# Patient Record
Sex: FEMALE | Race: WHITE | Employment: OTHER | URBAN - METROPOLITAN AREA
[De-identification: names, ages, dates, MRNs, and addresses within clinical notes are randomized per-mention and may not be internally consistent; named-entity substitution may affect disease eponyms.]

---

## 2020-11-26 LAB — SARS-COV-2: ABNORMAL

## 2020-11-29 ENCOUNTER — HOSPITAL ENCOUNTER (INPATIENT)
Age: 75
LOS: 17 days | Discharge: NURSING, REHAB OR LONG-TERM CARE | DRG: 207 | End: 2020-12-16
Attending: INTERNAL MEDICINE | Admitting: INTERNAL MEDICINE
Payer: MEDICARE

## 2020-11-29 PROBLEM — E66.01 OBESITY, CLASS III, BMI 40-49.9 (MORBID OBESITY) (HCC): Chronic | Status: ACTIVE | Noted: 2020-11-29

## 2020-11-29 PROBLEM — J96.00 ACUTE RESPIRATORY FAILURE DUE TO COVID-19 (HCC): Status: ACTIVE | Noted: 2020-11-29

## 2020-11-29 PROBLEM — U07.1 ACUTE RESPIRATORY FAILURE DUE TO COVID-19 (HCC): Status: ACTIVE | Noted: 2020-11-29

## 2020-11-29 PROBLEM — E66.01 MORBID OBESITY WITH BMI OF 40.0-44.9, ADULT (HCC): Status: ACTIVE | Noted: 2020-11-29

## 2020-11-29 PROBLEM — E11.9 TYPE 2 DIABETES MELLITUS, WITH LONG-TERM CURRENT USE OF INSULIN (HCC): Status: ACTIVE | Noted: 2020-11-29

## 2020-11-29 PROBLEM — Z79.4 TYPE 2 DIABETES MELLITUS, WITH LONG-TERM CURRENT USE OF INSULIN (HCC): Status: ACTIVE | Noted: 2020-11-29

## 2020-11-29 PROBLEM — I10 ESSENTIAL HYPERTENSION: Status: ACTIVE | Noted: 2020-11-29

## 2020-11-29 LAB
A/G RATIO: 0.9 (ref 1.1–2.2)
ABO/RH: NORMAL
ALBUMIN SERPL-MCNC: 2.9 G/DL (ref 3.4–5)
ALP BLD-CCNC: 64 U/L (ref 40–129)
ALT SERPL-CCNC: 23 U/L (ref 10–40)
ANION GAP SERPL CALCULATED.3IONS-SCNC: 9 MMOL/L (ref 3–16)
ANTIBODY SCREEN: NORMAL
AST SERPL-CCNC: 28 U/L (ref 15–37)
BASE EXCESS ARTERIAL: 3.9 MMOL/L (ref -3–3)
BASE EXCESS ARTERIAL: 6.6 MMOL/L (ref -3–3)
BASOPHILS ABSOLUTE: 0 K/UL (ref 0–0.2)
BASOPHILS RELATIVE PERCENT: 0.2 %
BILIRUB SERPL-MCNC: 0.5 MG/DL (ref 0–1)
BUN BLDV-MCNC: 22 MG/DL (ref 7–20)
CALCIUM SERPL-MCNC: 8.2 MG/DL (ref 8.3–10.6)
CARBOXYHEMOGLOBIN ARTERIAL: 0.4 % (ref 0–1.5)
CARBOXYHEMOGLOBIN ARTERIAL: 0.5 % (ref 0–1.5)
CHLORIDE BLD-SCNC: 94 MMOL/L (ref 99–110)
CO2: 34 MMOL/L (ref 21–32)
CREAT SERPL-MCNC: 1.2 MG/DL (ref 0.6–1.2)
D DIMER: 747 NG/ML DDU (ref 0–229)
EOSINOPHILS ABSOLUTE: 0 K/UL (ref 0–0.6)
EOSINOPHILS RELATIVE PERCENT: 0 %
FIBRINOGEN: 583 MG/DL (ref 200–397)
GFR AFRICAN AMERICAN: 53
GFR NON-AFRICAN AMERICAN: 44
GLOBULIN: 3.2 G/DL
GLUCOSE BLD-MCNC: 300 MG/DL (ref 70–99)
GLUCOSE BLD-MCNC: 347 MG/DL (ref 70–99)
HCO3 ARTERIAL: 27.5 MMOL/L (ref 21–29)
HCO3 ARTERIAL: 31 MMOL/L (ref 21–29)
HCT VFR BLD CALC: 43 % (ref 36–48)
HEMOGLOBIN, ART, EXTENDED: 14.8 G/DL (ref 12–16)
HEMOGLOBIN, ART, EXTENDED: 15 G/DL (ref 12–16)
HEMOGLOBIN: 14.3 G/DL (ref 12–16)
INR BLD: 1.11 (ref 0.86–1.14)
LACTATE DEHYDROGENASE: 412 U/L (ref 100–190)
LYMPHOCYTES ABSOLUTE: 0.5 K/UL (ref 1–5.1)
LYMPHOCYTES RELATIVE PERCENT: 4.7 %
MAGNESIUM: 1.7 MG/DL (ref 1.8–2.4)
MCH RBC QN AUTO: 27.1 PG (ref 26–34)
MCHC RBC AUTO-ENTMCNC: 33.3 G/DL (ref 31–36)
MCV RBC AUTO: 81.4 FL (ref 80–100)
METHEMOGLOBIN ARTERIAL: 0.5 %
METHEMOGLOBIN ARTERIAL: 0.6 %
MONOCYTES ABSOLUTE: 0.3 K/UL (ref 0–1.3)
MONOCYTES RELATIVE PERCENT: 2.7 %
NEUTROPHILS ABSOLUTE: 10.1 K/UL (ref 1.7–7.7)
NEUTROPHILS RELATIVE PERCENT: 92.4 %
O2 CONTENT ARTERIAL: 16 ML/DL
O2 CONTENT ARTERIAL: 18 ML/DL
O2 SAT, ARTERIAL: 76.5 %
O2 SAT, ARTERIAL: 89.5 %
O2 THERAPY: ABNORMAL
O2 THERAPY: ABNORMAL
PCO2 ARTERIAL: 38.2 MMHG (ref 35–45)
PCO2 ARTERIAL: 43.5 MMHG (ref 35–45)
PDW BLD-RTO: 14.4 % (ref 12.4–15.4)
PERFORMED ON: ABNORMAL
PH ARTERIAL: 7.47 (ref 7.35–7.45)
PH ARTERIAL: 7.47 (ref 7.35–7.45)
PHOSPHORUS: 3.6 MG/DL (ref 2.5–4.9)
PLATELET # BLD: 235 K/UL (ref 135–450)
PMV BLD AUTO: 9.7 FL (ref 5–10.5)
PO2 ARTERIAL: 38.2 MMHG (ref 75–108)
PO2 ARTERIAL: 53.4 MMHG (ref 75–108)
POTASSIUM SERPL-SCNC: 3.1 MMOL/L (ref 3.5–5.1)
PROCALCITONIN: 0.11 NG/ML (ref 0–0.15)
PROTHROMBIN TIME: 12.9 SEC (ref 10–13.2)
RBC # BLD: 5.28 M/UL (ref 4–5.2)
SODIUM BLD-SCNC: 137 MMOL/L (ref 136–145)
TCO2 ARTERIAL: 28.7 MMOL/L
TCO2 ARTERIAL: 32.4 MMOL/L
TOTAL PROTEIN: 6.1 G/DL (ref 6.4–8.2)
WBC # BLD: 11 K/UL (ref 4–11)

## 2020-11-29 PROCEDURE — 84100 ASSAY OF PHOSPHORUS: CPT

## 2020-11-29 PROCEDURE — 6360000002 HC RX W HCPCS: Performed by: INTERNAL MEDICINE

## 2020-11-29 PROCEDURE — 99223 1ST HOSP IP/OBS HIGH 75: CPT | Performed by: INTERNAL MEDICINE

## 2020-11-29 PROCEDURE — 86900 BLOOD TYPING SEROLOGIC ABO: CPT

## 2020-11-29 PROCEDURE — 82728 ASSAY OF FERRITIN: CPT

## 2020-11-29 PROCEDURE — 85384 FIBRINOGEN ACTIVITY: CPT

## 2020-11-29 PROCEDURE — XW13325 TRANSFUSION OF CONVALESCENT PLASMA (NONAUTOLOGOUS) INTO PERIPHERAL VEIN, PERCUTANEOUS APPROACH, NEW TECHNOLOGY GROUP 5: ICD-10-PCS | Performed by: INTERNAL MEDICINE

## 2020-11-29 PROCEDURE — 85379 FIBRIN DEGRADATION QUANT: CPT

## 2020-11-29 PROCEDURE — 94761 N-INVAS EAR/PLS OXIMETRY MLT: CPT

## 2020-11-29 PROCEDURE — 86850 RBC ANTIBODY SCREEN: CPT

## 2020-11-29 PROCEDURE — 86901 BLOOD TYPING SEROLOGIC RH(D): CPT

## 2020-11-29 PROCEDURE — 82803 BLOOD GASES ANY COMBINATION: CPT

## 2020-11-29 PROCEDURE — 6370000000 HC RX 637 (ALT 250 FOR IP): Performed by: INTERNAL MEDICINE

## 2020-11-29 PROCEDURE — 36600 WITHDRAWAL OF ARTERIAL BLOOD: CPT

## 2020-11-29 PROCEDURE — 2000000000 HC ICU R&B

## 2020-11-29 PROCEDURE — 80053 COMPREHEN METABOLIC PANEL: CPT

## 2020-11-29 PROCEDURE — 83615 LACTATE (LD) (LDH) ENZYME: CPT

## 2020-11-29 PROCEDURE — 85610 PROTHROMBIN TIME: CPT

## 2020-11-29 PROCEDURE — 2700000000 HC OXYGEN THERAPY PER DAY

## 2020-11-29 PROCEDURE — 86140 C-REACTIVE PROTEIN: CPT

## 2020-11-29 PROCEDURE — 84145 PROCALCITONIN (PCT): CPT

## 2020-11-29 PROCEDURE — XW033E5 INTRODUCTION OF REMDESIVIR ANTI-INFECTIVE INTO PERIPHERAL VEIN, PERCUTANEOUS APPROACH, NEW TECHNOLOGY GROUP 5: ICD-10-PCS | Performed by: INTERNAL MEDICINE

## 2020-11-29 PROCEDURE — 85025 COMPLETE CBC W/AUTO DIFF WBC: CPT

## 2020-11-29 PROCEDURE — 36415 COLL VENOUS BLD VENIPUNCTURE: CPT

## 2020-11-29 PROCEDURE — 83735 ASSAY OF MAGNESIUM: CPT

## 2020-11-29 RX ORDER — INSULIN GLARGINE 100 [IU]/ML
45 INJECTION, SOLUTION SUBCUTANEOUS NIGHTLY
Status: DISCONTINUED | OUTPATIENT
Start: 2020-11-30 | End: 2020-11-29

## 2020-11-29 RX ORDER — MAGNESIUM SULFATE 1 G/100ML
1 INJECTION INTRAVENOUS PRN
Status: DISCONTINUED | OUTPATIENT
Start: 2020-11-29 | End: 2020-12-16 | Stop reason: HOSPADM

## 2020-11-29 RX ORDER — INSULIN GLARGINE 100 [IU]/ML
45 INJECTION, SOLUTION SUBCUTANEOUS NIGHTLY
Status: DISCONTINUED | OUTPATIENT
Start: 2020-11-29 | End: 2020-12-01

## 2020-11-29 RX ORDER — DEXTROSE MONOHYDRATE 50 MG/ML
100 INJECTION, SOLUTION INTRAVENOUS PRN
Status: DISCONTINUED | OUTPATIENT
Start: 2020-11-29 | End: 2020-12-16 | Stop reason: HOSPADM

## 2020-11-29 RX ORDER — ALBUTEROL SULFATE 90 UG/1
2 AEROSOL, METERED RESPIRATORY (INHALATION) EVERY 4 HOURS PRN
Status: DISCONTINUED | OUTPATIENT
Start: 2020-11-29 | End: 2020-12-16 | Stop reason: HOSPADM

## 2020-11-29 RX ORDER — PANTOPRAZOLE SODIUM 40 MG/1
40 TABLET, DELAYED RELEASE ORAL
Status: DISCONTINUED | OUTPATIENT
Start: 2020-11-30 | End: 2020-12-02

## 2020-11-29 RX ORDER — ACETAMINOPHEN 325 MG/1
650 TABLET ORAL EVERY 4 HOURS PRN
Status: DISCONTINUED | OUTPATIENT
Start: 2020-11-29 | End: 2020-12-16 | Stop reason: HOSPADM

## 2020-11-29 RX ORDER — BENZONATATE 100 MG/1
200 CAPSULE ORAL 3 TIMES DAILY PRN
Status: DISCONTINUED | OUTPATIENT
Start: 2020-11-29 | End: 2020-12-02

## 2020-11-29 RX ORDER — POTASSIUM CHLORIDE 7.45 MG/ML
10 INJECTION INTRAVENOUS PRN
Status: DISCONTINUED | OUTPATIENT
Start: 2020-11-29 | End: 2020-12-03

## 2020-11-29 RX ORDER — ALBUTEROL SULFATE 90 UG/1
2 AEROSOL, METERED RESPIRATORY (INHALATION) 4 TIMES DAILY
Status: DISCONTINUED | OUTPATIENT
Start: 2020-11-29 | End: 2020-11-29

## 2020-11-29 RX ORDER — DEXTROSE MONOHYDRATE 25 G/50ML
12.5 INJECTION, SOLUTION INTRAVENOUS PRN
Status: DISCONTINUED | OUTPATIENT
Start: 2020-11-29 | End: 2020-12-16 | Stop reason: HOSPADM

## 2020-11-29 RX ORDER — NICOTINE POLACRILEX 4 MG
15 LOZENGE BUCCAL PRN
Status: DISCONTINUED | OUTPATIENT
Start: 2020-11-29 | End: 2020-12-16 | Stop reason: HOSPADM

## 2020-11-29 RX ORDER — ONDANSETRON 2 MG/ML
4 INJECTION INTRAMUSCULAR; INTRAVENOUS EVERY 6 HOURS PRN
Status: DISCONTINUED | OUTPATIENT
Start: 2020-11-29 | End: 2020-12-16 | Stop reason: HOSPADM

## 2020-11-29 RX ORDER — METHYLPREDNISOLONE SODIUM SUCCINATE 40 MG/ML
40 INJECTION, POWDER, LYOPHILIZED, FOR SOLUTION INTRAMUSCULAR; INTRAVENOUS EVERY 12 HOURS
Status: DISCONTINUED | OUTPATIENT
Start: 2020-11-29 | End: 2020-12-03

## 2020-11-29 RX ORDER — SODIUM CHLORIDE 0.9 % (FLUSH) 0.9 %
10 SYRINGE (ML) INJECTION PRN
Status: DISCONTINUED | OUTPATIENT
Start: 2020-11-29 | End: 2020-12-16 | Stop reason: HOSPADM

## 2020-11-29 RX ORDER — 0.9 % SODIUM CHLORIDE 0.9 %
30 INTRAVENOUS SOLUTION INTRAVENOUS PRN
Status: DISCONTINUED | OUTPATIENT
Start: 2020-11-29 | End: 2020-12-16 | Stop reason: HOSPADM

## 2020-11-29 RX ORDER — 0.9 % SODIUM CHLORIDE 0.9 %
20 INTRAVENOUS SOLUTION INTRAVENOUS ONCE
Status: COMPLETED | OUTPATIENT
Start: 2020-11-29 | End: 2020-11-30

## 2020-11-29 RX ORDER — ACETAMINOPHEN 650 MG/1
650 SUPPOSITORY RECTAL EVERY 4 HOURS PRN
Status: DISCONTINUED | OUTPATIENT
Start: 2020-11-29 | End: 2020-12-16 | Stop reason: HOSPADM

## 2020-11-29 RX ADMIN — INSULIN LISPRO 6 UNITS: 100 INJECTION, SOLUTION INTRAVENOUS; SUBCUTANEOUS at 22:08

## 2020-11-29 RX ADMIN — INSULIN LISPRO 9 UNITS: 100 INJECTION, SOLUTION INTRAVENOUS; SUBCUTANEOUS at 23:55

## 2020-11-29 RX ADMIN — METHYLPREDNISOLONE SODIUM SUCCINATE 40 MG: 40 INJECTION, POWDER, FOR SOLUTION INTRAMUSCULAR; INTRAVENOUS at 22:09

## 2020-11-29 RX ADMIN — ENOXAPARIN SODIUM 40 MG: 40 INJECTION SUBCUTANEOUS at 22:08

## 2020-11-29 NOTE — PROGRESS NOTES
Patient arrived by transport from [facility]. On 10 L HFNC and alert and oriented. RN notified Dr. Matt Thompson that patient arrived.

## 2020-11-30 ENCOUNTER — APPOINTMENT (OUTPATIENT)
Dept: VASCULAR LAB | Age: 75
DRG: 207 | End: 2020-11-30
Attending: INTERNAL MEDICINE
Payer: MEDICARE

## 2020-11-30 ENCOUNTER — APPOINTMENT (OUTPATIENT)
Dept: GENERAL RADIOLOGY | Age: 75
DRG: 207 | End: 2020-11-30
Attending: INTERNAL MEDICINE
Payer: MEDICARE

## 2020-11-30 PROBLEM — J44.9 COPD (CHRONIC OBSTRUCTIVE PULMONARY DISEASE) (HCC): Chronic | Status: ACTIVE | Noted: 2020-11-30

## 2020-11-30 LAB
A/G RATIO: 0.8 (ref 1.1–2.2)
ALBUMIN SERPL-MCNC: 2.7 G/DL (ref 3.4–5)
ALP BLD-CCNC: 64 U/L (ref 40–129)
ALT SERPL-CCNC: 20 U/L (ref 10–40)
ANION GAP SERPL CALCULATED.3IONS-SCNC: 11 MMOL/L (ref 3–16)
AST SERPL-CCNC: 24 U/L (ref 15–37)
BASOPHILS ABSOLUTE: 0 K/UL (ref 0–0.2)
BASOPHILS RELATIVE PERCENT: 0.1 %
BILIRUB SERPL-MCNC: 0.5 MG/DL (ref 0–1)
BLOOD BANK DISPENSE STATUS: NORMAL
BLOOD BANK PRODUCT CODE: NORMAL
BPU ID: NORMAL
BUN BLDV-MCNC: 20 MG/DL (ref 7–20)
C-REACTIVE PROTEIN: 94.9 MG/L (ref 0–5.1)
CALCIUM SERPL-MCNC: 8.5 MG/DL (ref 8.3–10.6)
CHLORIDE BLD-SCNC: 96 MMOL/L (ref 99–110)
CO2: 33 MMOL/L (ref 21–32)
CREAT SERPL-MCNC: 1.1 MG/DL (ref 0.6–1.2)
D DIMER: 774 NG/ML DDU (ref 0–229)
DESCRIPTION BLOOD BANK: NORMAL
EOSINOPHILS ABSOLUTE: 0 K/UL (ref 0–0.6)
EOSINOPHILS RELATIVE PERCENT: 0 %
ESTIMATED AVERAGE GLUCOSE: 200.1 MG/DL
FERRITIN: 802.4 NG/ML (ref 15–150)
FIBRINOGEN: 599 MG/DL (ref 200–397)
GFR AFRICAN AMERICAN: 59
GFR NON-AFRICAN AMERICAN: 49
GLOBULIN: 3.4 G/DL
GLUCOSE BLD-MCNC: 136 MG/DL (ref 70–99)
GLUCOSE BLD-MCNC: 189 MG/DL (ref 70–99)
GLUCOSE BLD-MCNC: 189 MG/DL (ref 70–99)
GLUCOSE BLD-MCNC: 205 MG/DL (ref 70–99)
GLUCOSE BLD-MCNC: 205 MG/DL (ref 70–99)
GLUCOSE BLD-MCNC: 238 MG/DL (ref 70–99)
GLUCOSE BLD-MCNC: 262 MG/DL (ref 70–99)
HBA1C MFR BLD: 8.6 %
HCT VFR BLD CALC: 41.4 % (ref 36–48)
HEMOGLOBIN: 13.8 G/DL (ref 12–16)
INR BLD: 1.09 (ref 0.86–1.14)
LYMPHOCYTES ABSOLUTE: 0.6 K/UL (ref 1–5.1)
LYMPHOCYTES RELATIVE PERCENT: 6.9 %
MAGNESIUM: 1.8 MG/DL (ref 1.8–2.4)
MCH RBC QN AUTO: 27.6 PG (ref 26–34)
MCHC RBC AUTO-ENTMCNC: 33.3 G/DL (ref 31–36)
MCV RBC AUTO: 82.9 FL (ref 80–100)
MONOCYTES ABSOLUTE: 0.4 K/UL (ref 0–1.3)
MONOCYTES RELATIVE PERCENT: 4 %
NEUTROPHILS ABSOLUTE: 8 K/UL (ref 1.7–7.7)
NEUTROPHILS RELATIVE PERCENT: 89 %
PDW BLD-RTO: 14.5 % (ref 12.4–15.4)
PERFORMED ON: ABNORMAL
PHOSPHORUS: 3.1 MG/DL (ref 2.5–4.9)
PLATELET # BLD: 213 K/UL (ref 135–450)
PMV BLD AUTO: 10 FL (ref 5–10.5)
POTASSIUM SERPL-SCNC: 2.9 MMOL/L (ref 3.5–5.1)
PROTHROMBIN TIME: 12.6 SEC (ref 10–13.2)
RBC # BLD: 4.99 M/UL (ref 4–5.2)
SODIUM BLD-SCNC: 140 MMOL/L (ref 136–145)
TOTAL PROTEIN: 6.1 G/DL (ref 6.4–8.2)
WBC # BLD: 9 K/UL (ref 4–11)

## 2020-11-30 PROCEDURE — 6360000002 HC RX W HCPCS: Performed by: INTERNAL MEDICINE

## 2020-11-30 PROCEDURE — 2500000003 HC RX 250 WO HCPCS: Performed by: INTERNAL MEDICINE

## 2020-11-30 PROCEDURE — 85384 FIBRINOGEN ACTIVITY: CPT

## 2020-11-30 PROCEDURE — 6370000000 HC RX 637 (ALT 250 FOR IP): Performed by: INTERNAL MEDICINE

## 2020-11-30 PROCEDURE — 6360000002 HC RX W HCPCS: Performed by: NURSE PRACTITIONER

## 2020-11-30 PROCEDURE — 83735 ASSAY OF MAGNESIUM: CPT

## 2020-11-30 PROCEDURE — 71045 X-RAY EXAM CHEST 1 VIEW: CPT

## 2020-11-30 PROCEDURE — 99291 CRITICAL CARE FIRST HOUR: CPT | Performed by: INTERNAL MEDICINE

## 2020-11-30 PROCEDURE — 83036 HEMOGLOBIN GLYCOSYLATED A1C: CPT

## 2020-11-30 PROCEDURE — 85610 PROTHROMBIN TIME: CPT

## 2020-11-30 PROCEDURE — 94660 CPAP INITIATION&MGMT: CPT

## 2020-11-30 PROCEDURE — 85025 COMPLETE CBC W/AUTO DIFF WBC: CPT

## 2020-11-30 PROCEDURE — 2000000000 HC ICU R&B

## 2020-11-30 PROCEDURE — 2700000000 HC OXYGEN THERAPY PER DAY

## 2020-11-30 PROCEDURE — 84100 ASSAY OF PHOSPHORUS: CPT

## 2020-11-30 PROCEDURE — 94761 N-INVAS EAR/PLS OXIMETRY MLT: CPT

## 2020-11-30 PROCEDURE — 36415 COLL VENOUS BLD VENIPUNCTURE: CPT

## 2020-11-30 PROCEDURE — 80053 COMPREHEN METABOLIC PANEL: CPT

## 2020-11-30 PROCEDURE — 2580000003 HC RX 258: Performed by: INTERNAL MEDICINE

## 2020-11-30 PROCEDURE — 93971 EXTREMITY STUDY: CPT

## 2020-11-30 PROCEDURE — 85379 FIBRIN DEGRADATION QUANT: CPT

## 2020-11-30 RX ORDER — MAGNESIUM SULFATE IN WATER 40 MG/ML
2 INJECTION, SOLUTION INTRAVENOUS ONCE
Status: COMPLETED | OUTPATIENT
Start: 2020-11-30 | End: 2020-11-30

## 2020-11-30 RX ADMIN — ENOXAPARIN SODIUM 40 MG: 40 INJECTION SUBCUTANEOUS at 09:47

## 2020-11-30 RX ADMIN — INSULIN GLARGINE 45 UNITS: 100 INJECTION, SOLUTION SUBCUTANEOUS at 00:13

## 2020-11-30 RX ADMIN — SODIUM CHLORIDE 20 ML: 9 INJECTION, SOLUTION INTRAVENOUS at 01:50

## 2020-11-30 RX ADMIN — MAGNESIUM SULFATE HEPTAHYDRATE 2 G: 40 INJECTION, SOLUTION INTRAVENOUS at 09:47

## 2020-11-30 RX ADMIN — POTASSIUM CHLORIDE 10 MEQ: 10 INJECTION, SOLUTION INTRAVENOUS at 15:34

## 2020-11-30 RX ADMIN — POTASSIUM CHLORIDE 10 MEQ: 10 INJECTION, SOLUTION INTRAVENOUS at 12:25

## 2020-11-30 RX ADMIN — REMDESIVIR 100 MG: 100 INJECTION, POWDER, LYOPHILIZED, FOR SOLUTION INTRAVENOUS at 22:39

## 2020-11-30 RX ADMIN — INSULIN LISPRO 6 UNITS: 100 INJECTION, SOLUTION INTRAVENOUS; SUBCUTANEOUS at 12:34

## 2020-11-30 RX ADMIN — INSULIN LISPRO 9 UNITS: 100 INJECTION, SOLUTION INTRAVENOUS; SUBCUTANEOUS at 18:13

## 2020-11-30 RX ADMIN — POTASSIUM CHLORIDE 10 MEQ: 10 INJECTION, SOLUTION INTRAVENOUS at 14:33

## 2020-11-30 RX ADMIN — POTASSIUM CHLORIDE 10 MEQ: 10 INJECTION, SOLUTION INTRAVENOUS at 17:18

## 2020-11-30 RX ADMIN — ENOXAPARIN SODIUM 40 MG: 40 INJECTION SUBCUTANEOUS at 22:30

## 2020-11-30 RX ADMIN — POTASSIUM CHLORIDE 10 MEQ: 10 INJECTION, SOLUTION INTRAVENOUS at 13:30

## 2020-11-30 RX ADMIN — POTASSIUM CHLORIDE 10 MEQ: 10 INJECTION, SOLUTION INTRAVENOUS at 08:34

## 2020-11-30 RX ADMIN — METHYLPREDNISOLONE SODIUM SUCCINATE 40 MG: 40 INJECTION, POWDER, FOR SOLUTION INTRAMUSCULAR; INTRAVENOUS at 22:30

## 2020-11-30 RX ADMIN — PANTOPRAZOLE SODIUM 40 MG: 40 TABLET, DELAYED RELEASE ORAL at 08:33

## 2020-11-30 RX ADMIN — REMDESIVIR 200 MG: 100 INJECTION, POWDER, LYOPHILIZED, FOR SOLUTION INTRAVENOUS at 00:30

## 2020-11-30 RX ADMIN — INSULIN LISPRO 3 UNITS: 100 INJECTION, SOLUTION INTRAVENOUS; SUBCUTANEOUS at 18:13

## 2020-11-30 RX ADMIN — INSULIN GLARGINE 45 UNITS: 100 INJECTION, SOLUTION SUBCUTANEOUS at 22:40

## 2020-11-30 RX ADMIN — INSULIN LISPRO 6 UNITS: 100 INJECTION, SOLUTION INTRAVENOUS; SUBCUTANEOUS at 04:21

## 2020-11-30 ASSESSMENT — PAIN SCALES - GENERAL
PAINLEVEL_OUTOF10: 0

## 2020-11-30 NOTE — PROGRESS NOTES
11/30/20 0805   Oxygen Therapy/Pulse Ox   O2 Therapy Oxygen humidified   $Oxygen $Daily Charge   O2 Device Heated high flow cannula   O2 Flow Rate (L/min) 35 L/min   FiO2  80 %   Resp 21   SpO2 96 %   $Pulse Oximeter $Spot check (multiple/continuous)

## 2020-11-30 NOTE — PROGRESS NOTES
11/30/20 0339   Oxygen Therapy/Pulse Ox   O2 Therapy Oxygen humidified   O2 Device Heated high flow cannula   O2 Flow Rate (L/min) 28 L/min   FiO2  100 %   SpO2 93 %   Pulse Oximeter Device Mode Continuous   Pulse Oximeter Device Location Finger   Blood Gas  Performed?  No

## 2020-11-30 NOTE — CONSULTS
[email or web address]-[email or web address]_18%20Telemed%20Guidance%20Updated%20March%2018. pdf?[email or web address]                      [email or web address]_18%20Telemed%20Guidance%20Updated%20March%2018. pdf?[email or web address]  [email or web address]. pdf         Results:  CBC:   Recent Labs     11/29/20 1946   WBC 11.0   HGB 14.3   HCT 43.0   MCV 81.4        BMP:   Recent Labs     11/29/20 1946      K 3.1*   CL 94*   CO2 34*   PHOS 3.6   BUN 22*   CREATININE 1.2     LIVER PROFILE:   Recent Labs     11/29/20 1946   AST 28   ALT 23   BILITOT 0.5   ALKPHOS 64     PT/INR:   Recent Labs     11/29/20 1946   PROTIME 12.9   INR 1.11           Echocardiogram: None in Epic   PFT: None in Epic         Assessment:  Active Problems:    Acute respiratory failure due to COVID-19 [facility])    Essential hypertension    Obesity, Class III, BMI 40-49.9 (morbid obesity) ([facility]    Type 2 diabetes mellitus, with long-term current use of insulin (HCC)    DM (diabetes mellitus), secondary uncontrolled ([facility]    Morbid obesity with BMI of 40.0-44.9, adult ([facility]  Resolved Problems:    * No resolved hospital problems.  *          Plan:   Oxygen supplementation to keep saturation between 90 to 94%  Patient was failing the high flow oxygen and for which reason Vapotherm oxygen was ordered  Titrate oxygen as per the above parameters  Monitor for any worsening respiratory compromise or hypoxemia which is progressively worse  If patient has worsening respite status or hypoxemia which is not controlled by Vapotherm then patient may require intubation and mechanical ventilator support  Patient has been started on Lovenox by the admitting provider-consider changing to subcutaneous heparin secondary morbid obesity if deemed appropriate  Patient was started on IV Solu-Medrol  Patient's blood sugar are elevated and will worsen with the Solu-Medrol  Patient has been started on Lantus insulin and titration of the dose of Lantus as per blood glucose monitoring  BGM with SSI  Will start patient on remdesivir  Patient may require convalescent plasma  Monitor input output and BMP  Correct electrolytes on whenever necessary basis  Bronchodilators can be given on as needed basis  PUD prophylaxis as per IM    Patient's clinical status is precarious and has potential for further decompensation and needs to monitor closely in the ICU    Patient has a high potential of getting intubated and being put on mechanical ventilatory support          Electronically signed by:  Shruti Garcia MD    11/29/2020    10:57 PM.

## 2020-11-30 NOTE — H&P
Hospital Medicine History & Physical      Patient:  Quang Ash  :   [birth year]  MRN:   [id number]  Date of Service: 20    CHIEF COMPLAINT:  dyspnea    HISTORY OF PRESENT ILLNESS:    Quang Ash is a 70 y.o. female. She was accepted in transfer from [facility]. She presentd there w/ respiratory failure requiring 10L/min O2 supplementation. She was diagnosed with COVID19 on . She was tested 3 days prior to that at [facility] but she never got the result. The documentation accompanying her includes only the  result. She estimates she has been symptomatic for about two weeks overall. Initial symptoms were profound fatigue, headache, loss of appetite and sense of smell & taste, and finally cough. Dyspnea has evolved over time. She relates her dyspnea has rapidly accelerated over the past 2 - 3 days. Cough has been usually non-productive, but sometimes she expectorates white sputum. Denies pleuritic pain and hemoptysis. She reports a h/o COPD CHF. She does not require supplemental O2 at baseline. Review of Systems:  All pertinent positives and negatives are as noted in the HPI section. All other systems were reviewed and are negative. Past Medical History:   Diagnosis Date    Hyperlipidemia     Hypertension     Neuropathy     Type II or unspecified type diabetes mellitus without mention of complication, not stated as uncontrolled ([facility]        Past Surgical History:   Procedure Laterality Date    APPENDECTOMY      CHOLECYSTECTOMY      HUMERUS FRACTURE SURGERY      TUBAL LIGATION      TUMOR REMOVAL           Prior to Admission medications    Medication Sig Start Date End Date Taking?  Authorizing Provider   metoprolol (LOPRESSOR) 100 MG tablet  10/6/14  Yes Historical Provider, MD   furosemide (LASIX) 40 MG tablet  14   Historical Provider, MD   gabapentin (NEURONTIN) 600 MG tablet  14   Historical Provider, MD   LEVEMIR FLEXPEN 100 UNIT/ML injection pen  10/9/14   Historical Provider, MD   Insulin Disposable Pump (V-GO 40) KIT  11/6/14   Historical Provider, MD   VICTOZA 18 MG/3ML SOPN SC injection  10/3/14   Historical Provider, MD   HUMALOG 100 UNIT/ML injection vial  11/5/14   Historical Provider, MD   acetaminophen-codeine (TYLENOL #3) 300-30 MG per tablet  10/11/14   Cheryl Provider, MD   HYDROcodone-acetaminophen (NORCO) 5-325 MG per tablet Take 1 tablet by mouth every 6 hours as needed for Pain. 12/26/14   Luis Chicas MD   acetaminophen-codeine (TYLENOL/CODEINE #3) 300-30 MG per tablet Take 1 tablet by mouth every 4 hours as needed for Pain. 11/7/14   Malina Morgan MD   methocarbamol (ROBAXIN) 500 MG tablet Take 1 tablet by mouth 4 times daily. 10/30/14   Malina Morgan MD   acetaminophen-codeine (TYLENOL/CODEINE #3) 300-30 MG per tablet Take 1 tablet by mouth every 4 hours as needed for Pain. 10/16/14   Malina Morgan MD       Allergies:   Ibuprofen and Lyrica [pregabalin]    Social:   reports that she has never smoked. She does not have any smokeless tobacco history on file. has no history on file for alcohol. Social History     Substance and Sexual Activity   Drug Use Not on file       Family History  Family History reviewed. No pertinent family history. PHYSICAL EXAM:  I performed this physical examination. Vitals:  Patient Vitals for the past 24 hrs:   BP Temp Temp src Pulse Resp SpO2 Weight   11/29/20 1850 (!) 141/59 96 °F (35.6 °C) Oral 73 28 91 % 241 lb 2.9 oz (109.4 kg)     No intake or output data in the 24 hours ending 11/29/20 1921    Vent Settings:  10 L/min HFNC    GEN:  Appearance:  Obese female who appears acutely ill but NAD . Level of Consciousness:  alert . Orientation:  ull    HEENT: Sclera anicteric.  no conjunctival chemosis. moist mucus membranes. no specific or diagnostic oral lesions. NECK:  no signs of meningismus. Jugular veins not distended.   Carotid pulses  2+.  no cervical lymphadenopathy. no thyromegaly. CV:  regular rhythm. normal S1 & S2.    no murmur. no rub.  no gallop. PULM:  Chest excursion is symmetric. Breath sounds are diminished throughout. Adventitious sounds:  Crackles throughout all fields. AB:  Abdominal shape is obese. Bowel sounds are active. Generally soft to palpation. no tenderness is present. no involuntary guarding. no rebound guarding. EXTR:  Skin is warm. Capillary refill brisk. no specific or pathognomic rash. no clubbing. 1+ pitting edema about the ankles w/ some associated distal skin thickening. Patch of skin hyperemia of left medial leg. Not tender but sensation is poor. No assymetry othrwise and no venous cords. No active wound or ulcer. LABS:  [facility] (dated 11/26 - 29/20)    CBC: WBC=16.2  82% segs, 6% lymph (UYM=560)   Hgb = 14.7   Plt = 310  Creat = 1.4 -> 1.7 on serial testing since 11/26    ABG #1:  7.48 / 39 / 48 / 28 (+4.3)  On 5L/min O2  ABG #2:  7.43 / 32 / 85 / 21 (-2.3)   on 10L/min via HFNC    Procalcitonin was < 0.05 on 11/26    IMAGING:  [facility]  -  Evolving changes since 11/26. On 11/26 there was a faint RLL opacity. Since that time bilateral peripheral opacities have evolved. Active Hospital Problems    Diagnosis Date Noted    COPD (chronic obstructive pulmonary disease) ([address] [J44.9] 11/30/2020    Acute respiratory failure due to COVID-19 ([address] [U07.1, J96.00] 11/29/2020    Essential hypertension [I10] 11/29/2020    Obesity, Class III, BMI 40-49.9 (morbid obesity) ([address] [E66.01] 11/29/2020    Type 2 diabetes mellitus, with long-term current use of insulin ([address] [E11.9, Z79.4] 11/29/2020       ASSESSMENT & PLAN  Respiratory failure, COPD, COVID19  -  Titrate respiratory support according to patient's needs and advanced care plan. Currently patient is requiring 10L/min O2 support. -  Patient seems to be at significant risk for progression of respiratory failure.   She was advised of the potential need for intubation and mechanical ventilation. She was agreeable if necessary.  -  Repeat all pertinent inflammatory labs. Obtain an ABG. The first sample obtained was venous. The second sample was arterial with a pO2 = 53. Vapotherm made available if needed. -  Start BMI-adjusted low-intensity anticoagulation w/ lovenox 40mg bid. There is suspicion for LLE DVT though so will request duplex venous U/S as well. -  Start solumedrol 40mg IV bid, remdesivir, and 1U convalescent plasma. DM2  -  Hold all oral antidiabetic agents. Start s.c. Insulin regimen based on home regimen. DVT prophylaxis: SCDs, lovenox 40 bid  Stress ulcers:  protonix  Code Status:  Ambivalent. Full code.   Disposition:  Inpatient    Ashly Mai MD

## 2020-11-30 NOTE — PROGRESS NOTES
11/30/20 1138   Oxygen Therapy/Pulse Ox   O2 Therapy Oxygen humidified   O2 Device Heated high flow cannula   O2 Flow Rate (L/min) 35 L/min   FiO2  80 %   Resp 24   SpO2 92 %   Humidification Temp 37

## 2020-11-30 NOTE — PROGRESS NOTES
11/30/20 0013   Oxygen Therapy/Pulse Ox   O2 Therapy Oxygen humidified   O2 Device Heated high flow cannula   O2 Flow Rate (L/min) 30 L/min   FiO2  100 %   Resp 9   SpO2 96 %   Pulse Oximeter Device Mode Continuous   Pulse Oximeter Device Location Finger   Blood Gas  Performed?  No

## 2020-11-30 NOTE — FLOWSHEET NOTE
11/30/20 1834   Encounter Summary   Services provided to: Patient not available   Referral/Consult From: [address] Yes  (Left AD materials with nurse to provide to patient)   Unable to visit patient in person (COVID+). Left materials with nurse to be give to patient.

## 2020-11-30 NOTE — PROGRESS NOTES
Pulmonary & Critical Care Inpatient Progress Note   Kj Winston MD     REASON FOR TODAY'S VISIT:  Critical care management    SUBJECTIVE:   Remains on vapotherm support, 35 LPM and 80% FIO2    Scheduled Meds:   methylPREDNISolone  40 mg Intravenous Q12H    insulin lispro  0.08 Units/kg Subcutaneous TID WC    insulin lispro  0-18 Units Subcutaneous TID WC    insulin lispro  0-9 Units Subcutaneous Nightly    enoxaparin  40 mg Subcutaneous BID    insulin glargine  45 Units Subcutaneous Nightly    remdesivir IVPB  100 mg Intravenous Q24H    pantoprazole  40 mg Oral QAM AC       Continuous Infusions:   dextrose         PRN Meds:  sodium chloride flush, potassium chloride, magnesium sulfate, acetaminophen **OR** acetaminophen, ondansetron, albuterol sulfate HFA, benzonatate, glucose, dextrose, glucagon (rDNA), dextrose, sodium chloride, albuterol sulfate HFA **AND** ipratropium **AND** MDI Treatment    ALLERGIES:  Patient is allergic to contrast [iodides]; ibuprofen; and lipitor [atorvastatin calcium]. Objective:   PHYSICAL EXAM:  BP (!) 188/91   Pulse 77   Temp 97.7 °F (36.5 °C) (Bladder)   Resp 25   Ht 5' 4\" (1.626 m)   Wt 242 lb 1 oz (109.8 kg)   SpO2 91%   BMI 41.55 kg/m²    Physical Exam  Constitutional:       General: She is not in acute distress. Appearance: She is well-developed. She is not diaphoretic. HENT:      Head: Normocephalic and atraumatic. Mouth/Throat:      Pharynx: No oropharyngeal exudate. Eyes:      Pupils: Pupils are equal, round, and reactive to light. Neck:      Musculoskeletal: Neck supple. Vascular: No JVD. Cardiovascular:      Heart sounds: Normal heart sounds. No murmur. No friction rub. No gallop. Pulmonary:      Effort: Pulmonary effort is normal.      Breath sounds: No wheezing or rales. Abdominal:      General: Bowel sounds are normal. There is no distension. Palpations: Abdomen is soft. Tenderness: There is no abdominal tenderness.

## 2020-11-30 NOTE — PROGRESS NOTES
all pertinent inflammatory labs. Obtain an ABG. The first sample obtained was venous. The second sample was arterial with a pO2 = 53. Vapotherm made available if needed. -  Started BMI-adjusted low-intensity anticoagulation w/ lovenox 40mg bid. There is suspicion for LLE DVT though so will request duplex venous U/S as well. -  Started solumedrol 40mg IV bid, remdesivir, and 1U convalescent plasma.     DM2  -  Hold all oral antidiabetic agents. Start s.c. Insulin regimen based on home regimen.     DVT Prophylaxis: lovenox bid  Diet: Diet NPO Effective Now Exceptions are: Sips with Meds  Code Status: Full Code    PT/OT Eval Status: not possible    Dispo - pending workup, icu care    Lisy Gray MD

## 2020-11-30 NOTE — PROGRESS NOTES
RESPIRATORY THERAPY ASSESSMENT    [facility]  Record Number:  [id number]  Age: 70 y.o. Gender: female  : 1945  Today's Date:  2020  Room:  0229/0229-01    Assessment     Is the patient being admitted for a COPD or Asthma exacerbation? No   (If yes the patient will be seen every 4 hours for the first 24 hours and then reassessed)    Patient Admission Diagnosis      Allergies  Allergies   Allergen Reactions    Ibuprofen     Lyrica [Pregabalin]      Side effects       Minimum Predicted Vital Capacity:     n/a          Actual Vital Capacity:      n/a              Pulmonary History:COPD and CHF/Pulmonary Edema  Home Oxygen Therapy:  room air  Home Respiratory Therapy:Albuterol inh PRN   Current Respiratory Therapy:  Atrovent and Albuterol inhaler           Respiratory Severity Index(RSI)   Patients with orders for inhalation medications, oxygen, or any therapeutic treatment modality will be placed on Respiratory Protocol. They will be assessed with the first treatment and at least every 72 hours thereafter. The following severity scale will be used to determine frequency of treatment intervention.     Smoking History: Pulmonary Disease or Smoking History, Greater than 15 pack year = 2    Social History  Social History     Tobacco Use    Smoking status: Never Smoker   Substance Use Topics    Alcohol use: Not on file    Drug use: Not on file       Recent Surgical History: None = 0  Past Surgical History  Past Surgical History:   Procedure Laterality Date    APPENDECTOMY      CHOLECYSTECTOMY      HUMERUS FRACTURE SURGERY      TUBAL LIGATION      TUMOR REMOVAL         Level of Consciousness: Alert, Oriented, and Cooperative = 0    Level of Activity: Bedridden, unresponsive or quadriplegic = 4    Respiratory Pattern: Increased; RR 21-30 = 1    Breath Sounds: Diminshed bilaterally and/or crackles = 2    Sputum   ,  ,    Cough: Strong, productive = 1    Vital Signs   BP (!) 141/59   Pulse 73   Temp 96 °F (35.6 °C) (Oral)   Resp 28   Wt 241 lb 2.9 oz (109.4 kg)   SpO2 91%   BMI 41.40 kg/m²   SPO2 (COPD values may differ): Less than 86% on room air or greater than 92% on FiO2 greater than 50% = 4    Peak Flow (asthma only): not applicable = 0    RSI: 67-05 = Q4WA (every four hours while awake) and Q4hrs PRN        Plan       Goals: medication delivery, mobilize retained secretions, volume expansion and improve oxygenation    Patient/caregiver was educated on the proper method of use for Respiratory Care Devices:  Yes      Level of patient/caregiver understanding able to:   ? Verbalize understanding   ? Demonstrate understanding       ? Teach back        ? Needs reinforcement       ? No available caregiver               ? Other:     Response to education:  Good     Is patient being placed on Home Treatment Regimen? No     Does the patient have everything they need prior to discharge? NA     Comments: pt seen on admission, eval at bedside     Plan of Care: albuterol and Atrovent inhalers       Electronically signed by Johnny Craig RCP on 11/29/2020 at 9:01 PM    Respiratory Protocol Guidelines     1. Assessment and treatment by Respiratory Therapy will be initiated for medication and therapeutic interventions upon initiation of aerosolized medication. 2. Physician will be contacted for respiratory rate (RR) greater than 35 breaths per minute. Therapy will be held for heart rate (HR) greater than 140 beats per minute, pending direction from physician. 3. Bronchodilators will be administered via Metered Dose Inhaler (MDI) with spacer when the following criteria are met:  a. Alert and cooperative     b. HR < 140 bpm  c. RR < 30 bpm                d. Can demonstrate a 2-3 second inspiratory hold  4. Bronchodilators will be administered via Hand Held Nebulizer [facility]) to patients when ANY of the following criteria are met  a. Incognizant or uncooperative          b.  Patients treated with HHN at Home        c. Unable to demonstrate proper use of MDI with spacer     d. RR > 30 bpm   5. Bronchodilators will be delivered via Metered Dose Inhaler (MDI), HHN, Aerogen to intubated patients on mechanical ventilation. 6. Inhalation medication orders will be delivered and/or substituted as outlined below. Aerosolized Medications Ordering and Administration Guidelines:    1. All Medications will be ordered by a physician, and their frequency and/or modality will be adjusted as defined by the patients Respiratory Severity Index (RSI) score. 2. If the patient does not have documented COPD, consider discontinuing anticholinergics when RSI is less than 9.  3. If the bronchospasm worsens (increased RSI), then the bronchodilator frequency can be increased to a maximum of every 4 hours. If greater than every 4 hours is required, the physician will be contacted. 4. If the bronchospasm improves, the frequency of the bronchodilator can be decreased, based on the patient's RSI, but not less than home treatment regimen frequency. 5. Bronchodilator(s) will be discontinued if patient has a RSI less than 9 and has received no scheduled or as needed treatment for 72  Hrs. Patients Ordered on a Mucolytic Agent:    1. Must always be administered with a bronchodilator. 2. Discontinue if patient experiences worsened bronchospasm, or secretions have lessened to the point that the patient is able to clear them with a cough. Anti-inflammatory and Combination Medications:    1. If the patient lacks prior history of lung disease, is not using inhaled anti-inflammatory medication at home, and lacks wheezing by examination or by history for at least 24 hours, contact physician for possible discontinuation.

## 2020-11-30 NOTE — PROGRESS NOTES
Granddaughter updated on Pt's status and plan of care. OK'd by patient to discuss with granddaughter.

## 2020-11-30 NOTE — PROGRESS NOTES
Comprehensive Nutrition Assessment    Type and Reason for Visit:  Initial, Positive Nutrition Screen(decreased appetite, weight loss)    Nutrition Recommendations/Plan:   1. NPO at this time  2. If/when enteral nutrition initiated recommend  Vital High Protein at trophic rate, increase when medically appropriate 10 ml every 4 hours to goal rate of 60 ml/hr providing 1208 calories, 105 grams of protein, 966 ml free water + Water flush 30 ml every 4 hours    Nutrition Assessment:  Patient admitted with acute respiratory failure due to COVID 19. Patient was transferred to ICU yesterday due to worsening respiratory status, currently on vapotherm with high risk for intubation per pulmonary. Nutritional status compromised with reported decreased appetite and weight loss prior to admission. At risk for further decline with current condition interfering with nutritional delivery. Will closely monitor medical condition, nutrition progression and plan of care. Malnutrition Assessment:  Malnutrition Status:   At risk for malnutrition (Comment)(limited access to nutrition + increased nutrient needs for compromised lung function)      Estimated Daily Nutrient Needs:  Energy (kcal):  6018-8682; Weight Used for Energy Requirements:  Current(109.8 kg)     Protein (g):  ; Weight Used for Protein Requirements:  Ideal(1.5-2.0)        Fluid (ml/day):  per MD with current condition; Method Used for Fluid Requirements:         Nutrition Related Findings:  no Edema; no BM noted yet; K 2.9 this am requiring replacement; blood sugars improving from greater than 300 mg/dl to less than 200 mg/dl in the past 24 hours-may vary pending steroid need      Wounds:  (redness on L ankle)       Current Nutrition Therapies:    Diet NPO Effective Now Exceptions are: Sips with Meds  Current Tube Feeding (TF) Orders:  · Goal TF & Flush Orders Provides: Start Vital 1.2 AF at trophic rate, increase when medically appropriate 10 ml every 4 hours to goal rate of 60 ml/hr providing 1208 calories, 105 grams of protein, 966 ml free water + Water flush 30 ml every 4 hours    Anthropometric Measures:  · Height: 5' 4\" (162.6 cm)  · Current Body Weight: 242 lb 1 oz (109.8 kg)   · Admission Body Weight: 241 lb 2 oz (109.4 kg)    · Ideal Body Weight: 120 lbs; % Ideal Body Weight 201.7 %   · BMI: 41.5  · BMI Categories: Obese Class 3 (BMI 40.0 or greater)       Nutrition Diagnosis:   · Inadequate energy intake related to lack or limited access to food, impaired respiratory function as evidenced by NPO or clear liquid status due to medical condition      Nutrition Interventions:   Food and/or Nutrient Delivery:  Continue NPO  Nutrition Education/Counseling:  No recommendation at this time   Coordination of Nutrition Care:  Continue to monitor while inpatient    Goals:  Patient will tolerate nutrition initiation without worsening respiratory function or blood sugars greater than 180 mg/dl. Nutrition Monitoring and Evaluation:   Food/Nutrient Intake Outcomes:  Enteral Nutrition Intake/Tolerance  Physical Signs/Symptoms Outcomes:  Biochemical Data, Nutrition Focused Physical Findings     Discharge Planning:     Too soon to determine     Electronically signed by Ligia Plascencia, [address],  on 11/30/20 at 10:34 AM EST    Contact: Office: [phone]; [address]: [phone]

## 2020-12-01 LAB
A/G RATIO: 0.8 (ref 1.1–2.2)
ALBUMIN SERPL-MCNC: 2.6 G/DL (ref 3.4–5)
ALP BLD-CCNC: 67 U/L (ref 40–129)
ALT SERPL-CCNC: 18 U/L (ref 10–40)
ANION GAP SERPL CALCULATED.3IONS-SCNC: 8 MMOL/L (ref 3–16)
AST SERPL-CCNC: 29 U/L (ref 15–37)
BASOPHILS ABSOLUTE: 0 K/UL (ref 0–0.2)
BASOPHILS RELATIVE PERCENT: 0.4 %
BILIRUB SERPL-MCNC: 0.5 MG/DL (ref 0–1)
BILIRUBIN URINE: NEGATIVE
BLOOD, URINE: ABNORMAL
BUN BLDV-MCNC: 19 MG/DL (ref 7–20)
CALCIUM SERPL-MCNC: 8.2 MG/DL (ref 8.3–10.6)
CHLORIDE BLD-SCNC: 102 MMOL/L (ref 99–110)
CLARITY: ABNORMAL
CO2: 33 MMOL/L (ref 21–32)
COLOR: ABNORMAL
COMMENT UA: ABNORMAL
CREAT SERPL-MCNC: 0.9 MG/DL (ref 0.6–1.2)
D DIMER: 918 NG/ML DDU (ref 0–229)
EOSINOPHILS ABSOLUTE: 0 K/UL (ref 0–0.6)
EOSINOPHILS RELATIVE PERCENT: 0 %
EPITHELIAL CELLS, UA: ABNORMAL /HPF (ref 0–5)
FIBRINOGEN: 599 MG/DL (ref 200–397)
GFR AFRICAN AMERICAN: >60
GFR NON-AFRICAN AMERICAN: >60
GLOBULIN: 3.2 G/DL
GLUCOSE BLD-MCNC: 115 MG/DL (ref 70–99)
GLUCOSE BLD-MCNC: 160 MG/DL (ref 70–99)
GLUCOSE BLD-MCNC: 191 MG/DL (ref 70–99)
GLUCOSE BLD-MCNC: 198 MG/DL (ref 70–99)
GLUCOSE BLD-MCNC: 201 MG/DL (ref 70–99)
GLUCOSE URINE: 100 MG/DL
HCT VFR BLD CALC: 44.3 % (ref 36–48)
HEMOGLOBIN: 14.6 G/DL (ref 12–16)
INR BLD: 1.11 (ref 0.86–1.14)
KETONES, URINE: NEGATIVE MG/DL
LEUKOCYTE ESTERASE, URINE: ABNORMAL
LYMPHOCYTES ABSOLUTE: 0.6 K/UL (ref 1–5.1)
LYMPHOCYTES RELATIVE PERCENT: 4.8 %
MAGNESIUM: 2.4 MG/DL (ref 1.8–2.4)
MCH RBC QN AUTO: 26.9 PG (ref 26–34)
MCHC RBC AUTO-ENTMCNC: 32.9 G/DL (ref 31–36)
MCV RBC AUTO: 81.7 FL (ref 80–100)
MICROSCOPIC EXAMINATION: YES
MONOCYTES ABSOLUTE: 0.4 K/UL (ref 0–1.3)
MONOCYTES RELATIVE PERCENT: 3 %
NEUTROPHILS ABSOLUTE: 11.2 K/UL (ref 1.7–7.7)
NEUTROPHILS RELATIVE PERCENT: 91.8 %
NITRITE, URINE: NEGATIVE
PDW BLD-RTO: 14.3 % (ref 12.4–15.4)
PERFORMED ON: ABNORMAL
PH UA: 7.5 (ref 5–8)
PHOSPHORUS: 2.6 MG/DL (ref 2.5–4.9)
PLATELET # BLD: 284 K/UL (ref 135–450)
PMV BLD AUTO: 9.7 FL (ref 5–10.5)
POTASSIUM SERPL-SCNC: 3.5 MMOL/L (ref 3.5–5.1)
PROTEIN UA: 100 MG/DL
PROTHROMBIN TIME: 12.9 SEC (ref 10–13.2)
RBC # BLD: 5.42 M/UL (ref 4–5.2)
RBC UA: >100 /HPF (ref 0–4)
RENAL EPITHELIAL, UA: ABNORMAL /HPF (ref 0–1)
SODIUM BLD-SCNC: 143 MMOL/L (ref 136–145)
SPECIFIC GRAVITY UA: 1.02 (ref 1–1.03)
TOTAL PROTEIN: 5.8 G/DL (ref 6.4–8.2)
URINE TYPE: ABNORMAL
UROBILINOGEN, URINE: 0.2 E.U./DL
WBC # BLD: 12.2 K/UL (ref 4–11)
WBC UA: ABNORMAL /HPF (ref 0–5)

## 2020-12-01 PROCEDURE — 2000000000 HC ICU R&B

## 2020-12-01 PROCEDURE — 85025 COMPLETE CBC W/AUTO DIFF WBC: CPT

## 2020-12-01 PROCEDURE — 83735 ASSAY OF MAGNESIUM: CPT

## 2020-12-01 PROCEDURE — 84100 ASSAY OF PHOSPHORUS: CPT

## 2020-12-01 PROCEDURE — 2580000003 HC RX 258: Performed by: INTERNAL MEDICINE

## 2020-12-01 PROCEDURE — 6370000000 HC RX 637 (ALT 250 FOR IP): Performed by: NURSE PRACTITIONER

## 2020-12-01 PROCEDURE — 6370000000 HC RX 637 (ALT 250 FOR IP): Performed by: INTERNAL MEDICINE

## 2020-12-01 PROCEDURE — 85379 FIBRIN DEGRADATION QUANT: CPT

## 2020-12-01 PROCEDURE — 81001 URINALYSIS AUTO W/SCOPE: CPT

## 2020-12-01 PROCEDURE — 99291 CRITICAL CARE FIRST HOUR: CPT | Performed by: INTERNAL MEDICINE

## 2020-12-01 PROCEDURE — 6370000000 HC RX 637 (ALT 250 FOR IP)

## 2020-12-01 PROCEDURE — 85610 PROTHROMBIN TIME: CPT

## 2020-12-01 PROCEDURE — 85384 FIBRINOGEN ACTIVITY: CPT

## 2020-12-01 PROCEDURE — 6360000002 HC RX W HCPCS: Performed by: INTERNAL MEDICINE

## 2020-12-01 PROCEDURE — 94761 N-INVAS EAR/PLS OXIMETRY MLT: CPT

## 2020-12-01 PROCEDURE — 94660 CPAP INITIATION&MGMT: CPT

## 2020-12-01 PROCEDURE — 2500000003 HC RX 250 WO HCPCS: Performed by: INTERNAL MEDICINE

## 2020-12-01 PROCEDURE — 80053 COMPREHEN METABOLIC PANEL: CPT

## 2020-12-01 PROCEDURE — 2700000000 HC OXYGEN THERAPY PER DAY

## 2020-12-01 PROCEDURE — 36415 COLL VENOUS BLD VENIPUNCTURE: CPT

## 2020-12-01 RX ORDER — METOPROLOL TARTRATE 50 MG/1
50 TABLET, FILM COATED ORAL 2 TIMES DAILY
Status: DISCONTINUED | OUTPATIENT
Start: 2020-12-01 | End: 2020-12-16 | Stop reason: HOSPADM

## 2020-12-01 RX ORDER — INSULIN GLARGINE 100 [IU]/ML
50 INJECTION, SOLUTION SUBCUTANEOUS NIGHTLY
Status: DISCONTINUED | OUTPATIENT
Start: 2020-12-01 | End: 2020-12-03

## 2020-12-01 RX ADMIN — INSULIN LISPRO 3 UNITS: 100 INJECTION, SOLUTION INTRAVENOUS; SUBCUTANEOUS at 12:26

## 2020-12-01 RX ADMIN — INSULIN LISPRO 9 UNITS: 100 INJECTION, SOLUTION INTRAVENOUS; SUBCUTANEOUS at 17:27

## 2020-12-01 RX ADMIN — ENOXAPARIN SODIUM 40 MG: 40 INJECTION SUBCUTANEOUS at 09:08

## 2020-12-01 RX ADMIN — ACETAMINOPHEN 650 MG: 325 TABLET ORAL at 23:04

## 2020-12-01 RX ADMIN — INSULIN LISPRO 9 UNITS: 100 INJECTION, SOLUTION INTRAVENOUS; SUBCUTANEOUS at 12:27

## 2020-12-01 RX ADMIN — METOPROLOL TARTRATE 50 MG: 50 TABLET, FILM COATED ORAL at 23:04

## 2020-12-01 RX ADMIN — METHYLPREDNISOLONE SODIUM SUCCINATE 40 MG: 40 INJECTION, POWDER, FOR SOLUTION INTRAMUSCULAR; INTRAVENOUS at 09:07

## 2020-12-01 RX ADMIN — ENOXAPARIN SODIUM 40 MG: 40 INJECTION SUBCUTANEOUS at 23:03

## 2020-12-01 RX ADMIN — METOPROLOL TARTRATE 50 MG: 50 TABLET, FILM COATED ORAL at 12:08

## 2020-12-01 RX ADMIN — METHYLPREDNISOLONE SODIUM SUCCINATE 40 MG: 40 INJECTION, POWDER, FOR SOLUTION INTRAMUSCULAR; INTRAVENOUS at 23:03

## 2020-12-01 RX ADMIN — Medication: at 17:26

## 2020-12-01 RX ADMIN — REMDESIVIR 100 MG: 100 INJECTION, POWDER, LYOPHILIZED, FOR SOLUTION INTRAVENOUS at 23:13

## 2020-12-01 RX ADMIN — INSULIN LISPRO 9 UNITS: 100 INJECTION, SOLUTION INTRAVENOUS; SUBCUTANEOUS at 09:11

## 2020-12-01 RX ADMIN — INSULIN GLARGINE 50 UNITS: 100 INJECTION, SOLUTION SUBCUTANEOUS at 23:19

## 2020-12-01 RX ADMIN — INSULIN LISPRO 3 UNITS: 100 INJECTION, SOLUTION INTRAVENOUS; SUBCUTANEOUS at 17:26

## 2020-12-01 RX ADMIN — INSULIN LISPRO 3 UNITS: 100 INJECTION, SOLUTION INTRAVENOUS; SUBCUTANEOUS at 09:08

## 2020-12-01 ASSESSMENT — PAIN SCALES - GENERAL
PAINLEVEL_OUTOF10: 0
PAINLEVEL_OUTOF10: 2
PAINLEVEL_OUTOF10: 0
PAINLEVEL_OUTOF10: 0

## 2020-12-01 NOTE — CARE COORDINATION
CASE MANAGEMENT INITIAL ASSESSMENT      Reviewed chart and completed assessment via telephone with: pt, briefly as pt is on vapotherm. Pt answered yes/no questions. Explained Case Management role/services. Primary contact information:[phone]    Health Care Decision Maker: Not discussed at this time. Admit date/status:11/29 Inpatient  Diagnosis:Acute respiratory failure, Covid positive   Is this a Readmission?:  No      Insurance: [place] [employer]   Precert required for SNF: No, waived during pandemic       3 night stay required: No    Living arrangements, Adls, care needs, prior to admission: Pt lives at home with , denies using any DME including oxygen, denies having any home care. Transportation:Pending needs, usually family. PT/OT recs:not appropriate at this time, would be helpful when respiratory status more stable. Hospital Exemption Notification (HEN): Would be needed for SNF    Barriers to discharge:None noted    Plan/comments:CM will continue to follow pt's progress and coordinate discharge arrangements as appropriate. ECOC on chart for MD signature.   JHONATHAN Schwab-RN

## 2020-12-01 NOTE — PROGRESS NOTES
bilaterally. Full range of motion without deformity. Skin: Skin color, texture, turgor normal.  No rashes or lesions. Neurologic:  Neurovascularly intact without any focal sensory/motor deficits. Cranial nerves: II-XII intact, grossly non-focal.  Psychiatric: Alert and oriented, thought content appropriate, normal insight  Capillary Refill: Brisk,< 3 seconds   Peripheral Pulses: +2 palpable, equal bilaterally             Labs:   Recent Labs     11/29/20 1946 11/30/20 0432 12/01/20 0440   WBC 11.0 9.0 12.2*   HGB 14.3 13.8 14.6   HCT 43.0 41.4 44.3    213 284     Recent Labs     11/29/20 1946 11/30/20 0433 12/01/20 0440    140 143   K 3.1* 2.9* 3.5   CL 94* 96* 102   CO2 34* 33* 33*   BUN 22* 20 19   CREATININE 1.2 1.1 0.9   CALCIUM 8.2* 8.5 8.2*   PHOS 3.6 3.1 2.6     Recent Labs     11/29/20 1946 11/30/20 0433 12/01/20  0440   AST 28 24 29   ALT 23 20 18   BILITOT 0.5 0.5 0.5   ALKPHOS 64 64 67     Recent Labs     11/29/20 1946 11/30/20 0432 12/01/20  0440   INR 1.11 1.09 1.11     No results for input(s): Rawleigh Lesser in the last 72 hours. Urinalysis:    No results found for: Pamela Reed, BACTERIA, RBCUA, BLOODU, [address], [address]    Radiology:  VL Extremity Venous Left   Final Result      XR CHEST PORTABLE   Final Result   Bilateral airspace disease is suspicious for pneumonia, and could be   compatible with history of COVID-19.                  Assessment/Plan:    Active Hospital Problems    Diagnosis    [facility]ch[facility]) [facility] [J44.9]    Acute respiratory failure due to COVID-19 ([facility]) [U07.1, J96.00]    Essential hypertension [I10]    Obesity, Class III, BMI 40-49.9 (morbid obesity) ([facility] [E66.01]    Type 2 diabetes mellitus, with long-term current use of insulin (HCC) [E11.9, Z79.4]         Respiratory failure, COPD, COVID19  -  Titrate respiratory support according to patient's needs and advanced care plan.  Patient was requiring vapotherm  -  Patient seems to be at significant risk for progression of respiratory failure.  She was advised of the potential need for intubation and mechanical ventilation.  She was agreeable if necessary.  -  Repeated all pertinent inflammatory labs.  Obtained an ABG.  The first sample obtained was venous.  The second sample was arterial with a pO2 = 53.  Vapotherm made available if needed. -  Started BMI-adjusted low-intensity anticoagulation w/ lovenox 40mg bid. Nicola Miller is suspicion for LLE DVT though so will request duplex venous U/S as well. -  Started solumedrol 40mg IV bid, remdesivir, and 1U convalescent plasma.     DM2  -  Hold all oral antidiabetic agents.  Start s.c. Insulin regimen based on home regimen.     DVT Prophylaxis: lovenox bid  Diet: DIET CARB CONTROL;  Dental Soft  Code Status: Full Code    PT/OT Eval Status: not possible    Dispo - unclear, icu care    Brittany Cunningham MD

## 2020-12-01 NOTE — PROGRESS NOTES
This note also relates to the following rows which could not be included:  Resp - Cannot attach notes to unvalidated device data       11/30/20 7536   Oxygen Therapy/Pulse Ox   O2 Device Heated high flow cannula   O2 Flow Rate (L/min) 35 L/min   FiO2  80 %   SpO2 93 %

## 2020-12-01 NOTE — PROGRESS NOTES
This note also relates to the following rows which could not be included:  Resp - Cannot attach notes to unvalidated device data  SpO2 - Cannot attach notes to unvalidated device data       12/01/20 1659   Oxygen Therapy/Pulse Ox   O2 Therapy Oxygen humidified   O2 Device Heated high flow cannula   O2 Flow Rate (L/min) 35 L/min   FiO2  80 %

## 2020-12-01 NOTE — PROGRESS NOTES
12/01/20 0346   Oxygen Therapy/Pulse Ox   O2 Device Heated high flow cannula   O2 Flow Rate (L/min) 35 L/min   FiO2  80 %   Resp 21   SpO2 92 %

## 2020-12-02 ENCOUNTER — APPOINTMENT (OUTPATIENT)
Dept: GENERAL RADIOLOGY | Age: 75
DRG: 207 | End: 2020-12-02
Attending: INTERNAL MEDICINE
Payer: MEDICARE

## 2020-12-02 LAB
A/G RATIO: 0.8 (ref 1.1–2.2)
ALBUMIN SERPL-MCNC: 2.7 G/DL (ref 3.4–5)
ALP BLD-CCNC: 77 U/L (ref 40–129)
ALT SERPL-CCNC: 19 U/L (ref 10–40)
ANION GAP SERPL CALCULATED.3IONS-SCNC: 10 MMOL/L (ref 3–16)
AST SERPL-CCNC: 27 U/L (ref 15–37)
ATYPICAL LYMPHOCYTE RELATIVE PERCENT: 1 % (ref 0–6)
BANDED NEUTROPHILS RELATIVE PERCENT: 10 % (ref 0–7)
BASE EXCESS ARTERIAL: 3.3 MMOL/L (ref -3–3)
BASE EXCESS ARTERIAL: 5 (ref -3–3)
BASE EXCESS ARTERIAL: 8.1 MMOL/L (ref -3–3)
BASOPHILS ABSOLUTE: 0 K/UL (ref 0–0.2)
BASOPHILS RELATIVE PERCENT: 0 %
BILIRUB SERPL-MCNC: 0.5 MG/DL (ref 0–1)
BUN BLDV-MCNC: 21 MG/DL (ref 7–20)
CALCIUM SERPL-MCNC: 8.5 MG/DL (ref 8.3–10.6)
CARBOXYHEMOGLOBIN ARTERIAL: 0.1 % (ref 0–1.5)
CARBOXYHEMOGLOBIN ARTERIAL: 0.4 % (ref 0–1.5)
CHLORIDE BLD-SCNC: 104 MMOL/L (ref 99–110)
CO2: 32 MMOL/L (ref 21–32)
CREAT SERPL-MCNC: 0.9 MG/DL (ref 0.6–1.2)
D DIMER: 973 NG/ML DDU (ref 0–229)
EOSINOPHILS ABSOLUTE: 0 K/UL (ref 0–0.6)
EOSINOPHILS RELATIVE PERCENT: 0 %
FIBRINOGEN: 540 MG/DL (ref 200–397)
GFR AFRICAN AMERICAN: >60
GFR NON-AFRICAN AMERICAN: >60
GLOBULIN: 3.3 G/DL
GLUCOSE BLD-MCNC: 124 MG/DL (ref 70–99)
GLUCOSE BLD-MCNC: 124 MG/DL (ref 70–99)
GLUCOSE BLD-MCNC: 129 MG/DL (ref 70–99)
GLUCOSE BLD-MCNC: 302 MG/DL (ref 70–99)
HCO3 ARTERIAL: 30 MMOL/L (ref 21–29)
HCO3 ARTERIAL: 30.4 MMOL/L (ref 21–29)
HCO3 ARTERIAL: 32.5 MMOL/L (ref 21–29)
HCT VFR BLD CALC: 47.9 % (ref 36–48)
HEMOGLOBIN, ART, EXTENDED: 16.4 G/DL (ref 12–16)
HEMOGLOBIN, ART, EXTENDED: 16.4 G/DL (ref 12–16)
HEMOGLOBIN: 15.5 G/DL (ref 12–16)
INR BLD: 1.3 (ref 0.86–1.14)
LYMPHOCYTES ABSOLUTE: 0.7 K/UL (ref 1–5.1)
LYMPHOCYTES RELATIVE PERCENT: 4 %
MAGNESIUM: 2.4 MG/DL (ref 1.8–2.4)
MCH RBC QN AUTO: 26.7 PG (ref 26–34)
MCHC RBC AUTO-ENTMCNC: 32.4 G/DL (ref 31–36)
MCV RBC AUTO: 82.5 FL (ref 80–100)
METHEMOGLOBIN ARTERIAL: 0.5 %
METHEMOGLOBIN ARTERIAL: 0.5 %
MONOCYTES ABSOLUTE: 0.1 K/UL (ref 0–1.3)
MONOCYTES RELATIVE PERCENT: 1 %
MYELOCYTE PERCENT: 1 %
NEUTROPHILS ABSOLUTE: 13.7 K/UL (ref 1.7–7.7)
NEUTROPHILS RELATIVE PERCENT: 83 %
O2 CONTENT ARTERIAL: 20 ML/DL
O2 CONTENT ARTERIAL: 23 ML/DL
O2 SAT, ARTERIAL: 89.4 %
O2 SAT, ARTERIAL: 95 % (ref 93–100)
O2 SAT, ARTERIAL: 98.1 %
O2 THERAPY: ABNORMAL
O2 THERAPY: ABNORMAL
PCO2 ARTERIAL: 43.3 MMHG (ref 35–45)
PCO2 ARTERIAL: 48.7 MM HG (ref 35–45)
PCO2 ARTERIAL: 55.3 MMHG (ref 35–45)
PDW BLD-RTO: 14.9 % (ref 12.4–15.4)
PERFORMED ON: ABNORMAL
PH ARTERIAL: 7.36 (ref 7.35–7.45)
PH ARTERIAL: 7.4 (ref 7.35–7.45)
PH ARTERIAL: 7.49 (ref 7.35–7.45)
PHOSPHORUS: 3.5 MG/DL (ref 2.5–4.9)
PLATELET # BLD: 331 K/UL (ref 135–450)
PLATELET SLIDE REVIEW: ADEQUATE
PMV BLD AUTO: 9.1 FL (ref 5–10.5)
PO2 ARTERIAL: 134.2 MMHG (ref 75–108)
PO2 ARTERIAL: 53.7 MMHG (ref 75–108)
PO2 ARTERIAL: 78.3 MM HG (ref 75–108)
POC SAMPLE TYPE: ABNORMAL
POLYCHROMASIA: ABNORMAL
POTASSIUM SERPL-SCNC: 3.6 MMOL/L (ref 3.5–5.1)
PROTHROMBIN TIME: 15.1 SEC (ref 10–13.2)
RBC # BLD: 5.8 M/UL (ref 4–5.2)
SLIDE REVIEW: ABNORMAL
SODIUM BLD-SCNC: 146 MMOL/L (ref 136–145)
TCO2 ARTERIAL: 32 MMOL/L
TCO2 ARTERIAL: 32.1 MMOL/L
TCO2 ARTERIAL: 33.8 MMOL/L
TOTAL PROTEIN: 6 G/DL (ref 6.4–8.2)
WBC # BLD: 14.6 K/UL (ref 4–11)

## 2020-12-02 PROCEDURE — 85025 COMPLETE CBC W/AUTO DIFF WBC: CPT

## 2020-12-02 PROCEDURE — 84478 ASSAY OF TRIGLYCERIDES: CPT

## 2020-12-02 PROCEDURE — 6370000000 HC RX 637 (ALT 250 FOR IP): Performed by: INTERNAL MEDICINE

## 2020-12-02 PROCEDURE — 2700000000 HC OXYGEN THERAPY PER DAY

## 2020-12-02 PROCEDURE — 83735 ASSAY OF MAGNESIUM: CPT

## 2020-12-02 PROCEDURE — 2580000003 HC RX 258: Performed by: INTERNAL MEDICINE

## 2020-12-02 PROCEDURE — 0D9670Z DRAINAGE OF STOMACH WITH DRAINAGE DEVICE, VIA NATURAL OR ARTIFICIAL OPENING: ICD-10-PCS | Performed by: RADIOLOGY

## 2020-12-02 PROCEDURE — 94750 HC PULMONARY COMPLIANCE STUDY: CPT

## 2020-12-02 PROCEDURE — 6360000002 HC RX W HCPCS: Performed by: INTERNAL MEDICINE

## 2020-12-02 PROCEDURE — 71045 X-RAY EXAM CHEST 1 VIEW: CPT

## 2020-12-02 PROCEDURE — 2500000003 HC RX 250 WO HCPCS: Performed by: NURSE PRACTITIONER

## 2020-12-02 PROCEDURE — 74018 RADEX ABDOMEN 1 VIEW: CPT

## 2020-12-02 PROCEDURE — 36620 INSERTION CATHETER ARTERY: CPT

## 2020-12-02 PROCEDURE — 82803 BLOOD GASES ANY COMBINATION: CPT

## 2020-12-02 PROCEDURE — 85379 FIBRIN DEGRADATION QUANT: CPT

## 2020-12-02 PROCEDURE — 99291 CRITICAL CARE FIRST HOUR: CPT | Performed by: INTERNAL MEDICINE

## 2020-12-02 PROCEDURE — 85610 PROTHROMBIN TIME: CPT

## 2020-12-02 PROCEDURE — 94002 VENT MGMT INPAT INIT DAY: CPT

## 2020-12-02 PROCEDURE — 02HV33Z INSERTION OF INFUSION DEVICE INTO SUPERIOR VENA CAVA, PERCUTANEOUS APPROACH: ICD-10-PCS | Performed by: INTERNAL MEDICINE

## 2020-12-02 PROCEDURE — 84100 ASSAY OF PHOSPHORUS: CPT

## 2020-12-02 PROCEDURE — 94660 CPAP INITIATION&MGMT: CPT

## 2020-12-02 PROCEDURE — 2500000003 HC RX 250 WO HCPCS: Performed by: INTERNAL MEDICINE

## 2020-12-02 PROCEDURE — 36415 COLL VENOUS BLD VENIPUNCTURE: CPT

## 2020-12-02 PROCEDURE — 2580000003 HC RX 258: Performed by: NURSE PRACTITIONER

## 2020-12-02 PROCEDURE — 31500 INSERT EMERGENCY AIRWAY: CPT

## 2020-12-02 PROCEDURE — 2000000000 HC ICU R&B

## 2020-12-02 PROCEDURE — 5A1955Z RESPIRATORY VENTILATION, GREATER THAN 96 CONSECUTIVE HOURS: ICD-10-PCS | Performed by: INTERNAL MEDICINE

## 2020-12-02 PROCEDURE — 94761 N-INVAS EAR/PLS OXIMETRY MLT: CPT

## 2020-12-02 PROCEDURE — 51702 INSERT TEMP BLADDER CATH: CPT

## 2020-12-02 PROCEDURE — 85384 FIBRINOGEN ACTIVITY: CPT

## 2020-12-02 PROCEDURE — 36556 INSERT NON-TUNNEL CV CATH: CPT | Performed by: NURSE PRACTITIONER

## 2020-12-02 PROCEDURE — 36620 INSERTION CATHETER ARTERY: CPT | Performed by: NURSE PRACTITIONER

## 2020-12-02 PROCEDURE — 0BH18EZ INSERTION OF ENDOTRACHEAL AIRWAY INTO TRACHEA, VIA NATURAL OR ARTIFICIAL OPENING ENDOSCOPIC: ICD-10-PCS | Performed by: INTERNAL MEDICINE

## 2020-12-02 PROCEDURE — 6370000000 HC RX 637 (ALT 250 FOR IP): Performed by: NURSE PRACTITIONER

## 2020-12-02 PROCEDURE — 80053 COMPREHEN METABOLIC PANEL: CPT

## 2020-12-02 PROCEDURE — 36556 INSERT NON-TUNNEL CV CATH: CPT

## 2020-12-02 RX ORDER — PROPOFOL 10 MG/ML
10 INJECTION, EMULSION INTRAVENOUS
Status: DISCONTINUED | OUTPATIENT
Start: 2020-12-02 | End: 2020-12-08

## 2020-12-02 RX ORDER — CARBOXYMETHYLCELLULOSE SODIUM 10 MG/ML
1 GEL OPHTHALMIC
Status: DISCONTINUED | OUTPATIENT
Start: 2020-12-02 | End: 2020-12-10

## 2020-12-02 RX ORDER — CHLORHEXIDINE GLUCONATE 0.12 MG/ML
15 RINSE ORAL 2 TIMES DAILY
Status: DISCONTINUED | OUTPATIENT
Start: 2020-12-02 | End: 2020-12-10

## 2020-12-02 RX ORDER — DEXTROSE MONOHYDRATE 50 MG/ML
INJECTION, SOLUTION INTRAVENOUS CONTINUOUS
Status: ACTIVE | OUTPATIENT
Start: 2020-12-02 | End: 2020-12-03

## 2020-12-02 RX ORDER — PANTOPRAZOLE SODIUM 40 MG/10ML
40 INJECTION, POWDER, LYOPHILIZED, FOR SOLUTION INTRAVENOUS DAILY
Status: DISCONTINUED | OUTPATIENT
Start: 2020-12-02 | End: 2020-12-16

## 2020-12-02 RX ADMIN — CARBOXYMETHYLCELLULOSE SODIUM 1 DROP: 10 GEL OPHTHALMIC at 17:39

## 2020-12-02 RX ADMIN — ENOXAPARIN SODIUM 40 MG: 40 INJECTION SUBCUTANEOUS at 21:05

## 2020-12-02 RX ADMIN — PROPOFOL 10 MCG/KG/MIN: 10 INJECTION, EMULSION INTRAVENOUS at 14:12

## 2020-12-02 RX ADMIN — ENOXAPARIN SODIUM 40 MG: 40 INJECTION SUBCUTANEOUS at 09:28

## 2020-12-02 RX ADMIN — METHYLPREDNISOLONE SODIUM SUCCINATE 40 MG: 40 INJECTION, POWDER, FOR SOLUTION INTRAMUSCULAR; INTRAVENOUS at 09:31

## 2020-12-02 RX ADMIN — CARBOXYMETHYLCELLULOSE SODIUM 1 DROP: 10 GEL OPHTHALMIC at 20:49

## 2020-12-02 RX ADMIN — PROPOFOL 30 MCG/KG/MIN: 10 INJECTION, EMULSION INTRAVENOUS at 17:54

## 2020-12-02 RX ADMIN — INSULIN GLARGINE 50 UNITS: 100 INJECTION, SOLUTION SUBCUTANEOUS at 22:06

## 2020-12-02 RX ADMIN — DEXTROSE MONOHYDRATE: 50 INJECTION, SOLUTION INTRAVENOUS at 13:10

## 2020-12-02 RX ADMIN — METOPROLOL TARTRATE 50 MG: 50 TABLET, FILM COATED ORAL at 09:30

## 2020-12-02 RX ADMIN — FENTANYL CITRATE 75 MCG/HR: 0.05 INJECTION, SOLUTION INTRAMUSCULAR; INTRAVENOUS at 15:20

## 2020-12-02 RX ADMIN — REMDESIVIR 100 MG: 100 INJECTION, POWDER, LYOPHILIZED, FOR SOLUTION INTRAVENOUS at 23:20

## 2020-12-02 RX ADMIN — INSULIN LISPRO 0 UNITS: 100 INJECTION, SOLUTION INTRAVENOUS; SUBCUTANEOUS at 18:29

## 2020-12-02 RX ADMIN — METHYLPREDNISOLONE SODIUM SUCCINATE 40 MG: 40 INJECTION, POWDER, FOR SOLUTION INTRAMUSCULAR; INTRAVENOUS at 21:02

## 2020-12-02 RX ADMIN — PANTOPRAZOLE SODIUM 40 MG: 40 TABLET, DELAYED RELEASE ORAL at 09:30

## 2020-12-02 RX ADMIN — Medication 15 ML: at 20:23

## 2020-12-02 RX ADMIN — INSULIN LISPRO 12 UNITS: 100 INJECTION, SOLUTION INTRAVENOUS; SUBCUTANEOUS at 22:06

## 2020-12-02 RX ADMIN — DEXTROSE MONOHYDRATE 5 MCG/MIN: 50 INJECTION, SOLUTION INTRAVENOUS at 16:09

## 2020-12-02 ASSESSMENT — PULMONARY FUNCTION TESTS
PIF_VALUE: 35
PIF_VALUE: 36
PIF_VALUE: 36
PIF_VALUE: 35

## 2020-12-02 ASSESSMENT — PAIN SCALES - GENERAL
PAINLEVEL_OUTOF10: 0
PAINLEVEL_OUTOF10: 1
PAINLEVEL_OUTOF10: 0
PAINLEVEL_OUTOF10: 7
PAINLEVEL_OUTOF10: 0
PAINLEVEL_OUTOF10: 5
PAINLEVEL_OUTOF10: 0

## 2020-12-02 NOTE — PROGRESS NOTES
12/02/20 0022   Oxygen Therapy/Pulse Ox   O2 Therapy Oxygen humidified   O2 Device Heated high flow cannula   O2 Flow Rate (L/min) 40 L/min   Resp 20   SpO2 92 %   Humidification Source Heated wire   Humidification Temp 37   Humidification Temp Measured 37   Pulse Oximeter Device Mode Continuous   Pulse Oximeter Device Location Finger

## 2020-12-02 NOTE — ADT AUTH CERT
Chronic Obstructive Pulmonary Disease - Care Day 3 (12/1/2020) by Suzy Arguelles RN         Review Status  Review Entered    Completed  12/2/2020 12:46        Criteria Review       Care Day: 3 Care Date: 12/1/2020 Level of Care: ICU    Guideline Day 2    Level Of Care    (X) Floor or intermediate care    12/2/2020 12:46 PM EST by Zattoo Zelda      ICU    Clinical Status    (X) * Hemodynamic stability    ( ) * Mechanical and noninvasive ventilation absent    12/2/2020 12:46 PM EST by Rudolfo Safe      FiO2 : 80 %  SpO2: 91 %  SpO2/FiO2 ratio: 113.75  Humidification Temp: 37  Humidification Temp Measured: 37    (X) * Uncompensated acidosis absent    12/2/2020 12:46 PM EST by Zattoo Zelda      12/2/2020 09:02  Hemoglobin, Art, Extended: 16.4 (H)  pH, Arterial: 7.493 (H)  pCO2, Arterial: 43.3  pO2, Arterial: 53.7 (L)  HCO3, Arterial: 32.5 (H)  TCO2 (calc), Art: 33.8    Activity    ( ) Ambulatory    12/2/2020 12:46 PM EST by [employer]      bedrest    Routes    (X) Diet as tolerated    12/2/2020 12:46 PM EST by Lexy Posey;  Dental Soft    Interventions    (X) Oxygen    12/2/2020 12:46 PM EST by Rudolfo Safe      FiO2 : 80 %  SpO2: 91 %  SpO2/FiO2 ratio: 113.75  Humidification Temp: 37  Humidification Temp Measured: 37    (X) Pulse oximetry    12/2/2020 12:46 PM EST by Rudolfo Safe      Sats 82-92%    (X) Respiratory therapy    (X) DVT prophylaxis    12/2/2020 12:46 PM EST by Rudolfo Safe      enoxaparin (LOVENOX) injection 40 mg  bid    Medications    (X) Systemic corticosteroids    12/2/2020 12:46 PM EST by Rudolfo Safe      methylPREDNISolone sodium (SOLU-MEDROL) injection 40 mg  q12    (X) Inhaled bronchodilators    ( ) Possible IV antibiotics    12/2/2020 12:46 PM EST by Rudolfo Safe      remdesivir 100 mg in sodium chloride 0.9 % 250 mL IVPB  q24    * Milestone

## 2020-12-02 NOTE — PROGRESS NOTES
12/01/20 0042   Oxygen Therapy/Pulse Ox   O2 Therapy Oxygen humidified   O2 Device Heated high flow cannula   O2 Flow Rate (L/min) 40 L/min   FiO2  100 %   Resp 22   SpO2 92 %   Humidification Temp Measured 37   Pulse Oximeter Device Mode Continuous   Pulse Oximeter Device Location Finger

## 2020-12-02 NOTE — PROGRESS NOTES
12/02/20 1422   Vent Information   $Ventilation $Initial Day   Vent Type 980   Vent Mode AC/PC   Rate Set 18 bmp   Pressure Support 0 cmH20   FiO2  100 %   SpO2 94 %   SpO2/FiO2 ratio 94   Sensitivity 3   PEEP/CPAP 15   I Time/ I Time % 0.9 s   Humidification Source HME   Vent Patient Data   High Peep/I Pressure 20   Peak Inspiratory Pressure 36 cmH2O   Mean Airway Pressure 20 cmH20   Rate Measured 18 br/min   Vt Exhaled 385 mL   Minute Volume 6.97 Liters   I:E Ratio 1:2.70   Spontaneous Breathing Trial (SBT) RT Doc   Pulse 112   Additional Respiratory  Assessments   Resp 18   Position Semi-Armas's   Alarm Settings   High Pressure Alarm 45 cmH2O   Low Minute Volume Alarm 2 L/min   Apnea (secs) 20 secs   High Respiratory Rate 40 br/min   Low Exhaled Vt  200 mL   ETT (adult)   Placement Date/Time: 12/02/20 1421   Timeout: Patient  Preoxygenation: Yes  Mask Ventilation: Ventilated by mask (1)  Technique: Video laryngoscopy  Type: Cuffed  Tube Size: 8 mm  Laryngoscope: GlideScope  Blade Size: 3  Location: Oral  Placement Veri. ..   $ Intubation emergent  $ Yes

## 2020-12-02 NOTE — PROGRESS NOTES
Pt intubated by Dr. Jenna Dhillon assisted by Dr. Malika Roche and RT. #8.0 ETT placed at 23cm lip line. Color change visualized and Bilateral lung sounds auscultated. NGT placed by RN and Arterial line and CVC to be placed by Westley Barron CNP. Pt started on Propofol and fentanyl to be started after. Dr. Marko Steel ordered Nimbex to hold if pt becomes difficult to sedate for vent dyssynchrony.

## 2020-12-02 NOTE — PROGRESS NOTES
ADVANCED CARE PLANNING    Name:Angelita Sanford       :  [birth year]              MRN:  [id number]  Admission Date: 2020  6:50 PM  Date of Discussion:  20      Purpose of Encounter: Advanced care planning in light of  Respiratory failure, covid-19. Parties in attendance: :Jemal Menendez MD, Family members: none. Decisional Capacity:Yes      Objective/Medical Story: 69 yo WM with obesity, dm2, ?new dx of bladder ca p/w sob and respiratory failure due to covid-19. Pt was placed in icu and required vapotherm and remains critically ill. Her granddaughter Alcon Orellana nurse at [facility]) is emergency contact. Pt does not have HCPOA set up yet. Active Diagnoses: Active Hospital Problems    Diagnosis Date Noted    COPD (chronic obstructive pulmonary disease) ([facility] [J44.9] 2020    Acute respiratory failure due to COVID-19 ([facility] [U07.1, J96.00] 2020    Essential hypertension [I10] 2020    Obesity, Class III, BMI 40-49.9 (morbid obesity) ([facility] [E66.01] 2020    Type 2 diabetes mellitus, with long-term current use of insulin ([facility] [E11.9, Z79.4] 2020       These active diagnoses are of sufficient risk that focused discussion on advance care planning is indicated in order to allow the patient to thoughtfully consider personal goals of care; and if situations arise that prevent the ability to personally give input, to ensure appropriate representation of their personal desires for different levels and agressiveness of care. Goals of Care Determinations: Patient wishes to focus on aggressive care. Code Status: At this time patient wishes to be FULL. Time Spent on Advanced Planning Documents: 16 mins. The following items were considered in medical decision making:  Independent review of images , Review / order clinical lab tests. Review / order radiology tests, Decision to obtain old records.     Advanced Care Planning Documents:   Completed advances directives, advanced directives in chart. Electronically signed by Kandace Lay MD on 12/2/2020 at 2:44 PM    Thank you No primary care provider on file. for the opportunity to be involved in this patient's care. If you have any questions or concerns please feel free to contact me at [phone].

## 2020-12-02 NOTE — PROGRESS NOTES
Abdomen is soft. Tenderness: There is no abdominal tenderness. Musculoskeletal: Normal range of motion. Lymphadenopathy:      Cervical: No cervical adenopathy. Skin:     General: Skin is warm and dry. Findings: No rash. Neurological:      Mental Status: She is alert. Cranial Nerves: No cranial nerve deficit. Comments: CN 2-12 grossly intact            Data Reviewed:   LABS:  CBC:  Recent Labs     11/30/20 0432 12/01/20 0440 12/02/20 0431   WBC 9.0 12.2* 14.6*   HGB 13.8 14.6 15.5   HCT 41.4 44.3 47.9   MCV 82.9 81.7 82.5    284 331     BMP:  Recent Labs     11/30/20 0433 12/01/20 0440 12/02/20  0431    143 146*   K 2.9* 3.5 3.6   CL 96* 102 104   CO2 33* 33* 32   PHOS 3.1 2.6 3.5   BUN 20 19 21*   CREATININE 1.1 0.9 0.9     LIVER PROFILE:   Recent Labs     11/30/20 0433 12/01/20 0440 12/02/20  0431   AST 24 29 27   ALT 20 18 19   BILITOT 0.5 0.5 0.5   ALKPHOS 64 67 77     PT/INR:  Recent Labs     11/30/20 0432 12/01/20 0440 12/02/20  0431   PROTIME 12.6 12.9 15.1*   INR 1.09 1.11 1.30*     APTT: No results for input(s): APTT in the last 72 hours. UA:  Recent Labs     12/01/20  1600   COLORU RED*   PHUR 7.5   WBCUA see below*   RBCUA >100*   CLARITYU TURBID*   SPECGRAV 1.020   LEUKOCYTESUR TRACE*   UROBILINOGEN 0.2   BILIRUBINUR Negative   BLOODU LARGE*   GLUCOSEU 100*     Recent Labs     11/29/20  2110 12/02/20  0902   PHART 7.471* 7.493*   PRV7AZF 43.5 43.3   PO2ART 53.4* 53.7*       Vent Information  Skin Assessment: Clean, dry, & intact  FiO2 : 100 %  SpO2: 91 %  SpO2/FiO2 ratio: 91  Humidification Source: Heated wire  Humidification Temp: 37  Humidification Temp Measured: 37    CXR personally reviewed, bilateral airspace disease          Assessment:     1. Acute resp failure, hypoxic  2. Acute covid respiratory infection  3. DM2  4.  Acute hypernatremia, free water deficit    Plan:      -Titrate FIO2 as tolerated, on vapotherm but at risk for requiring MV  -Remdesivir  -Steroids  -D5W for 12 hours  -Adjust insulin regimen due to hyperglycemia, monitor blood sugars  -Guarded prognosis due to advanced age and comorbidities, clarify goals of care. Maintain full support for now    Addendum: Worsening hypoxia despite max vapotherm support. Plan for intubation/MV. Discussed with the patient and family, they agreed with this plan   Will start sedation with propofol, fentanyl. Place OG and start tube feeds/free water    Due to life threatening resp failure this patient is critically ill.  Total critical care time involved in her care was 90 mins excluding separately listed procedures     Anju Barrera MD

## 2020-12-02 NOTE — PROGRESS NOTES
Katie Oliver CNP spoke with pt about pt expression to go on the ventilator. Decision was made to move forward with a controlled intubation today. CNP spoke to pt about placing a CVC  For better IV access. Pt consented.

## 2020-12-02 NOTE — FLOWSHEET NOTE
Bedside report given by James Manley RN, Skin assessment completed. FiO2 increased to 100% on Vapotherm overnight. Shift assessment completed and documented; see flowsheets for details. Pt resting in bed, VSS, Lines checked and verified, alarms on and audible. Repositioned patient, sacral Mepilex in place, call light within reach, will continue to closely monitor. Recent Labs     12/02/20  0431   WBC 14.6*   HGB 15.5   HCT 47.9   MCV 82.5        Recent Labs     12/02/20  0431   *   K 3.6      CO2 32   GLUCOSE 129*   PHOS 3.5   MG 2.40   BUN 21*   CREATININE 0.9   CALCIUM 8.5   LABGLOM >60   GFRAA >60     Kory Mustard 12/2/2020 10:27 AM  Estimated Creatinine Clearance: 67 mL/min (based on SCr of 0.9 mg/dL).     DVT Prophylaxis: enoxaparin (Lovenox) 40mg SQ Daily    GI Prophylaxis: pantoprazole (PROTONIX), per order

## 2020-12-02 NOTE — CONSULTS
exposed skin shows no abnormal lesions or rashes   Musculoskeletal no digital cyanosis, head normocephalic   Psych shows normal mood and affect, alert and appropriately answers questions   Urena catheter shows mild hematuria     Labs:  WBC:    Lab Results   Component Value Date    WBC 14.6 12/02/2020     Hemoglobin/Hematocrit:    Lab Results   Component Value Date    HGB 15.5 12/02/2020    HCT 47.9 12/02/2020     BMP:    Lab Results   Component Value Date     12/02/2020    K 3.6 12/02/2020     12/02/2020    CO2 32 12/02/2020    BUN 21 12/02/2020    LABALBU 2.7 12/02/2020    CREATININE 0.9 12/02/2020    CALCIUM 8.5 12/02/2020    GFRAA >60 12/02/2020    LABGLOM >60 12/02/2020     PT/INR:    Lab Results   Component Value Date    PROTIME 15.1 12/02/2020    INR 1.30 12/02/2020     PTT:  No results found for: APTT[APTT      Impression/Plan: 70 y. o.female COVID+, consulted for gross hematuria. Patient states she has a history of bladder cancer and is seen by Dr. Diana Knowles in [facility] for cystoscopy and removal every so often. Patient states this is the first time in several months she has had hematuria. Denies suprapubic tenderness, abdominal tenderness today. Urena catheter showed mild hematuria today, looks as if clearing up. Creatinine is wnl.  Her SpO2 is 91 at this time.     -I informed patient that I will get in contact with her primary Urologist and confirm follow up for treatment of her cancer  -Due to limited PPE and spread of Covid-19, will sign off from  standpoint--very willing to re-consult on patient if new problems arise or if primary Urologist requests intervention    Thank you   Valentin Tarango PA-C

## 2020-12-02 NOTE — CONSULTS
Comprehensive Nutrition Assessment    Type and Reason for Visit:  Reassess, Consult    Nutrition Recommendations/Plan:   1. Recommend TF initiation. Order: \"Diet: Tube feed continuous/ NPO\". Initiate Glucerna 1.5 (Diabetic 1.5 formula) at 10 mL/hr and as tolerated, increase by 10 mL/hr q 4 hours until goal of 50 mL/hr is met via N/G  2. Recommend 120 mL H20 flush q 4 hours. Monitor IVF infusion, Na labs and need for adjustments in water flush   3. Monitor TF tolerance (abd distention, bowel habits, N/V, cramping)  4. Check TG while on diprivan infusion  5. Monitor nutrition adequacy, pertinent labs, bowel habits, wt changes, and clinical progress      Nutrition Assessment:  Follow up: Pt on 40 L vapotherm at 100% this am, then later intubated this afternoon. Currently sedated on propofol at 6.6 mL/hr (174 kcals from lipids). Pt has been ordered on a carb controlled diet, but not eating well and often skipping/refusing meals per intake documentation. Hypernatremia noted, Na 146 today. Md requesting TF and free water to start today. Malnutrition Assessment:  Malnutrition Status: At risk for malnutrition (Comment)(limited access to nutrition + increased nutrient needs for compromised lung function)      Estimated Daily Nutrient Needs:  Energy (kcal):  3736-3076; Weight Used for Energy Requirements:  Current(110 kg)     Protein (g):  (11-14); Weight Used for Protein Requirements:  Current(1.5-2)        Fluid (ml/day):  per MD with current condition; Method Used for Fluid Requirements:  1 ml/kcal      Nutrition Related Findings:  Intubated today.  No BM documented      Wounds:  (redness on L ankle)       Current Nutrition Therapies:    DIET TUBE FEED CONTINUOUS/CYCLIC NPO; Diabetic 1.5; Orogastric; Continuous; 10; 40; Exceptions are: Sips with Meds  Current Tube Feeding (TF) Orders:  · Goal TF & Flush Orders Provides: Glucerna 1.5 at 50 mL/hr x 20 hrs to provide 1000 mL TV, 1500 kcals, 83 g protein, and 760 mL free water. + 120 mL free water q 4 hrs for additional 720 mL free water daily. Anthropometric Measures:  · Height: 5' 4\" (162.6 cm)  · Current Body Weight: 242 lb 1 oz (109.8 kg)   · Admission Body Weight: 241 lb 2 oz (109.4 kg)    · Ideal Body Weight: 120 lbs; % Ideal Body Weight 201.7 %   · BMI: 41.5  · BMI Categories: Obese Class 3 (BMI 40.0 or greater)       Nutrition Diagnosis:   · Inadequate energy intake related to lack or limited access to food, impaired respiratory function as evidenced by NPO or clear liquid status due to medical condition, intubation      Nutrition Interventions:   Food and/or Nutrient Delivery:  Start Tube Feeding  Nutrition Education/Counseling:  No recommendation at this time   Coordination of Nutrition Care:  Continue to monitor while inpatient    Goals:  Patient will tolerate nutrition initiation without worsening respiratory function or blood sugars greater than 180 mg/dl. Nutrition Monitoring and Evaluation:   Food/Nutrient Intake Outcomes:  Enteral Nutrition Intake/Tolerance  Physical Signs/Symptoms Outcomes:  Biochemical Data, Nutrition Focused Physical Findings     Discharge Planning: Too soon to determine     Electronically signed by Jeanette Hatch.  Roselyn Rose RD, LD on 12/2/20 at 2:36 PM EST    Contact: [phone]

## 2020-12-02 NOTE — PROGRESS NOTES
Hospitalist Progress Note      PCP: No primary care provider on file. Date of Admission: 11/29/2020       Chief [address] [place] [address] Course:       Subjective: remains sob, on vapotherm, no overnight issues, notes ongoing hematuria(but has riggins) and notes has to have bladder surgery for cancer, wants to think about undergoing [facility] vent/cpr      Medications:  Reviewed    Infusion Medications    dextrose       Scheduled Medications    insulin glargine  50 Units Subcutaneous Nightly    metoprolol  50 mg Oral BID    methylPREDNISolone  40 mg Intravenous Q12H    insulin lispro  0.08 Units/kg Subcutaneous TID WC    insulin lispro  0-18 Units Subcutaneous TID WC    insulin lispro  0-9 Units Subcutaneous Nightly    enoxaparin  40 mg Subcutaneous BID    remdesivir IVPB  100 mg Intravenous Q24H    pantoprazole  40 mg Oral QAM AC     PRN Meds: sodium chloride flush, potassium chloride, magnesium sulfate, acetaminophen **OR** acetaminophen, ondansetron, albuterol sulfate HFA, benzonatate, glucose, dextrose, glucagon (rDNA), dextrose, sodium chloride, albuterol sulfate HFA **AND** ipratropium **AND** MDI Treatment      Intake/Output Summary (Last 24 hours) at 12/2/2020 0940  Last data filed at 12/2/2020 0600  Gross per 24 hour   Intake 380 ml   Output 1265 ml   Net -885 ml       Physical Exam Performed:    BP (!) 188/96   Pulse 63   Temp 98.4 °F (36.9 °C) (Bladder)   Resp 23   Ht 5' 4\" (1.626 m)   Wt 242 lb 1 oz (109.8 kg)   SpO2 91%   BMI 41.55 kg/m²      General appearance: mild distress, appears stated age and cooperative. obese  HEENT: Pupils equal, round, and reactive to light. Conjunctivae/corneas clear. Neck: Supple, with full range of motion. No jugular venous distention. Trachea midline. Respiratory:  Normal respiratory effort. Clear to auscultation, bilaterally without Wheezes/Rhonchi.  Mild bibas rales  Cardiovascular: Regular rate and rhythm with normal S1/S2 without murmurs, rubs or gallops. Abdomen: Soft, non-tender, non-distended with normal bowel sounds. Musculoskeletal: No clubbing, cyanosis or edema bilaterally.  Full range of motion without deformity. Skin: Skin color, texture, turgor normal.  No rashes or lesions. Neurologic:  Neurovascularly intact without any focal sensory/motor deficits. Cranial nerves: II-XII intact, grossly non-focal.  Psychiatric: Alert and oriented, thought content appropriate, normal insight  Capillary Refill: Brisk,< 3 seconds   Peripheral Pulses: +2 palpable, equal bilaterally          Labs:   Recent Labs     11/30/20 0432 12/01/20 0440 12/02/20 0431   WBC 9.0 12.2* 14.6*   HGB 13.8 14.6 15.5   HCT 41.4 44.3 47.9    284 331     Recent Labs     11/30/20 0433 12/01/20 0440 12/02/20 0431    143 146*   K 2.9* 3.5 3.6   CL 96* 102 104   CO2 33* 33* 32   BUN 20 19 21*   CREATININE 1.1 0.9 0.9   CALCIUM 8.5 8.2* 8.5   PHOS 3.1 2.6 3.5     Recent Labs     11/30/20 0433 12/01/20 0440 12/02/20 0431   AST 24 29 27   ALT 20 18 19   BILITOT 0.5 0.5 0.5   ALKPHOS 64 67 77     Recent Labs     11/30/20  0432 12/01/20 0440 12/02/20 0431   INR 1.09 1.11 1.30*     No results for input(s): [place] in the last 72 hours. Urinalysis:      Lab Results   Component Value Date    NITRU Negative 12/01/2020    WBCUA see below 12/01/2020    RBCUA >100 12/01/2020    BLOODU LARGE 12/01/2020    SPECGRAV 1.020 12/01/2020    GLUCOSEU 100 12/01/2020       Radiology:  VL Extremity Venous Left   Final Result      XR CHEST PORTABLE   Final Result   Bilateral airspace disease is suspicious for pneumonia, and could be   compatible with history of COVID-19.                  Assessment/Plan:    Active Hospital Problems    Diagnosis    COPD (chronic obstructive pulmonary disease) ([facility] [J44.9]    Acute respiratory failure due to COVID-19 ([facility]) [U07.1, J96.00]    Essential hypertension [I10]    Obesity, Class III, BMI 40-49.9 (morbid obesity) ([facility] [E66.01]    Type 2 diabetes mellitus, with long-term current use of insulin ([facility]) [E11.9, Z79.4]         Respiratory failure, COPD, COVID19  -  Titrate respiratory support according to patient's needs and advanced care plan.  Patient was requiring vapotherm  -  Patient seems to be at significant risk for progression of respiratory failure.  She was advised of the potential need for intubation and mechanical ventilation.  She was agreeable if necessary.  -  Repeated all pertinent inflammatory labs.  Obtained an ABG.  The first sample obtained was venous.  The second sample was arterial with a pO2 = 53.  Vapotherm made available if needed. -  Started BMI-adjusted low-intensity anticoagulation w/ lovenox 40mg bid. Constantin Ramirez is suspicion for LLE DVT though so will request duplex venous U/S as well. -  Started solumedrol 40mg IV bid, remdesivir, and 1U convalescent plasma.     DM2  -  Hold all oral antidiabetic agents.  Start s.c. Insulin regimen based on home regimen.     Hematuria- with hx of bladder ca per pt, supposed to get surgery in [facility]  -urology consulted for further recs  -continued riggins    DVT Prophylaxis: lovenox bid  Diet: DIET CARB CONTROL;  Dental Soft  Code Status: Full Code    PT/OT Eval Status: not possible    Dispo - pending improvement, Ziyad Paz MD

## 2020-12-02 NOTE — PROCEDURES
Arterial Line Placement Procedure Note                     Indication: arterial blood gases, mechanical ventilation and Adult Respiratory Distress Syndrome    Consent: The patient provided verbal consent for this procedure. Pravin's Test: Normal    Procedure: The skin over the left brachial artery was prepped with Chloraprep and draped in a sterile fashion. Local anesthesia was not performed due to the emergent nature of this procedure. A 20 gauge angiocath was then inserted, using a modified Seldinger technique, into the vessel. The transducer set was then attached and securely fastened to the skin with sutures and with an adhesive dressing. Waveforms on the monitor were observed and found to be adequate. The patient had good distal perfusion after the procedure. The site was then dressed in a sterile fashion. The patient tolerated the procedure well. Complications: None. EBL less than 2 cc. Left brachial artery was selected due to small vessel size of bilateral radial arteries. Licha White, APRN-CNP
Central Line Placement Procedure Note    Indication: poor peripheral access, central venous monitoring and centrally administered medications    Consent: The patient provided verbal consent for this procedure. Procedure: The patient was positioned appropriately and the skin over the right internal jugular vein was prepped with Chloraprep and draped in a sterile fashion. Local anesthesia was obtained by infiltration using 1% Lidocaine without epinephrine. A large bore needle was used to identify the vein under ultrasound guidance. A guide wire was then inserted into the vein through the needle. A triple lumen catheter was then inserted into the vessel over the guide wire using the Seldinger technique. All ports showed good, free flowing blood return and were flushed with saline solution. The catheter was then securely fastened to the skin with sutures and with an adhesive dressing and covered with a sterile dressing. A post procedure X-ray was ordered and is still pending at this time. The patient tolerated the procedure well. Complications: None. EBL less than 3 cc. Licha Cha, APRN-CNP
Yes

## 2020-12-02 NOTE — PROGRESS NOTES
Gave Bedside report to: Nikunj ELIAS    4 Eyes Skin Assessment     The patient is being assess for   Shift Handoff    I agree that 2 RN's have performed a thorough Head to Toe Skin Assessment on the patient. ALL assessment sites listed below have been assessed. Areas assessed by both nurses:   [x]   Head, Face, and Ears   [x]   Shoulders, Back, and Chest, Abdomen  [x]   Arms, Elbows, and Hands   [x]   Coccyx, Sacrum, and Ischium  [x]   Legs, Feet, and Heels        Does the Patient have Skin Breakdown?   No          Loyd Prevention initiated:  Yes   Wound Care Orders initiated:  No      WOC nurse consulted for Pressure Injury (Stage 3,4, Unstageable, DTI, NWPT, Complex wounds)and New or Established Ostomies:  NA      Primary Nurse eSignature: Electronically signed by Pascual Fonseca RN on 12/1/20 at 8:09 PM EST    **SHARE this note so that the co-signing nurse is able to place an eSignature**    Co-signer eSignature: {Esignature:[id number]}          \

## 2020-12-02 NOTE — PROGRESS NOTES
Pulmonary & Critical Care Inpatient Progress Note   Kj Winston MD     REASON FOR TODAY'S VISIT:  Critical care management    SUBJECTIVE:   Remains on vapotherm support, had not taken PO yesterday    Scheduled Meds:   insulin glargine  50 Units Subcutaneous Nightly    metoprolol  50 mg Oral BID    methylPREDNISolone  40 mg Intravenous Q12H    insulin lispro  0.08 Units/kg Subcutaneous TID WC    insulin lispro  0-18 Units Subcutaneous TID WC    insulin lispro  0-9 Units Subcutaneous Nightly    enoxaparin  40 mg Subcutaneous BID    remdesivir IVPB  100 mg Intravenous Q24H    pantoprazole  40 mg Oral QAM AC       Continuous Infusions:   dextrose         PRN Meds:  sodium chloride flush, potassium chloride, magnesium sulfate, acetaminophen **OR** acetaminophen, ondansetron, albuterol sulfate HFA, benzonatate, glucose, dextrose, glucagon (rDNA), dextrose, sodium chloride, albuterol sulfate HFA **AND** ipratropium **AND** MDI Treatment    ALLERGIES:  Patient is allergic to contrast [iodides]; ibuprofen; and lipitor [atorvastatin calcium]. Objective:   PHYSICAL EXAM:  BP (!) 167/99   Pulse 74   Temp 98.3 °F (36.8 °C) (Bladder)   Resp 20   Ht 5' 4\" (1.626 m)   Wt 242 lb 1 oz (109.8 kg)   SpO2 91%   BMI 41.55 kg/m²    Physical Exam  Constitutional:       General: She is not in acute distress. Appearance: She is well-developed. She is not diaphoretic. HENT:      Head: Normocephalic and atraumatic. Mouth/Throat:      Pharynx: No oropharyngeal exudate. Eyes:      Pupils: Pupils are equal, round, and reactive to light. Neck:      Musculoskeletal: Neck supple. Vascular: No JVD. Cardiovascular:      Heart sounds: Normal heart sounds. No murmur. No friction rub. No gallop. Pulmonary:      Effort: Pulmonary effort is normal.      Breath sounds: No wheezing or rales. Abdominal:      General: Bowel sounds are normal. There is no distension. Palpations: Abdomen is soft. Tenderness: There is no abdominal tenderness. Musculoskeletal: Normal range of motion. Lymphadenopathy:      Cervical: No cervical adenopathy. Skin:     General: Skin is warm and dry. Findings: No rash. Neurological:      Mental Status: She is alert. Cranial Nerves: No cranial nerve deficit. Comments: CN 2-12 grossly intact            Data Reviewed:   LABS:  CBC:  Recent Labs     11/29/20 1946 11/30/20 0432 12/01/20 0440   WBC 11.0 9.0 12.2*   HGB 14.3 13.8 14.6   HCT 43.0 41.4 44.3   MCV 81.4 82.9 81.7    213 284     BMP:  Recent Labs     11/29/20 1946 11/30/20 0433 12/01/20 0440    140 143   K 3.1* 2.9* 3.5   CL 94* 96* 102   CO2 34* 33* 33*   PHOS 3.6 3.1 2.6   BUN 22* 20 19   CREATININE 1.2 1.1 0.9     LIVER PROFILE:   Recent Labs     11/29/20 1946 11/30/20 0433 12/01/20 0440   AST 28 24 29   ALT 23 20 18   BILITOT 0.5 0.5 0.5   ALKPHOS 64 64 67     PT/INR:  Recent Labs     11/29/20 1946 11/30/20 0432 12/01/20  0440   PROTIME 12.9 12.6 12.9   INR 1.11 1.09 1.11     APTT: No results for input(s): APTT in the last 72 hours. UA:  Recent Labs     12/01/20  1600   COLORU RED*   PHUR 7.5   WBCUA see below*   RBCUA >100*   CLARITYU TURBID*   SPECGRAV 1.020   LEUKOCYTESUR TRACE*   UROBILINOGEN 0.2   BILIRUBINUR Negative   BLOODU LARGE*   GLUCOSEU 100*     Recent Labs     11/29/20 2045 11/29/20 2110   PHART 7.475* 7.471*   ENW7WCP 38.2 43.5   PO2ART 38.2* 53.4*       Vent Information  Skin Assessment: Clean, dry, & intact  FiO2 : 80 %  SpO2: 91 %  SpO2/FiO2 ratio: 113.75  Humidification Temp: 37  Humidification Temp Measured: 37    CXR personally reviewed, bilateral airspace disease          Assessment:     1. Acute resp failure, hypoxic  2. Acute covid respiratory infection  3.  DM2    Plan:      -Titrate FIO2 as tolerated  -Remdesivir  -Steroids  -Adjust insulin regimen due to hyperglycemia, monitor blood sugars  -Guarded prognosis due to advanced age and comorbidities, clarify goals of care. Maintain full support for now    Due to life threatening resp failure this patient is critically ill.  Total critical care time involved in her care was 32 mins    Velma Augustin MD

## 2020-12-02 NOTE — PROGRESS NOTES
12/02/20 0316   Oxygen Therapy/Pulse Ox   O2 Therapy Oxygen humidified   O2 Device Heated high flow cannula   O2 Flow Rate (L/min) 40 L/min   FiO2  100 %   Resp 21   SpO2 91 %   Humidification Source Heated wire   Humidification Temp Measured 37   Pulse Oximeter Device Mode Continuous   Pulse Oximeter Device Location Finger   Blood Gas  Performed?  No

## 2020-12-02 NOTE — PROGRESS NOTES
12/02/20 1155   Oxygen Therapy/Pulse Ox   O2 Therapy Oxygen humidified   O2 Device Heated high flow cannula   O2 Flow Rate (L/min) 40 L/min   FiO2  100 %   Resp 26   SpO2 91 %

## 2020-12-02 NOTE — PROGRESS NOTES
12/02/20 1548   Vent Information   Vent Type 980   Vent Mode AC/PC   Pressure Ordered 20   Rate Set 18 bmp   Pressure Support 0 cmH20   FiO2  100 %   SpO2 93 %   SpO2/FiO2 ratio 93   Sensitivity 3   PEEP/CPAP 15   I Time/ I Time % 0.9 s   Humidification Source HME   Vent Patient Data   High Peep/I Pressure 20   Peak Inspiratory Pressure 35 cmH2O   Mean Airway Pressure 24 cmH20   Rate Measured 27 br/min   Vt Exhaled 45 mL   Minute Volume 9.98 Liters   I:E Ratio 2.20:1   Plateau Pressure 32 VWL91   Static Compliance 24 mL/cmH2O   Dynamic Compliance 21 mL/cmH2O   Spontaneous Breathing Trial (SBT) RT Doc   Pulse 58   Breath Sounds   Right Upper Lobe Diminished   Right Middle Lobe Diminished   Right Lower Lobe Diminished   Left Upper Lobe Diminished   Left Lower Lobe Diminished   Additional Respiratory  Assessments   Resp 20   Alarm Settings   High Pressure Alarm 45 cmH2O   Low Minute Volume Alarm 2 L/min   Apnea (secs) 20 secs   High Respiratory Rate 40 br/min   Low Exhaled Vt  200 mL   ETT (adult)   Placement Date/Time: 12/02/20 1359   Timeout: Patient  Preoxygenation: Yes  Mask Ventilation: Ventilated by mask (1)  Technique: Video laryngoscopy  Type: Cuffed  Tube Size: 8 mm  Laryngoscope: GlideScope  Blade Size: 3  Location: Oral  Placement Veri. ..    Secured at 23 cm   Measured From [address] [place] [address],[address] By [employer] tube cabral   Site Condition Dry   Cuff Pressure 30 cm H2O

## 2020-12-03 ENCOUNTER — APPOINTMENT (OUTPATIENT)
Dept: GENERAL RADIOLOGY | Age: 75
DRG: 207 | End: 2020-12-03
Attending: INTERNAL MEDICINE
Payer: MEDICARE

## 2020-12-03 LAB
A/G RATIO: 0.8 (ref 1.1–2.2)
A/G RATIO: 0.8 (ref 1.1–2.2)
ALBUMIN SERPL-MCNC: 1.6 G/DL (ref 3.4–5)
ALBUMIN SERPL-MCNC: 2.6 G/DL (ref 3.4–5)
ALP BLD-CCNC: 57 U/L (ref 40–129)
ALP BLD-CCNC: 85 U/L (ref 40–129)
ALT SERPL-CCNC: 20 U/L (ref 10–40)
ALT SERPL-CCNC: 34 U/L (ref 10–40)
ANION GAP SERPL CALCULATED.3IONS-SCNC: 6 MMOL/L (ref 3–16)
ANION GAP SERPL CALCULATED.3IONS-SCNC: 7 MMOL/L (ref 3–16)
AST SERPL-CCNC: 18 U/L (ref 15–37)
AST SERPL-CCNC: 24 U/L (ref 15–37)
ATYPICAL LYMPHOCYTE RELATIVE PERCENT: 1 % (ref 0–6)
BANDED NEUTROPHILS RELATIVE PERCENT: 2 % (ref 0–7)
BANDED NEUTROPHILS RELATIVE PERCENT: 7 % (ref 0–7)
BASE EXCESS ARTERIAL: 2 MMOL/L (ref -3–3)
BASE EXCESS ARTERIAL: 3.6 MMOL/L (ref -3–3)
BASE EXCESS ARTERIAL: 4.3 MMOL/L (ref -3–3)
BASE EXCESS ARTERIAL: 5.9 MMOL/L (ref -3–3)
BASOPHILS ABSOLUTE: 0 K/UL (ref 0–0.2)
BASOPHILS ABSOLUTE: 0 K/UL (ref 0–0.2)
BASOPHILS RELATIVE PERCENT: 0 %
BASOPHILS RELATIVE PERCENT: 0 %
BILIRUB SERPL-MCNC: 0.3 MG/DL (ref 0–1)
BILIRUB SERPL-MCNC: 0.5 MG/DL (ref 0–1)
BUN BLDV-MCNC: 27 MG/DL (ref 7–20)
BUN BLDV-MCNC: 39 MG/DL (ref 7–20)
CALCIUM IONIZED: 1.08 MMOL/L (ref 1.12–1.32)
CALCIUM SERPL-MCNC: 5 MG/DL (ref 8.3–10.6)
CALCIUM SERPL-MCNC: 8.7 MG/DL (ref 8.3–10.6)
CARBOXYHEMOGLOBIN ARTERIAL: 0.1 % (ref 0–1.5)
CARBOXYHEMOGLOBIN ARTERIAL: 0.1 % (ref 0–1.5)
CARBOXYHEMOGLOBIN ARTERIAL: 0.4 % (ref 0–1.5)
CARBOXYHEMOGLOBIN ARTERIAL: 0.5 % (ref 0–1.5)
CHLORIDE BLD-SCNC: 100 MMOL/L (ref 99–110)
CHLORIDE BLD-SCNC: 113 MMOL/L (ref 99–110)
CO2: 22 MMOL/L (ref 21–32)
CO2: 32 MMOL/L (ref 21–32)
CREAT SERPL-MCNC: 0.8 MG/DL (ref 0.6–1.2)
CREAT SERPL-MCNC: 1.2 MG/DL (ref 0.6–1.2)
D DIMER: 444 NG/ML DDU (ref 0–229)
EOSINOPHILS ABSOLUTE: 0 K/UL (ref 0–0.6)
EOSINOPHILS ABSOLUTE: 0 K/UL (ref 0–0.6)
EOSINOPHILS RELATIVE PERCENT: 0 %
EOSINOPHILS RELATIVE PERCENT: 0 %
FIBRINOGEN: 262 MG/DL (ref 200–397)
GFR AFRICAN AMERICAN: 53
GFR AFRICAN AMERICAN: >60
GFR NON-AFRICAN AMERICAN: 44
GFR NON-AFRICAN AMERICAN: >60
GLOBULIN: 1.9 G/DL
GLOBULIN: 3.4 G/DL
GLUCOSE BLD-MCNC: 175 MG/DL (ref 70–99)
GLUCOSE BLD-MCNC: 204 MG/DL (ref 70–99)
GLUCOSE BLD-MCNC: 205 MG/DL (ref 70–99)
GLUCOSE BLD-MCNC: 214 MG/DL (ref 70–99)
GLUCOSE BLD-MCNC: 259 MG/DL (ref 70–99)
GLUCOSE BLD-MCNC: 302 MG/DL (ref 70–99)
GLUCOSE BLD-MCNC: 319 MG/DL (ref 70–99)
HCO3 ARTERIAL: 27.6 MMOL/L (ref 21–29)
HCO3 ARTERIAL: 28.6 MMOL/L (ref 21–29)
HCO3 ARTERIAL: 29.6 MMOL/L (ref 21–29)
HCO3 ARTERIAL: 32.6 MMOL/L (ref 21–29)
HCT VFR BLD CALC: 26.8 % (ref 36–48)
HCT VFR BLD CALC: 45.3 % (ref 36–48)
HEMOGLOBIN, ART, EXTENDED: 15.8 G/DL (ref 12–16)
HEMOGLOBIN, ART, EXTENDED: 16.1 G/DL (ref 12–16)
HEMOGLOBIN, ART, EXTENDED: 16.7 G/DL (ref 12–16)
HEMOGLOBIN, ART, EXTENDED: 16.8 G/DL (ref 12–16)
HEMOGLOBIN: 14.8 G/DL (ref 12–16)
HEMOGLOBIN: 8.5 G/DL (ref 12–16)
INR BLD: 1.44 (ref 0.86–1.14)
LYMPHOCYTES ABSOLUTE: 0.2 K/UL (ref 1–5.1)
LYMPHOCYTES ABSOLUTE: 0.6 K/UL (ref 1–5.1)
LYMPHOCYTES RELATIVE PERCENT: 2 %
LYMPHOCYTES RELATIVE PERCENT: 3 %
MAGNESIUM: 1.5 MG/DL (ref 1.8–2.4)
MCH RBC QN AUTO: 26.7 PG (ref 26–34)
MCH RBC QN AUTO: 26.8 PG (ref 26–34)
MCHC RBC AUTO-ENTMCNC: 31.8 G/DL (ref 31–36)
MCHC RBC AUTO-ENTMCNC: 32.7 G/DL (ref 31–36)
MCV RBC AUTO: 81.9 FL (ref 80–100)
MCV RBC AUTO: 84 FL (ref 80–100)
METAMYELOCYTES RELATIVE PERCENT: 3 %
METHEMOGLOBIN ARTERIAL: 0.4 %
METHEMOGLOBIN ARTERIAL: 0.5 %
METHEMOGLOBIN ARTERIAL: 0.5 %
METHEMOGLOBIN ARTERIAL: 0.6 %
MONOCYTES ABSOLUTE: 0 K/UL (ref 0–1.3)
MONOCYTES ABSOLUTE: 0.3 K/UL (ref 0–1.3)
MONOCYTES RELATIVE PERCENT: 0 %
MONOCYTES RELATIVE PERCENT: 3 %
MYELOCYTE PERCENT: 1 %
NEUTROPHILS ABSOLUTE: 14.5 K/UL (ref 1.7–7.7)
NEUTROPHILS ABSOLUTE: 8.2 K/UL (ref 1.7–7.7)
NEUTROPHILS RELATIVE PERCENT: 88 %
NEUTROPHILS RELATIVE PERCENT: 90 %
NUCLEATED RED BLOOD CELLS: 1 /100 WBC
O2 CONTENT ARTERIAL: 21 ML/DL
O2 CONTENT ARTERIAL: 22 ML/DL
O2 SAT, ARTERIAL: 93.9 %
O2 SAT, ARTERIAL: 94.9 %
O2 SAT, ARTERIAL: 96.8 %
O2 SAT, ARTERIAL: 97.8 %
O2 THERAPY: ABNORMAL
PCO2 ARTERIAL: 44.4 MMHG (ref 35–45)
PCO2 ARTERIAL: 46.1 MMHG (ref 35–45)
PCO2 ARTERIAL: 46.2 MMHG (ref 35–45)
PCO2 ARTERIAL: 54.5 MMHG (ref 35–45)
PDW BLD-RTO: 14.6 % (ref 12.4–15.4)
PDW BLD-RTO: 14.7 % (ref 12.4–15.4)
PERFORMED ON: ABNORMAL
PH ARTERIAL: 7.39 (ref 7.35–7.45)
PH ARTERIAL: 7.39 (ref 7.35–7.45)
PH ARTERIAL: 7.42 (ref 7.35–7.45)
PH ARTERIAL: 7.43 (ref 7.35–7.45)
PH VENOUS: 7.43 (ref 7.35–7.45)
PHOSPHORUS: 3.1 MG/DL (ref 2.5–4.9)
PLATELET # BLD: 257 K/UL (ref 135–450)
PLATELET # BLD: 473 K/UL (ref 135–450)
PLATELET SLIDE REVIEW: ADEQUATE
PMV BLD AUTO: 9.3 FL (ref 5–10.5)
PMV BLD AUTO: 9.9 FL (ref 5–10.5)
PO2 ARTERIAL: 100 MMHG (ref 75–108)
PO2 ARTERIAL: 74.4 MMHG (ref 75–108)
PO2 ARTERIAL: 77.4 MMHG (ref 75–108)
PO2 ARTERIAL: 91 MMHG (ref 75–108)
POLYCHROMASIA: ABNORMAL
POLYCHROMASIA: ABNORMAL
POTASSIUM REFLEX MAGNESIUM: 4.4 MMOL/L (ref 3.5–5.1)
POTASSIUM SERPL-SCNC: 2.3 MMOL/L (ref 3.5–5.1)
PROTHROMBIN TIME: 16.8 SEC (ref 10–13.2)
RBC # BLD: 3.19 M/UL (ref 4–5.2)
RBC # BLD: 5.53 M/UL (ref 4–5.2)
REASON FOR REJECTION: NORMAL
REJECTED TEST: NORMAL
SODIUM BLD-SCNC: 138 MMOL/L (ref 136–145)
SODIUM BLD-SCNC: 142 MMOL/L (ref 136–145)
TCO2 ARTERIAL: 29 MMOL/L
TCO2 ARTERIAL: 30 MMOL/L
TCO2 ARTERIAL: 31 MMOL/L
TCO2 ARTERIAL: 34.3 MMOL/L
TOTAL PROTEIN: 3.5 G/DL (ref 6.4–8.2)
TOTAL PROTEIN: 6 G/DL (ref 6.4–8.2)
TRIGL SERPL-MCNC: 115 MG/DL (ref 0–150)
WBC # BLD: 15.1 K/UL (ref 4–11)
WBC # BLD: 8.6 K/UL (ref 4–11)

## 2020-12-03 PROCEDURE — 85379 FIBRIN DEGRADATION QUANT: CPT

## 2020-12-03 PROCEDURE — 99291 CRITICAL CARE FIRST HOUR: CPT | Performed by: INTERNAL MEDICINE

## 2020-12-03 PROCEDURE — 2500000003 HC RX 250 WO HCPCS: Performed by: INTERNAL MEDICINE

## 2020-12-03 PROCEDURE — 71045 X-RAY EXAM CHEST 1 VIEW: CPT

## 2020-12-03 PROCEDURE — 83735 ASSAY OF MAGNESIUM: CPT

## 2020-12-03 PROCEDURE — 36415 COLL VENOUS BLD VENIPUNCTURE: CPT

## 2020-12-03 PROCEDURE — 6360000002 HC RX W HCPCS: Performed by: NURSE PRACTITIONER

## 2020-12-03 PROCEDURE — 84100 ASSAY OF PHOSPHORUS: CPT

## 2020-12-03 PROCEDURE — C9113 INJ PANTOPRAZOLE SODIUM, VIA: HCPCS | Performed by: NURSE PRACTITIONER

## 2020-12-03 PROCEDURE — 80053 COMPREHEN METABOLIC PANEL: CPT

## 2020-12-03 PROCEDURE — 2580000003 HC RX 258: Performed by: INTERNAL MEDICINE

## 2020-12-03 PROCEDURE — 6360000002 HC RX W HCPCS: Performed by: INTERNAL MEDICINE

## 2020-12-03 PROCEDURE — 6370000000 HC RX 637 (ALT 250 FOR IP): Performed by: NURSE PRACTITIONER

## 2020-12-03 PROCEDURE — 2000000000 HC ICU R&B

## 2020-12-03 PROCEDURE — 82803 BLOOD GASES ANY COMBINATION: CPT

## 2020-12-03 PROCEDURE — 82330 ASSAY OF CALCIUM: CPT

## 2020-12-03 PROCEDURE — 2700000000 HC OXYGEN THERAPY PER DAY

## 2020-12-03 PROCEDURE — 37799 UNLISTED PX VASCULAR SURGERY: CPT

## 2020-12-03 PROCEDURE — 94770 HC ETCO2 MONITOR DAILY: CPT

## 2020-12-03 PROCEDURE — 6370000000 HC RX 637 (ALT 250 FOR IP): Performed by: INTERNAL MEDICINE

## 2020-12-03 PROCEDURE — 85025 COMPLETE CBC W/AUTO DIFF WBC: CPT

## 2020-12-03 PROCEDURE — 85384 FIBRINOGEN ACTIVITY: CPT

## 2020-12-03 PROCEDURE — 94750 HC PULMONARY COMPLIANCE STUDY: CPT

## 2020-12-03 PROCEDURE — 99222 1ST HOSP IP/OBS MODERATE 55: CPT | Performed by: INTERNAL MEDICINE

## 2020-12-03 PROCEDURE — 94761 N-INVAS EAR/PLS OXIMETRY MLT: CPT

## 2020-12-03 PROCEDURE — 94003 VENT MGMT INPAT SUBQ DAY: CPT

## 2020-12-03 PROCEDURE — 99292 CRITICAL CARE ADDL 30 MIN: CPT | Performed by: INTERNAL MEDICINE

## 2020-12-03 PROCEDURE — 85610 PROTHROMBIN TIME: CPT

## 2020-12-03 RX ORDER — MAGNESIUM SULFATE IN WATER 40 MG/ML
4 INJECTION, SOLUTION INTRAVENOUS ONCE
Status: COMPLETED | OUTPATIENT
Start: 2020-12-03 | End: 2020-12-03

## 2020-12-03 RX ORDER — METHYLPREDNISOLONE SODIUM SUCCINATE 40 MG/ML
40 INJECTION, POWDER, LYOPHILIZED, FOR SOLUTION INTRAMUSCULAR; INTRAVENOUS EVERY 6 HOURS
Status: DISCONTINUED | OUTPATIENT
Start: 2020-12-03 | End: 2020-12-08

## 2020-12-03 RX ORDER — SODIUM CHLORIDE 9 MG/ML
INJECTION, SOLUTION INTRAVENOUS CONTINUOUS
Status: ACTIVE | OUTPATIENT
Start: 2020-12-03 | End: 2020-12-04

## 2020-12-03 RX ORDER — INSULIN GLARGINE 100 [IU]/ML
55 INJECTION, SOLUTION SUBCUTANEOUS NIGHTLY
Status: DISCONTINUED | OUTPATIENT
Start: 2020-12-03 | End: 2020-12-08

## 2020-12-03 RX ORDER — CALCIUM GLUCONATE 20 MG/ML
1 INJECTION, SOLUTION INTRAVENOUS ONCE
Status: COMPLETED | OUTPATIENT
Start: 2020-12-03 | End: 2020-12-03

## 2020-12-03 RX ORDER — POTASSIUM CHLORIDE 29.8 MG/ML
20 INJECTION INTRAVENOUS PRN
Status: DISCONTINUED | OUTPATIENT
Start: 2020-12-03 | End: 2020-12-16 | Stop reason: HOSPADM

## 2020-12-03 RX ADMIN — INSULIN GLARGINE 55 UNITS: 100 INJECTION, SOLUTION SUBCUTANEOUS at 20:58

## 2020-12-03 RX ADMIN — CALCIUM GLUCONATE 1 G: 20 INJECTION, SOLUTION INTRAVENOUS at 09:49

## 2020-12-03 RX ADMIN — INSULIN LISPRO 6 UNITS: 100 INJECTION, SOLUTION INTRAVENOUS; SUBCUTANEOUS at 12:20

## 2020-12-03 RX ADMIN — CARBOXYMETHYLCELLULOSE SODIUM 1 DROP: 10 GEL OPHTHALMIC at 16:15

## 2020-12-03 RX ADMIN — PANTOPRAZOLE SODIUM 40 MG: 40 INJECTION, POWDER, FOR SOLUTION INTRAVENOUS at 09:03

## 2020-12-03 RX ADMIN — PROPOFOL 10 MCG/KG/MIN: 10 INJECTION, EMULSION INTRAVENOUS at 18:03

## 2020-12-03 RX ADMIN — Medication 10 ML: at 09:03

## 2020-12-03 RX ADMIN — INSULIN LISPRO 6 UNITS: 100 INJECTION, SOLUTION INTRAVENOUS; SUBCUTANEOUS at 20:59

## 2020-12-03 RX ADMIN — CISATRACURIUM BESYLATE 2 MCG/KG/MIN: 10 INJECTION, SOLUTION INTRAVENOUS at 08:33

## 2020-12-03 RX ADMIN — METHYLPREDNISOLONE SODIUM SUCCINATE 40 MG: 40 INJECTION, POWDER, FOR SOLUTION INTRAMUSCULAR; INTRAVENOUS at 20:55

## 2020-12-03 RX ADMIN — FENTANYL CITRATE 125 MCG/HR: 0.05 INJECTION, SOLUTION INTRAMUSCULAR; INTRAVENOUS at 10:22

## 2020-12-03 RX ADMIN — SODIUM CHLORIDE: 9 INJECTION, SOLUTION INTRAVENOUS at 20:02

## 2020-12-03 RX ADMIN — MAGNESIUM SULFATE HEPTAHYDRATE 4 G: 40 INJECTION, SOLUTION INTRAVENOUS at 11:08

## 2020-12-03 RX ADMIN — CISATRACURIUM BESYLATE 2 MCG/KG/MIN: 10 INJECTION, SOLUTION INTRAVENOUS at 20:59

## 2020-12-03 RX ADMIN — INSULIN LISPRO 12 UNITS: 100 INJECTION, SOLUTION INTRAVENOUS; SUBCUTANEOUS at 07:22

## 2020-12-03 RX ADMIN — CARBOXYMETHYLCELLULOSE SODIUM 1 DROP: 10 GEL OPHTHALMIC at 04:00

## 2020-12-03 RX ADMIN — ENOXAPARIN SODIUM 40 MG: 40 INJECTION SUBCUTANEOUS at 09:03

## 2020-12-03 RX ADMIN — INSULIN LISPRO 3 UNITS: 100 INJECTION, SOLUTION INTRAVENOUS; SUBCUTANEOUS at 16:46

## 2020-12-03 RX ADMIN — POTASSIUM CHLORIDE 20 MEQ: 400 INJECTION, SOLUTION INTRAVENOUS at 09:53

## 2020-12-03 RX ADMIN — POTASSIUM CHLORIDE 20 MEQ: 400 INJECTION, SOLUTION INTRAVENOUS at 08:56

## 2020-12-03 RX ADMIN — FENTANYL CITRATE 125 MCG/HR: 0.05 INJECTION, SOLUTION INTRAMUSCULAR; INTRAVENOUS at 21:01

## 2020-12-03 RX ADMIN — PROPOFOL 40 MCG/KG/MIN: 10 INJECTION, EMULSION INTRAVENOUS at 08:59

## 2020-12-03 RX ADMIN — CARBOXYMETHYLCELLULOSE SODIUM 1 DROP: 10 GEL OPHTHALMIC at 12:05

## 2020-12-03 RX ADMIN — CARBOXYMETHYLCELLULOSE SODIUM 1 DROP: 10 GEL OPHTHALMIC at 20:54

## 2020-12-03 RX ADMIN — FENTANYL CITRATE 75 MCG/HR: 0.05 INJECTION, SOLUTION INTRAMUSCULAR; INTRAVENOUS at 00:24

## 2020-12-03 RX ADMIN — ENOXAPARIN SODIUM 40 MG: 40 INJECTION SUBCUTANEOUS at 20:55

## 2020-12-03 RX ADMIN — METHYLPREDNISOLONE SODIUM SUCCINATE 40 MG: 40 INJECTION, POWDER, FOR SOLUTION INTRAMUSCULAR; INTRAVENOUS at 16:15

## 2020-12-03 RX ADMIN — Medication 15 ML: at 20:56

## 2020-12-03 RX ADMIN — Medication 15 ML: at 09:03

## 2020-12-03 RX ADMIN — METHYLPREDNISOLONE SODIUM SUCCINATE 40 MG: 40 INJECTION, POWDER, FOR SOLUTION INTRAMUSCULAR; INTRAVENOUS at 09:04

## 2020-12-03 ASSESSMENT — PULMONARY FUNCTION TESTS
PIF_VALUE: 30
PIF_VALUE: 36
PIF_VALUE: 31
PIF_VALUE: 35
PIF_VALUE: 36
PIF_VALUE: 30
PIF_VALUE: 36
PIF_VALUE: 30
PIF_VALUE: 35
PIF_VALUE: 36
PIF_VALUE: 30
PIF_VALUE: 31
PIF_VALUE: 30
PIF_VALUE: 36
PIF_VALUE: 36
PIF_VALUE: 31
PIF_VALUE: 30
PIF_VALUE: 31
PIF_VALUE: 36
PIF_VALUE: 31
PIF_VALUE: 28
PIF_VALUE: 31
PIF_VALUE: 35
PIF_VALUE: 31
PIF_VALUE: 31
PIF_VALUE: 36
PIF_VALUE: 35
PIF_VALUE: 30

## 2020-12-03 ASSESSMENT — PAIN SCALES - GENERAL
PAINLEVEL_OUTOF10: 0

## 2020-12-03 NOTE — PROGRESS NOTES
Hospitalist Progress Note      PCP: No primary care provider on file. Date of Admission: 11/29/2020    Chief [address] [place] [address] Course:       Subjective:  intubated yesterday due to worsening resp status, about to be proned today    Medications:  Reviewed    Infusion Medications    propofol 40 mcg/kg/min (12/03/20 0859)    fentaNYL (SUBLIMAZE) infusion 125 mcg/hr (12/03/20 7739)    cisatracurium (NIMBEX) infusion 2 mcg/kg/min (12/03/20 5282)    norepinephrine 2 mcg/min (12/03/20 4649)    dextrose       Scheduled Medications    pantoprazole  40 mg Intravenous Daily    insulin lispro  0-18 Units Subcutaneous 6 times per day    carboxymethylcellulose PF  1 drop Both Eyes 6 times per day    chlorhexidine  15 mL Mouth/Throat BID    insulin glargine  50 Units Subcutaneous Nightly    [Held by provider] metoprolol  50 mg Oral BID    methylPREDNISolone  40 mg Intravenous Q12H    enoxaparin  40 mg Subcutaneous BID    remdesivir IVPB  100 mg Intravenous Q24H     PRN Meds: potassium chloride, sodium chloride flush, magnesium sulfate, acetaminophen **OR** acetaminophen, ondansetron, albuterol sulfate HFA, glucose, dextrose, glucagon (rDNA), dextrose, sodium chloride, albuterol sulfate HFA **AND** ipratropium **AND** MDI Treatment      Intake/Output Summary (Last 24 hours) at 12/3/2020 0930  Last data filed at 12/3/2020 0530  Gross per 24 hour   Intake 1344.86 ml   Output 1225 ml   Net 119.86 ml       Physical Exam Performed:    BP (!) 182/102   Pulse (!) 41   Temp 97.6 °F (36.4 °C) (Bladder)   Resp 18   Ht 5' 4\" (1.626 m)   Wt 242 lb 1 oz (109.8 kg)   SpO2 97%   BMI 41.55 kg/m²     General appearance: , appears stated age and cooperative. obese, on vent  HEENT: Pupils equal, round, and reactive to light. Conjunctivae/corneas clear. Neck: Supple, with full range of motion. No jugular venous distention. Trachea midline. Respiratory:  Normal respiratory effort.  Clear to auscultation, bilaterally without Wheezes/Rhonchi. Mild bibas rales  Cardiovascular: Regular rate and rhythm with normal S1/S2 without murmurs, rubs or gallops. Abdomen: Soft, non-tender, non-distended with normal bowel sounds. Musculoskeletal: No clubbing, cyanosis or edema bilaterally.  Full range of motion without deformity. Skin: Skin color, texture, turgor normal.  No rashes or lesions. Neurologic:  did not test as sedated  Psychiatric: sedated  Capillary Refill: Brisk,< 3 seconds   Peripheral Pulses: +2 palpable, equal bilaterally     Labs:   Recent Labs     12/02/20  0431 12/03/20  0535 12/03/20  0718   WBC 14.6* 8.6 15.1*   HGB 15.5 8.5* 14.8   HCT 47.9 26.8* 45.3    257 473*     Recent Labs     12/01/20  0440 12/02/20  0431 12/03/20  0718    146* 142   K 3.5 3.6 2.3*    104 113*   CO2 33* 32 22   BUN 19 21* 27*   CREATININE 0.9 0.9 0.8   CALCIUM 8.2* 8.5 5.0*   PHOS 2.6 3.5 3.1     Recent Labs     12/01/20  0440 12/02/20  0431 12/03/20  0718   AST 29 27 18   ALT 18 19 20   BILITOT 0.5 0.5 0.3   ALKPHOS 67 77 57     Recent Labs     12/01/20  0440 12/02/20  0431 12/03/20  0535   INR 1.11 1.30* 1.44*     No results for input(s): Aristides Lugo in the last 72 hours. Urinalysis:      Lab Results   Component Value Date    NITRU Negative 12/01/2020    WBCUA see below 12/01/2020    RBCUA >100 12/01/2020    BLOODU LARGE 12/01/2020    SPECGRAV 1.020 12/01/2020    GLUCOSEU 100 12/01/2020       Radiology:  XR CHEST PORTABLE   Preliminary Result   Support apparatus appears stable. Mild congestive failure, pulmonary edema which is similar to slightly   improved when accounting for differences in technique. Superimposed   pneumonia cannot be excluded in a patient with known COVID-19 infection. XR ABDOMEN FOR NG/OG/NE TUBE PLACEMENT   Final Result   Support lines and tubes as above.       Worsening diffuse pulmonary opacities relative to 2 days prior in the setting   of known COVID-19 infection. XR CHEST PORTABLE   Final Result   Support lines and tubes as above. Worsening diffuse pulmonary opacities relative to 2 days prior in the setting   of known COVID-19 infection. VL Extremity Venous Left   Final Result      XR CHEST PORTABLE   Final Result   Bilateral airspace disease is suspicious for pneumonia, and could be   compatible with history of COVID-19. Assessment/Plan:    Active Hospital Problems    Diagnosis    [facility]) [facility] [J44.9]    Acute respiratory failure due to COVID-19 ([facility]) [U07.1, J96.00]    Essential hypertension [I10]    Obesity, Class III, BMI 40-49.9 (morbid obesity) ([facility] [E66.01]    Type 2 diabetes mellitus, with long-term current use of insulin ([facility]) [E11.9, Z79.4]       Respiratory failure, COPD, COVID19    Titrate respiratory support according to patient's needs and advanced care plan.  Patient was requiring vapotherm  -  Patient seems to be at significant risk for progression of respiratory failure.  She was advised of the potential need for intubation and mechanical ventilation.  She was agreeable if necessary.  -  Repeated all pertinent inflammatory labs.  Obtained an ABG.  The first sample obtained was venous.  The second sample was arterial with a pO2 = 53.  Vapotherm made available if needed. -  Started BMI-adjusted low-intensity anticoagulation w/ lovenox 40mg bid. Jenifer Sung is suspicion for LLE DVT though so will request duplex venous U/S as well. -  Started solumedrol 40mg IV bid, remdesivir, and 1U convalescent plasma.   -cvc/A-line placed 12/2  -intubated 12/2    DM2  -  Hold all oral antidiabetic agents.  Start s.c.  Insulin regimen based on home regimen.     Hematuria- with hx of bladder ca per pt, supposed to get surgery in [facility]  -urology consulted for further recs  -continued riggins     DVT Prophylaxis: lovenox bid  Diet: DIET TUBE FEED CONTINUOUS/CYCLIC NPO; Diabetic 1.5; Orogastric; Continuous; 10; 40; Exceptions are: Sips with Meds  Code Status: Full Code    PT/OT Eval Status: not possible    Dispo - guarded, ICU Care    Luciano Anderson MD

## 2020-12-03 NOTE — PROGRESS NOTES
Mouth/Throat:      Pharynx: No oropharyngeal exudate. Eyes:      Pupils: Pupils are equal, round, and reactive to light. Neck:      Musculoskeletal: Neck supple. Vascular: No JVD. Cardiovascular:      Heart sounds: Normal heart sounds. No murmur. No friction rub. No gallop. Pulmonary:      Effort: Pulmonary effort is normal.      Breath sounds: No wheezing or rales. Abdominal:      General: Bowel sounds are normal. There is no distension. Palpations: Abdomen is soft. Tenderness: There is no abdominal tenderness. Musculoskeletal: Normal range of motion. Lymphadenopathy:      Cervical: No cervical adenopathy. Skin:     General: Skin is warm and dry. Findings: No rash. Neurological:      Mental Status: She is alert. Cranial Nerves: No cranial nerve deficit. Comments: CN 2-12 grossly intact            Data Reviewed:   LABS:  CBC:  Recent Labs     12/02/20 0431 12/03/20  0535 12/03/20  0718   WBC 14.6* 8.6 15.1*   HGB 15.5 8.5* 14.8   HCT 47.9 26.8* 45.3   MCV 82.5 84.0 81.9    257 473*     BMP:  Recent Labs     12/01/20  0440 12/02/20  0431 12/03/20  0718    146* 142   K 3.5 3.6 2.3*    104 113*   CO2 33* 32 22   PHOS 2.6 3.5 3.1   BUN 19 21* 27*   CREATININE 0.9 0.9 0.8     LIVER PROFILE:   Recent Labs     12/01/20 0440 12/02/20  0431 12/03/20  0718   AST 29 27 18   ALT 18 19 20   BILITOT 0.5 0.5 0.3   ALKPHOS 67 77 57     PT/INR:  Recent Labs     12/01/20  0440 12/02/20  0431 12/03/20  0535   PROTIME 12.9 15.1* 16.8*   INR 1.11 1.30* 1.44*     APTT: No results for input(s): APTT in the last 72 hours.   UA:  Recent Labs     12/01/20  1600   COLORU RED*   PHUR 7.5   WBCUA see below*   RBCUA >100*   CLARITYU TURBID*   SPECGRAV 1.020   LEUKOCYTESUR TRACE*   UROBILINOGEN 0.2   BILIRUBINUR Negative   BLOODU LARGE*   GLUCOSEU 100*     Recent Labs     12/03/20  0538 12/03/20  1307   PHART 7.427 7.425   [id number] 44.4 46.1*   PO2ART 100.0 77.4       Vent Information  $Ventilation: $Subsequent Day  Skin Assessment: Clean, dry, & intact  Vent Type: 980  Vent Mode: AC/VC  Vt Ordered: 400 mL  Pressure Ordered: 20  Rate Set: 23 bmp  Peak Flow: 45 L/min  Pressure Support: 0 cmH20  FiO2 : 50 %  SpO2: 94 %  SpO2/FiO2 ratio: 188  Sensitivity: 3  PEEP/CPAP: 15  I Time/ I Time %: 0.9 s  Humidification Source: HME  Humidification Temp: 37  Humidification Temp Measured: 37    CXR personally reviewed, bilateral airspace disease          Assessment:     1. Acute resp failure, hypoxic  2. Acute covid respiratory infection  3. DM2  4. Acute hypokalemia    Plan:      -Vent support, lung protective vent strategy with intermittent prone ventilation  -Titrate sedation as needed, explained BIS goal of 40-60 as she was consistently reading below 40 on monitor this morning  -Hopefully decreasing sedation will assist with bradycardia  -Titrate levophed support for MAP>65  -Antimicrobials per ID consulted today  -Replete potassium, serial labs  -Tube feeds, monitor bowel output  -Adjust insulin   -Steroids  -Overall guarded prognosis with high mortality, early in course however. Ongoing discussions with family regarding goals of care     Due to life threatening resp failure this patient is critically ill.  Total critical care time involved in her care was 90 mins    Diana Ying MD

## 2020-12-03 NOTE — PROGRESS NOTES
12/03/20 0039   Vent Information   $Ventilation $Subsequent Day   Skin Assessment Clean, dry, & intact   Vent Type 980   Vent Mode AC/PC   Pressure Ordered 20   Rate Set 18 bmp   Pressure Support 0 cmH20   FiO2  70 %   SpO2 97 %   SpO2/FiO2 ratio 138.57   Sensitivity 3   PEEP/CPAP 15   I Time/ I Time % 0 s   Humidification Source HME   Vent Patient Data   High Peep/I Pressure 20   Peak Inspiratory Pressure 36 cmH2O   Mean Airway Pressure 20 cmH20   Rate Measured 18 br/min   Vt Exhaled 481 mL   Minute Volume 8.64 Liters   I:E Ratio 1:2.70   Cough/Sputum   Sputum How Obtained Endotracheal;Suctioned   Cough Productive   Sputum Amount Small   Sputum Color Cloudy   Tenacity Thick; Thin   Spontaneous Breathing Trial (SBT) RT Doc   Pulse (!) 48   Breath Sounds   Right Upper Lobe Diminished   Right Middle Lobe Diminished   Right Lower Lobe Diminished   Left Upper Lobe Diminished   Left Lower Lobe Diminished   Additional Respiratory  Assessments   Resp 14   Position Semi-Armas's   Cuff Pressure (cm H2O) 30 cm H2O   Alarm Settings   High Pressure Alarm 45 cmH2O   Low Minute Volume Alarm 2 L/min   High Respiratory Rate 40 br/min   Low Exhaled Vt  200 mL   ETT (adult)   Placement Date/Time: 12/02/20 2779   Timeout: Patient  Preoxygenation: Yes  Mask Ventilation: Ventilated by mask (1)  Technique: Video laryngoscopy  Type: Cuffed  Tube Size: 8 mm  Laryngoscope: GlideScope  Blade Size: 3  Location: Oral  Placement Veri. ..    Secured at 24 cm   Measured From Lips   ET Placement Left   Secured By Commercial tube cabral   Site Condition Dry

## 2020-12-03 NOTE — PROGRESS NOTES
12/03/20 0946   Vent Information   Vent Type 980   Vent Mode AC/PC   Rate Set 18 bmp   Pressure Support 0 cmH20   FiO2  70 %   SpO2 97 %   SpO2/FiO2 ratio 138.57   Sensitivity 3   PEEP/CPAP 15   I Time/ I Time % 0.9 s   Humidification Source HME   Vent Patient Data   High Peep/I Pressure 20   Peak Inspiratory Pressure 36 cmH2O   Mean Airway Pressure 20 cmH20   Rate Measured 18 br/min   Vt Exhaled 523 mL   Minute Volume 9.44 Liters   I:E Ratio 1:2.70   Spontaneous Breathing Trial (SBT) RT Doc   Pulse (!) 42   Additional Respiratory  Assessments   Resp 18   Alarm Settings   High Pressure Alarm 45 cmH2O   Low Minute Volume Alarm 2 L/min   High Respiratory Rate 40 br/min

## 2020-12-03 NOTE — PROGRESS NOTES
12/03/20 1030   Vent Information   Vent Type 980   Vent Mode AC/VC   Vt Ordered 400 mL   Rate Set 23 bmp   Peak Flow 45 L/min   Pressure Support 0 cmH20   FiO2  50 %   SpO2 94 %   SpO2/FiO2 ratio 188   Sensitivity 3   PEEP/CPAP 15   Humidification Source HME   Vent Patient Data   Peak Inspiratory Pressure 31 cmH2O   Mean Airway Pressure 21 cmH20   Rate Measured 23 br/min   Vt Exhaled 406 mL   Minute Volume 9.33 Liters   I:E Ratio 1:1.70   Cough/Sputum   Cough None   Sputum Amount Small   Sputum Color Cloudy   Spontaneous Breathing Trial (SBT) RT Doc   Pulse 53   Breath Sounds   Right Upper Lobe Diminished   Right Middle Lobe Diminished   Right Lower Lobe Diminished   Left Upper Lobe Diminished   Left Lower Lobe Diminished   Additional Respiratory  Assessments   Resp 23   Alarm Settings   High Pressure Alarm 45 cmH2O   Low Minute Volume Alarm 2 L/min   High Respiratory Rate 40 br/min   Low Exhaled Vt  200 mL   ETT (adult)   Placement Date/Time: 12/02/20 1359   Timeout: Patient  Preoxygenation: Yes  Mask Ventilation: Ventilated by mask (1)  Technique: Video laryngoscopy  Type: Cuffed  Tube Size: 8 mm  Laryngoscope: GlideScope  Blade Size: 3  Location: Oral  Placement Veri. ..    Secured at 24 cm   Measured From Lips   ET Placement Left   Secured By Commercial tube cabral   Site Condition Dry   Cuff Pressure 33 cm H2O

## 2020-12-03 NOTE — PLAN OF CARE
Problem: Falls - Risk of:  Goal: Will remain free from falls  Description: Will remain free from falls  Outcome: Ongoing  Goal: Absence of physical injury  Description: Absence of physical injury  Outcome: Ongoing     Problem: Breathing Pattern - Ineffective:  Goal: Ability to achieve and maintain a regular respiratory rate will improve  Description: Ability to achieve and maintain a regular respiratory rate will improve  Outcome: Ongoing     Problem: Nutrition  Goal: Optimal nutrition therapy  Outcome: Ongoing     Problem: Restraint Use - Nonviolent/Non-Self-Destructive Behavior:  Goal: Absence of restraint indications  Description: Absence of restraint indications  Outcome: Ongoing  Goal: Absence of restraint-related injury  Description: Absence of restraint-related injury  Outcome: Ongoing

## 2020-12-03 NOTE — CONSULTS
mcg in sodium chloride 0.9 % 100 mL infusion, 25 mcg/hr, Intravenous, Titrated  cisatracurium besylate (NIMBEX) 200 mg in sodium chloride 0.9 % 100 mL infusion, 2 mcg/kg/min, Intravenous, Continuous  pantoprazole (PROTONIX) injection 40 mg, 40 mg, Intravenous, Daily  insulin lispro (HUMALOG) injection vial 0-18 Units, 0-18 Units, Subcutaneous, 6 times per day  carboxymethylcellulose PF (THERATEARS) 1 % ophthalmic gel 1 drop, 1 drop, Both Eyes, 6 times per day  chlorhexidine (PERIDEX) 0.12 % solution 15 mL, 15 mL, Mouth/Throat, BID  norepinephrine (LEVOPHED) 16 mg in dextrose 5 % 250 mL infusion, 5 mcg/min, Intravenous, Continuous  [Held by provider] metoprolol tartrate (LOPRESSOR) tablet 50 mg, 50 mg, Oral, BID  sodium chloride flush 0.9 % injection 10 mL, 10 mL, Intravenous, PRN  magnesium sulfate 1 g in dextrose 5% 100 mL IVPB, 1 g, Intravenous, PRN  acetaminophen (TYLENOL) tablet 650 mg, 650 mg, Oral, Q4H PRN **OR** acetaminophen (TYLENOL) suppository 650 mg, 650 mg, Rectal, Q4H PRN  ondansetron (ZOFRAN) injection 4 mg, 4 mg, Intravenous, Q6H PRN  albuterol sulfate  (90 Base) MCG/ACT inhaler 2 puff, 2 puff, Inhalation, Q4H PRN  methylPREDNISolone sodium (SOLU-MEDROL) injection 40 mg, 40 mg, Intravenous, Q12H  glucose (GLUTOSE) 40 % oral gel 15 g, 15 g, Oral, PRN  dextrose 50 % IV solution, 12.5 g, Intravenous, PRN  glucagon (rDNA) injection 1 mg, 1 mg, Intramuscular, PRN  dextrose 5 % solution, 100 mL/hr, Intravenous, PRN  enoxaparin (LOVENOX) injection 40 mg, 40 mg, Subcutaneous, BID  [COMPLETED] remdesivir 200 mg in sodium chloride 0.9 % 250 mL IVPB, 200 mg, Intravenous, Once **FOLLOWED BY** remdesivir 100 mg in sodium chloride 0.9 % 250 mL IVPB, 100 mg, Intravenous, Q24H  0.9 % sodium chloride bolus, 30 mL, Intravenous, PRN  albuterol sulfate  (90 Base) MCG/ACT inhaler 2 puff, 2 puff, Inhalation, Q4H PRN **AND** ipratropium (ATROVENT HFA) 17 MCG/ACT inhaler 2 puff, 2 puff, Inhalation, Q4H PRN **AND** MDI Treatment, , , PRN      Allergies   Allergen Reactions    Contrast [Iodides] Other (See Comments)     Kidney problem    Ibuprofen     Lipitor [Atorvastatin Calcium] Other (See Comments)     Joint pains        REVIEW OF SYSTEMS:    Unable to obtain      Objective:   PHYSICAL EXAM:      VITALS:  BP (!) 182/102   Pulse (!) 49   Temp 97.3 °F (36.3 °C) (Bladder)   Resp 23   Ht 5' 4\" (1.626 m)   Wt 242 lb 1 oz (109.8 kg)   SpO2 95%   BMI 41.55 kg/m²      24HR INTAKE/OUTPUT:      Intake/Output Summary (Last 24 hours) at 12/3/2020 1153  Last data filed at 12/3/2020 0530  Gross per 24 hour   Intake 1344.86 ml   Output 800 ml   Net 544.86 ml     Due to the current efforts to prevent transmission of COVID-19 and also the need to preserve PPE for other caregivers, a face-to-face encounter with the patient was not performed. That being said, all relevant records and diagnostic tests were reviewed, including laboratory results and imaging. Please reference any relevant documentation elsewhere. Care will be coordinated with the primary service. DATA:    Old records have been reviewed    CBC:  Recent Labs     12/02/20  0431 12/03/20  0535 12/03/20  0718   WBC 14.6* 8.6 15.1*   RBC 5.80* 3.19* 5.53*   HGB 15.5 8.5* 14.8   HCT 47.9 26.8* 45.3    257 473*   MCV 82.5 84.0 81.9   MCH 26.7 26.7 26.8   MCHC 32.4 31.8 32.7   RDW 14.9 14.7 14.6   BANDSPCT 10* 7  --       BMP:  Recent Labs     12/01/20  0440 12/02/20  0431 12/03/20  0718    146* 142   K 3.5 3.6 2.3*    104 113*   CO2 33* 32 22   BUN 19 21* 27*   CREATININE 0.9 0.9 0.8   CALCIUM 8.2* 8.5 5.0*   GLUCOSE 201* 129* 259*          11/29 12/1 12/3   CK  CRP  95  ddimer  [phone]  Ferritin  Fibrinogen  [phone]      Cultures:   11/26 COVID PCR+      Radiology Review:  All pertinent images / reports were reviewed as a part of this visit.        Assessment:     Patient Active Problem List   Diagnosis    Lateral malleolar fracture   

## 2020-12-03 NOTE — PROGRESS NOTES
12/02/20 2110   Vent Information   Vent Type 980   Vent Mode AC/PC   Pressure Ordered 20   Rate Set 18 bmp   Pressure Support 0 cmH20   FiO2  80 %   SpO2 93 %   SpO2/FiO2 ratio 116.25   Sensitivity 3   PEEP/CPAP 15   I Time/ I Time % 0.9 s   Humidification Source HME   Vent Patient Data   High Peep/I Pressure 20   Peak Inspiratory Pressure 35 cmH2O   Mean Airway Pressure 20 cmH20   Rate Measured 18 br/min   Vt Exhaled 412 mL   Minute Volume 7.4 Liters   I:E Ratio 1:2.70   Plateau Pressure 29 LDQ80   Static Compliance 30 mL/cmH2O   Dynamic Compliance 22 mL/cmH2O   Cough/Sputum   Sputum How Obtained Endotracheal;Suctioned   Cough Productive   Sputum Amount None   Sputum Color None   Tenacity None   Spontaneous Breathing Trial (SBT) RT Doc   Pulse 56   Breath Sounds   Right Upper Lobe Diminished   Right Middle Lobe Diminished   Right Lower Lobe Diminished   Left Upper Lobe Diminished   Left Lower Lobe Diminished   Additional Respiratory  Assessments   Resp 14   Position Semi-Armas's   Cuff Pressure (cm H2O) 30 cm H2O   Alarm Settings   High Pressure Alarm 45 cmH2O   Low Minute Volume Alarm 2 L/min   High Respiratory Rate 40 br/min   Low Exhaled Vt  200 mL   ETT (adult)   Placement Date/Time: 12/02/20 1359   Timeout: Patient  Preoxygenation: Yes  Mask Ventilation: Ventilated by mask (1)  Technique: Video laryngoscopy  Type: Cuffed  Tube Size: 8 mm  Laryngoscope: GlideScope  Blade Size: 3  Location: Oral  Placement Veri. ..    Secured at 23 cm   Measured From [address] [place] [address],[address] By Commercial tube cabral   Site Condition Dry

## 2020-12-03 NOTE — CONSULTS
Comprehensive Nutrition Assessment    Type and Reason for Visit:  Reassess, Consult    Nutrition Recommendations/Plan:   Per SCCM/ASPEN guidelines, \"EN during prone positioning is not associated with increased risk of gastrointestinal or pulmonary complications, thus we recommend the patient requiring prone positioning receive early EN. ..keeping the head of the bed elevated (reverse Trendelenburg) to at least 10 to 25 degrees to decrease the risk of aspiration of gastric contents, facial edema and intra-abdominal hypertension. \"  -Nutrition Therapy in the Patient with COVID-19 Disease Requiring ICU Care: Updated March 30, 2020    1. Replace K, Mg   2. Ensure NG/OG in proper position with repeat KUB. Recommend TF initiation when medically appropriate. Order: \"Diet: Tube feed continuous/ NPO\". Initiate glucerna 1.5 (diabetic formula) at 10 mL/hr trophic rate. As tolerated, increase by 10 mL/hr q 8 hours until goal of 40 mL/hr is met. 3. Recommend 100 mL H20 flush q 4 hours IF NO IVF infusing, otherwise consider decreased flush to 30 mL q 4 hrs. Monitor IVF infusion, Na labs and need for adjustments in water flush  4. Recommend 1 Bottle Proteinex P2Go daily via syringe. Flush with 30 mL H20 before and after  5. Consider prophylactic prokinetic agent (such as reglan, erythromycin) to promote EN tolerance as appropriate while paralyzed and proned. 6. Consider daily micronutrient supplementation: 1000 IU Vitamin D3, MVM, + Probiotic (such as Lactobacillus GG) as shown to reduce incidence of VAP  7. Monitor TF tolerance (abd distention, bowel habits, N/V, cramping)  8. Check TG daily while on diprivan infusion  9. Monitor nutrition adequacy, pertinent labs, bowel habits, wt changes, and clinical progress      Nutrition Assessment:  Consult recieved for TF ordering and management and completed yesterday 12/2. TF still not started although orders in place.  Pt remains on the vent, sedated on propofol at 26.4 mL/hr (697 kcals from lipids). Fentanyl at 125 mcg/min. Paralyzed on Nimbex. Hypotensive on Levo at 2 mcg. Pt supine this am, but may transition to prone position per RN. Malnutrition Assessment:  Malnutrition Status: At risk for malnutrition (Comment)(limited access to nutrition + increased nutrient needs for compromised lung function)        Estimated Daily Nutrient Needs:  Energy (kcal):  9189-5054; Weight Used for Energy Requirements:  Current(110 kg)     Protein (g):  (11-14); Weight Used for Protein Requirements:  Current(1.5-2)        Fluid (ml/day):  per MD with current condition; Method Used for Fluid Requirements:  1 ml/kcal      Nutrition Related Findings:  Labs reviewed: Na 142, K 2.3, Mg 1.5, Phos WNL, Alb 1.6, . Wounds:  (redness on L ankle)       Current Nutrition Therapies:    DIET TUBE FEED CONTINUOUS/CYCLIC NPO; Diabetic 1.5; Orogastric; Continuous; 10; 40; Exceptions are: Sips with Meds  Current Tube Feeding (TF) Orders:  · Feeding Route: Nasogastric  · Formula: 1.5 Diabetic  · Schedule: Continuous  · Additives/Modulars: Protein  · Goal TF & Flush Orders Provides: Glucerna 1.5 at 40 mL/hr x 20 hrs to provide 800 mL TV, 1200 kcals, 66 g protein, and 607 mL free water. + 100 mL free water q 4 hrs for additional 600 mL free water daily. + propofol calories. + 1 proteinex daily for added 26 g protein and 104 kcals.       Anthropometric Measures:  · Height: 5' 4\" (162.6 cm)  · Current Body Weight: 242 lb 1 oz (109.8 kg)   · Admission Body Weight: 241 lb 2 oz (109.4 kg)    · Ideal Body Weight: 120 lbs; % Ideal Body Weight 201.7 %   · BMI: 41.5   · BMI Categories: Obese Class 3 (BMI 40.0 or greater)       Nutrition Diagnosis:   · Inadequate energy intake related to lack or limited access to food, impaired respiratory function as evidenced by NPO or clear liquid status due to medical condition, intubation      Nutrition Interventions:   Food and/or Nutrient Delivery:  Start Tube Feeding  Nutrition Education/Counseling:  No recommendation at this time   Coordination of Nutrition Care:  Continue to monitor while inpatient    Goals:  Patient will tolerate nutrition initiation without worsening respiratory function or blood sugars greater than 180 mg/dl. Nutrition Monitoring and Evaluation:   Food/Nutrient Intake Outcomes:  Enteral Nutrition Intake/Tolerance  Physical Signs/Symptoms Outcomes:  Biochemical Data, Nutrition Focused Physical Findings     Discharge Planning: Too soon to determine     Electronically signed by Duy Cruz.  Jarod Dunham RD, LD on 12/3/20 at 10:01 AM EST    Contact: [phone]

## 2020-12-03 NOTE — PROGRESS NOTES
12/03/20 1030   Vent Information   Vent Type 980   Vent Mode AC/VC   Vt Ordered 400 mL   Rate Set 23 bmp   Peak Flow 45 L/min   Pressure Support 0 cmH20   FiO2  50 %   SpO2 94 %   SpO2/FiO2 ratio 188   Sensitivity 3   PEEP/CPAP 15   Humidification Source HME   Vent Patient Data   Peak Inspiratory Pressure 31 cmH2O   Mean Airway Pressure 21 cmH20   Rate Measured 23 br/min   Vt Exhaled 406 mL   Minute Volume 9.33 Liters   I:E Ratio 1:1.70   Cough/Sputum   Cough None   Sputum Amount Small   Sputum Color Cloudy   Spontaneous Breathing Trial (SBT) RT Doc   Pulse 53   Breath Sounds   Right Upper Lobe Diminished   Right Middle Lobe Diminished   Right Lower Lobe Diminished   Left Upper Lobe Diminished   Left Lower Lobe Diminished   Additional Respiratory  Assessments   Resp 23   Alarm Settings   High Pressure Alarm 45 cmH2O   Low Minute Volume Alarm 2 L/min   High Respiratory Rate 40 br/min   Low Exhaled Vt  200 mL     Patient is proned  ETT cabral with OPTI foam under all facial pressure points.

## 2020-12-04 LAB
A/G RATIO: 0.7 (ref 1.1–2.2)
ALBUMIN SERPL-MCNC: 2.1 G/DL (ref 3.4–5)
ALP BLD-CCNC: 73 U/L (ref 40–129)
ALT SERPL-CCNC: 25 U/L (ref 10–40)
ANION GAP SERPL CALCULATED.3IONS-SCNC: 6 MMOL/L (ref 3–16)
AST SERPL-CCNC: 20 U/L (ref 15–37)
BASE EXCESS ARTERIAL: 2 MMOL/L (ref -3–3)
BASE EXCESS ARTERIAL: 2.2 MMOL/L (ref -3–3)
BASE EXCESS ARTERIAL: 3.6 MMOL/L (ref -3–3)
BASE EXCESS ARTERIAL: 3.7 MMOL/L (ref -3–3)
BASOPHILS ABSOLUTE: 0 K/UL (ref 0–0.2)
BASOPHILS RELATIVE PERCENT: 0 %
BILIRUB SERPL-MCNC: 0.3 MG/DL (ref 0–1)
BUN BLDV-MCNC: 43 MG/DL (ref 7–20)
C-REACTIVE PROTEIN: 43.4 MG/L (ref 0–5.1)
CALCIUM SERPL-MCNC: 7.7 MG/DL (ref 8.3–10.6)
CARBOXYHEMOGLOBIN ARTERIAL: 0.3 % (ref 0–1.5)
CARBOXYHEMOGLOBIN ARTERIAL: 0.4 % (ref 0–1.5)
CHLORIDE BLD-SCNC: 106 MMOL/L (ref 99–110)
CO2: 30 MMOL/L (ref 21–32)
CREAT SERPL-MCNC: 1.1 MG/DL (ref 0.6–1.2)
EOSINOPHILS ABSOLUTE: 0 K/UL (ref 0–0.6)
EOSINOPHILS RELATIVE PERCENT: 0 %
GFR AFRICAN AMERICAN: 59
GFR NON-AFRICAN AMERICAN: 49
GLOBULIN: 3.1 G/DL
GLUCOSE BLD-MCNC: 157 MG/DL (ref 70–99)
GLUCOSE BLD-MCNC: 170 MG/DL (ref 70–99)
GLUCOSE BLD-MCNC: 176 MG/DL (ref 70–99)
GLUCOSE BLD-MCNC: 184 MG/DL (ref 70–99)
GLUCOSE BLD-MCNC: 200 MG/DL (ref 70–99)
GLUCOSE BLD-MCNC: 222 MG/DL (ref 70–99)
GLUCOSE BLD-MCNC: 228 MG/DL (ref 70–99)
HCO3 ARTERIAL: 28.5 MMOL/L (ref 21–29)
HCO3 ARTERIAL: 29 MMOL/L (ref 21–29)
HCO3 ARTERIAL: 29.5 MMOL/L (ref 21–29)
HCO3 ARTERIAL: 29.7 MMOL/L (ref 21–29)
HCT VFR BLD CALC: 47.2 % (ref 36–48)
HEMOGLOBIN, ART, EXTENDED: 15.4 G/DL (ref 12–16)
HEMOGLOBIN, ART, EXTENDED: 15.5 G/DL (ref 12–16)
HEMOGLOBIN, ART, EXTENDED: 16.9 G/DL (ref 12–16)
HEMOGLOBIN, ART, EXTENDED: 17.8 G/DL (ref 12–16)
HEMOGLOBIN: 15.5 G/DL (ref 12–16)
LYMPHOCYTES ABSOLUTE: 0.1 K/UL (ref 1–5.1)
LYMPHOCYTES RELATIVE PERCENT: 1 %
MAGNESIUM: 2.8 MG/DL (ref 1.8–2.4)
MCH RBC QN AUTO: 27.1 PG (ref 26–34)
MCHC RBC AUTO-ENTMCNC: 32.8 G/DL (ref 31–36)
MCV RBC AUTO: 82.6 FL (ref 80–100)
METHEMOGLOBIN ARTERIAL: 0.5 %
MONOCYTES ABSOLUTE: 0.3 K/UL (ref 0–1.3)
MONOCYTES RELATIVE PERCENT: 2 %
NEUTROPHILS ABSOLUTE: 12.1 K/UL (ref 1.7–7.7)
NEUTROPHILS RELATIVE PERCENT: 97 %
O2 CONTENT ARTERIAL: 20 ML/DL
O2 CONTENT ARTERIAL: 20 ML/DL
O2 CONTENT ARTERIAL: 22 ML/DL
O2 CONTENT ARTERIAL: 24 ML/DL
O2 SAT, ARTERIAL: 90.3 %
O2 SAT, ARTERIAL: 93.3 %
O2 SAT, ARTERIAL: 95.1 %
O2 SAT, ARTERIAL: 95.5 %
O2 THERAPY: ABNORMAL
PCO2 ARTERIAL: 45.9 MMHG (ref 35–45)
PCO2 ARTERIAL: 48.5 MMHG (ref 35–45)
PCO2 ARTERIAL: 50.7 MMHG (ref 35–45)
PCO2 ARTERIAL: 58.1 MMHG (ref 35–45)
PDW BLD-RTO: 14.5 % (ref 12.4–15.4)
PERFORMED ON: ABNORMAL
PH ARTERIAL: 7.33 (ref 7.35–7.45)
PH ARTERIAL: 7.37 (ref 7.35–7.45)
PH ARTERIAL: 7.4 (ref 7.35–7.45)
PH ARTERIAL: 7.42 (ref 7.35–7.45)
PHOSPHORUS: 3.9 MG/DL (ref 2.5–4.9)
PLATELET # BLD: 364 K/UL (ref 135–450)
PLATELET SLIDE REVIEW: ADEQUATE
PMV BLD AUTO: 9.8 FL (ref 5–10.5)
PO2 ARTERIAL: 63.7 MMHG (ref 75–108)
PO2 ARTERIAL: 71.9 MMHG (ref 75–108)
PO2 ARTERIAL: 75 MMHG (ref 75–108)
PO2 ARTERIAL: 82.3 MMHG (ref 75–108)
POTASSIUM SERPL-SCNC: 4 MMOL/L (ref 3.5–5.1)
RBC # BLD: 5.72 M/UL (ref 4–5.2)
SLIDE REVIEW: ABNORMAL
SODIUM BLD-SCNC: 142 MMOL/L (ref 136–145)
TCO2 ARTERIAL: 30 MMOL/L
TCO2 ARTERIAL: 30.4 MMOL/L
TCO2 ARTERIAL: 31 MMOL/L
TCO2 ARTERIAL: 31.5 MMOL/L
TOTAL PROTEIN: 5.2 G/DL (ref 6.4–8.2)
WBC # BLD: 12.5 K/UL (ref 4–11)

## 2020-12-04 PROCEDURE — 80053 COMPREHEN METABOLIC PANEL: CPT

## 2020-12-04 PROCEDURE — C9113 INJ PANTOPRAZOLE SODIUM, VIA: HCPCS | Performed by: NURSE PRACTITIONER

## 2020-12-04 PROCEDURE — 94761 N-INVAS EAR/PLS OXIMETRY MLT: CPT

## 2020-12-04 PROCEDURE — 99291 CRITICAL CARE FIRST HOUR: CPT | Performed by: INTERNAL MEDICINE

## 2020-12-04 PROCEDURE — 84100 ASSAY OF PHOSPHORUS: CPT

## 2020-12-04 PROCEDURE — 2580000003 HC RX 258: Performed by: INTERNAL MEDICINE

## 2020-12-04 PROCEDURE — 99232 SBSQ HOSP IP/OBS MODERATE 35: CPT | Performed by: INTERNAL MEDICINE

## 2020-12-04 PROCEDURE — 85025 COMPLETE CBC W/AUTO DIFF WBC: CPT

## 2020-12-04 PROCEDURE — 86140 C-REACTIVE PROTEIN: CPT

## 2020-12-04 PROCEDURE — 83735 ASSAY OF MAGNESIUM: CPT

## 2020-12-04 PROCEDURE — 6370000000 HC RX 637 (ALT 250 FOR IP): Performed by: NURSE PRACTITIONER

## 2020-12-04 PROCEDURE — 82803 BLOOD GASES ANY COMBINATION: CPT

## 2020-12-04 PROCEDURE — 2500000003 HC RX 250 WO HCPCS: Performed by: INTERNAL MEDICINE

## 2020-12-04 PROCEDURE — 6360000002 HC RX W HCPCS: Performed by: NURSE PRACTITIONER

## 2020-12-04 PROCEDURE — 2000000000 HC ICU R&B

## 2020-12-04 PROCEDURE — 6360000002 HC RX W HCPCS: Performed by: INTERNAL MEDICINE

## 2020-12-04 PROCEDURE — 6370000000 HC RX 637 (ALT 250 FOR IP): Performed by: INTERNAL MEDICINE

## 2020-12-04 PROCEDURE — 94770 HC ETCO2 MONITOR DAILY: CPT

## 2020-12-04 PROCEDURE — 99292 CRITICAL CARE ADDL 30 MIN: CPT | Performed by: INTERNAL MEDICINE

## 2020-12-04 PROCEDURE — 2700000000 HC OXYGEN THERAPY PER DAY

## 2020-12-04 PROCEDURE — 94750 HC PULMONARY COMPLIANCE STUDY: CPT

## 2020-12-04 PROCEDURE — 94003 VENT MGMT INPAT SUBQ DAY: CPT

## 2020-12-04 RX ORDER — HYDRALAZINE HYDROCHLORIDE 20 MG/ML
10 INJECTION INTRAMUSCULAR; INTRAVENOUS EVERY 4 HOURS PRN
Status: DISCONTINUED | OUTPATIENT
Start: 2020-12-04 | End: 2020-12-16 | Stop reason: HOSPADM

## 2020-12-04 RX ADMIN — HYDRALAZINE HYDROCHLORIDE 10 MG: 20 INJECTION INTRAMUSCULAR; INTRAVENOUS at 10:01

## 2020-12-04 RX ADMIN — METHYLPREDNISOLONE SODIUM SUCCINATE 40 MG: 40 INJECTION, POWDER, FOR SOLUTION INTRAMUSCULAR; INTRAVENOUS at 16:29

## 2020-12-04 RX ADMIN — FENTANYL CITRATE 125 MCG/HR: 0.05 INJECTION, SOLUTION INTRAMUSCULAR; INTRAVENOUS at 13:25

## 2020-12-04 RX ADMIN — CARBOXYMETHYLCELLULOSE SODIUM 1 DROP: 10 GEL OPHTHALMIC at 16:29

## 2020-12-04 RX ADMIN — INSULIN LISPRO 6 UNITS: 100 INJECTION, SOLUTION INTRAVENOUS; SUBCUTANEOUS at 21:45

## 2020-12-04 RX ADMIN — FENTANYL CITRATE 125 MCG/HR: 0.05 INJECTION, SOLUTION INTRAMUSCULAR; INTRAVENOUS at 12:16

## 2020-12-04 RX ADMIN — ENOXAPARIN SODIUM 40 MG: 40 INJECTION SUBCUTANEOUS at 21:00

## 2020-12-04 RX ADMIN — INSULIN LISPRO 3 UNITS: 100 INJECTION, SOLUTION INTRAVENOUS; SUBCUTANEOUS at 10:02

## 2020-12-04 RX ADMIN — INSULIN LISPRO 3 UNITS: 100 INJECTION, SOLUTION INTRAVENOUS; SUBCUTANEOUS at 12:17

## 2020-12-04 RX ADMIN — REMDESIVIR 100 MG: 100 INJECTION, POWDER, LYOPHILIZED, FOR SOLUTION INTRAVENOUS at 00:40

## 2020-12-04 RX ADMIN — INSULIN GLARGINE 55 UNITS: 100 INJECTION, SOLUTION SUBCUTANEOUS at 21:45

## 2020-12-04 RX ADMIN — CARBOXYMETHYLCELLULOSE SODIUM 1 DROP: 10 GEL OPHTHALMIC at 20:59

## 2020-12-04 RX ADMIN — MIDAZOLAM 2 MG/HR: 5 INJECTION INTRAMUSCULAR; INTRAVENOUS at 21:04

## 2020-12-04 RX ADMIN — CARBOXYMETHYLCELLULOSE SODIUM 1 DROP: 10 GEL OPHTHALMIC at 00:30

## 2020-12-04 RX ADMIN — INSULIN LISPRO 3 UNITS: 100 INJECTION, SOLUTION INTRAVENOUS; SUBCUTANEOUS at 05:03

## 2020-12-04 RX ADMIN — INSULIN LISPRO 3 UNITS: 100 INJECTION, SOLUTION INTRAVENOUS; SUBCUTANEOUS at 00:41

## 2020-12-04 RX ADMIN — Medication 15 ML: at 21:00

## 2020-12-04 RX ADMIN — METHYLPREDNISOLONE SODIUM SUCCINATE 40 MG: 40 INJECTION, POWDER, FOR SOLUTION INTRAMUSCULAR; INTRAVENOUS at 21:00

## 2020-12-04 RX ADMIN — CARBOXYMETHYLCELLULOSE SODIUM 1 DROP: 10 GEL OPHTHALMIC at 09:48

## 2020-12-04 RX ADMIN — METHYLPREDNISOLONE SODIUM SUCCINATE 40 MG: 40 INJECTION, POWDER, FOR SOLUTION INTRAMUSCULAR; INTRAVENOUS at 09:51

## 2020-12-04 RX ADMIN — PROPOFOL 10 MCG/KG/MIN: 10 INJECTION, EMULSION INTRAVENOUS at 16:28

## 2020-12-04 RX ADMIN — PANTOPRAZOLE SODIUM 40 MG: 40 INJECTION, POWDER, FOR SOLUTION INTRAVENOUS at 09:51

## 2020-12-04 RX ADMIN — Medication 15 ML: at 09:51

## 2020-12-04 RX ADMIN — CARBOXYMETHYLCELLULOSE SODIUM 1 DROP: 10 GEL OPHTHALMIC at 05:02

## 2020-12-04 RX ADMIN — CARBOXYMETHYLCELLULOSE SODIUM 1 DROP: 10 GEL OPHTHALMIC at 12:00

## 2020-12-04 RX ADMIN — ENOXAPARIN SODIUM 40 MG: 40 INJECTION SUBCUTANEOUS at 09:51

## 2020-12-04 RX ADMIN — INSULIN LISPRO 6 UNITS: 100 INJECTION, SOLUTION INTRAVENOUS; SUBCUTANEOUS at 16:43

## 2020-12-04 RX ADMIN — METHYLPREDNISOLONE SODIUM SUCCINATE 40 MG: 40 INJECTION, POWDER, FOR SOLUTION INTRAMUSCULAR; INTRAVENOUS at 05:02

## 2020-12-04 ASSESSMENT — PULMONARY FUNCTION TESTS
PIF_VALUE: 30
PIF_VALUE: 29
PIF_VALUE: 30
PIF_VALUE: 30
PIF_VALUE: 28
PIF_VALUE: 26
PIF_VALUE: 30
PIF_VALUE: 27
PIF_VALUE: 29
PIF_VALUE: 27
PIF_VALUE: 27
PIF_VALUE: 29
PIF_VALUE: 29
PIF_VALUE: 27
PIF_VALUE: 38
PIF_VALUE: 27
PIF_VALUE: 27
PIF_VALUE: 30
PIF_VALUE: 30
PIF_VALUE: 29
PIF_VALUE: 33

## 2020-12-04 NOTE — CONSULTS
[facility]    [facility]              [phone]                      She is admitted with hypoxemia and respiratory failure requiring intubation and mechanical ventilation due to COVID-19. We are following for ELOY and related issues    Interval History and plan:      Her urine output is improving, creatinine coming down  Oxygen saturation is maintained with FiO2 of 45%  Agree with decreasing the fluid to 50 mL/h                   Assessment :     Acute Kidney Injury  ELOY likely due to -hemodynamic changes, Covid  Cr on consultation 1.2  Baseline Cr-0.8    UA-large blood, has Urena, RBC more than 100  Renal Imaging:-NA  Echo: N/A    Hypertension     Pulse:  [49-71]   BP goal inpatient [id number] systolic inpatient  Blood pressure 200 yesterday  Coming down to 160s  On as needed hydralazine    COVID-19 positive, had respiratory failure requiring intubation and mechanical ventilation         [facility] would like to thank Brittany Cunningham MD   for opportunity to serve this patient      Please call with questions at-   24 Hrs Answering service [phone] or  7 am- 5 pm via Perfect serve or cell phone  Dr.Sudhir Erin Miles          CC/reason for consult :     ELOY     HPI :     Dionisio Colin is a 70 y.o. female presented to   the hospital on 11/29/2020 with COVID-19 which was diagnosed on 11/26/2016. Per chart see you had fatigue headache loss in appetite since of smell and taste and followed by cough for several weeks. Now came with the shortness of breath. She had a difficulty breathing and came to [facility].  He has she required 10 L/min of oxygen. She is known to have COPD and congestive heart failure but no need for supplemental oxygen at home. She was transferred to ICU. She needed intubation and mechanical ventilation. Post intubation her blood pressure changes significantly. She also had poor urine output and rising creatinine.     She got 125 mL/h of fluids overnight now on 25 mL/h  Blood pressure has been fluctuating. Blood pressure went more than 200 but now coming up to 007 systolic    We are consulted for acute renal failure and related issues    ROS:     Seen with- RN    Unable to obtain ROS due to  Altered mental status           PMH/PSH/SH/Family History:     Past Medical History:   Diagnosis Date    COVID-19 11/26/2020    Hyperlipidemia     Hypertension     Neuropathy     Type II or unspecified type diabetes mellitus without mention of complication, not stated as uncontrolled        Past Surgical History:   Procedure Laterality Date    APPENDECTOMY      CHOLECYSTECTOMY      HUMERUS FRACTURE SURGERY      TUBAL LIGATION      TUMOR REMOVAL          reports that she has never smoked. She does not have any smokeless tobacco history on file. family history is not on file.          Medication:     Current Facility-Administered Medications: hydrALAZINE (APRESOLINE) injection 10 mg, 10 mg, Intravenous, Q4H PRN  potassium chloride 20 mEq/50 mL IVPB (Central Line), 20 mEq, Intravenous, PRN  insulin glargine (LANTUS) injection vial 55 Units, 55 Units, Subcutaneous, Nightly  methylPREDNISolone sodium (SOLU-MEDROL) injection 40 mg, 40 mg, Intravenous, Q6H  0.9 % sodium chloride infusion, , Intravenous, Continuous  propofol injection, 10 mcg/kg/min, Intravenous, Titrated  fentaNYL (SUBLIMAZE) 1,000 mcg in sodium chloride 0.9 % 100 mL infusion, 25 mcg/hr, Intravenous, Titrated  cisatracurium besylate (NIMBEX) 200 mg in sodium chloride 0.9 % 100 mL infusion, 2 mcg/kg/min, Intravenous, Continuous  pantoprazole (PROTONIX) injection 40 mg, 40 mg, Intravenous, Daily  insulin lispro (HUMALOG) injection vial 0-18 Units, 0-18 Units, Subcutaneous, 6 times per day  carboxymethylcellulose PF (THERATEARS) 1 % ophthalmic gel 1 drop, 1 drop, Both Eyes, 6 times per day  chlorhexidine (PERIDEX) 0.12 % solution 15 mL, 15 mL, Mouth/Throat, BID  [Held by provider] metoprolol tartrate (LOPRESSOR) tablet 50 mg, 50 mg, Oral, BID  sodium chloride flush 0.9 % injection 10 mL, 10 mL, Intravenous, PRN  magnesium sulfate 1 g in dextrose 5% 100 mL IVPB, 1 g, Intravenous, PRN  acetaminophen (TYLENOL) tablet 650 mg, 650 mg, Oral, Q4H PRN **OR** acetaminophen (TYLENOL) suppository 650 mg, 650 mg, Rectal, Q4H PRN  ondansetron (ZOFRAN) injection 4 mg, 4 mg, Intravenous, Q6H PRN  albuterol sulfate  (90 Base) MCG/ACT inhaler 2 puff, 2 puff, Inhalation, Q4H PRN  glucose (GLUTOSE) 40 % oral gel 15 g, 15 g, Oral, PRN  dextrose 50 % IV solution, 12.5 g, Intravenous, PRN  glucagon (rDNA) injection 1 mg, 1 mg, Intramuscular, PRN  dextrose 5 % solution, 100 mL/hr, Intravenous, PRN  enoxaparin (LOVENOX) injection 40 mg, 40 mg, Subcutaneous, BID  0.9 % sodium chloride bolus, 30 mL, Intravenous, PRN  albuterol sulfate  (90 Base) MCG/ACT inhaler 2 puff, 2 puff, Inhalation, Q4H PRN **AND** ipratropium (ATROVENT HFA) 17 MCG/ACT inhaler 2 puff, 2 puff, Inhalation, Q4H PRN **AND** MDI Treatment, , , PRN       Vitals :     Vitals:    12/04/20 0805   BP:    Pulse: 52   Resp: 22   Temp:    SpO2: 99%       I & O :       Intake/Output Summary (Last 24 hours) at 12/4/2020 1007  Last data filed at 12/4/2020 0600  Gross per 24 hour   Intake 2931.24 ml   Output 750 ml   Net 2181.24 ml        Physical Examination :     General appearance: unresponsive, intubated   HEENT: Lips- normal, teeth- ok , oral mucosa- moist  Neck : Mass- no, appears symmetrical, JVD- not raised  Respiratory: Respiratory effort-  On ventilation- FiO2-45% wheeze- no, crackles - no  Cardiovascular:  Ausculation- No M/R/G, Edema none  Abdomen: visible mass- no, distention- no, scar- no, tenderness- unable to assess                           hepatosplenomegaly-  No--  Musculoskeletal:  clubbing no,cyanosis- no , digital ischemia- no                           muscle strength- patient unable to co-operate     , tone - patient unable to co-operate Skin: rashes- no , ulcers- no, induration- no, tightening - no  Psychiatric:   Intubated, unable to assess  Additional findings: -       LABS:     Recent Labs     12/03/20  0535 12/03/20  0718 12/04/20  0555   WBC 8.6 15.1* 12.5*   HGB 8.5* 14.8 15.5   HCT 26.8* 45.3 47.2    473* 364     Recent Labs     12/02/20  0431 12/03/20  0718 12/03/20  1619 12/04/20  0555   * 142 138 142   K 3.6 2.3* 4.4 4.0    113* 100 106   CO2 32 22 32 30   BUN 21* 27* 39* 43*   CREATININE 0.9 0.8 1.2 1.1   GLUCOSE 129* 259* 205* 200*   MG 2.40 1.50*  --  2.80*   PHOS 3.5 3.1  --  3.9

## 2020-12-04 NOTE — PROGRESS NOTES
Turned this morning during report and O2 desat to 70's. Bagged and slowly came up. Sedation increased, Nibex started, proned at 10 am.   Granddaughter updated x2 via telephone. proning effective and SpO2 is 96%. Urine output decreasing and BUN and creat increasing. Hospitalist paged and fluids ordered and nephrology consult called. Levo stopped and is now hypertensive, nephrology informed.

## 2020-12-04 NOTE — PROGRESS NOTES
Pulmonary & Critical Care Inpatient Progress Note   Yoli Montalvo MD     REASON FOR TODAY'S VISIT:  Critical care management    SUBJECTIVE:   Remains on vent support with high oxygenation requirements, sedation, and paralytic infusion  Net positive fluid balance     Scheduled Meds:   insulin glargine  55 Units Subcutaneous Nightly    methylPREDNISolone  40 mg Intravenous Q6H    pantoprazole  40 mg Intravenous Daily    insulin lispro  0-18 Units Subcutaneous 6 times per day    carboxymethylcellulose PF  1 drop Both Eyes 6 times per day    chlorhexidine  15 mL Mouth/Throat BID    [Held by provider] metoprolol  50 mg Oral BID    enoxaparin  40 mg Subcutaneous BID       Continuous Infusions:   propofol 10 mcg/kg/min (12/03/20 1803)    fentaNYL (SUBLIMAZE) infusion 125 mcg/hr (12/04/20 1325)    dextrose         PRN Meds:  hydrALAZINE, potassium chloride, sodium chloride flush, magnesium sulfate, acetaminophen **OR** acetaminophen, ondansetron, albuterol sulfate HFA, glucose, dextrose, glucagon (rDNA), dextrose, sodium chloride, albuterol sulfate HFA **AND** ipratropium **AND** MDI Treatment    ALLERGIES:  Patient is allergic to contrast [iodides]; ibuprofen; and lipitor [atorvastatin calcium]. Objective:   PHYSICAL EXAM:  BP (!) 182/102   Pulse 61   Temp 96.8 °F (36 °C) (Bladder)   Resp 22   Ht 5' 4\" (1.626 m)   Wt 245 lb 6 oz (111.3 kg)   SpO2 94%   BMI 42.12 kg/m²    Physical Exam  Constitutional:       General: She is not in acute distress. Appearance: She is well-developed. She is not diaphoretic. HENT:      Head: Normocephalic and atraumatic. Mouth/Throat:      Pharynx: No oropharyngeal exudate. Eyes:      Pupils: Pupils are equal, round, and reactive to light. Neck:      Musculoskeletal: Neck supple. Vascular: No JVD. Cardiovascular:      Heart sounds: Normal heart sounds. No murmur. No friction rub. No gallop.     Pulmonary:      Effort: Pulmonary effort is normal. Breath sounds: No wheezing or rales. Abdominal:      General: Bowel sounds are normal. There is no distension. Palpations: Abdomen is soft. Tenderness: There is no abdominal tenderness. Musculoskeletal: Normal range of motion. Lymphadenopathy:      Cervical: No cervical adenopathy. Skin:     General: Skin is warm and dry. Findings: No rash. Neurological:      Mental Status: She is alert. Cranial Nerves: No cranial nerve deficit. Comments: CN 2-12 grossly intact            Data Reviewed:   LABS:  CBC:  Recent Labs     12/03/20  0535 12/03/20  0718 12/04/20  0555   WBC 8.6 15.1* 12.5*   HGB 8.5* 14.8 15.5   HCT 26.8* 45.3 47.2   MCV 84.0 81.9 82.6    473* 364     BMP:  Recent Labs     12/02/20  0431 12/03/20  0718 12/03/20  1619 12/04/20  0555   * 142 138 142   K 3.6 2.3* 4.4 4.0    113* 100 106   CO2 32 22 32 30   PHOS 3.5 3.1  --  3.9   BUN 21* 27* 39* 43*   CREATININE 0.9 0.8 1.2 1.1     LIVER PROFILE:   Recent Labs     12/03/20  0718 12/03/20  1619 12/04/20  0555   AST 18 24 20   ALT 20 34 25   BILITOT 0.3 0.5 0.3   ALKPHOS 57 85 73     PT/INR:  Recent Labs     12/02/20  0431 12/03/20  0535   PROTIME 15.1* 16.8*   INR 1.30* 1.44*     APTT: No results for input(s): APTT in the last 72 hours.   UA:  Recent Labs     12/01/20  1600   COLORU RED*   PHUR 7.5   WBCUA see below*   RBCUA >100*   CLARITYU TURBID*   SPECGRAV 1.020   LEUKOCYTESUR TRACE*   UROBILINOGEN 0.2   BILIRUBINUR Negative   BLOODU LARGE*   GLUCOSEU 100*     Recent Labs     12/04/20  0555 12/04/20  1204   PHART 7.402 7.367   DOQ3YGW 48.5* 50.7*   PO2ART 82.3 71.9*       Vent Information  $Ventilation: $Subsequent Day  Skin Assessment: Clean, dry, & intact  Equipment Changed: (S) HME  Vent Type: 980  Vent Mode: AC/VC  Vt Ordered: 400 mL  Pressure Ordered: 20  Rate Set: 22 bmp  Peak Flow: 45 L/min  Pressure Support: 0 cmH20  FiO2 : 45 %  SpO2: 94 %  SpO2/FiO2 ratio: 208.89  Sensitivity: 3  PEEP/CPAP: 12  I Time/ I Time %: 0.9 s  Humidification Source: HME  Humidification Temp: 37  Humidification Temp Measured: 37    CXR personally reviewed, bilateral airspace disease          Assessment:     1. Acute resp failure, hypoxic  2. Acute covid respiratory infection  3. DM2  4. Acute hypokalemia    Plan:      -Vent support, lung protective vent strategy with intermittent prone ventilation  -Titrate sedation as needed to keep BIS between 40-60  -Titrate levophed support for MAP>65  -Antimicrobials per ID  -Nephrology involved, serial labs  -Tube feeds, monitor bowel output  -Adjust insulin   -Steroids  -Overall guarded prognosis with high mortality, early in course however. Ongoing discussions with family regarding goals of care     Due to life threatening resp failure this patient is critically ill.  Total critical care time involved in her care was 90 mins    Los Navarrete MD

## 2020-12-04 NOTE — PROGRESS NOTES
12/04/20 1205   Vent Information   Vent Type 980   Vent Mode AC/VC   Vt Ordered 400 mL   Rate Set 22 bmp   Peak Flow 45 L/min   Pressure Support 0 cmH20   FiO2  45 %   SpO2 97 %   SpO2/FiO2 ratio 215.56   Sensitivity 3   PEEP/CPAP 12   Humidification Source HME   Vent Patient Data   Peak Inspiratory Pressure 27 cmH2O   Mean Airway Pressure 17 cmH20   Rate Measured 22 br/min   Vt Exhaled 400 mL   Minute Volume 8.78 Liters   I:E Ratio 1:1.80   Spontaneous Breathing Trial (SBT) RT Doc   Pulse 73   Additional Respiratory  Assessments   Resp 20   Alarm Settings   High Pressure Alarm 45 cmH2O   Low Minute Volume Alarm 3 L/min   High Respiratory Rate 40 br/min   Low Exhaled Vt  200 mL   ETT (adult)   Placement Date/Time: 12/02/20 1359   Timeout: Patient  Preoxygenation: Yes  Mask Ventilation: Ventilated by mask (1)  Technique: Video laryngoscopy  Type: Cuffed  Tube Size: 8 mm  Laryngoscope: GlideScope  Blade Size: 3  Location: Oral  Placement Veri. ..    Secured at 23 cm   Measured From Lips   ET Placement Right   Secured By Commercial tube cabral   Site Condition Dry

## 2020-12-04 NOTE — PROGRESS NOTES
Thank you for consulting [facility] in the care of your patient. Labs reviewed with SCr of 1.2 up from 0.8 and UOP has decreased with patient now prone, intubated for COVID+ve. Hx of bladder cancer with riggins in place. IVF started and ordered Renal ultrasound which will be done tomorrow once patient is no longer proned for respiratory issues. Discussed with nurse. A full consult will follow but if you have any questions I can be reached through our office at [phone] or through [facility]. Thank you.   Dr. Busby President

## 2020-12-04 NOTE — CARE COORDINATION
Chart reviewed by  for LOS - day 5. Pt remains in ICU on C2. Intubated yesterday d/t worsening respiratory status. Intermittent prone ventilation per pulmonology note yesterday. Patient from home, lives with . Disposition time frame unclear at this time. CM team will continue to follow.      Law Terry RN CM

## 2020-12-04 NOTE — PROGRESS NOTES
12/04/20 0018   Vent Information   Vent Type 980   Vent Mode AC/VC   Vt Ordered 400 mL   Rate Set 22 bmp   Peak Flow 45 L/min   Pressure Support 0 cmH20   FiO2  45 %   SpO2 98 %   SpO2/FiO2 ratio 217.78   Sensitivity 3   PEEP/CPAP 15   Humidification Source HME   Vent Patient Data   Peak Inspiratory Pressure 30 cmH2O   Mean Airway Pressure 20 cmH20   Rate Measured 22 br/min   Vt Exhaled 400 mL   Minute Volume 8.78 Liters   I:E Ratio 1:1.80   Cough/Sputum   Sputum How Obtained None   Spontaneous Breathing Trial (SBT) RT Doc   Pulse 52   Breath Sounds   Right Upper Lobe Diminished   Right Middle Lobe Diminished   Right Lower Lobe Diminished   Left Upper Lobe Diminished   Left Lower Lobe Diminished   Additional Respiratory  Assessments   Resp 22   Position Prone   Cuff Pressure (cm H2O) 30 cm H2O   Alarm Settings   High Pressure Alarm 45 cmH2O   Low Minute Volume Alarm 3 L/min   High Respiratory Rate 40 br/min   ETT (adult)   Placement Date/Time: 12/02/20 1355   Timeout: Patient  Preoxygenation: Yes  Mask Ventilation: Ventilated by mask (1)  Technique: Video laryngoscopy  Type: Cuffed  Tube Size: 8 mm  Laryngoscope: GlideScope  Blade Size: 3  Location: Oral  Placement Veri. ..    Secured at 23 cm   Measured From Lips   ET Placement Left   Secured By Commercial tube cabral   Site Condition Dry

## 2020-12-04 NOTE — PROGRESS NOTES
Infectious Disease Follow up Notes    CC :  COVID-19 and acute hypoxemic respiratory failure      Antibiotics:   None     remdesivir given 11/29-12/3/20     Solumedrol 40 q6  lovenox 40 q12    Admit Date:   11/29/2020  Hospital Day: 6    Subjective:   No history from patient possible. She remains sedated on vent. Back in supine position, still on paralytic infusion. Vent at 45/12. No fever. Objective:     Patient Vitals for the past 8 hrs:   Temp Temp src Pulse Resp SpO2   12/04/20 1638 -- -- 76 21 97 %   12/04/20 1600 -- -- 56 22 96 %   12/04/20 1525 98 °F (36.7 °C) Bladder 59 22 94 %   12/04/20 1300 -- -- 61 22 94 %   12/04/20 1205 -- -- 73 20 97 %   12/04/20 1100 96.8 °F (36 °C) Bladder 63 22 97 %   12/04/20 1000 96.7 °F (35.9 °C) Bladder 53 22 96 %     Due to the current efforts to prevent transmission of COVID-19 and also the need to preserve PPE for other caregivers, a face-to-face encounter with the patient was not performed. That being said, all relevant records and diagnostic tests were reviewed, including laboratory results and imaging. Please reference any relevant documentation elsewhere. Care will be coordinated with the primary service.          Scheduled Meds:   insulin glargine  55 Units Subcutaneous Nightly    methylPREDNISolone  40 mg Intravenous Q6H    pantoprazole  40 mg Intravenous Daily    insulin lispro  0-18 Units Subcutaneous 6 times per day    carboxymethylcellulose PF  1 drop Both Eyes 6 times per day    chlorhexidine  15 mL Mouth/Throat BID    [Held by provider] metoprolol  50 mg Oral BID    enoxaparin  40 mg Subcutaneous BID       Continuous Infusions:   midazolam      propofol 15 mcg/kg/min (12/04/20 1649)    fentaNYL (SUBLIMAZE) infusion 150 mcg/hr (12/04/20 1659)    dextrose            Data Review:    Lab Results   Component Value Date    WBC 12.5 (H) 12/04/2020    HGB 15.5 12/04/2020 HCT 47.2 12/04/2020    MCV 82.6 12/04/2020     12/04/2020     Lab Results   Component Value Date    CREATININE 1.1 12/04/2020    BUN 43 (H) 12/04/2020     12/04/2020    K 4.0 12/04/2020     12/04/2020    CO2 30 12/04/2020       Hepatic Function Panel:   Lab Results   Component Value Date    ALKPHOS 73 12/04/2020    ALT 25 12/04/2020    AST 20 12/04/2020    PROT 5.2 12/04/2020    BILITOT 0.3 12/04/2020    LABALBU 2.1 12/04/2020                                  11/29   12/1     12/3 12/4       CK  CRP                 95 - - 43  ddimer             747      918      444  Ferritin  Fibrinogen       583      599      262        Cultures:   11/26   COVID PCR+      RADIOLOGY RESULTS:  CXR 12/3/20   Impression    Support apparatus appears stable.         Mild congestive failure, pulmonary edema which is similar to slightly    improved when accounting for differences in technique.  Superimposed    pneumonia cannot be excluded in a patient with known COVID-19 infection.             Assessment:     Patient Active Problem List    Diagnosis Date Noted    COPD (chronic obstructive pulmonary disease) ([facility] 11/30/2020    Acute respiratory failure due to COVID-19 ([facility] 11/29/2020    Essential hypertension 11/29/2020    Obesity, Class III, BMI 40-49.9 (morbid obesity) ([address] 11/29/2020    Type 2 diabetes mellitus, with long-term current use of insulin ([address] 11/29/2020    DM (diabetes mellitus), secondary uncontrolled ([address] 11/29/2020    Morbid obesity with BMI of 40.0-44.9, adult ([facility] 11/29/2020    Metatarsalgia of left foot 01/05/2015    Plantar fascial fibromatosis 01/05/2015    Lateral malleolar fracture 10/16/2014       COVID-19 and acute respiratory failure  Date of onset of symptoms reportedly ~2 weeks prior to admission, on or around 11/15/20  Date of diagnosis 11/26/20  Date of admission 11/29/20  Date of intubation 12/2/20  -CCP given 11/29/20  -5d course of remdesivir completed  -continue solumedrol  -supportive care  -DVT ppx.   Trend ddimer which remains low  -trend CRP for prognostication - decline is reassuring      Little more to add from ID standpoint  I will follow peripherally, please call with questions         Alber William MD  Phone: [phone]   Fax : [phone]

## 2020-12-04 NOTE — PROGRESS NOTES
12/04/20 1638   Vent Information   Vent Type 980   Vent Mode AC/VC   Vt Ordered 400 mL   Rate Set 22 bmp   Peak Flow 48 L/min   Pressure Support 0 cmH20   FiO2  45 %   SpO2 97 %   SpO2/FiO2 ratio 215.56   Sensitivity 3   PEEP/CPAP 12   Humidification Source HME   Vent Patient Data   Peak Inspiratory Pressure 38 cmH2O   Mean Airway Pressure 20 cmH20   Rate Measured 25 br/min   Vt Exhaled 679 mL   Minute Volume 9.77 Liters   I:E Ratio 1:2.00   Plateau Pressure 23 [id number]   Static Compliance 41 mL/cmH2O   Cough/Sputum   Sputum How Obtained Endotracheal;Oral   Cough Congested   Sputum Amount Large   Sputum Color Cloudy   Tenacity Thick   Spontaneous Breathing Trial (SBT) RT Doc   Pulse 76   Breath Sounds   Right Upper Lobe Diminished   Right Middle Lobe Diminished   Right Lower Lobe Diminished   Left Upper Lobe Diminished   Left Lower Lobe Diminished   Additional Respiratory  Assessments   Resp 21   $End Tidal CO2 41   Alarm Settings   High Pressure Alarm 45 cmH2O   Low Minute Volume Alarm 3 L/min   Apnea (secs) 20 secs   High Respiratory Rate 40 br/min   Low Exhaled Vt  200 mL   ETT (adult)   Placement Date/Time: 12/02/20 1359   Timeout: Patient  Preoxygenation: Yes  Mask Ventilation: Ventilated by mask (1)  Technique: Video laryngoscopy  Type: Cuffed  Tube Size: 8 mm  Laryngoscope: GlideScope  Blade Size: 3  Location: Oral  Placement Veri. ..    Secured at 23 cm   Measured From [address] [place] [address],[address] By Commercial tube cabral   Site Condition Dry   Cuff Pressure 30 cm H2O

## 2020-12-04 NOTE — PROGRESS NOTES
12/04/20 0446   Vent Information   Vent Type 980   Vent Mode AC/VC   Vt Ordered 400 mL   Rate Set 22 bmp   Peak Flow 45 L/min   Pressure Support 0 cmH20   FiO2  45 %   SpO2 99 %   SpO2/FiO2 ratio 220   Sensitivity 3   PEEP/CPAP 15   Vent Patient Data   Peak Inspiratory Pressure 30 cmH2O   Mean Airway Pressure 20 cmH20   Rate Measured 22 br/min   Vt Exhaled 391 mL   Minute Volume 8.61 Liters   I:E Ratio 1:1.80   Cough/Sputum   Sputum How Obtained None   Spontaneous Breathing Trial (SBT) RT Doc   Pulse 71   Breath Sounds   Right Upper Lobe Diminished   Right Middle Lobe Diminished   Right Lower Lobe Diminished   Left Upper Lobe Diminished   Left Lower Lobe Diminished   Additional Respiratory  Assessments   Resp 16   Position Prone   Cuff Pressure (cm H2O) 30 cm H2O   Alarm Settings   High Pressure Alarm 45 cmH2O   Low Minute Volume Alarm 3 L/min   High Respiratory Rate 40 br/min   ETT (adult)   Placement Date/Time: 12/02/20 1359   Timeout: Patient  Preoxygenation: Yes  Mask Ventilation: Ventilated by mask (1)  Technique: Video laryngoscopy  Type: Cuffed  Tube Size: 8 mm  Laryngoscope: GlideScope  Blade Size: 3  Location: Oral  Placement Veri. ..    Secured at 23 cm   Measured From Lips   ET Placement Right   Secured By Commercial tube cabral   Site Condition Dry

## 2020-12-04 NOTE — PROGRESS NOTES
12/04/20 0805   Vent Information   Equipment Changed HME   Vent Type 980   Vent Mode AC/VC   Vt Ordered 400 mL   Rate Set 22 bmp   Peak Flow 45 L/min   Pressure Support 0 cmH20   FiO2  45 %   SpO2 99 %   SpO2/FiO2 ratio 220   Sensitivity 3   PEEP/CPAP 12   Humidification Source HME   Vent Patient Data   Peak Inspiratory Pressure 29 cmH2O   Mean Airway Pressure 20 cmH20   Rate Measured 22 br/min   Vt Exhaled 402 mL   Minute Volume 8.8 Liters   I:E Ratio 1:1.80   Plateau Pressure 24 [id number]   Static Compliance 35 mL/cmH2O   Dynamic Compliance 23.6 mL/cmH2O   Spontaneous Breathing Trial (SBT) RT Doc   Pulse 52   Additional Respiratory  Assessments   Resp 22   Cuff Pressure (cm H2O) 30 cm H2O   Alarm Settings   High Pressure Alarm 45 cmH2O   Low Minute Volume Alarm 3 L/min   High Respiratory Rate 40 br/min   Low Exhaled Vt  200 mL   Patient Observation   Observations ambu bag at bedside   ETT (adult)   Placement Date/Time: 12/02/20 1359   Timeout: Patient  Preoxygenation: Yes  Mask Ventilation: Ventilated by mask (1)  Technique: Video laryngoscopy  Type: Cuffed  Tube Size: 8 mm  Laryngoscope: GlideScope  Blade Size: 3  Location: Oral  Placement Veri. ..    Secured at 23 cm   Measured From [address] [place] [address],[address] By Commercial tube cabral   Site Condition Dry   Cuff Pressure 30 cm H2O

## 2020-12-05 LAB
A/G RATIO: 0.9 (ref 1.1–2.2)
ALBUMIN SERPL-MCNC: 2.3 G/DL (ref 3.4–5)
ALBUMIN SERPL-MCNC: 2.3 G/DL (ref 3.4–5)
ALP BLD-CCNC: 66 U/L (ref 40–129)
ALT SERPL-CCNC: 22 U/L (ref 10–40)
ANION GAP SERPL CALCULATED.3IONS-SCNC: 6 MMOL/L (ref 3–16)
ANION GAP SERPL CALCULATED.3IONS-SCNC: 7 MMOL/L (ref 3–16)
AST SERPL-CCNC: 16 U/L (ref 15–37)
ATYPICAL LYMPHOCYTE RELATIVE PERCENT: 1 % (ref 0–6)
BANDED NEUTROPHILS RELATIVE PERCENT: 4 % (ref 0–7)
BASE EXCESS ARTERIAL: -0.1 MMOL/L (ref -3–3)
BASE EXCESS ARTERIAL: 3 MMOL/L (ref -3–3)
BASOPHILS ABSOLUTE: 0 K/UL (ref 0–0.2)
BASOPHILS RELATIVE PERCENT: 0 %
BILIRUB SERPL-MCNC: 0.3 MG/DL (ref 0–1)
BUN BLDV-MCNC: 68 MG/DL (ref 7–20)
BUN BLDV-MCNC: 73 MG/DL (ref 7–20)
CALCIUM SERPL-MCNC: 8 MG/DL (ref 8.3–10.6)
CALCIUM SERPL-MCNC: 8.2 MG/DL (ref 8.3–10.6)
CARBOXYHEMOGLOBIN ARTERIAL: 0.3 % (ref 0–1.5)
CARBOXYHEMOGLOBIN ARTERIAL: 0.4 % (ref 0–1.5)
CHLORIDE BLD-SCNC: 103 MMOL/L (ref 99–110)
CHLORIDE BLD-SCNC: 106 MMOL/L (ref 99–110)
CO2: 29 MMOL/L (ref 21–32)
CO2: 30 MMOL/L (ref 21–32)
CREAT SERPL-MCNC: 1.5 MG/DL (ref 0.6–1.2)
CREAT SERPL-MCNC: 1.5 MG/DL (ref 0.6–1.2)
EOSINOPHILS ABSOLUTE: 0 K/UL (ref 0–0.6)
EOSINOPHILS RELATIVE PERCENT: 0 %
GFR AFRICAN AMERICAN: 41
GFR AFRICAN AMERICAN: 41
GFR NON-AFRICAN AMERICAN: 34
GFR NON-AFRICAN AMERICAN: 34
GLOBULIN: 2.7 G/DL
GLUCOSE BLD-MCNC: 195 MG/DL (ref 70–99)
GLUCOSE BLD-MCNC: 217 MG/DL (ref 70–99)
GLUCOSE BLD-MCNC: 219 MG/DL (ref 70–99)
GLUCOSE BLD-MCNC: 221 MG/DL (ref 70–99)
GLUCOSE BLD-MCNC: 221 MG/DL (ref 70–99)
GLUCOSE BLD-MCNC: 262 MG/DL (ref 70–99)
GLUCOSE BLD-MCNC: 270 MG/DL (ref 70–99)
GLUCOSE BLD-MCNC: 292 MG/DL (ref 70–99)
HCO3 ARTERIAL: 26.2 MMOL/L (ref 21–29)
HCO3 ARTERIAL: 29.7 MMOL/L (ref 21–29)
HCT VFR BLD CALC: 41.8 % (ref 36–48)
HEMOGLOBIN, ART, EXTENDED: 14.3 G/DL (ref 12–16)
HEMOGLOBIN, ART, EXTENDED: 14.5 G/DL (ref 12–16)
HEMOGLOBIN: 13.5 G/DL (ref 12–16)
LYMPHOCYTES ABSOLUTE: 0.2 K/UL (ref 1–5.1)
LYMPHOCYTES RELATIVE PERCENT: 1 %
MAGNESIUM: 3.3 MG/DL (ref 1.8–2.4)
MCH RBC QN AUTO: 27.1 PG (ref 26–34)
MCHC RBC AUTO-ENTMCNC: 32.2 G/DL (ref 31–36)
MCV RBC AUTO: 84.2 FL (ref 80–100)
METHEMOGLOBIN ARTERIAL: 0.6 %
METHEMOGLOBIN ARTERIAL: 0.6 %
MONOCYTES ABSOLUTE: 0.2 K/UL (ref 0–1.3)
MONOCYTES RELATIVE PERCENT: 2 %
NEUTROPHILS ABSOLUTE: 10.8 K/UL (ref 1.7–7.7)
NEUTROPHILS RELATIVE PERCENT: 92 %
O2 CONTENT ARTERIAL: 19 ML/DL
O2 CONTENT ARTERIAL: 19 ML/DL
O2 SAT, ARTERIAL: 93.9 %
O2 SAT, ARTERIAL: 95.8 %
O2 THERAPY: ABNORMAL
O2 THERAPY: ABNORMAL
PCO2 ARTERIAL: 48.8 MMHG (ref 35–45)
PCO2 ARTERIAL: 53.6 MMHG (ref 35–45)
PDW BLD-RTO: 14.9 % (ref 12.4–15.4)
PERFORMED ON: ABNORMAL
PH ARTERIAL: 7.35 (ref 7.35–7.45)
PH ARTERIAL: 7.36 (ref 7.35–7.45)
PHOSPHORUS: 4.4 MG/DL (ref 2.5–4.9)
PHOSPHORUS: 5.2 MG/DL (ref 2.5–4.9)
PLATELET # BLD: 310 K/UL (ref 135–450)
PLATELET SLIDE REVIEW: ADEQUATE
PMV BLD AUTO: 9.9 FL (ref 5–10.5)
PO2 ARTERIAL: 74 MMHG (ref 75–108)
PO2 ARTERIAL: 83.4 MMHG (ref 75–108)
POLYCHROMASIA: ABNORMAL
POTASSIUM SERPL-SCNC: 4.6 MMOL/L (ref 3.5–5.1)
POTASSIUM SERPL-SCNC: 4.9 MMOL/L (ref 3.5–5.1)
RBC # BLD: 4.97 M/UL (ref 4–5.2)
SLIDE REVIEW: ABNORMAL
SODIUM BLD-SCNC: 138 MMOL/L (ref 136–145)
SODIUM BLD-SCNC: 143 MMOL/L (ref 136–145)
TCO2 ARTERIAL: 27.7 MMOL/L
TCO2 ARTERIAL: 31.3 MMOL/L
TOTAL PROTEIN: 5 G/DL (ref 6.4–8.2)
WBC # BLD: 11.3 K/UL (ref 4–11)

## 2020-12-05 PROCEDURE — 6360000002 HC RX W HCPCS: Performed by: INTERNAL MEDICINE

## 2020-12-05 PROCEDURE — 84100 ASSAY OF PHOSPHORUS: CPT

## 2020-12-05 PROCEDURE — 6360000002 HC RX W HCPCS: Performed by: NURSE PRACTITIONER

## 2020-12-05 PROCEDURE — 85025 COMPLETE CBC W/AUTO DIFF WBC: CPT

## 2020-12-05 PROCEDURE — 94750 HC PULMONARY COMPLIANCE STUDY: CPT

## 2020-12-05 PROCEDURE — 2580000003 HC RX 258: Performed by: INTERNAL MEDICINE

## 2020-12-05 PROCEDURE — 99292 CRITICAL CARE ADDL 30 MIN: CPT | Performed by: INTERNAL MEDICINE

## 2020-12-05 PROCEDURE — 94003 VENT MGMT INPAT SUBQ DAY: CPT

## 2020-12-05 PROCEDURE — 94761 N-INVAS EAR/PLS OXIMETRY MLT: CPT

## 2020-12-05 PROCEDURE — 82803 BLOOD GASES ANY COMBINATION: CPT

## 2020-12-05 PROCEDURE — 80053 COMPREHEN METABOLIC PANEL: CPT

## 2020-12-05 PROCEDURE — 6370000000 HC RX 637 (ALT 250 FOR IP): Performed by: NURSE PRACTITIONER

## 2020-12-05 PROCEDURE — 2000000000 HC ICU R&B

## 2020-12-05 PROCEDURE — 2700000000 HC OXYGEN THERAPY PER DAY

## 2020-12-05 PROCEDURE — 99291 CRITICAL CARE FIRST HOUR: CPT | Performed by: INTERNAL MEDICINE

## 2020-12-05 PROCEDURE — C9113 INJ PANTOPRAZOLE SODIUM, VIA: HCPCS | Performed by: NURSE PRACTITIONER

## 2020-12-05 PROCEDURE — 94770 HC ETCO2 MONITOR DAILY: CPT

## 2020-12-05 PROCEDURE — 83735 ASSAY OF MAGNESIUM: CPT

## 2020-12-05 PROCEDURE — 6370000000 HC RX 637 (ALT 250 FOR IP): Performed by: INTERNAL MEDICINE

## 2020-12-05 RX ADMIN — CARBOXYMETHYLCELLULOSE SODIUM 1 DROP: 10 GEL OPHTHALMIC at 08:26

## 2020-12-05 RX ADMIN — METHYLPREDNISOLONE SODIUM SUCCINATE 40 MG: 40 INJECTION, POWDER, FOR SOLUTION INTRAMUSCULAR; INTRAVENOUS at 16:44

## 2020-12-05 RX ADMIN — Medication 15 ML: at 08:26

## 2020-12-05 RX ADMIN — CARBOXYMETHYLCELLULOSE SODIUM 1 DROP: 10 GEL OPHTHALMIC at 01:36

## 2020-12-05 RX ADMIN — Medication 15 ML: at 21:30

## 2020-12-05 RX ADMIN — PANTOPRAZOLE SODIUM 40 MG: 40 INJECTION, POWDER, FOR SOLUTION INTRAVENOUS at 08:27

## 2020-12-05 RX ADMIN — METHYLPREDNISOLONE SODIUM SUCCINATE 40 MG: 40 INJECTION, POWDER, FOR SOLUTION INTRAMUSCULAR; INTRAVENOUS at 21:16

## 2020-12-05 RX ADMIN — FENTANYL CITRATE 150 MCG/HR: 0.05 INJECTION, SOLUTION INTRAMUSCULAR; INTRAVENOUS at 03:38

## 2020-12-05 RX ADMIN — INSULIN LISPRO 6 UNITS: 100 INJECTION, SOLUTION INTRAVENOUS; SUBCUTANEOUS at 21:17

## 2020-12-05 RX ADMIN — ENOXAPARIN SODIUM 40 MG: 40 INJECTION SUBCUTANEOUS at 21:16

## 2020-12-05 RX ADMIN — ENOXAPARIN SODIUM 40 MG: 40 INJECTION SUBCUTANEOUS at 09:45

## 2020-12-05 RX ADMIN — Medication 10 ML: at 21:17

## 2020-12-05 RX ADMIN — Medication 10 ML: at 08:27

## 2020-12-05 RX ADMIN — INSULIN LISPRO 6 UNITS: 100 INJECTION, SOLUTION INTRAVENOUS; SUBCUTANEOUS at 16:43

## 2020-12-05 RX ADMIN — FENTANYL CITRATE 150 MCG/HR: 0.05 INJECTION, SOLUTION INTRAMUSCULAR; INTRAVENOUS at 18:32

## 2020-12-05 RX ADMIN — INSULIN LISPRO 6 UNITS: 100 INJECTION, SOLUTION INTRAVENOUS; SUBCUTANEOUS at 05:02

## 2020-12-05 RX ADMIN — CARBOXYMETHYLCELLULOSE SODIUM 1 DROP: 10 GEL OPHTHALMIC at 12:58

## 2020-12-05 RX ADMIN — METHYLPREDNISOLONE SODIUM SUCCINATE 40 MG: 40 INJECTION, POWDER, FOR SOLUTION INTRAMUSCULAR; INTRAVENOUS at 09:45

## 2020-12-05 RX ADMIN — CARBOXYMETHYLCELLULOSE SODIUM 1 DROP: 10 GEL OPHTHALMIC at 16:43

## 2020-12-05 RX ADMIN — FENTANYL CITRATE 150 MCG/HR: 0.05 INJECTION, SOLUTION INTRAMUSCULAR; INTRAVENOUS at 11:19

## 2020-12-05 RX ADMIN — INSULIN LISPRO 3 UNITS: 100 INJECTION, SOLUTION INTRAVENOUS; SUBCUTANEOUS at 08:27

## 2020-12-05 RX ADMIN — INSULIN LISPRO 6 UNITS: 100 INJECTION, SOLUTION INTRAVENOUS; SUBCUTANEOUS at 12:57

## 2020-12-05 RX ADMIN — CARBOXYMETHYLCELLULOSE SODIUM 1 DROP: 10 GEL OPHTHALMIC at 21:16

## 2020-12-05 RX ADMIN — INSULIN GLARGINE 55 UNITS: 100 INJECTION, SOLUTION SUBCUTANEOUS at 21:17

## 2020-12-05 RX ADMIN — METHYLPREDNISOLONE SODIUM SUCCINATE 40 MG: 40 INJECTION, POWDER, FOR SOLUTION INTRAMUSCULAR; INTRAVENOUS at 04:57

## 2020-12-05 RX ADMIN — CARBOXYMETHYLCELLULOSE SODIUM 1 DROP: 10 GEL OPHTHALMIC at 04:57

## 2020-12-05 RX ADMIN — MIDAZOLAM 6 MG/HR: 5 INJECTION INTRAMUSCULAR; INTRAVENOUS at 16:43

## 2020-12-05 RX ADMIN — INSULIN LISPRO 9 UNITS: 100 INJECTION, SOLUTION INTRAVENOUS; SUBCUTANEOUS at 01:37

## 2020-12-05 ASSESSMENT — PULMONARY FUNCTION TESTS
PIF_VALUE: 23
PIF_VALUE: 27
PIF_VALUE: 28
PIF_VALUE: 25
PIF_VALUE: 22
PIF_VALUE: 26
PIF_VALUE: 23
PIF_VALUE: 22
PIF_VALUE: 26
PIF_VALUE: 23
PIF_VALUE: 26
PIF_VALUE: 24
PIF_VALUE: 30
PIF_VALUE: 23
PIF_VALUE: 23
PIF_VALUE: 30
PIF_VALUE: 26
PIF_VALUE: 26
PIF_VALUE: 25
PIF_VALUE: 23
PIF_VALUE: 26
PIF_VALUE: 26
PIF_VALUE: 23
PIF_VALUE: 25
PIF_VALUE: 27
PIF_VALUE: 23
PIF_VALUE: 26
PIF_VALUE: 26
PIF_VALUE: 25
PIF_VALUE: 24

## 2020-12-05 ASSESSMENT — PAIN SCALES - GENERAL
PAINLEVEL_OUTOF10: 0

## 2020-12-05 NOTE — PROGRESS NOTES
12/05/20 0344   Vent Information   Vent Type 980   Vent Mode AC/VC+   Vt Ordered 400 mL   Rate Set 22 bmp   Peak Flow 48 L/min   Pressure Support 0 cmH20   FiO2  50 %   SpO2 95 %   SpO2/FiO2 ratio 190   Sensitivity 3   PEEP/CPAP 12   I Time/ I Time % 0.48 s   Humidification Source HME   Vent Patient Data   Peak Inspiratory Pressure 25 cmH2O   Mean Airway Pressure 17 cmH20   Rate Measured 22 br/min   Vt Exhaled 401 mL   Minute Volume 8.86 Liters   I:E Ratio 1:2.00   Spontaneous Breathing Trial (SBT) RT Doc   Pulse 61   Breath Sounds   Right Upper Lobe Diminished   Right Middle Lobe Diminished   Right Lower Lobe Diminished   Left Upper Lobe Diminished   Left Lower Lobe Diminished   Additional Respiratory  Assessments   Resp 21   Alarm Settings   High Pressure Alarm 45 cmH2O   Low Minute Volume Alarm 3 L/min   High Respiratory Rate 40 br/min   Low Exhaled Vt  200 mL   ETT (adult)   Placement Date/Time: 12/02/20 1359   Timeout: Patient  Preoxygenation: Yes  Mask Ventilation: Ventilated by mask (1)  Technique: Video laryngoscopy  Type: Cuffed  Tube Size: 8 mm  Laryngoscope: GlideScope  Blade Size: 3  Location: Oral  Placement Veri. ..    Secured at 23 cm   Measured From [address] [place] [address],[address] By Commercial tube cabral

## 2020-12-05 NOTE — PROGRESS NOTES
12/05/20 1158   Vent Information   Vent Type 980   Vent Mode AC/VC+   Vt Ordered 400 mL   Rate Set 22 bmp   Pressure Support 0 cmH20   FiO2  50 %   SpO2 94 %   SpO2/FiO2 ratio 188   Sensitivity 3   PEEP/CPAP 10   I Time/ I Time % 0.9 s   Vent Patient Data   Peak Inspiratory Pressure 23 cmH2O   Mean Airway Pressure 14 cmH20   Rate Measured 22 br/min   Vt Exhaled 397 mL   Minute Volume 8.46 Liters   I:E Ratio 1:2.00   Cough/Sputum   Sputum How Obtained Suctioned;Endotracheal   Cough None   Sputum Amount None   Spontaneous Breathing Trial (SBT) RT Doc   Pulse 72   Breath Sounds   Right Upper Lobe Diminished   Right Middle Lobe Diminished   Right Lower Lobe Diminished   Left Upper Lobe Diminished   Left Lower Lobe Diminished   Additional Respiratory  Assessments   Resp 22   $End Tidal CO2 38   Alarm Settings   High Pressure Alarm 40 cmH2O   Low Minute Volume Alarm 3 L/min   High Respiratory Rate 40 br/min   Patient Observation   Observations ambu @ bedside   ETT (adult)   Placement Date/Time: 12/02/20 1359   Timeout: Patient  Preoxygenation: Yes  Mask Ventilation: Ventilated by mask (1)  Technique: Video laryngoscopy  Type: Cuffed  Tube Size: 8 mm  Laryngoscope: GlideScope  Blade Size: 3  Location: Oral  Placement Veri. ..    Secured at 23 cm   Measured From Lips   ET Placement Right  (moved to the center at this time.)   Secured By Commercial tube cabral   Site Condition Dry

## 2020-12-05 NOTE — PROGRESS NOTES
[facility]    [facility]. [facility]              [phone]                      She is admitted with hypoxemia and respiratory failure requiring intubation and mechanical ventilation due to COVID-19. We are following for ELOY and related issues    Interval History and plan:      Remains intubated on ventilation  Urine output 400 only since yesterday  On fluids  FiO2 50%  Today's labs were not back at the time of assessment. I will follow labs  and update orders as appropriate, in the meantime please do not  Hesitate to call us with questions                     Assessment :     Acute Kidney Injury  ELOY likely due to -hemodynamic changes, Covid  Cr on consultation 1.2  Baseline Cr-0.8    UA-large blood, has Urena, RBC more than 100  Renal Imaging:-NA  Echo: N/A    Hypertension     Pulse:  [59-75]   BP goal inpatient [phone] systolic inpatient  Blood pressure 200 yesterday  Coming down to 160s  On as needed hydralazine    COVID-19 positive, had respiratory failure requiring intubation and mechanical ventilation         [facility] would like to thank Bulmaro Garcia MD   for opportunity to serve this patient      Please call with questions at-   24 Hrs Answering service [phone] or  7 am- 5 pm via Perfect serve or cell phone  Dr.Sudhir Ghazal Hoyt          CC/reason for consult :     ELOY     HPI :     Jack Peck is a 70 y.o. female presented to   the hospital on 11/29/2020 with COVID-19 which was diagnosed on 11/26/2016. Per chart see you had fatigue headache loss in appetite since of smell and taste and followed by cough for several weeks. Now came with the shortness of breath. She had a difficulty breathing and came to [facility].  He has she required 10 L/min of oxygen. She is known to have COPD and congestive heart failure but no need for supplemental oxygen at home. She was transferred to ICU. She needed intubation and mechanical ventilation.   Post intubation her blood pressure changes significantly. She also had poor urine output and rising creatinine. She got 125 mL/h of fluids overnight now on 25 mL/h  Blood pressure has been fluctuating. Blood pressure went more than 200 but now coming up to 876 systolic    We are consulted for acute renal failure and related issues    ROS:     Seen with- RN    Unable to obtain ROS due to  Altered mental status           PMH/PSH/SH/Family History:     Past Medical History:   Diagnosis Date    COVID-19 11/26/2020    Hyperlipidemia     Hypertension     Neuropathy     Type II or unspecified type diabetes mellitus without mention of complication, not stated as uncontrolled        Past Surgical History:   Procedure Laterality Date    APPENDECTOMY      CHOLECYSTECTOMY      HUMERUS FRACTURE SURGERY      TUBAL LIGATION      TUMOR REMOVAL          reports that she has never smoked. She does not have any smokeless tobacco history on file. family history is not on file.          Medication:     Current Facility-Administered Medications: hydrALAZINE (APRESOLINE) injection 10 mg, 10 mg, Intravenous, Q4H PRN  midazolam (VERSED) 100 mg in dextrose 5 % 100 mL infusion, 1 mg/hr, Intravenous, Continuous  potassium chloride 20 mEq/50 mL IVPB (Central Line), 20 mEq, Intravenous, PRN  insulin glargine (LANTUS) injection vial 55 Units, 55 Units, Subcutaneous, Nightly  methylPREDNISolone sodium (SOLU-MEDROL) injection 40 mg, 40 mg, Intravenous, Q6H  propofol injection, 10 mcg/kg/min, Intravenous, Titrated  fentaNYL (SUBLIMAZE) 1,000 mcg in sodium chloride 0.9 % 100 mL infusion, 25 mcg/hr, Intravenous, Titrated  pantoprazole (PROTONIX) injection 40 mg, 40 mg, Intravenous, Daily  insulin lispro (HUMALOG) injection vial 0-18 Units, 0-18 Units, Subcutaneous, 6 times per day  carboxymethylcellulose PF (THERATEARS) 1 % ophthalmic gel 1 drop, 1 drop, Both Eyes, 6 times per day  chlorhexidine (PERIDEX) 0.12 % solution 15 mL, 15 mL, Mouth/Throat, BID  [Held by provider] metoprolol tartrate (LOPRESSOR) tablet 50 mg, 50 mg, Oral, BID  sodium chloride flush 0.9 % injection 10 mL, 10 mL, Intravenous, PRN  magnesium sulfate 1 g in dextrose 5% 100 mL IVPB, 1 g, Intravenous, PRN  acetaminophen (TYLENOL) tablet 650 mg, 650 mg, Oral, Q4H PRN **OR** acetaminophen (TYLENOL) suppository 650 mg, 650 mg, Rectal, Q4H PRN  ondansetron (ZOFRAN) injection 4 mg, 4 mg, Intravenous, Q6H PRN  albuterol sulfate  (90 Base) MCG/ACT inhaler 2 puff, 2 puff, Inhalation, Q4H PRN  glucose (GLUTOSE) 40 % oral gel 15 g, 15 g, Oral, PRN  dextrose 50 % IV solution, 12.5 g, Intravenous, PRN  glucagon (rDNA) injection 1 mg, 1 mg, Intramuscular, PRN  dextrose 5 % solution, 100 mL/hr, Intravenous, PRN  enoxaparin (LOVENOX) injection 40 mg, 40 mg, Subcutaneous, BID  0.9 % sodium chloride bolus, 30 mL, Intravenous, PRN  albuterol sulfate  (90 Base) MCG/ACT inhaler 2 puff, 2 puff, Inhalation, Q4H PRN **AND** ipratropium (ATROVENT HFA) 17 MCG/ACT inhaler 2 puff, 2 puff, Inhalation, Q4H PRN **AND** MDI Treatment, , , PRN       Vitals :     Vitals:    12/05/20 0600   BP:    Pulse: 61   Resp: 22   Temp:    SpO2: 95%       I & O :       Intake/Output Summary (Last 24 hours) at 12/5/2020 7849  Last data filed at 12/5/2020 0600  Gross per 24 hour   Intake 2032.89 ml   Output 375 ml   Net 1657.89 ml        Physical Examination :     General appearance: unresponsive, intubated   HEENT: Lips- normal, teeth- ok , oral mucosa- moist  Neck : Mass- no, appears symmetrical, JVD- not raised  Respiratory: Respiratory effort-  On ventilation- FiO2-45% wheeze- no, crackles - no  Cardiovascular:  Ausculation- No M/R/G, Edema none  Abdomen: visible mass- no, distention- no, scar- no, tenderness- unable to assess                           hepatosplenomegaly-  No--  Musculoskeletal:  clubbing no,cyanosis- no , digital ischemia- no                           muscle strength- patient unable to co-operate     , tone - patient unable to co-operate   Skin: rashes- no , ulcers- no, induration- no, tightening - no  Psychiatric:   Intubated, unable to assess  Additional findings: -       LABS:     Recent Labs     12/03/20  0718 12/04/20  0555 12/05/20  0543   WBC 15.1* 12.5* 11.3*   HGB 14.8 15.5 13.5   HCT 45.3 47.2 41.8   * 364 310     Recent Labs     12/03/20  0718 12/03/20  1619 12/04/20  0555    138 142   K 2.3* 4.4 4.0   * 100 106   CO2 22 32 30   BUN 27* 39* 43*   CREATININE 0.8 1.2 1.1   GLUCOSE 259* 205* 200*   MG 1.50*  --  2.80*   PHOS 3.1  --  3.9

## 2020-12-05 NOTE — PROGRESS NOTES
12/05/20 1611   Vent Information   Vent Type 980   Vent Mode AC/VC+   Vt Ordered 400 mL   Rate Set 22 bmp   Pressure Support 0 cmH20   FiO2  50 %   SpO2 94 %   SpO2/FiO2 ratio 188   Sensitivity 3   PEEP/CPAP 10   I Time/ I Time % 0.9 s   Vent Patient Data   Peak Inspiratory Pressure 23 cmH2O   Mean Airway Pressure 14 cmH20   Rate Measured 22 br/min   Vt Exhaled 406 mL   Minute Volume 8.96 Liters   I:E Ratio 1:2.00   Plateau Pressure 42.17 cmH20   Static Compliance 36.9 mL/cmH2O   Cough/Sputum   Sputum How Obtained Suctioned;Endotracheal   Cough None   Sputum Amount None   Spontaneous Breathing Trial (SBT) RT Doc   Pulse 60   Breath Sounds   Right Upper Lobe Diminished   Right Middle Lobe Diminished   Right Lower Lobe Diminished   Left Upper Lobe Diminished   Left Lower Lobe Diminished   Additional Respiratory  Assessments   Resp 22   Alarm Settings   High Pressure Alarm 40 cmH2O   Low Minute Volume Alarm 3 L/min   Apnea (secs) 20 secs   High Respiratory Rate 40 br/min   Low Exhaled Vt  300 mL   Patient Observation   Observations ambu @ bedside   ETT (adult)   Placement Date/Time: 12/02/20 1359   Timeout: Patient  Preoxygenation: Yes  Mask Ventilation: Ventilated by mask (1)  Technique: Video laryngoscopy  Type: Cuffed  Tube Size: 8 mm  Laryngoscope: GlideScope  Blade Size: 3  Location: Oral  Placement Veri. ..    Secured at 23 cm   Measured From [address]  (moved to the left at this time.)   Secured By Commercial tube cabral   Site Condition Dry

## 2020-12-05 NOTE — PROGRESS NOTES
Patient placed in AC/VC+ at this time to better facilitate patient/ventilator synchrony. PEEP also decreased to +10 cmH2O at this time. Patient tolerating ventilator changes well thus far. RN at bedside with RT at this time and updated on the changes made.     Electronically signed by El Coffey RCP, RRT, RRT-ACCS on 12/5/2020 at 8:19 AM

## 2020-12-05 NOTE — PROGRESS NOTES
Hospitalist Progress Note      PCP: No primary care provider on file. Date of Admission: 11/29/2020    Chief [address] [place] [address] Course:       Subjective:  remains intubated , sedated    Medications:  Reviewed    Infusion Medications    midazolam      propofol 20 mcg/kg/min (12/04/20 1800)    fentaNYL (SUBLIMAZE) infusion 150 mcg/hr (12/04/20 1659)    dextrose       Scheduled Medications    insulin glargine  55 Units Subcutaneous Nightly    methylPREDNISolone  40 mg Intravenous Q6H    pantoprazole  40 mg Intravenous Daily    insulin lispro  0-18 Units Subcutaneous 6 times per day    carboxymethylcellulose PF  1 drop Both Eyes 6 times per day    chlorhexidine  15 mL Mouth/Throat BID    [Held by provider] metoprolol  50 mg Oral BID    enoxaparin  40 mg Subcutaneous BID     PRN Meds: hydrALAZINE, potassium chloride, sodium chloride flush, magnesium sulfate, acetaminophen **OR** acetaminophen, ondansetron, albuterol sulfate HFA, glucose, dextrose, glucagon (rDNA), dextrose, sodium chloride, albuterol sulfate HFA **AND** ipratropium **AND** MDI Treatment      Intake/Output Summary (Last 24 hours) at 12/4/2020 2043  Last data filed at 12/4/2020 1652  Gross per 24 hour   Intake 2752.24 ml   Output 800 ml   Net 1952.24 ml       Physical Exam Performed:    BP (!) 182/102   Pulse 56   Temp 97.9 °F (36.6 °C) (Bladder)   Resp 14   Ht 5' 4\" (1.626 m)   Wt 245 lb 6 oz (111.3 kg)   SpO2 95%   BMI 42.12 kg/m²      General appearance: , appears stated age and cooperative. obese, on vent  HEENT: Pupils equal, round, and reactive to light. Conjunctivae/corneas clear. Neck: Supple, with full range of motion. No jugular venous distention. Trachea midline. Respiratory:  Normal respiratory effort. Clear to auscultation, bilaterally without Wheezes/Rhonchi. Mild bibas rales  Cardiovascular: Regular rate and rhythm with normal S1/S2 without murmurs, rubs or gallops.   Abdomen: Soft, non-tender, non-distended with normal bowel sounds. Musculoskeletal: No clubbing, cyanosis or edema bilaterally.  Full range of motion without deformity. Skin: Skin color, texture, turgor normal.  No rashes or lesions. Neurologic:  did not test as sedated  Psychiatric: sedated  Capillary Refill: Brisk,< 3 seconds   Peripheral Pulses: +2 palpable, equal bilaterally       Labs:   Recent Labs     12/03/20  0535 12/03/20  0718 12/04/20  0555   WBC 8.6 15.1* 12.5*   HGB 8.5* 14.8 15.5   HCT 26.8* 45.3 47.2    473* 364     Recent Labs     12/02/20  0431 12/03/20  0718 12/03/20  1619 12/04/20  0555   * 142 138 142   K 3.6 2.3* 4.4 4.0    113* 100 106   CO2 32 22 32 30   BUN 21* 27* 39* 43*   CREATININE 0.9 0.8 1.2 1.1   CALCIUM 8.5 5.0* 8.7 7.7*   PHOS 3.5 3.1  --  3.9     Recent Labs     12/03/20  0718 12/03/20  1619 12/04/20  0555   AST 18 24 20   ALT 20 34 25   BILITOT 0.3 0.5 0.3   ALKPHOS 57 85 73     Recent Labs     12/02/20  0431 12/03/20  0535   INR 1.30* 1.44*     No results for input(s): CKTOTAL, TROPONINI in the last 72 hours. Urinalysis:      Lab Results   Component Value Date    NITRU Negative 12/01/2020    WBCUA see below 12/01/2020    RBCUA >100 12/01/2020    BLOODU LARGE 12/01/2020    SPECGRAV 1.020 12/01/2020    GLUCOSEU 100 12/01/2020       Radiology:  XR CHEST PORTABLE   Final Result   Support apparatus appears stable. Mild congestive failure, pulmonary edema which is similar to slightly   improved when accounting for differences in technique. Superimposed   pneumonia cannot be excluded in a patient with known COVID-19 infection. XR ABDOMEN FOR NG/OG/NE TUBE PLACEMENT   Final Result   Support lines and tubes as above. Worsening diffuse pulmonary opacities relative to 2 days prior in the setting   of known COVID-19 infection. XR CHEST PORTABLE   Final Result   Support lines and tubes as above.       Worsening diffuse pulmonary opacities relative to 2 days prior in the setting   of known COVID-19 infection. VL Extremity Venous Left   Final Result      XR CHEST PORTABLE   Final Result   Bilateral airspace disease is suspicious for pneumonia, and could be   compatible with history of COVID-19. Assessment/Plan:    Active Hospital Problems    Diagnosis    COPD (chronic obstructive pulmonary disease) [facility] [J44.9]    Acute respiratory failure due to COVID-19 (HCC) [U07.1, J96.00]    Essential hypertension [I10]    Obesity, Class III, BMI 40-49.9 (morbid obesity) ([facility] [E66.01]    Type 2 diabetes mellitus, with long-term current use of insulin ([facility]) [E11.9, Z79.4]         Respiratory failure, COPD, COVID19    Titrate respiratory support according to patient's needs and advanced care plan.  Patient was requiring vapotherm  -  Patient seems to be at significant risk for progression of respiratory failure.  She was advised of the potential need for intubation and mechanical ventilation.  She was agreeable if necessary.  -  Repeated all pertinent inflammatory labs.  Obtained an ABG.  The first sample obtained was venous.  The second sample was arterial with a pO2 = 53.  Vapotherm made available if needed. -  Started BMI-adjusted low-intensity anticoagulation w/ lovenox 40mg bid. Wesley Gill is suspicion for LLE DVT though so will request duplex venous U/S as well. -  Started solumedrol 40mg IV bid, remdesivir, and 1U convalescent plasma.   -cvc/A-line placed 12/2  -intubated 12/2   -ID consulted 12/3, apprec recs    DM2  -  Hold all oral antidiabetic agents.  Start s.c.  Insulin regimen based on home regimen.     Elevated creatinine- noted during stay  -nephro consulted 12/3    Hematuria- with hx of bladder ca per pt, supposed to get surgery in [facility]  -urology consulted for further recs  -continued riggins     DVT Prophylaxis: lovenox bid  Diet: DIET TUBE FEED CONTINUOUS/CYCLIC NPO; Diabetic 1.5; Orogastric; Continuous; 10; 40; Exceptions are: Sips with Meds  Code Status: Full Code    PT/OT Eval Status: not possible    Dispo - guarded, ICU Care    Tiffany Alejandra MD

## 2020-12-05 NOTE — PROGRESS NOTES
12/04/20 1923   Vent Information   Vent Type 980   Vent Mode AC/VC   Vt Ordered 400 mL   Rate Set 22 bmp   Peak Flow 48 L/min   Pressure Support 0 cmH20   FiO2  45 %   SpO2 95 %   SpO2/FiO2 ratio 211.11   Sensitivity 3   PEEP/CPAP 12   Humidification Source HME   Vent Patient Data   Peak Inspiratory Pressure 28 cmH2O   Mean Airway Pressure 17 cmH20   Rate Measured 22 br/min   Vt Exhaled 400 mL   Minute Volume 8.85 Liters   I:E Ratio 1:2.00   Spontaneous Breathing Trial (SBT) RT Doc   Pulse 56   Breath Sounds   Right Upper Lobe Diminished   Right Middle Lobe Diminished   Right Lower Lobe Diminished   Left Upper Lobe Diminished   Left Lower Lobe Diminished   Additional Respiratory  Assessments   Resp 14   Alarm Settings   High Pressure Alarm 45 cmH2O   Low Minute Volume Alarm 3 L/min   Apnea (secs) 20 secs   High Respiratory Rate 40 br/min   Low Exhaled Vt  200 mL   Patient Observation   Observations ambu bag at bedside   ETT (adult)   Placement Date/Time: 12/02/20 1359   Timeout: Patient  Preoxygenation: Yes  Mask Ventilation: Ventilated by mask (1)  Technique: Video laryngoscopy  Type: Cuffed  Tube Size: 8 mm  Laryngoscope: GlideScope  Blade Size: 3  Location: Oral  Placement Veri. ..    Secured at 23 cm   Measured From Lips   ET Placement Right   Secured By Commercial tube cabral   Site Condition Dry   Cuff Pressure 30 cm H2O

## 2020-12-05 NOTE — PLAN OF CARE
Problem: Restraint Use - Nonviolent/Non-Self-Destructive Behavior:  Goal: Absence of restraint indications  Description: Absence of restraint indications  Outcome: Not Met This Shift  Note: Pt is in BUE 2 point soft restraints to protect medical access devices. Explained criteria for release. No evidence of learning.

## 2020-12-05 NOTE — PROGRESS NOTES
12/05/20 0015   Vent Information   Vent Type 980   Vent Mode AC/VC+   Vt Ordered 400 mL   Rate Set 22 bmp   Peak Flow 48 L/min   Pressure Support 0 cmH20   FiO2  50 %   SpO2 (!) 87 %   SpO2/FiO2 ratio 174   Sensitivity 3   PEEP/CPAP 12   I Time/ I Time % 0.48 s   Humidification Source HME   Vent Patient Data   Peak Inspiratory Pressure 30 cmH2O   Mean Airway Pressure 18 cmH20   Rate Measured 22 br/min   Vt Exhaled 402 mL   Minute Volume 8.63 Liters   I:E Ratio 1:2.00   Cough/Sputum   Sputum How Obtained Cough on request;Suctioned   Cough Productive   Sputum Amount Moderate   Sputum Color Cloudy   Tenacity Thick   Spontaneous Breathing Trial (SBT) RT Doc   Pulse 75   Breath Sounds   Right Upper Lobe Rhonchi   Right Middle Lobe Diminished   Right Lower Lobe Diminished   Left Upper Lobe Rhonchi   Left Lower Lobe Diminished   Additional Respiratory  Assessments   Resp 18   Alarm Settings   High Pressure Alarm 45 cmH2O   Low Minute Volume Alarm 3 L/min   High Respiratory Rate 40 br/min   Low Exhaled Vt  200 mL   ETT (adult)   Placement Date/Time: 12/02/20 5019   Timeout: Patient  Preoxygenation: Yes  Mask Ventilation: Ventilated by mask (1)  Technique: Video laryngoscopy  Type: Cuffed  Tube Size: 8 mm  Laryngoscope: GlideScope  Blade Size: 3  Location: Oral  Placement Veri. ..    Secured at 23 cm   Measured From Lips   ET Placement Right   Secured By Commercial tube cabral   Site Condition Dry

## 2020-12-05 NOTE — PROGRESS NOTES
supined at 0800. Nimbex stopped at approx 1300. Alert and follows directions. Increase in Propofol causes decrease in HR and BP. Attempting to change propofol over to Versed. Daughter updated over the phone. Will monitor.

## 2020-12-05 NOTE — PROGRESS NOTES
Hospitalist Progress Note      PCP: No primary care provider on file. Date of Admission: 11/29/2020         Chief [address] [place] [address] Course:       Subjective:  remains intubated , sedated, on fio2 of 50, peep 10      Medications:  Reviewed    Infusion Medications    midazolam 4 mg/hr (12/04/20 2200)    propofol Stopped (12/04/20 2300)    fentaNYL (SUBLIMAZE) infusion 150 mcg/hr (12/05/20 0338)    dextrose       Scheduled Medications    insulin glargine  55 Units Subcutaneous Nightly    methylPREDNISolone  40 mg Intravenous Q6H    pantoprazole  40 mg Intravenous Daily    insulin lispro  0-18 Units Subcutaneous 6 times per day    carboxymethylcellulose PF  1 drop Both Eyes 6 times per day    chlorhexidine  15 mL Mouth/Throat BID    [Held by provider] metoprolol  50 mg Oral BID    enoxaparin  40 mg Subcutaneous BID     PRN Meds: hydrALAZINE, potassium chloride, sodium chloride flush, magnesium sulfate, acetaminophen **OR** acetaminophen, ondansetron, albuterol sulfate HFA, glucose, dextrose, glucagon (rDNA), dextrose, sodium chloride, albuterol sulfate HFA **AND** ipratropium **AND** MDI Treatment      Intake/Output Summary (Last 24 hours) at 12/5/2020 1000  Last data filed at 12/5/2020 0800  Gross per 24 hour   Intake 2206.69 ml   Output 450 ml   Net 1756.69 ml       Physical Exam Performed:    BP (!) 182/102   Pulse 64   Temp 98.6 °F (37 °C) (Bladder)   Resp 22   Ht 5' 4\" (1.626 m)   Wt 245 lb 6 oz (111.3 kg)   SpO2 94%   BMI 42.12 kg/m²      General appearance: , appears stated age and cooperative. obese, on vent  HEENT: Pupils equal, round, and reactive to light. Conjunctivae/corneas clear. Neck: Supple, with full range of motion. No jugular venous distention. Trachea midline. Respiratory:  Normal respiratory effort.  Clear to auscultation, bilaterally without Wheezes/Rhonchi. Mild bibas rales  Cardiovascular: Regular rate and rhythm with normal S1/S2 without murmurs, rubs or gallops. Abdomen: Soft, non-tender, non-distended with normal bowel sounds. Musculoskeletal: No clubbing, cyanosis or edema bilaterally.  Full range of motion without deformity. Skin: Skin color, texture, turgor normal.  No rashes or lesions. Neurologic:  did not test as sedated  Psychiatric: sedated  Capillary Refill: Brisk,< 3 seconds   Peripheral Pulses: +2 palpable, equal bilaterally        Labs:   Recent Labs     12/03/20  0718 12/04/20  0555 12/05/20  0543   WBC 15.1* 12.5* 11.3*   HGB 14.8 15.5 13.5   HCT 45.3 47.2 41.8   * 364 310     Recent Labs     12/03/20  0718 12/03/20  1619 12/04/20  0555 12/05/20  0543    138 142 138   K 2.3* 4.4 4.0 4.6   * 100 106 103   CO2 22 32 30 29   BUN 27* 39* 43* 68*   CREATININE 0.8 1.2 1.1 1.5*   CALCIUM 5.0* 8.7 7.7* 8.0*   PHOS 3.1  --  3.9 5.2*     Recent Labs     12/03/20  1619 12/04/20  0555 12/05/20  0543   AST 24 20 16   ALT 34 25 22   BILITOT 0.5 0.3 0.3   ALKPHOS 85 73 66     Recent Labs     12/03/20  0535   INR 1.44*     No results for input(s): CKTOTAL, TROPONINI in the last 72 hours. Urinalysis:      Lab Results   Component Value Date    NITRU Negative 12/01/2020    WBCUA see below 12/01/2020    RBCUA >100 12/01/2020    BLOODU LARGE 12/01/2020    SPECGRAV 1.020 12/01/2020    GLUCOSEU 100 12/01/2020       Radiology:  XR CHEST PORTABLE   Final Result   Support apparatus appears stable. Mild congestive failure, pulmonary edema which is similar to slightly   improved when accounting for differences in technique. Superimposed   pneumonia cannot be excluded in a patient with known COVID-19 infection. XR ABDOMEN FOR NG/OG/NE TUBE PLACEMENT   Final Result   Support lines and tubes as above. Worsening diffuse pulmonary opacities relative to 2 days prior in the setting   of known COVID-19 infection. XR CHEST PORTABLE   Final Result   Support lines and tubes as above.       Worsening diffuse pulmonary opacities Orogastric; Continuous; 10; 40; Exceptions are: Sips with Meds  Code Status: Full Code    PT/OT Eval Status: not possible       Dispo - unclear, icu care, off vent    Sophia Hays MD

## 2020-12-06 LAB
A/G RATIO: 0.8 (ref 1.1–2.2)
ALBUMIN SERPL-MCNC: 2.4 G/DL (ref 3.4–5)
ALP BLD-CCNC: 67 U/L (ref 40–129)
ALT SERPL-CCNC: 21 U/L (ref 10–40)
ANION GAP SERPL CALCULATED.3IONS-SCNC: 4 MMOL/L (ref 3–16)
AST SERPL-CCNC: 17 U/L (ref 15–37)
BANDED NEUTROPHILS RELATIVE PERCENT: 1 % (ref 0–7)
BASOPHILS ABSOLUTE: 0 K/UL (ref 0–0.2)
BASOPHILS RELATIVE PERCENT: 0 %
BILIRUB SERPL-MCNC: 0.3 MG/DL (ref 0–1)
BUN BLDV-MCNC: 84 MG/DL (ref 7–20)
C-REACTIVE PROTEIN: 7.7 MG/L (ref 0–5.1)
CALCIUM SERPL-MCNC: 8.6 MG/DL (ref 8.3–10.6)
CHLORIDE BLD-SCNC: 109 MMOL/L (ref 99–110)
CO2: 32 MMOL/L (ref 21–32)
CREAT SERPL-MCNC: 1.6 MG/DL (ref 0.6–1.2)
EOSINOPHILS ABSOLUTE: 0 K/UL (ref 0–0.6)
EOSINOPHILS RELATIVE PERCENT: 0 %
GFR AFRICAN AMERICAN: 38
GFR NON-AFRICAN AMERICAN: 32
GLOBULIN: 2.9 G/DL
GLUCOSE BLD-MCNC: 141 MG/DL (ref 70–99)
GLUCOSE BLD-MCNC: 166 MG/DL (ref 70–99)
GLUCOSE BLD-MCNC: 184 MG/DL (ref 70–99)
GLUCOSE BLD-MCNC: 186 MG/DL (ref 70–99)
GLUCOSE BLD-MCNC: 208 MG/DL (ref 70–99)
GLUCOSE BLD-MCNC: 211 MG/DL (ref 70–99)
GLUCOSE BLD-MCNC: 213 MG/DL (ref 70–99)
HCT VFR BLD CALC: 42 % (ref 36–48)
HEMOGLOBIN: 13.4 G/DL (ref 12–16)
LYMPHOCYTES ABSOLUTE: 0.7 K/UL (ref 1–5.1)
LYMPHOCYTES RELATIVE PERCENT: 5 %
MAGNESIUM: 3.2 MG/DL (ref 1.8–2.4)
MCH RBC QN AUTO: 26.7 PG (ref 26–34)
MCHC RBC AUTO-ENTMCNC: 31.9 G/DL (ref 31–36)
MCV RBC AUTO: 83.8 FL (ref 80–100)
MONOCYTES ABSOLUTE: 0.4 K/UL (ref 0–1.3)
MONOCYTES RELATIVE PERCENT: 3 %
NEUTROPHILS ABSOLUTE: 12.3 K/UL (ref 1.7–7.7)
NEUTROPHILS RELATIVE PERCENT: 91 %
PDW BLD-RTO: 14.8 % (ref 12.4–15.4)
PERFORMED ON: ABNORMAL
PHOSPHORUS: 4.3 MG/DL (ref 2.5–4.9)
PLATELET # BLD: 297 K/UL (ref 135–450)
PMV BLD AUTO: 9.7 FL (ref 5–10.5)
POLYCHROMASIA: ABNORMAL
POTASSIUM SERPL-SCNC: 5.1 MMOL/L (ref 3.5–5.1)
RBC # BLD: 5.02 M/UL (ref 4–5.2)
SODIUM BLD-SCNC: 145 MMOL/L (ref 136–145)
TOTAL PROTEIN: 5.3 G/DL (ref 6.4–8.2)
WBC # BLD: 13.4 K/UL (ref 4–11)

## 2020-12-06 PROCEDURE — 83735 ASSAY OF MAGNESIUM: CPT

## 2020-12-06 PROCEDURE — 93005 ELECTROCARDIOGRAM TRACING: CPT | Performed by: INTERNAL MEDICINE

## 2020-12-06 PROCEDURE — 86140 C-REACTIVE PROTEIN: CPT

## 2020-12-06 PROCEDURE — 6370000000 HC RX 637 (ALT 250 FOR IP): Performed by: INTERNAL MEDICINE

## 2020-12-06 PROCEDURE — 6360000002 HC RX W HCPCS: Performed by: INTERNAL MEDICINE

## 2020-12-06 PROCEDURE — 6360000002 HC RX W HCPCS: Performed by: NURSE PRACTITIONER

## 2020-12-06 PROCEDURE — 94003 VENT MGMT INPAT SUBQ DAY: CPT

## 2020-12-06 PROCEDURE — 94770 HC ETCO2 MONITOR DAILY: CPT

## 2020-12-06 PROCEDURE — 94761 N-INVAS EAR/PLS OXIMETRY MLT: CPT

## 2020-12-06 PROCEDURE — 2580000003 HC RX 258: Performed by: INTERNAL MEDICINE

## 2020-12-06 PROCEDURE — 80053 COMPREHEN METABOLIC PANEL: CPT

## 2020-12-06 PROCEDURE — 84100 ASSAY OF PHOSPHORUS: CPT

## 2020-12-06 PROCEDURE — 99292 CRITICAL CARE ADDL 30 MIN: CPT | Performed by: INTERNAL MEDICINE

## 2020-12-06 PROCEDURE — C9113 INJ PANTOPRAZOLE SODIUM, VIA: HCPCS | Performed by: NURSE PRACTITIONER

## 2020-12-06 PROCEDURE — 94750 HC PULMONARY COMPLIANCE STUDY: CPT

## 2020-12-06 PROCEDURE — 51702 INSERT TEMP BLADDER CATH: CPT

## 2020-12-06 PROCEDURE — 6370000000 HC RX 637 (ALT 250 FOR IP): Performed by: NURSE PRACTITIONER

## 2020-12-06 PROCEDURE — 85025 COMPLETE CBC W/AUTO DIFF WBC: CPT

## 2020-12-06 PROCEDURE — 2500000003 HC RX 250 WO HCPCS: Performed by: INTERNAL MEDICINE

## 2020-12-06 PROCEDURE — 2000000000 HC ICU R&B

## 2020-12-06 PROCEDURE — 2700000000 HC OXYGEN THERAPY PER DAY

## 2020-12-06 PROCEDURE — 99291 CRITICAL CARE FIRST HOUR: CPT | Performed by: INTERNAL MEDICINE

## 2020-12-06 RX ORDER — FUROSEMIDE 10 MG/ML
60 INJECTION INTRAMUSCULAR; INTRAVENOUS 3 TIMES DAILY
Status: DISCONTINUED | OUTPATIENT
Start: 2020-12-06 | End: 2020-12-07

## 2020-12-06 RX ADMIN — INSULIN LISPRO 6 UNITS: 100 INJECTION, SOLUTION INTRAVENOUS; SUBCUTANEOUS at 01:07

## 2020-12-06 RX ADMIN — FUROSEMIDE 60 MG: 10 INJECTION, SOLUTION INTRAMUSCULAR; INTRAVENOUS at 08:36

## 2020-12-06 RX ADMIN — CARBOXYMETHYLCELLULOSE SODIUM 1 DROP: 10 GEL OPHTHALMIC at 04:17

## 2020-12-06 RX ADMIN — MIDAZOLAM 6 MG/HR: 5 INJECTION INTRAMUSCULAR; INTRAVENOUS at 08:35

## 2020-12-06 RX ADMIN — METHYLPREDNISOLONE SODIUM SUCCINATE 40 MG: 40 INJECTION, POWDER, FOR SOLUTION INTRAMUSCULAR; INTRAVENOUS at 04:18

## 2020-12-06 RX ADMIN — CARBOXYMETHYLCELLULOSE SODIUM 1 DROP: 10 GEL OPHTHALMIC at 01:07

## 2020-12-06 RX ADMIN — FENTANYL CITRATE 150 MCG/HR: 0.05 INJECTION, SOLUTION INTRAMUSCULAR; INTRAVENOUS at 23:49

## 2020-12-06 RX ADMIN — FENTANYL CITRATE 150 MCG/HR: 0.05 INJECTION, SOLUTION INTRAMUSCULAR; INTRAVENOUS at 01:38

## 2020-12-06 RX ADMIN — CARBOXYMETHYLCELLULOSE SODIUM 1 DROP: 10 GEL OPHTHALMIC at 20:45

## 2020-12-06 RX ADMIN — CARBOXYMETHYLCELLULOSE SODIUM 1 DROP: 10 GEL OPHTHALMIC at 16:24

## 2020-12-06 RX ADMIN — FENTANYL CITRATE 150 MCG/HR: 0.05 INJECTION, SOLUTION INTRAMUSCULAR; INTRAVENOUS at 08:35

## 2020-12-06 RX ADMIN — PANTOPRAZOLE SODIUM 40 MG: 40 INJECTION, POWDER, FOR SOLUTION INTRAVENOUS at 08:35

## 2020-12-06 RX ADMIN — Medication 15 ML: at 20:45

## 2020-12-06 RX ADMIN — MIDAZOLAM 6 MG/HR: 5 INJECTION INTRAMUSCULAR; INTRAVENOUS at 23:49

## 2020-12-06 RX ADMIN — ENOXAPARIN SODIUM 40 MG: 40 INJECTION SUBCUTANEOUS at 20:46

## 2020-12-06 RX ADMIN — FENTANYL CITRATE 150 MCG/HR: 0.05 INJECTION, SOLUTION INTRAMUSCULAR; INTRAVENOUS at 16:23

## 2020-12-06 RX ADMIN — DEXTROSE MONOHYDRATE 2 MCG/MIN: 50 INJECTION, SOLUTION INTRAVENOUS at 14:39

## 2020-12-06 RX ADMIN — INSULIN LISPRO 3 UNITS: 100 INJECTION, SOLUTION INTRAVENOUS; SUBCUTANEOUS at 12:50

## 2020-12-06 RX ADMIN — METHYLPREDNISOLONE SODIUM SUCCINATE 40 MG: 40 INJECTION, POWDER, FOR SOLUTION INTRAMUSCULAR; INTRAVENOUS at 16:24

## 2020-12-06 RX ADMIN — INSULIN LISPRO 6 UNITS: 100 INJECTION, SOLUTION INTRAVENOUS; SUBCUTANEOUS at 20:51

## 2020-12-06 RX ADMIN — INSULIN LISPRO 3 UNITS: 100 INJECTION, SOLUTION INTRAVENOUS; SUBCUTANEOUS at 04:20

## 2020-12-06 RX ADMIN — INSULIN LISPRO 3 UNITS: 100 INJECTION, SOLUTION INTRAVENOUS; SUBCUTANEOUS at 16:24

## 2020-12-06 RX ADMIN — CARBOXYMETHYLCELLULOSE SODIUM 1 DROP: 10 GEL OPHTHALMIC at 12:50

## 2020-12-06 RX ADMIN — FUROSEMIDE 60 MG: 10 INJECTION, SOLUTION INTRAMUSCULAR; INTRAVENOUS at 20:45

## 2020-12-06 RX ADMIN — CARBOXYMETHYLCELLULOSE SODIUM 1 DROP: 10 GEL OPHTHALMIC at 08:35

## 2020-12-06 RX ADMIN — ENOXAPARIN SODIUM 40 MG: 40 INJECTION SUBCUTANEOUS at 08:35

## 2020-12-06 RX ADMIN — INSULIN LISPRO 3 UNITS: 100 INJECTION, SOLUTION INTRAVENOUS; SUBCUTANEOUS at 08:36

## 2020-12-06 RX ADMIN — FUROSEMIDE 60 MG: 10 INJECTION, SOLUTION INTRAMUSCULAR; INTRAVENOUS at 14:52

## 2020-12-06 RX ADMIN — INSULIN GLARGINE 55 UNITS: 100 INJECTION, SOLUTION SUBCUTANEOUS at 20:52

## 2020-12-06 RX ADMIN — Medication 15 ML: at 08:44

## 2020-12-06 RX ADMIN — METHYLPREDNISOLONE SODIUM SUCCINATE 40 MG: 40 INJECTION, POWDER, FOR SOLUTION INTRAMUSCULAR; INTRAVENOUS at 20:45

## 2020-12-06 RX ADMIN — METHYLPREDNISOLONE SODIUM SUCCINATE 40 MG: 40 INJECTION, POWDER, FOR SOLUTION INTRAMUSCULAR; INTRAVENOUS at 10:26

## 2020-12-06 ASSESSMENT — PULMONARY FUNCTION TESTS
PIF_VALUE: 29
PIF_VALUE: 32
PIF_VALUE: 32
PIF_VALUE: 29
PIF_VALUE: 29
PIF_VALUE: 26
PIF_VALUE: 26
PIF_VALUE: 32
PIF_VALUE: 29
PIF_VALUE: 29
PIF_VALUE: 30
PIF_VALUE: 29
PIF_VALUE: 28
PIF_VALUE: 29
PIF_VALUE: 28
PIF_VALUE: 29
PIF_VALUE: 29
PIF_VALUE: 26
PIF_VALUE: 28
PIF_VALUE: 29
PIF_VALUE: 30
PIF_VALUE: 30
PIF_VALUE: 28
PIF_VALUE: 30
PIF_VALUE: 28
PIF_VALUE: 29
PIF_VALUE: 32
PIF_VALUE: 28

## 2020-12-06 ASSESSMENT — PAIN SCALES - GENERAL
PAINLEVEL_OUTOF10: 0

## 2020-12-06 NOTE — PROGRESS NOTES
12/05/20 2340   Vent Information   Vent Type 980   Vent Mode AC/VC+   Vt Ordered 400 mL   Rate Set 22 bmp   Pressure Support 0 cmH20   FiO2  50 %   SpO2 92 %   SpO2/FiO2 ratio 184   Sensitivity 3   PEEP/CPAP 10   I Time/ I Time % 0.95 s   Humidification Source HME   Vent Patient Data   Peak Inspiratory Pressure 27 cmH2O   Mean Airway Pressure 15 cmH20   Rate Measured 22 br/min   Vt Exhaled 397 mL   Minute Volume 8.96 Liters   I:E Ratio 1:2.00   Spontaneous Breathing Trial (SBT) RT Doc   Pulse 73   Breath Sounds   Right Upper Lobe Diminished   Right Middle Lobe Diminished   Right Lower Lobe Diminished   Left Upper Lobe Diminished   Left Lower Lobe Diminished   Additional Respiratory  Assessments   Resp 16   Alarm Settings   High Pressure Alarm 40 cmH2O   Low Minute Volume Alarm 3 L/min   High Respiratory Rate 40 br/min   Low Exhaled Vt  300 mL   ETT (adult)   Placement Date/Time: 12/02/20 1359   Timeout: Patient  Preoxygenation: Yes  Mask Ventilation: Ventilated by mask (1)  Technique: Video laryngoscopy  Type: Cuffed  Tube Size: 8 mm  Laryngoscope: GlideScope  Blade Size: 3  Location: Oral  Placement Veri. ..    Secured at 23 cm   Measured From Lips   ET Placement Left   Secured By Commercial tube cabral

## 2020-12-06 NOTE — PROGRESS NOTES
12/06/20 0338   Vent Information   Vent Type 980   Vent Mode AC/VC+   Vt Ordered 400 mL   Rate Set 22 bmp   Pressure Support 0 cmH20   FiO2  50 %   SpO2 94 %   SpO2/FiO2 ratio 188   Sensitivity 3   PEEP/CPAP 10   I Time/ I Time % 0.95 s   Humidification Source HME   Vent Patient Data   Peak Inspiratory Pressure 29 cmH2O   Mean Airway Pressure 16 cmH20   Rate Measured 22 br/min   Vt Exhaled 405 mL   Minute Volume 8.91 Liters   I:E Ratio 1:2.00   Spontaneous Breathing Trial (SBT) RT Doc   Pulse 60   Breath Sounds   Right Upper Lobe Diminished   Right Middle Lobe Diminished   Right Lower Lobe Diminished   Left Upper Lobe Diminished   Left Lower Lobe Diminished   Additional Respiratory  Assessments   Resp 22   Alarm Settings   High Pressure Alarm 40 cmH2O   Low Minute Volume Alarm 3 L/min   High Respiratory Rate 40 br/min   Low Exhaled Vt  300 mL   ETT (adult)   Placement Date/Time: 12/02/20 1359   Timeout: Patient  Preoxygenation: Yes  Mask Ventilation: Ventilated by mask (1)  Technique: Video laryngoscopy  Type: Cuffed  Tube Size: 8 mm  Laryngoscope: GlideScope  Blade Size: 3  Location: Oral  Placement Veri. ..    Secured at 23 cm   Measured From [address] [place] [address],[address] By Commercial tube cabral   Site Condition Dry

## 2020-12-06 NOTE — PROGRESS NOTES
Hospitalist Progress Note      PCP: No primary care provider on file. Date of Admission: 11/29/2020       Chief [address] [place] [address] Course:       Subjective:  remains intubated , sedated, on fio2 of 50, peep 10, having some intermitent tremors per pt's nurse(noted yesterday also)    Medications:  Reviewed    Infusion Medications    midazolam 6 mg/hr (12/06/20 0835)    propofol Stopped (12/04/20 2300)    fentaNYL (SUBLIMAZE) infusion 150 mcg/hr (12/06/20 0835)    dextrose       Scheduled Medications    furosemide  60 mg Intravenous TID    insulin glargine  55 Units Subcutaneous Nightly    methylPREDNISolone  40 mg Intravenous Q6H    pantoprazole  40 mg Intravenous Daily    insulin lispro  0-18 Units Subcutaneous 6 times per day    carboxymethylcellulose PF  1 drop Both Eyes 6 times per day    chlorhexidine  15 mL Mouth/Throat BID    [Held by provider] metoprolol  50 mg Oral BID    enoxaparin  40 mg Subcutaneous BID     PRN Meds: hydrALAZINE, potassium chloride, sodium chloride flush, magnesium sulfate, acetaminophen **OR** acetaminophen, ondansetron, albuterol sulfate HFA, glucose, dextrose, glucagon (rDNA), dextrose, sodium chloride, albuterol sulfate HFA **AND** ipratropium **AND** MDI Treatment      Intake/Output Summary (Last 24 hours) at 12/6/2020 3707  Last data filed at 12/6/2020 0800  Gross per 24 hour   Intake 1556.1 ml   Output 140 ml   Net 1416.1 ml       Physical Exam Performed:    BP (!) 145/45   Pulse 58   Temp 98.7 °F (37.1 °C) (Bladder)   Resp 22   Ht 5' 4\" (1.626 m)   Wt 245 lb 6 oz (111.3 kg)   SpO2 95%   BMI 42.12 kg/m²          General appearance: , appears stated age and cooperative. obese, on vent  HEENT: Pupils equal, round, and reactive to light. Conjunctivae/corneas clear. Neck: Supple, with full range of motion. No jugular venous distention. Trachea midline. Respiratory:  Normal respiratory effort.  Clear to auscultation, bilaterally without Wheezes/Rhonchi. Mild bibas rales  Cardiovascular: Regular rate and rhythm with normal S1/S2 without murmurs, rubs or gallops. Abdomen: Soft, non-tender, non-distended with normal bowel sounds. Musculoskeletal: No clubbing, cyanosis or edema bilaterally.  Full range of motion without deformity. Skin: Skin color, texture, turgor normal.  No rashes or lesions. Neurologic:  did not test as sedated  Psychiatric: sedated  Capillary Refill: Brisk,< 3 seconds   Peripheral Pulses: +2 palpable, equal bilaterally       Labs:   Recent Labs     12/04/20  0555 12/05/20  0543 12/06/20  0554   WBC 12.5* 11.3* 13.4*   HGB 15.5 13.5 13.4   HCT 47.2 41.8 42.0    310 297     Recent Labs     12/05/20  0543 12/05/20  1722 12/06/20  0554    143 145   K 4.6 4.9 5.1    106 109   CO2 29 30 32   BUN 68* 73* 84*   CREATININE 1.5* 1.5* 1.6*   CALCIUM 8.0* 8.2* 8.6   PHOS 5.2* 4.4 4.3     Recent Labs     12/04/20  0555 12/05/20  0543 12/06/20  0554   AST 20 16 17   ALT 25 22 21   BILITOT 0.3 0.3 0.3   ALKPHOS 73 66 67     No results for input(s): INR in the last 72 hours. No results for input(s): Zettie Binet in the last 72 hours. Urinalysis:      Lab Results   Component Value Date    NITRU Negative 12/01/2020    WBCUA see below 12/01/2020    RBCUA >100 12/01/2020    BLOODU LARGE 12/01/2020    SPECGRAV 1.020 12/01/2020    GLUCOSEU 100 12/01/2020       Radiology:  XR CHEST PORTABLE   Final Result   Support apparatus appears stable. Mild congestive failure, pulmonary edema which is similar to slightly   improved when accounting for differences in technique. Superimposed   pneumonia cannot be excluded in a patient with known COVID-19 infection. XR ABDOMEN FOR NG/OG/NE TUBE PLACEMENT   Final Result   Support lines and tubes as above. Worsening diffuse pulmonary opacities relative to 2 days prior in the setting   of known COVID-19 infection.          XR CHEST PORTABLE   Final Result   Support lines and tubes as above. Worsening diffuse pulmonary opacities relative to 2 days prior in the setting   of known COVID-19 infection. VL Extremity Venous Left   Final Result      XR CHEST PORTABLE   Final Result   Bilateral airspace disease is suspicious for pneumonia, and could be   compatible with history of COVID-19. Assessment/Plan:    Active Hospital Problems    Diagnosis    COPD (chronic obstructive pulmonary disease) ([facility] [J44.9]    Acute respiratory failure due to COVID-19 (HCC) [U07.1, J96.00]    Essential hypertension [I10]    Obesity, Class III, BMI 40-49.9 (morbid obesity) ([facility] [E66.01]    Type 2 diabetes mellitus, with long-term current use of insulin ([facility]) [E11.9, Z79.4]       Respiratory failure, COPD, COVID19    Titrate respiratory support according to patient's needs and advanced care plan.  Patient was requiring vapotherm  -  Patient seems to be at significant risk for progression of respiratory failure.  She was advised of the potential need for intubation and mechanical ventilation.  She was agreeable if necessary.  -  Repeated all pertinent inflammatory labs.  Obtained an ABG.  The first sample obtained was venous.  The second sample was arterial with a pO2 = 53.  Vapotherm made available if needed. -  Started BMI-adjusted low-intensity anticoagulation w/ lovenox 40mg bid. Emilee Santos is suspicion for LLE DVT though so will request duplex venous U/S as well. -  Started solumedrol 40mg IV bid, remdesivir(ended 12/4), and 1U convalescent plasma.   -cvc/A-line placed 12/2  -intubated 12/2   -ID consulted 12/3, apprec recs     DM2  -  Hold all oral antidiabetic agents.  Start s.c.  Insulin regimen based on home regimen.   -on home lantus     Elevated creatinine due to ELOY- noted during stay  -nephro consulted 12/3, concern for needing HD     Hematuria- with hx of bladder ca per pt, supposed to get surgery in [facility]  -urology consulted for further recs  -continued gilson     DVT Prophylaxis: lovenox bid  Diet: DIET TUBE FEED CONTINUOUS/CYCLIC NPO; Diabetic 1.5; Orogastric; Continuous; 10; 40; Exceptions are: Sips with Meds  Code Status: Full Code    PT/OT Eval Status: not possible    Dispo -unclear, icu care, may need HD    Suellen Agee MD

## 2020-12-06 NOTE — PROGRESS NOTES
12/06/20 0735   Vent Information   Skin Assessment Clean, dry, & intact   Vt Ordered 400 mL   Rate Set 22 bmp   Pressure Support 0 cmH20   FiO2  50 %   SpO2 93 %   SpO2/FiO2 ratio 186   Sensitivity 3   PEEP/CPAP 10   I Time/ I Time % 0 s   Humidification Source HME   Vent Patient Data   Peak Inspiratory Pressure 29 cmH2O   Mean Airway Pressure 16 cmH20   Rate Measured 22 br/min   Vt Exhaled 390 mL   Minute Volume 8.58 Liters   I:E Ratio 1:2.00   Plateau Pressure 19 WVU91   Static Compliance 43 mL/cmH2O   Dynamic Compliance 20 mL/cmH2O   Spontaneous Breathing Trial (SBT) RT Doc   Pulse 58   Breath Sounds   Right Upper Lobe Diminished   Right Middle Lobe Diminished   Right Lower Lobe Diminished   Left Upper Lobe Diminished   Left Lower Lobe Diminished   Additional Respiratory  Assessments   Resp 22   $End Tidal CO2 42   Position Semi-Armas's   Cuff Pressure (cm H2O) 30 cm H2O   Alarm Settings   High Pressure Alarm 40 cmH2O   Low Minute Volume Alarm 3 L/min   High Respiratory Rate 40 br/min   Patient Observation   Observations ambu @ bedside   ETT (adult)   Placement Date/Time: 12/02/20 1359   Timeout: Patient  Preoxygenation: Yes  Mask Ventilation: Ventilated by mask (1)  Technique: Video laryngoscopy  Type: Cuffed  Tube Size: 8 mm  Laryngoscope: GlideScope  Blade Size: 3  Location: Oral  Placement Veri. ..    Secured at 23 cm   Measured From Lips   ET Placement Left   Secured By Commercial tube cabral   Site Condition Dry

## 2020-12-06 NOTE — PROGRESS NOTES
[facility]    [facility]              [phone]                      She is admitted with hypoxemia and respiratory failure requiring intubation and mechanical ventilation due to COVID-19. We are following for ELOY and related issues    Interval History and plan:      Remains intubated on ventilation  Creatinine going up to 1.5 BUN is high at 73  Blood pressure is a stable  FiO2 50%-stable  Urine output dropped to 190 mL over 24 hours  Urine output has dropped despite of having no hypotension  Possible effect of direct injury from Covid  Risks of needing dialysis this hospitalization  Try IV Lasix for today to see if that helps to open up the kidneys                     Assessment :     Acute Kidney Injury  ELOY likely due to -hemodynamic changes, Covid  Cr on consultation 1.2  Baseline Cr-0.8    UA-large blood, has Urena, RBC more than 100  Renal Imaging:-NA  Echo: N/A    Hypertension     Pulse:  [57-73]   BP goal inpatient 333-250 systolic inpatient  Blood pressure 200 yesterday  Coming down to 160s  On as needed hydralazine    COVID-19 positive, had respiratory failure requiring intubation and mechanical ventilation         [facility] would like to thank Prashanth Hernandez MD   for opportunity to serve this patient      Please call with questions at-   24 Hrs Answering service [phone] or  7 am- 5 pm via Perfect serve or cell phone  Dr.Sudhir Thomas Graft          CC/reason for consult :     ELOY     HPI :     Joceline Peck is a 70 y.o. female presented to   the hospital on 11/29/2020 with COVID-19 which was diagnosed on 11/26/2016. Per chart see you had fatigue headache loss in appetite since of smell and taste and followed by cough for several weeks. Now came with the shortness of breath. She had a difficulty breathing and came to [facility].  He has she required 10 L/min of oxygen.   She is known to have COPD and congestive heart failure but no need for supplemental oxygen at home. She was transferred to ICU. She needed intubation and mechanical ventilation. Post intubation her blood pressure changes significantly. She also had poor urine output and rising creatinine. She got 125 mL/h of fluids overnight now on 25 mL/h  Blood pressure has been fluctuating. Blood pressure went more than 200 but now coming up to 932 systolic    We are consulted for acute renal failure and related issues    ROS:     Seen with- RN    Unable to obtain ROS due to  Altered mental status           PMH/PSH/SH/Family History:     Past Medical History:   Diagnosis Date    COVID-19 11/26/2020    Hyperlipidemia     Hypertension     Neuropathy     Type II or unspecified type diabetes mellitus without mention of complication, not stated as uncontrolled        Past Surgical History:   Procedure Laterality Date    APPENDECTOMY      CHOLECYSTECTOMY      HUMERUS FRACTURE SURGERY      TUBAL LIGATION      TUMOR REMOVAL          reports that she has never smoked. She does not have any smokeless tobacco history on file. family history is not on file.          Medication:     Current Facility-Administered Medications: hydrALAZINE (APRESOLINE) injection 10 mg, 10 mg, Intravenous, Q4H PRN  midazolam (VERSED) 100 mg in dextrose 5 % 100 mL infusion, 1 mg/hr, Intravenous, Continuous  potassium chloride 20 mEq/50 mL IVPB (Central Line), 20 mEq, Intravenous, PRN  insulin glargine (LANTUS) injection vial 55 Units, 55 Units, Subcutaneous, Nightly  methylPREDNISolone sodium (SOLU-MEDROL) injection 40 mg, 40 mg, Intravenous, Q6H  propofol injection, 10 mcg/kg/min, Intravenous, Titrated  fentaNYL (SUBLIMAZE) 1,000 mcg in sodium chloride 0.9 % 100 mL infusion, 25 mcg/hr, Intravenous, Titrated  pantoprazole (PROTONIX) injection 40 mg, 40 mg, Intravenous, Daily  insulin lispro (HUMALOG) injection vial 0-18 Units, 0-18 Units, Subcutaneous, 6 times per day  carboxymethylcellulose PF (THERATEARS) 1 % clubbing no,cyanosis- no , digital ischemia- no                           muscle strength- patient unable to co-operate     , tone - patient unable to co-operate   Skin: rashes- no , ulcers- no, induration- no, tightening - no  Psychiatric:   Intubated, unable to assess  Additional findings: -       LABS:     Recent Labs     12/03/20  0718 12/04/20  0555 12/05/20  0543   WBC 15.1* 12.5* 11.3*   HGB 14.8 15.5 13.5   HCT 45.3 47.2 41.8   * 364 310     Recent Labs     12/03/20  0718  12/04/20  0555 12/05/20  0543 12/05/20  1722      < > 142 138 143   K 2.3*   < > 4.0 4.6 4.9   *   < > 106 103 106   CO2 22   < > 30 29 30   BUN 27*   < > 43* 68* 73*   CREATININE 0.8   < > 1.1 1.5* 1.5*   GLUCOSE 259*   < > 200* 262* 270*   MG 1.50*  --  2.80* 3.30*  --    PHOS 3.1  --  3.9 5.2* 4.4    < > = values in this interval not displayed.

## 2020-12-06 NOTE — PROGRESS NOTES
12/06/20 1557   Vent Information   Skin Assessment Clean, dry, & intact   Vent Mode AC/VC+   Vt Ordered 400 mL   Rate Set 22 bmp   Pressure Support 0 cmH20   FiO2  60 %   SpO2 95 %   SpO2/FiO2 ratio 158.33   Sensitivity 3   PEEP/CPAP 10   I Time/ I Time % 0 s   Humidification Source HME   Vent Patient Data   Peak Inspiratory Pressure 28 cmH2O   Mean Airway Pressure 16 cmH20   Rate Measured 22 br/min   Vt Exhaled 404 mL   Minute Volume 8.86 Liters   I:E Ratio 1:2.00   Spontaneous Breathing Trial (SBT) RT Doc   Pulse 53   Breath Sounds   Right Upper Lobe Diminished   Right Middle Lobe Diminished   Right Lower Lobe Diminished   Left Upper Lobe Diminished   Left Lower Lobe Diminished   Additional Respiratory  Assessments   Resp 22   $End Tidal CO2 34   Position Semi-Armas's   Cuff Pressure (cm H2O) 30 cm H2O   Alarm Settings   High Pressure Alarm 40 cmH2O   Low Minute Volume Alarm 3 L/min   High Respiratory Rate 40 br/min   Patient Observation   Observations ambu @ bedside   ETT (adult)   Placement Date/Time: 12/02/20 1359   Timeout: Patient  Preoxygenation: Yes  Mask Ventilation: Ventilated by mask (1)  Technique: Video laryngoscopy  Type: Cuffed  Tube Size: 8 mm  Laryngoscope: GlideScope  Blade Size: 3  Location: Oral  Placement Veri. ..    Secured at 23 cm   Measured From Lips   ET Placement Right   Secured By Commercial tube cabral   Site Condition Dry

## 2020-12-06 NOTE — PROGRESS NOTES
12/06/20 0741   Oxygen Therapy/Pulse Ox   O2 Therapy Oxygen   $Oxygen $Daily Charge   O2 Device Ventilator   FiO2  60 %   Resp 22   SpO2 91 %   $Pulse Oximeter $Spot check (multiple/continuous)   $End Tidal CO2 42

## 2020-12-06 NOTE — PROGRESS NOTES
Pulmonary & Critical Care Inpatient Progress Note   Velma Augustin MD     REASON FOR TODAY'S VISIT:  Critical care management    SUBJECTIVE:   Remains on vent support with high oxygenation requirements, sedation, paralytic stopped    Scheduled Meds:   insulin glargine  55 Units Subcutaneous Nightly    methylPREDNISolone  40 mg Intravenous Q6H    pantoprazole  40 mg Intravenous Daily    insulin lispro  0-18 Units Subcutaneous 6 times per day    carboxymethylcellulose PF  1 drop Both Eyes 6 times per day    chlorhexidine  15 mL Mouth/Throat BID    [Held by provider] metoprolol  50 mg Oral BID    enoxaparin  40 mg Subcutaneous BID       Continuous Infusions:   midazolam 6 mg/hr (12/05/20 1643)    propofol Stopped (12/04/20 2300)    fentaNYL (SUBLIMAZE) infusion 150 mcg/hr (12/05/20 1832)    dextrose         PRN Meds:  hydrALAZINE, potassium chloride, sodium chloride flush, magnesium sulfate, acetaminophen **OR** acetaminophen, ondansetron, albuterol sulfate HFA, glucose, dextrose, glucagon (rDNA), dextrose, sodium chloride, albuterol sulfate HFA **AND** ipratropium **AND** MDI Treatment    ALLERGIES:  Patient is allergic to contrast [iodides]; ibuprofen; and lipitor [atorvastatin calcium]. Objective:   PHYSICAL EXAM:  BP (!) 144/53   Pulse 65   Temp 98.3 °F (36.8 °C) (Bladder)   Resp 21   Ht 5' 4\" (1.626 m)   Wt 245 lb 6 oz (111.3 kg)   SpO2 95%   BMI 42.12 kg/m²    Physical Exam  Constitutional:       General: She is not in acute distress. Appearance: She is well-developed. She is not diaphoretic. HENT:      Head: Normocephalic and atraumatic. Mouth/Throat:      Pharynx: No oropharyngeal exudate. Eyes:      Pupils: Pupils are equal, round, and reactive to light. Neck:      Musculoskeletal: Neck supple. Vascular: No JVD. Cardiovascular:      Heart sounds: Normal heart sounds. No murmur. No friction rub. No gallop.     Pulmonary:      Effort: Pulmonary effort is normal. Breath sounds: Rales present. No wheezing. Abdominal:      General: Bowel sounds are normal. There is no distension. Palpations: Abdomen is soft. Tenderness: There is no abdominal tenderness. Lymphadenopathy:      Cervical: No cervical adenopathy. Skin:     General: Skin is warm and dry. Findings: No rash. Neurological:      Mental Status: She is alert. Cranial Nerves: Cranial nerve deficit present. Comments: Intubated, sedated            Data Reviewed:   LABS:  CBC:  Recent Labs     12/03/20  0718 12/04/20  0555 12/05/20  0543   WBC 15.1* 12.5* 11.3*   HGB 14.8 15.5 13.5   HCT 45.3 47.2 41.8   MCV 81.9 82.6 84.2   * 364 310     BMP:  Recent Labs     12/04/20  0555 12/05/20  0543 12/05/20  1722    138 143   K 4.0 4.6 4.9    103 106   CO2 30 29 30   PHOS 3.9 5.2* 4.4   BUN 43* 68* 73*   CREATININE 1.1 1.5* 1.5*     LIVER PROFILE:   Recent Labs     12/03/20  1619 12/04/20  0555 12/05/20  0543   AST 24 20 16   ALT 34 25 22   BILITOT 0.5 0.3 0.3   ALKPHOS 85 73 66     PT/INR:  Recent Labs     12/03/20  0535   PROTIME 16.8*   INR 1.44*     APTT: No results for input(s): APTT in the last 72 hours. UA:  No results for input(s): NITRITE, COLORU, PHUR, LABCAST, WBCUA, RBCUA, MUCUS, TRICHOMONAS, YEAST, BACTERIA, CLARITYU, SPECGRAV, LEUKOCYTESUR, UROBILINOGEN, BILIRUBINUR, BLOODU, GLUCOSEU, AMORPHOUS in the last 72 hours.     Invalid input(s): Tavia Cosby  Recent Labs     12/05/20  0543 12/05/20  1722   PHART 7.347* 7.361   BVS2EOC 48.8* 53.6*   PO2ART 74.0* 83.4       Vent Information  $Ventilation: $Subsequent Day  Skin Assessment: Clean, dry, & intact  Equipment Changed: (S) HME  Vent Type: 980  Vent Mode: AC/VC+  Vt Ordered: 400 mL  Pressure Ordered: 20  Rate Set: 22 bmp  Peak Flow: 48 L/min  Pressure Support: 0 cmH20  FiO2 : 50 %  SpO2: 95 %  SpO2/FiO2 ratio: 190  Sensitivity: 3  PEEP/CPAP: 10  I Time/ I Time %: 0.95 s  Humidification Source: HME  Humidification Temp:

## 2020-12-06 NOTE — PROGRESS NOTES
Pulmonary & Critical Care Inpatient Progress Note   Katie Peoples MD     REASON FOR TODAY'S VISIT:  Critical care management    SUBJECTIVE:   Remains on vent support with high oxygenation requirements. On sedation  Over 2L of urine output when riggins was replaced, clot suspected      Scheduled Meds:   furosemide  60 mg Intravenous TID    insulin glargine  55 Units Subcutaneous Nightly    methylPREDNISolone  40 mg Intravenous Q6H    pantoprazole  40 mg Intravenous Daily    insulin lispro  0-18 Units Subcutaneous 6 times per day    carboxymethylcellulose PF  1 drop Both Eyes 6 times per day    chlorhexidine  15 mL Mouth/Throat BID    [Held by provider] metoprolol  50 mg Oral BID    enoxaparin  40 mg Subcutaneous BID       Continuous Infusions:   midazolam 6 mg/hr (12/06/20 0835)    propofol Stopped (12/04/20 2300)    fentaNYL (SUBLIMAZE) infusion 150 mcg/hr (12/06/20 0835)    dextrose         PRN Meds:  hydrALAZINE, potassium chloride, sodium chloride flush, magnesium sulfate, acetaminophen **OR** acetaminophen, ondansetron, albuterol sulfate HFA, glucose, dextrose, glucagon (rDNA), dextrose, sodium chloride, albuterol sulfate HFA **AND** ipratropium **AND** MDI Treatment    ALLERGIES:  Patient is allergic to contrast [iodides]; ibuprofen; and lipitor [atorvastatin calcium]. Objective:   PHYSICAL EXAM:  BP (!) 145/45   Pulse 56   Temp 98.1 °F (36.7 °C) (Bladder)   Resp 22   Ht 5' 4\" (1.626 m)   Wt 245 lb 6 oz (111.3 kg)   SpO2 94%   BMI 42.12 kg/m²    Physical Exam  Constitutional:       General: She is not in acute distress. Appearance: She is well-developed. She is not diaphoretic. HENT:      Head: Normocephalic and atraumatic. Mouth/Throat:      Pharynx: No oropharyngeal exudate. Eyes:      Pupils: Pupils are equal, round, and reactive to light. Neck:      Musculoskeletal: Neck supple. Vascular: No JVD. Cardiovascular:      Heart sounds: Normal heart sounds. No murmur.  No friction rub. No gallop. Pulmonary:      Effort: Pulmonary effort is normal.      Breath sounds: Rales present. No wheezing. Abdominal:      General: Bowel sounds are normal. There is no distension. Palpations: Abdomen is soft. Tenderness: There is no abdominal tenderness. Lymphadenopathy:      Cervical: No cervical adenopathy. Skin:     General: Skin is warm and dry. Findings: No rash. Neurological:      Mental Status: She is alert. Cranial Nerves: Cranial nerve deficit present. Comments: Intubated, sedated            Data Reviewed:   LABS:  CBC:  Recent Labs     12/04/20  0555 12/05/20  0543 12/06/20  0554   WBC 12.5* 11.3* 13.4*   HGB 15.5 13.5 13.4   HCT 47.2 41.8 42.0   MCV 82.6 84.2 83.8    310 297     BMP:  Recent Labs     12/05/20  0543 12/05/20  1722 12/06/20  0554    143 145   K 4.6 4.9 5.1    106 109   CO2 29 30 32   PHOS 5.2* 4.4 4.3   BUN 68* 73* 84*   CREATININE 1.5* 1.5* 1.6*     LIVER PROFILE:   Recent Labs     12/04/20  0555 12/05/20  0543 12/06/20  0554   AST 20 16 17   ALT 25 22 21   BILITOT 0.3 0.3 0.3   ALKPHOS 73 66 67     PT/INR:  No results for input(s): PROTIME, INR in the last 72 hours. APTT: No results for input(s): APTT in the last 72 hours. UA:  No results for input(s): NITRITE, COLORU, PHUR, LABCAST, WBCUA, RBCUA, MUCUS, TRICHOMONAS, YEAST, BACTERIA, CLARITYU, SPECGRAV, LEUKOCYTESUR, UROBILINOGEN, BILIRUBINUR, BLOODU, GLUCOSEU, AMORPHOUS in the last 72 hours.     Invalid input(s): Moni Shirts  Recent Labs     12/05/20  0543 12/05/20  1722   PHART 7.347* 7.361   [id number] 48.8* 53.6*   PO2ART 74.0* 83.4       Vent Information  $Ventilation: $Subsequent Day  Skin Assessment: Clean, dry, & intact  Equipment Changed: (S) HME  Vent Type: 980  Vent Mode: AC/VC+  Vt Ordered: 400 mL  Pressure Ordered: 20  Rate Set: 22 bmp  Peak Flow: 48 L/min  Pressure Support: 0 cmH20  FiO2 : 60 %  SpO2: 94 %  SpO2/FiO2 ratio: 156.67  Sensitivity:

## 2020-12-06 NOTE — PLAN OF CARE
Pt is in 2 point BUE soft restraints to protect medical access devices. Explained criteria for release. No evidence of learning.

## 2020-12-06 NOTE — PROGRESS NOTES
12/05/20 1934   Vent Information   Vent Type 980   Vent Mode AC/VC+   Vt Ordered 400 mL   Rate Set 22 bmp   Pressure Support 0 cmH20   FiO2  50 %   SpO2 95 %   SpO2/FiO2 ratio 190   Sensitivity 3   PEEP/CPAP 10   I Time/ I Time % 0.95 s   Humidification Source HME   Vent Patient Data   Peak Inspiratory Pressure 23 cmH2O   Mean Airway Pressure 14 cmH20   Rate Measured 22 br/min   Vt Exhaled 425 mL   Minute Volume 9 Liters   I:E Ratio 1:2.00   Plateau Pressure 22 [id number]   Static Compliance 35.42 mL/cmH2O   Dynamic Compliance 32.69 mL/cmH2O   Cough/Sputum   Sputum How Obtained Endotracheal;Suctioned   Cough Productive   Sputum Amount Small   Sputum Color Creamy   Tenacity Thick   Spontaneous Breathing Trial (SBT) RT Doc   Pulse 65   Breath Sounds   Right Upper Lobe Diminished   Right Middle Lobe Diminished   Right Lower Lobe Diminished   Left Upper Lobe Diminished   Left Lower Lobe Diminished   Additional Respiratory  Assessments   Resp 21   Alarm Settings   High Pressure Alarm 40 cmH2O   Low Minute Volume Alarm 3 L/min   High Respiratory Rate 40 br/min   Low Exhaled Vt  300 mL   ETT (adult)   Placement Date/Time: 12/02/20 1359   Timeout: Patient  Preoxygenation: Yes  Mask Ventilation: Ventilated by mask (1)  Technique: Video laryngoscopy  Type: Cuffed  Tube Size: 8 mm  Laryngoscope: GlideScope  Blade Size: 3  Location: Oral  Placement Veri. ..    Secured at 23 cm   Measured From [address] [place] [address],[address] By Commercial tube cabral   Site Condition Dry

## 2020-12-06 NOTE — PROGRESS NOTES
12/06/20 1147   Vent Information   Skin Assessment Clean, dry, & intact   Vt Ordered 400 mL   Rate Set 22 bmp   Pressure Support 0 cmH20   FiO2  60 %   SpO2 95 %   SpO2/FiO2 ratio 158.33   Sensitivity 3   PEEP/CPAP 10   I Time/ I Time % 0 s   Humidification Source HME   Vent Patient Data   Peak Inspiratory Pressure 32 cmH2O   Mean Airway Pressure 17 cmH20   Rate Measured 22 br/min   Vt Exhaled 386 mL   Minute Volume 8.11 Liters   I:E Ratio 1:2.00   Spontaneous Breathing Trial (SBT) RT Doc   Pulse 57   Breath Sounds   Right Upper Lobe Diminished   Right Middle Lobe Diminished   Right Lower Lobe Diminished   Left Upper Lobe Diminished   Left Lower Lobe Diminished   Additional Respiratory  Assessments   Resp 17   Position Semi-Armas's   Cuff Pressure (cm H2O) 30 cm H2O   Alarm Settings   High Pressure Alarm 40 cmH2O   Low Minute Volume Alarm 3 L/min   High Respiratory Rate 40 br/min   Patient Observation   Observations ambu @ bedside   ETT (adult)   Placement Date/Time: 12/02/20 1359   Timeout: Patient  Preoxygenation: Yes  Mask Ventilation: Ventilated by mask (1)  Technique: Video laryngoscopy  Type: Cuffed  Tube Size: 8 mm  Laryngoscope: GlideScope  Blade Size: 3  Location: Oral  Placement Veri. ..    Secured at 23 cm   Measured From [address] [place] [address],[address] By Commercial tube cabral   Site Condition Dry

## 2020-12-06 NOTE — PROGRESS NOTES
Pt terminally extubated at 1416. Arterial line flat and EKG monitoring showing asystole at 1425. Dr Sameer Olivarez notified. Hospitalist notified. TOD 1425.

## 2020-12-07 LAB
A/G RATIO: 0.9 (ref 1.1–2.2)
ALBUMIN SERPL-MCNC: 2.7 G/DL (ref 3.4–5)
ALP BLD-CCNC: 64 U/L (ref 40–129)
ALT SERPL-CCNC: 28 U/L (ref 10–40)
ANION GAP SERPL CALCULATED.3IONS-SCNC: 8 MMOL/L (ref 3–16)
AST SERPL-CCNC: 22 U/L (ref 15–37)
BANDED NEUTROPHILS RELATIVE PERCENT: 11 % (ref 0–7)
BASE EXCESS ARTERIAL: 8.5 MMOL/L (ref -3–3)
BASOPHILS ABSOLUTE: 0 K/UL (ref 0–0.2)
BASOPHILS RELATIVE PERCENT: 0 %
BILIRUB SERPL-MCNC: 0.6 MG/DL (ref 0–1)
BUN BLDV-MCNC: 89 MG/DL (ref 7–20)
CALCIUM SERPL-MCNC: 8.7 MG/DL (ref 8.3–10.6)
CARBOXYHEMOGLOBIN ARTERIAL: 0.3 % (ref 0–1.5)
CHLORIDE BLD-SCNC: 104 MMOL/L (ref 99–110)
CO2: 36 MMOL/L (ref 21–32)
CREAT SERPL-MCNC: 1.4 MG/DL (ref 0.6–1.2)
EKG ATRIAL RATE: 55 BPM
EKG DIAGNOSIS: NORMAL
EKG P AXIS: 66 DEGREES
EKG P-R INTERVAL: 130 MS
EKG Q-T INTERVAL: 460 MS
EKG QRS DURATION: 94 MS
EKG QTC CALCULATION (BAZETT): 440 MS
EKG R AXIS: 54 DEGREES
EKG T AXIS: 104 DEGREES
EKG VENTRICULAR RATE: 55 BPM
EOSINOPHILS ABSOLUTE: 0 K/UL (ref 0–0.6)
EOSINOPHILS RELATIVE PERCENT: 0 %
GFR AFRICAN AMERICAN: 45
GFR NON-AFRICAN AMERICAN: 37
GLOBULIN: 3 G/DL
GLUCOSE BLD-MCNC: 163 MG/DL (ref 70–99)
GLUCOSE BLD-MCNC: 223 MG/DL (ref 70–99)
GLUCOSE BLD-MCNC: 232 MG/DL (ref 70–99)
GLUCOSE BLD-MCNC: 252 MG/DL (ref 70–99)
GLUCOSE BLD-MCNC: 308 MG/DL (ref 70–99)
GLUCOSE BLD-MCNC: 320 MG/DL (ref 70–99)
HCO3 ARTERIAL: 35.3 MMOL/L (ref 21–29)
HCT VFR BLD CALC: 44 % (ref 36–48)
HEMOGLOBIN, ART, EXTENDED: 14.7 G/DL (ref 12–16)
HEMOGLOBIN: 14.2 G/DL (ref 12–16)
LYMPHOCYTES ABSOLUTE: 0.2 K/UL (ref 1–5.1)
LYMPHOCYTES RELATIVE PERCENT: 1 %
MAGNESIUM: 2.7 MG/DL (ref 1.8–2.4)
MCH RBC QN AUTO: 26.8 PG (ref 26–34)
MCHC RBC AUTO-ENTMCNC: 32.3 G/DL (ref 31–36)
MCV RBC AUTO: 83 FL (ref 80–100)
METHEMOGLOBIN ARTERIAL: 0.3 %
MONOCYTES ABSOLUTE: 0.3 K/UL (ref 0–1.3)
MONOCYTES RELATIVE PERCENT: 2 %
NEUTROPHILS ABSOLUTE: 15.7 K/UL (ref 1.7–7.7)
NEUTROPHILS RELATIVE PERCENT: 86 %
O2 CONTENT ARTERIAL: 19 ML/DL
O2 SAT, ARTERIAL: 93.4 %
O2 THERAPY: ABNORMAL
PCO2 ARTERIAL: 56.9 MMHG (ref 35–45)
PDW BLD-RTO: 14.8 % (ref 12.4–15.4)
PERFORMED ON: ABNORMAL
PH ARTERIAL: 7.41 (ref 7.35–7.45)
PHOSPHORUS: 4.7 MG/DL (ref 2.5–4.9)
PLATELET # BLD: 335 K/UL (ref 135–450)
PLATELET SLIDE REVIEW: ADEQUATE
PMV BLD AUTO: 10.2 FL (ref 5–10.5)
PO2 ARTERIAL: 71.5 MMHG (ref 75–108)
POLYCHROMASIA: ABNORMAL
POTASSIUM SERPL-SCNC: 4.9 MMOL/L (ref 3.5–5.1)
RBC # BLD: 5.3 M/UL (ref 4–5.2)
SLIDE REVIEW: ABNORMAL
SODIUM BLD-SCNC: 148 MMOL/L (ref 136–145)
TCO2 ARTERIAL: 37 MMOL/L
TOTAL PROTEIN: 5.7 G/DL (ref 6.4–8.2)
TSH REFLEX FT4: 0.42 UIU/ML (ref 0.27–4.2)
WBC # BLD: 16.2 K/UL (ref 4–11)

## 2020-12-07 PROCEDURE — 6370000000 HC RX 637 (ALT 250 FOR IP): Performed by: INTERNAL MEDICINE

## 2020-12-07 PROCEDURE — 2700000000 HC OXYGEN THERAPY PER DAY

## 2020-12-07 PROCEDURE — 6360000002 HC RX W HCPCS: Performed by: NURSE PRACTITIONER

## 2020-12-07 PROCEDURE — 84100 ASSAY OF PHOSPHORUS: CPT

## 2020-12-07 PROCEDURE — 84443 ASSAY THYROID STIM HORMONE: CPT

## 2020-12-07 PROCEDURE — 2580000003 HC RX 258: Performed by: INTERNAL MEDICINE

## 2020-12-07 PROCEDURE — 85025 COMPLETE CBC W/AUTO DIFF WBC: CPT

## 2020-12-07 PROCEDURE — 83735 ASSAY OF MAGNESIUM: CPT

## 2020-12-07 PROCEDURE — 99292 CRITICAL CARE ADDL 30 MIN: CPT | Performed by: INTERNAL MEDICINE

## 2020-12-07 PROCEDURE — C9113 INJ PANTOPRAZOLE SODIUM, VIA: HCPCS | Performed by: NURSE PRACTITIONER

## 2020-12-07 PROCEDURE — 99291 CRITICAL CARE FIRST HOUR: CPT | Performed by: INTERNAL MEDICINE

## 2020-12-07 PROCEDURE — 94003 VENT MGMT INPAT SUBQ DAY: CPT

## 2020-12-07 PROCEDURE — 82803 BLOOD GASES ANY COMBINATION: CPT

## 2020-12-07 PROCEDURE — 93010 ELECTROCARDIOGRAM REPORT: CPT | Performed by: INTERNAL MEDICINE

## 2020-12-07 PROCEDURE — 6360000002 HC RX W HCPCS: Performed by: INTERNAL MEDICINE

## 2020-12-07 PROCEDURE — 2000000000 HC ICU R&B

## 2020-12-07 PROCEDURE — 6370000000 HC RX 637 (ALT 250 FOR IP): Performed by: NURSE PRACTITIONER

## 2020-12-07 PROCEDURE — 80053 COMPREHEN METABOLIC PANEL: CPT

## 2020-12-07 PROCEDURE — 94750 HC PULMONARY COMPLIANCE STUDY: CPT

## 2020-12-07 PROCEDURE — 94761 N-INVAS EAR/PLS OXIMETRY MLT: CPT

## 2020-12-07 PROCEDURE — 94770 HC ETCO2 MONITOR DAILY: CPT

## 2020-12-07 RX ADMIN — CARBOXYMETHYLCELLULOSE SODIUM 1 DROP: 10 GEL OPHTHALMIC at 20:45

## 2020-12-07 RX ADMIN — ENOXAPARIN SODIUM 40 MG: 40 INJECTION SUBCUTANEOUS at 20:45

## 2020-12-07 RX ADMIN — INSULIN LISPRO 3 UNITS: 100 INJECTION, SOLUTION INTRAVENOUS; SUBCUTANEOUS at 12:46

## 2020-12-07 RX ADMIN — FENTANYL CITRATE 150 MCG/HR: 0.05 INJECTION, SOLUTION INTRAMUSCULAR; INTRAVENOUS at 07:30

## 2020-12-07 RX ADMIN — INSULIN LISPRO 6 UNITS: 100 INJECTION, SOLUTION INTRAVENOUS; SUBCUTANEOUS at 10:28

## 2020-12-07 RX ADMIN — CARBOXYMETHYLCELLULOSE SODIUM 1 DROP: 10 GEL OPHTHALMIC at 04:25

## 2020-12-07 RX ADMIN — CARBOXYMETHYLCELLULOSE SODIUM 1 DROP: 10 GEL OPHTHALMIC at 12:46

## 2020-12-07 RX ADMIN — INSULIN LISPRO 12 UNITS: 100 INJECTION, SOLUTION INTRAVENOUS; SUBCUTANEOUS at 20:46

## 2020-12-07 RX ADMIN — Medication 15 ML: at 20:46

## 2020-12-07 RX ADMIN — MIDAZOLAM 5 MG/HR: 5 INJECTION INTRAMUSCULAR; INTRAVENOUS at 20:47

## 2020-12-07 RX ADMIN — METHYLPREDNISOLONE SODIUM SUCCINATE 40 MG: 40 INJECTION, POWDER, FOR SOLUTION INTRAMUSCULAR; INTRAVENOUS at 20:45

## 2020-12-07 RX ADMIN — Medication 10 ML: at 10:42

## 2020-12-07 RX ADMIN — METHYLPREDNISOLONE SODIUM SUCCINATE 40 MG: 40 INJECTION, POWDER, FOR SOLUTION INTRAMUSCULAR; INTRAVENOUS at 10:42

## 2020-12-07 RX ADMIN — ENOXAPARIN SODIUM 40 MG: 40 INJECTION SUBCUTANEOUS at 10:42

## 2020-12-07 RX ADMIN — PANTOPRAZOLE SODIUM 40 MG: 40 INJECTION, POWDER, FOR SOLUTION INTRAVENOUS at 10:42

## 2020-12-07 RX ADMIN — CARBOXYMETHYLCELLULOSE SODIUM 1 DROP: 10 GEL OPHTHALMIC at 16:30

## 2020-12-07 RX ADMIN — METHYLPREDNISOLONE SODIUM SUCCINATE 40 MG: 40 INJECTION, POWDER, FOR SOLUTION INTRAMUSCULAR; INTRAVENOUS at 16:29

## 2020-12-07 RX ADMIN — INSULIN LISPRO 6 UNITS: 100 INJECTION, SOLUTION INTRAVENOUS; SUBCUTANEOUS at 16:30

## 2020-12-07 RX ADMIN — FENTANYL CITRATE 150 MCG/HR: 0.05 INJECTION, SOLUTION INTRAMUSCULAR; INTRAVENOUS at 14:56

## 2020-12-07 RX ADMIN — INSULIN GLARGINE 55 UNITS: 100 INJECTION, SOLUTION SUBCUTANEOUS at 20:47

## 2020-12-07 RX ADMIN — METHYLPREDNISOLONE SODIUM SUCCINATE 40 MG: 40 INJECTION, POWDER, FOR SOLUTION INTRAMUSCULAR; INTRAVENOUS at 04:15

## 2020-12-07 RX ADMIN — CARBOXYMETHYLCELLULOSE SODIUM 1 DROP: 10 GEL OPHTHALMIC at 00:30

## 2020-12-07 RX ADMIN — FENTANYL CITRATE 150 MCG/HR: 0.05 INJECTION, SOLUTION INTRAMUSCULAR; INTRAVENOUS at 20:47

## 2020-12-07 RX ADMIN — Medication 15 ML: at 08:00

## 2020-12-07 RX ADMIN — INSULIN LISPRO 9 UNITS: 100 INJECTION, SOLUTION INTRAVENOUS; SUBCUTANEOUS at 04:53

## 2020-12-07 ASSESSMENT — PULMONARY FUNCTION TESTS
PIF_VALUE: 29
PIF_VALUE: 26
PIF_VALUE: 27
PIF_VALUE: 28
PIF_VALUE: 27
PIF_VALUE: 27
PIF_VALUE: 26
PIF_VALUE: 29
PIF_VALUE: 25
PIF_VALUE: 27
PIF_VALUE: 27
PIF_VALUE: 25
PIF_VALUE: 29
PIF_VALUE: 23
PIF_VALUE: 29
PIF_VALUE: 26
PIF_VALUE: 29

## 2020-12-07 ASSESSMENT — PAIN SCALES - GENERAL
PAINLEVEL_OUTOF10: 0

## 2020-12-07 NOTE — PROGRESS NOTES
12/06/20 1933   Vent Information   Skin Assessment Clean, dry, & intact   Vent Mode AC/VC+   Vt Ordered 400 mL   Rate Set 22 bmp   Pressure Support 0 cmH20   FiO2  60 %   SpO2 94 %   SpO2/FiO2 ratio 156.67   Sensitivity 3   PEEP/CPAP 10   I Time/ I Time % 0 s   Humidification Source HME   Vent Patient Data   Peak Inspiratory Pressure 29 cmH2O   Mean Airway Pressure 16 cmH20   Rate Measured 22 br/min   Vt Exhaled 402 mL   Minute Volume 8.85 Liters   I:E Ratio 1:2.00   Plateau Pressure 22 [id number]   Static Compliance 33 mL/cmH2O   Dynamic Compliance 21 mL/cmH2O   Spontaneous Breathing Trial (SBT) RT Doc   Pulse 52   Breath Sounds   Right Upper Lobe Diminished   Right Middle Lobe Diminished   Right Lower Lobe Diminished   Left Upper Lobe Diminished   Left Lower Lobe Diminished   Additional Respiratory  Assessments   Resp 22   $End Tidal CO2 35   Position Semi-Armas's   Cuff Pressure (cm H2O) 30 cm H2O   Alarm Settings   High Pressure Alarm 40 cmH2O   Low Minute Volume Alarm 3 L/min   High Respiratory Rate 40 br/min   Patient Observation   Observations ambu @ bedside   ETT (adult)   Placement Date/Time: 12/02/20 1359   Timeout: Patient  Preoxygenation: Yes  Mask Ventilation: Ventilated by mask (1)  Technique: Video laryngoscopy  Type: Cuffed  Tube Size: 8 mm  Laryngoscope: GlideScope  Blade Size: 3  Location: Oral  Placement Veri. ..    Secured at 23 cm   Measured From Lips   ET Placement Right   Secured By Commercial tube cabral   Site Condition Dry

## 2020-12-07 NOTE — PROGRESS NOTES
12/07/20 0352   Vent Information   Vent Type 980   Vent Mode AC/VC+   Vt Ordered 400 mL   Rate Set 22 bmp   Pressure Support 0 cmH20   FiO2  60 %   SpO2 94 %   SpO2/FiO2 ratio 156.67   Sensitivity 3   PEEP/CPAP 10   I Time/ I Time % 0.95 s   Humidification Source HME   Vent Patient Data   Peak Inspiratory Pressure 29 cmH2O   Mean Airway Pressure 16 cmH20   Rate Measured 22 br/min   Vt Exhaled 404 mL   Minute Volume 8.83 Liters   I:E Ratio 1:2.00   Spontaneous Breathing Trial (SBT) RT Doc   Pulse 55   Breath Sounds   Right Upper Lobe Diminished   Right Middle Lobe Diminished   Right Lower Lobe Diminished   Left Upper Lobe Diminished   Left Lower Lobe Diminished   Additional Respiratory  Assessments   Resp 22   Alarm Settings   High Pressure Alarm 40 cmH2O   Low Minute Volume Alarm 3 L/min   High Respiratory Rate 40 br/min   Low Exhaled Vt  300 mL   ETT (adult)   Placement Date/Time: 12/02/20 1359   Timeout: Patient  Preoxygenation: Yes  Mask Ventilation: Ventilated by mask (1)  Technique: Video laryngoscopy  Type: Cuffed  Tube Size: 8 mm  Laryngoscope: GlideScope  Blade Size: 3  Location: Oral  Placement Veri. ..    Secured at 23 cm   Measured From Lips   ET Placement Right   Secured By Commercial tube cabral   Site Condition Dry

## 2020-12-07 NOTE — FLOWSHEET NOTE
Bedside report given by Frandy Robles, Skin assessment completed. Shift assessment completed and documented; see flowsheets for details and vent settings. Pt resting in bed, VSS, Lines checked and verified, alarms on and audible. Repositioned patient, sacral Mepilex in place, call light within reach, will continue to closely monitor. Recent Labs     12/07/20  0620   WBC 16.2*   HGB 14.2   HCT 44.0   MCV 83.0        Recent Labs     12/07/20  0620   *   K 4.9      CO2 36*   GLUCOSE 308*   PHOS 4.7   MG 2.70*   BUN 89*   CREATININE 1.4*   CALCIUM 8.7   LABGLOM 37*   GFRAA 45*     Jorge Khan 12/7/2020 8:29 AM  Estimated Creatinine Clearance: 44 mL/min (A) (based on SCr of 1.4 mg/dL (H)).     DVT Prophylaxis: enoxaparin (Lovenox) 40mg SQ Daily    GI Prophylaxis: pantoprazole (PROTONIX), per order

## 2020-12-07 NOTE — PROGRESS NOTES
Comprehensive Nutrition Assessment    Type and Reason for Visit:  Reassess    Nutrition Recommendations/Plan:   1. Recommend  glucerna 1.5 (diabetic formula) As tolerated, increase by 10 mL/hr q 4 hours until goal of 55 mL/hr is met. 2. Recommend 150 mL H20 flush q 3 hours. Monitor IVF infusion, Na labs and need for adjustments in water flush  3. Consider daily micronutrient supplementation: 1000 IU Vitamin D3, MVM, + Probiotic (such as Lactobacillus GG) as shown to reduce incidence of VAP  4. Monitor TF tolerance (abd distention, bowel habits, N/V, cramping)  5. Monitor nutrition adequacy, pertinent labs, bowel habits, wt changes, and clinical progress    Nutrition Assessment:  Follow up: Pt remains intubated, off propofol, off nimbex. TF infusing at 40 mL/hr with minimal free water flushes. Hypernatremia noted, Na 148. Recommend increase TF and free water. D/w RN. Will modify orders. Malnutrition Assessment:  Malnutrition Status: At risk for malnutrition (Comment)(limited access to nutrition + increased nutrient needs for compromised lung function)      Estimated Daily Nutrient Needs:  Energy (kcal):  9567-4121; Weight Used for Energy Requirements:  Current(110 kg)     Protein (g):  g  Weight Used for Protein Requirements:  Current(1.5-2)  IBW       Fluid (ml/day):  per MD with current condition; Method Used for Fluid Requirements:  1 ml/kcal      Nutrition Related Findings:  Labs reviewed: Na 142, K 2.3, Mg 1.5, Phos WNL, Alb 1.6, .       Wounds:  (redness on L ankle)       Current Nutrition Therapies:    DIET TUBE FEED CONTINUOUS/CYCLIC NPO; Diabetic 1.5; Orogastric; Continuous; 10; 55; Exceptions are: Sips with Meds  Current Tube Feeding (TF) Orders:  · Feeding Route: Nasogastric  · Formula: 1.5 Diabetic  · Schedule: Continuous  · Additives/Modulars: Protein   · Goal TF & Flush Orders Provides: Glucerna 1.5 at 55 mL/hr x 20 hrs to provide 1100 mL TV, 1650 kcals, 91 g protein, and 835 mL

## 2020-12-07 NOTE — CARE COORDINATION
Chart review completed. Patient a 70year old female, admitted for acute respiratory failure. Hospital day 8, currently in ICU level of care on vent. Per initial assessment patient lives at home with  and independent prior to admission. [facility] with [facility] screening and following patient. Patient continues to meet criteria. Should MD feel appropriate CM can discuss with patient/family.  Tamir Zamorano RN

## 2020-12-07 NOTE — PROGRESS NOTES
12/07/20 0738   Vent Information   Equipment Changed HME   Vent Type 980   Vent Mode AC/VC+   Vt Ordered 400 mL   Rate Set 22 bmp   Pressure Support 0 cmH20   FiO2  60 %   SpO2 95 %   SpO2/FiO2 ratio 158.33   Sensitivity 3   PEEP/CPAP 10   I Time/ I Time % 0.9 s   Humidification Source HME   Vent Patient Data   Peak Inspiratory Pressure 29 cmH2O   Mean Airway Pressure 16 cmH20   Rate Measured 22 br/min   Vt Exhaled 401 mL   Minute Volume 8.85 Liters   I:E Ratio 1:2.00   Plateau Pressure 24 [id number]   Static Compliance 28.64 mL/cmH2O   Dynamic Compliance 21.05 mL/cmH2O   Cough/Sputum   Sputum How Obtained Endotracheal;Suctioned   Cough Weak;Productive   Sputum Amount Large   Sputum Color Creamy; Yellow   Tenacity Thick; Thin   Spontaneous Breathing Trial (SBT) RT Doc   Pulse 54   Breath Sounds   Right Upper Lobe Diminished   Right Middle Lobe Diminished   Right Lower Lobe Diminished   Left Upper Lobe Diminished   Left Lower Lobe Diminished   Additional Respiratory  Assessments   Resp 22   $End Tidal CO2 38   Alarm Settings   High Pressure Alarm 40 cmH2O   Low Minute Volume Alarm 3 L/min   Apnea (secs) 20 secs   High Respiratory Rate 40 br/min   Low Exhaled Vt  200 mL   Adult IBW   Height 5' 4\" (1.626 m)   IBW/kg (Calculated) 54.7   Patient Observation   Observations ambu @ Bedside   ETT (adult)   Placement Date/Time: 12/02/20 1356   Timeout: Patient  Preoxygenation: Yes  Mask Ventilation: Ventilated by mask (1)  Technique: Video laryngoscopy  Type: Cuffed  Tube Size: 8 mm  Laryngoscope: GlideScope  Blade Size: 3  Location: Oral  Placement Veri. ..    Secured at 23 cm   Measured From Lips   ET Placement Right  (moved to the left at this time.)   Secured By Commercial tube cabral   Site Condition Dry   Cuff Pressure 30 cm H2O

## 2020-12-07 NOTE — PLAN OF CARE
Problem: Falls - Risk of:  Goal: Will remain free from falls  Description: Will remain free from falls  Outcome: Ongoing  Goal: Absence of physical injury  Description: Absence of physical injury  Outcome: Ongoing     Problem: Breathing Pattern - Ineffective:  Goal: Ability to achieve and maintain a regular respiratory rate will improve  Description: Ability to achieve and maintain a regular respiratory rate will improve  Outcome: Ongoing     Problem: Nutrition  Goal: Optimal nutrition therapy  Outcome: Ongoing     Problem: Restraint Use - Nonviolent/Non-Self-Destructive Behavior:  Goal: Absence of restraint indications  Description: Absence of restraint indications  Outcome: Ongoing  Goal: Absence of restraint-related injury  Description: Absence of restraint-related injury  Outcome: Ongoing     Problem: Skin Integrity:  Goal: Will show no infection signs and symptoms  Description: Will show no infection signs and symptoms  Outcome: Ongoing  Goal: Absence of new skin breakdown  Description: Absence of new skin breakdown  Outcome: Ongoing

## 2020-12-07 NOTE — PROGRESS NOTES
12/06/20 2315   Vent Information   Vent Type 980   Vent Mode AC/VC+   Vt Ordered 400 mL   Rate Set 22 bmp   Pressure Support 0 cmH20   FiO2  60 %   SpO2 90 %   SpO2/FiO2 ratio 150   Sensitivity 3   PEEP/CPAP 10   I Time/ I Time % 0 s   Humidification Source HME   Vent Patient Data   Peak Inspiratory Pressure 32 cmH2O   Mean Airway Pressure 17 cmH20   Rate Measured 22 br/min   Vt Exhaled 396 mL   Minute Volume 8.68 Liters   I:E Ratio 1:2.00   Cough/Sputum   Sputum How Obtained Endotracheal;Suctioned   Cough Productive   Sputum Amount Moderate   Sputum Color Creamy   Tenacity Thin   Spontaneous Breathing Trial (SBT) RT Doc   Pulse 67   Breath Sounds   Right Upper Lobe Rhonchi   Right Middle Lobe Diminished   Right Lower Lobe Diminished   Left Upper Lobe Rhonchi   Left Lower Lobe Diminished   Additional Respiratory  Assessments   Resp 22   Alarm Settings   High Pressure Alarm 40 cmH2O   Low Minute Volume Alarm 3 L/min   High Respiratory Rate 40 br/min   Low Exhaled Vt  300 mL   ETT (adult)   Placement Date/Time: 12/02/20 1359   Timeout: Patient  Preoxygenation: Yes  Mask Ventilation: Ventilated by mask (1)  Technique: Video laryngoscopy  Type: Cuffed  Tube Size: 8 mm  Laryngoscope: GlideScope  Blade Size: 3  Location: Oral  Placement Veri. ..    Secured at 23 cm   Measured From [address] [place] [address],[address] By Commercial tube cabral   Site Condition Dry

## 2020-12-07 NOTE — PROGRESS NOTES
Gave Bedside report to: Carla Garcia RN    4 Eyes Skin Assessment     The patient is being assess for   Shift Handoff    I agree that 2 RN's have performed a thorough Head to Toe Skin Assessment on the patient. ALL assessment sites listed below have been assessed. Areas assessed by both nurses:   [x]   Head, Face, and Ears   [x]   Shoulders, Back, and Chest, Abdomen  [x]   Arms, Elbows, and Hands   [x]   Coccyx, Sacrum, and Ischium  [x]   Legs, Feet, and Heels        Does the Patient have Skin Breakdown?   No          Loyd Prevention initiated:  Yes   Wound Care Orders initiated:  No      WOC nurse consulted for Pressure Injury (Stage 3,4, Unstageable, DTI, NWPT, Complex wounds)and New or Established Ostomies:  NA      Primary Nurse eSignature: Electronically signed by Adrian Kenney RN on 12/7/20 at 7:27 PM EST    **SHARE this note so that the co-signing nurse is able to place an eSignature**    Co-signer eSignature: {Esignature:[id number]}

## 2020-12-07 NOTE — PROGRESS NOTES
Pulmonary & Critical Care Inpatient Progress Note   Sabi Millan MD     REASON FOR TODAY'S VISIT:  Critical care management    SUBJECTIVE:   Remains on vent support with high oxygenation requirements. On sedation with versed gtt  Copious urine output yesterday over 6L, now hypernatremic. Scheduled Meds:   insulin glargine  55 Units Subcutaneous Nightly    methylPREDNISolone  40 mg Intravenous Q6H    pantoprazole  40 mg Intravenous Daily    insulin lispro  0-18 Units Subcutaneous 6 times per day    carboxymethylcellulose PF  1 drop Both Eyes 6 times per day    chlorhexidine  15 mL Mouth/Throat BID    [Held by provider] metoprolol  50 mg Oral BID    enoxaparin  40 mg Subcutaneous BID       Continuous Infusions:   norepinephrine Stopped (12/07/20 1048)    midazolam 5 mg/hr (12/07/20 1048)    propofol Stopped (12/04/20 2300)    fentaNYL (SUBLIMAZE) infusion 150 mcg/hr (12/07/20 0730)    dextrose         PRN Meds:  hydrALAZINE, potassium chloride, sodium chloride flush, magnesium sulfate, acetaminophen **OR** acetaminophen, ondansetron, albuterol sulfate HFA, glucose, dextrose, glucagon (rDNA), dextrose, sodium chloride, albuterol sulfate HFA **AND** ipratropium **AND** MDI Treatment    ALLERGIES:  Patient is allergic to contrast [iodides]; ibuprofen; and lipitor [atorvastatin calcium]. Objective:   PHYSICAL EXAM:  BP (!) 145/45   Pulse 62   Temp 99.5 °F (37.5 °C) (Bladder)   Resp 22   Ht 5' 4\" (1.626 m)   Wt 230 lb 2.6 oz (104.4 kg)   SpO2 94%   BMI 39.51 kg/m²    Physical Exam  Constitutional:       General: She is not in acute distress. Appearance: She is well-developed. She is not diaphoretic. HENT:      Head: Normocephalic and atraumatic. Mouth/Throat:      Pharynx: No oropharyngeal exudate. Eyes:      Pupils: Pupils are equal, round, and reactive to light. Neck:      Musculoskeletal: Neck supple. Vascular: No JVD.    Cardiovascular:      Heart sounds: Normal heart

## 2020-12-07 NOTE — PROGRESS NOTES
12/07/20 1524   Vent Information   Vent Type 980   Vent Mode AC/VC+   Vt Ordered 400 mL   Rate Set 22 bmp   Pressure Support 0 cmH20   FiO2  50 %   SpO2 94 %   SpO2/FiO2 ratio 188   Sensitivity 3   PEEP/CPAP 10   I Time/ I Time % 0.9 s   Vent Patient Data   Peak Inspiratory Pressure 27 cmH2O   Mean Airway Pressure 15 cmH20   Rate Measured 22 br/min   Vt Exhaled 399 mL   Minute Volume 8.76 Liters   I:E Ratio 1:2.00   Plateau Pressure 25 [id number]   Static Compliance 36.6 mL/cmH2O   Dynamic Compliance 23.47 mL/cmH2O   Cough/Sputum   Sputum How Obtained Endotracheal;Suctioned   Cough Weak;Non-productive   Sputum Amount None   Spontaneous Breathing Trial (SBT) RT Doc   Pulse 66   Breath Sounds   Right Upper Lobe Diminished   Right Middle Lobe Diminished   Right Lower Lobe Diminished   Left Upper Lobe Diminished   Left Lower Lobe Diminished   Additional Respiratory  Assessments   Resp 22   $End Tidal CO2 41   Alarm Settings   High Pressure Alarm 40 cmH2O   Low Minute Volume Alarm 3 L/min   Apnea (secs) 20 secs   High Respiratory Rate 40 br/min   Low Exhaled Vt  200 mL   Patient Observation   Observations ambu @ bedside   ETT (adult)   Placement Date/Time: 12/02/20 1359   Timeout: Patient  Preoxygenation: Yes  Mask Ventilation: Ventilated by mask (1)  Technique: Video laryngoscopy  Type: Cuffed  Tube Size: 8 mm  Laryngoscope: GlideScope  Blade Size: 3  Location: Oral  Placement Veri. ..    Secured at 23 cm   Measured From [address] [facility] [address]  (moved to the left at this time.)   Secured By Commercial tube cabral   Site Condition Dry

## 2020-12-08 LAB
A/G RATIO: 1 (ref 1.1–2.2)
ALBUMIN SERPL-MCNC: 2.7 G/DL (ref 3.4–5)
ALP BLD-CCNC: 61 U/L (ref 40–129)
ALT SERPL-CCNC: 38 U/L (ref 10–40)
ANION GAP SERPL CALCULATED.3IONS-SCNC: 3 MMOL/L (ref 3–16)
ANION GAP SERPL CALCULATED.3IONS-SCNC: 3 MMOL/L (ref 3–16)
AST SERPL-CCNC: 22 U/L (ref 15–37)
BANDED NEUTROPHILS RELATIVE PERCENT: 6 % (ref 0–7)
BASE EXCESS ARTERIAL: 8.7 MMOL/L (ref -3–3)
BASOPHILS ABSOLUTE: 0 K/UL (ref 0–0.2)
BASOPHILS RELATIVE PERCENT: 0 %
BILIRUB SERPL-MCNC: 0.5 MG/DL (ref 0–1)
BUN BLDV-MCNC: 78 MG/DL (ref 7–20)
BUN BLDV-MCNC: 83 MG/DL (ref 7–20)
C-REACTIVE PROTEIN: 5.8 MG/L (ref 0–5.1)
CALCIUM SERPL-MCNC: 8.9 MG/DL (ref 8.3–10.6)
CALCIUM SERPL-MCNC: 8.9 MG/DL (ref 8.3–10.6)
CARBOXYHEMOGLOBIN ARTERIAL: 0.1 % (ref 0–1.5)
CHLORIDE BLD-SCNC: 102 MMOL/L (ref 99–110)
CHLORIDE BLD-SCNC: 106 MMOL/L (ref 99–110)
CO2: 38 MMOL/L (ref 21–32)
CO2: 39 MMOL/L (ref 21–32)
CREAT SERPL-MCNC: 1 MG/DL (ref 0.6–1.2)
CREAT SERPL-MCNC: 1.1 MG/DL (ref 0.6–1.2)
EOSINOPHILS ABSOLUTE: 0 K/UL (ref 0–0.6)
EOSINOPHILS RELATIVE PERCENT: 0 %
GFR AFRICAN AMERICAN: 59
GFR AFRICAN AMERICAN: >60
GFR NON-AFRICAN AMERICAN: 49
GFR NON-AFRICAN AMERICAN: 54
GLOBULIN: 2.6 G/DL
GLUCOSE BLD-MCNC: 200 MG/DL (ref 70–99)
GLUCOSE BLD-MCNC: 224 MG/DL (ref 70–99)
GLUCOSE BLD-MCNC: 251 MG/DL (ref 70–99)
GLUCOSE BLD-MCNC: 277 MG/DL (ref 70–99)
GLUCOSE BLD-MCNC: 277 MG/DL (ref 70–99)
GLUCOSE BLD-MCNC: 282 MG/DL (ref 70–99)
GLUCOSE BLD-MCNC: 303 MG/DL (ref 70–99)
GLUCOSE BLD-MCNC: 341 MG/DL (ref 70–99)
HCO3 ARTERIAL: 35.8 MMOL/L (ref 21–29)
HCT VFR BLD CALC: 41.8 % (ref 36–48)
HEMOGLOBIN, ART, EXTENDED: 14.2 G/DL (ref 12–16)
HEMOGLOBIN: 13.3 G/DL (ref 12–16)
LYMPHOCYTES ABSOLUTE: 0.2 K/UL (ref 1–5.1)
LYMPHOCYTES RELATIVE PERCENT: 2 %
MAGNESIUM: 2.6 MG/DL (ref 1.8–2.4)
MCH RBC QN AUTO: 26.5 PG (ref 26–34)
MCHC RBC AUTO-ENTMCNC: 31.8 G/DL (ref 31–36)
MCV RBC AUTO: 83.4 FL (ref 80–100)
METHEMOGLOBIN ARTERIAL: 0.5 %
MONOCYTES ABSOLUTE: 0.5 K/UL (ref 0–1.3)
MONOCYTES RELATIVE PERCENT: 4 %
NEUTROPHILS ABSOLUTE: 11.1 K/UL (ref 1.7–7.7)
NEUTROPHILS RELATIVE PERCENT: 88 %
O2 CONTENT ARTERIAL: 18 ML/DL
O2 SAT, ARTERIAL: 92.1 %
O2 THERAPY: ABNORMAL
PCO2 ARTERIAL: 59.7 MMHG (ref 35–45)
PDW BLD-RTO: 14.8 % (ref 12.4–15.4)
PERFORMED ON: ABNORMAL
PH ARTERIAL: 7.4 (ref 7.35–7.45)
PHOSPHORUS: 3.8 MG/DL (ref 2.5–4.9)
PLATELET # BLD: 253 K/UL (ref 135–450)
PLATELET SLIDE REVIEW: ADEQUATE
PMV BLD AUTO: 10 FL (ref 5–10.5)
PO2 ARTERIAL: 63.7 MMHG (ref 75–108)
POTASSIUM SERPL-SCNC: 5.6 MMOL/L (ref 3.5–5.1)
POTASSIUM SERPL-SCNC: 5.7 MMOL/L (ref 3.5–5.1)
RBC # BLD: 5.01 M/UL (ref 4–5.2)
RBC # BLD: NORMAL 10*6/UL
SLIDE REVIEW: ABNORMAL
SODIUM BLD-SCNC: 143 MMOL/L (ref 136–145)
SODIUM BLD-SCNC: 148 MMOL/L (ref 136–145)
TCO2 ARTERIAL: 37.7 MMOL/L
TOTAL PROTEIN: 5.3 G/DL (ref 6.4–8.2)
TOXIC GRANULATION: PRESENT
WBC # BLD: 11.8 K/UL (ref 4–11)

## 2020-12-08 PROCEDURE — 80053 COMPREHEN METABOLIC PANEL: CPT

## 2020-12-08 PROCEDURE — 2580000003 HC RX 258: Performed by: INTERNAL MEDICINE

## 2020-12-08 PROCEDURE — 6370000000 HC RX 637 (ALT 250 FOR IP): Performed by: NURSE PRACTITIONER

## 2020-12-08 PROCEDURE — 6360000002 HC RX W HCPCS: Performed by: INTERNAL MEDICINE

## 2020-12-08 PROCEDURE — 83735 ASSAY OF MAGNESIUM: CPT

## 2020-12-08 PROCEDURE — 6360000002 HC RX W HCPCS: Performed by: NURSE PRACTITIONER

## 2020-12-08 PROCEDURE — 85025 COMPLETE CBC W/AUTO DIFF WBC: CPT

## 2020-12-08 PROCEDURE — 86140 C-REACTIVE PROTEIN: CPT

## 2020-12-08 PROCEDURE — 94770 HC ETCO2 MONITOR DAILY: CPT

## 2020-12-08 PROCEDURE — 94003 VENT MGMT INPAT SUBQ DAY: CPT

## 2020-12-08 PROCEDURE — 2000000000 HC ICU R&B

## 2020-12-08 PROCEDURE — 2700000000 HC OXYGEN THERAPY PER DAY

## 2020-12-08 PROCEDURE — 84100 ASSAY OF PHOSPHORUS: CPT

## 2020-12-08 PROCEDURE — 99292 CRITICAL CARE ADDL 30 MIN: CPT | Performed by: INTERNAL MEDICINE

## 2020-12-08 PROCEDURE — 94750 HC PULMONARY COMPLIANCE STUDY: CPT

## 2020-12-08 PROCEDURE — 6370000000 HC RX 637 (ALT 250 FOR IP): Performed by: INTERNAL MEDICINE

## 2020-12-08 PROCEDURE — C9113 INJ PANTOPRAZOLE SODIUM, VIA: HCPCS | Performed by: NURSE PRACTITIONER

## 2020-12-08 PROCEDURE — 82803 BLOOD GASES ANY COMBINATION: CPT

## 2020-12-08 PROCEDURE — 99291 CRITICAL CARE FIRST HOUR: CPT | Performed by: INTERNAL MEDICINE

## 2020-12-08 PROCEDURE — 37799 UNLISTED PX VASCULAR SURGERY: CPT

## 2020-12-08 PROCEDURE — 94761 N-INVAS EAR/PLS OXIMETRY MLT: CPT

## 2020-12-08 RX ORDER — METHYLPREDNISOLONE SODIUM SUCCINATE 40 MG/ML
40 INJECTION, POWDER, LYOPHILIZED, FOR SOLUTION INTRAMUSCULAR; INTRAVENOUS EVERY 12 HOURS
Status: DISCONTINUED | OUTPATIENT
Start: 2020-12-08 | End: 2020-12-11

## 2020-12-08 RX ORDER — QUETIAPINE FUMARATE 100 MG/1
300 TABLET, FILM COATED ORAL 2 TIMES DAILY
Status: DISCONTINUED | OUTPATIENT
Start: 2020-12-08 | End: 2020-12-11

## 2020-12-08 RX ORDER — INSULIN GLARGINE 100 [IU]/ML
30 INJECTION, SOLUTION SUBCUTANEOUS 2 TIMES DAILY
Status: DISCONTINUED | OUTPATIENT
Start: 2020-12-08 | End: 2020-12-15

## 2020-12-08 RX ADMIN — CARBOXYMETHYLCELLULOSE SODIUM 1 DROP: 10 GEL OPHTHALMIC at 15:57

## 2020-12-08 RX ADMIN — Medication 15 ML: at 08:26

## 2020-12-08 RX ADMIN — INSULIN GLARGINE 30 UNITS: 100 INJECTION, SOLUTION SUBCUTANEOUS at 10:45

## 2020-12-08 RX ADMIN — CARBOXYMETHYLCELLULOSE SODIUM 1 DROP: 10 GEL OPHTHALMIC at 21:22

## 2020-12-08 RX ADMIN — FENTANYL CITRATE 150 MCG/HR: 0.05 INJECTION, SOLUTION INTRAMUSCULAR; INTRAVENOUS at 04:06

## 2020-12-08 RX ADMIN — INSULIN LISPRO 9 UNITS: 100 INJECTION, SOLUTION INTRAVENOUS; SUBCUTANEOUS at 15:57

## 2020-12-08 RX ADMIN — CARBOXYMETHYLCELLULOSE SODIUM 1 DROP: 10 GEL OPHTHALMIC at 00:44

## 2020-12-08 RX ADMIN — FENTANYL CITRATE 75 MCG/HR: 0.05 INJECTION, SOLUTION INTRAMUSCULAR; INTRAVENOUS at 12:11

## 2020-12-08 RX ADMIN — PANTOPRAZOLE SODIUM 40 MG: 40 INJECTION, POWDER, FOR SOLUTION INTRAVENOUS at 08:27

## 2020-12-08 RX ADMIN — CARBOXYMETHYLCELLULOSE SODIUM 1 DROP: 10 GEL OPHTHALMIC at 11:30

## 2020-12-08 RX ADMIN — CARBOXYMETHYLCELLULOSE SODIUM 1 DROP: 10 GEL OPHTHALMIC at 08:28

## 2020-12-08 RX ADMIN — Medication 10 ML: at 21:21

## 2020-12-08 RX ADMIN — QUETIAPINE FUMARATE 300 MG: 100 TABLET ORAL at 21:21

## 2020-12-08 RX ADMIN — CARBOXYMETHYLCELLULOSE SODIUM 1 DROP: 10 GEL OPHTHALMIC at 04:17

## 2020-12-08 RX ADMIN — INSULIN LISPRO 3 UNITS: 100 INJECTION, SOLUTION INTRAVENOUS; SUBCUTANEOUS at 04:07

## 2020-12-08 RX ADMIN — INSULIN LISPRO 6 UNITS: 100 INJECTION, SOLUTION INTRAVENOUS; SUBCUTANEOUS at 08:26

## 2020-12-08 RX ADMIN — INSULIN GLARGINE 30 UNITS: 100 INJECTION, SOLUTION SUBCUTANEOUS at 21:22

## 2020-12-08 RX ADMIN — METHYLPREDNISOLONE SODIUM SUCCINATE 40 MG: 40 INJECTION, POWDER, LYOPHILIZED, FOR SOLUTION INTRAMUSCULAR; INTRAVENOUS at 21:21

## 2020-12-08 RX ADMIN — ENOXAPARIN SODIUM 40 MG: 40 INJECTION SUBCUTANEOUS at 21:20

## 2020-12-08 RX ADMIN — Medication 10 ML: at 08:27

## 2020-12-08 RX ADMIN — INSULIN LISPRO 9 UNITS: 100 INJECTION, SOLUTION INTRAVENOUS; SUBCUTANEOUS at 21:22

## 2020-12-08 RX ADMIN — METHYLPREDNISOLONE SODIUM SUCCINATE 40 MG: 40 INJECTION, POWDER, FOR SOLUTION INTRAMUSCULAR; INTRAVENOUS at 04:17

## 2020-12-08 RX ADMIN — INSULIN LISPRO 6 UNITS: 100 INJECTION, SOLUTION INTRAVENOUS; SUBCUTANEOUS at 12:13

## 2020-12-08 RX ADMIN — METHYLPREDNISOLONE SODIUM SUCCINATE 40 MG: 40 INJECTION, POWDER, FOR SOLUTION INTRAMUSCULAR; INTRAVENOUS at 09:00

## 2020-12-08 RX ADMIN — INSULIN LISPRO 9 UNITS: 100 INJECTION, SOLUTION INTRAVENOUS; SUBCUTANEOUS at 00:45

## 2020-12-08 RX ADMIN — Medication 15 ML: at 21:21

## 2020-12-08 RX ADMIN — ENOXAPARIN SODIUM 40 MG: 40 INJECTION SUBCUTANEOUS at 08:30

## 2020-12-08 ASSESSMENT — PAIN SCALES - GENERAL
PAINLEVEL_OUTOF10: 0

## 2020-12-08 ASSESSMENT — PULMONARY FUNCTION TESTS
PIF_VALUE: 26
PIF_VALUE: 29
PIF_VALUE: 29
PIF_VALUE: 28
PIF_VALUE: 32
PIF_VALUE: 30
PIF_VALUE: 28
PIF_VALUE: 28
PIF_VALUE: 29
PIF_VALUE: 26
PIF_VALUE: 26
PIF_VALUE: 30
PIF_VALUE: 27
PIF_VALUE: 31
PIF_VALUE: 29
PIF_VALUE: 30

## 2020-12-08 NOTE — PROGRESS NOTES
12/07/20 2318   Vent Information   Vent Type 980   Vent Mode AC/VC+   Vt Ordered 400 mL   Rate Set 22 bmp   Pressure Support 0 cmH20   FiO2  50 %   SpO2 95 %   SpO2/FiO2 ratio 190   Sensitivity 3   PEEP/CPAP 10   I Time/ I Time % 0.9 s   Humidification Source HME   Vent Patient Data   Peak Inspiratory Pressure 27 cmH2O   Mean Airway Pressure 16 cmH20   Rate Measured 22 br/min   Vt Exhaled 401 mL   Minute Volume 8.8 Liters   I:E Ratio 1:2.00   Spontaneous Breathing Trial (SBT) RT Doc   Pulse 58   Breath Sounds   Right Upper Lobe Diminished   Right Middle Lobe Diminished   Right Lower Lobe Diminished   Left Upper Lobe Diminished   Left Lower Lobe Diminished   Additional Respiratory  Assessments   Resp 23   Alarm Settings   High Pressure Alarm 40 cmH2O   Low Minute Volume Alarm 3 L/min   High Respiratory Rate 40 br/min   Low Exhaled Vt  200 mL   ETT (adult)   Placement Date/Time: 12/02/20 1359   Timeout: Patient  Preoxygenation: Yes  Mask Ventilation: Ventilated by mask (1)  Technique: Video laryngoscopy  Type: Cuffed  Tube Size: 8 mm  Laryngoscope: GlideScope  Blade Size: 3  Location: Oral  Placement Veri. ..    Secured at 23 cm   Measured From Lips   ET Placement Right   Secured By Commercial tube cabral   Site Condition Dry

## 2020-12-08 NOTE — PROGRESS NOTES
Body Weight: 245 lb (111.1 kg)    · Ideal Body Weight: 120 lbs; % Ideal Body Weight 201.7 %   · BMI: 41.5  · BMI Categories: Obese Class 3 (BMI 40.0 or greater)       Nutrition Diagnosis:   · Inadequate energy intake related to lack or limited access to food, impaired respiratory function as evidenced by NPO or clear liquid status due to medical condition, intubation      Nutrition Interventions:   Food and/or Nutrient Delivery:  Modify Tube Feeding  Nutrition Education/Counseling:  No recommendation at this time   Coordination of Nutrition Care:  Continue to monitor while inpatient    Goals:  Patient will tolerate nutrition initiation without worsening respiratory function or blood sugars greater than 180 mg/dl. Nutrition Monitoring and Evaluation:   Food/Nutrient Intake Outcomes:  Enteral Nutrition Intake/Tolerance  Physical Signs/Symptoms Outcomes:  Biochemical Data, Nutrition Focused Physical Findings     Discharge Planning: Too soon to determine     Electronically signed by Maricel Rodriguez.  Steve Adams RD, LD on 12/8/20 at 10:01 AM EST    Contact: [phone]

## 2020-12-08 NOTE — PROGRESS NOTES
Rounded with ICU team, led by Bret Garcia CNP. CNP ordered to wean sedation more and decrease PEEP to 8 and trial SBT on next RT round. CNP also decreased frequency of steroids and RD increased free water to 200mL/2hrs and changed the TF formula to Nepro due to AM electrolytes.

## 2020-12-08 NOTE — PROGRESS NOTES
Gave Bedside report to: Ramiro Durham RN    4 Eyes Skin Assessment     The patient is being assess for   Shift Handoff    I agree that 2 RN's have performed a thorough Head to Toe Skin Assessment on the patient. ALL assessment sites listed below have been assessed. Areas assessed by both nurses:   [x]   Head, Face, and Ears   [x]   Shoulders, Back, and Chest, Abdomen  [x]   Arms, Elbows, and Hands   [x]   Coccyx, Sacrum, and Ischium  [x]   Legs, Feet, and Heels        Does the Patient have Skin Breakdown?   No          Loyd Prevention initiated:  Yes   Wound Care Orders initiated:  No      WOC nurse consulted for Pressure Injury (Stage 3,4, Unstageable, DTI, NWPT, Complex wounds)and New or Established Ostomies:  NA      Primary Nurse eSignature: Electronically signed by Marcos Reilly RN on 12/8/20 at 7:55 PM EST    **SHARE this note so that the co-signing nurse is able to place an eSignature**    Co-signer eSignature: {Esignature:[id number]}

## 2020-12-08 NOTE — PROGRESS NOTES
12/08/20 1530   Vent Information   Equipment Changed HME   Vent Type 980   Vent Mode AC/VC+   Vt Ordered 400 mL   Rate Set 22 bmp   Pressure Support 0 cmH20   FiO2  50 %   SpO2 93 %   SpO2/FiO2 ratio 186   Sensitivity 3   PEEP/CPAP 8   I Time/ I Time % 0 s   Humidification Source HME   Vent Patient Data   Peak Inspiratory Pressure 30 cmH2O   Mean Airway Pressure 15 cmH20   Rate Measured 22 br/min   Vt Exhaled 407 mL   Minute Volume 8.57 Liters   I:E Ratio 1:1.90   Plateau Pressure 22 [id number]   Static Compliance 27 mL/cmH2O   Dynamic Compliance 21.1 mL/cmH2O   Spontaneous Breathing Trial (SBT) RT Doc   Pulse 56   Additional Respiratory  Assessments   Resp 19   Alarm Settings   High Pressure Alarm 40 cmH2O   Low Minute Volume Alarm 3 L/min   High Respiratory Rate 40 br/min   Low Exhaled Vt  200 mL   ETT (adult)   Placement Date/Time: 12/02/20 1359   Timeout: Patient  Preoxygenation: Yes  Mask Ventilation: Ventilated by mask (1)  Technique: Video laryngoscopy  Type: Cuffed  Tube Size: 8 mm  Laryngoscope: GlideScope  Blade Size: 3  Location: Oral  Placement Veri. ..    Secured at 23 cm   Measured From Lips   ET Placement Left   Secured By Commercial tube cabral   Site Condition Dry

## 2020-12-08 NOTE — PROGRESS NOTES
12/08/20 0329   Vent Information   Vent Type 980   Vent Mode AC/VC+   Vt Ordered 400 mL   Rate Set 22 bmp   Pressure Support 0 cmH20   FiO2  50 %   SpO2 90 %   SpO2/FiO2 ratio 180   Sensitivity 3   PEEP/CPAP 10   I Time/ I Time % 0.9 s   Humidification Source HME  (HME CHANGED)   Vent Patient Data   Peak Inspiratory Pressure 32 cmH2O   Mean Airway Pressure 17 cmH20   Rate Measured 22 br/min   Vt Exhaled 397 mL   Minute Volume 8.53 Liters   I:E Ratio 1:2.00   Cough/Sputum   Sputum How Obtained Endotracheal;Suctioned   Sputum Amount Moderate   Sputum Color Yellow   Tenacity Thick   Spontaneous Breathing Trial (SBT) RT Doc   Pulse 62   Breath Sounds   Right Upper Lobe Diminished   Right Middle Lobe Diminished   Right Lower Lobe Diminished   Left Upper Lobe Diminished   Left Lower Lobe Diminished   Additional Respiratory  Assessments   Resp 22   Alarm Settings   High Pressure Alarm 40 cmH2O   Low Minute Volume Alarm 3 L/min   High Respiratory Rate 40 br/min   Low Exhaled Vt  200 mL   ETT (adult)   Placement Date/Time: 12/02/20 1359   Timeout: Patient  Preoxygenation: Yes  Mask Ventilation: Ventilated by mask (1)  Technique: Video laryngoscopy  Type: Cuffed  Tube Size: 8 mm  Laryngoscope: GlideScope  Blade Size: 3  Location: Oral  Placement Veri. ..    Secured at 23 cm   Measured From [address] [place] [address],[address] By Commercial tube cabral

## 2020-12-08 NOTE — PROGRESS NOTES
General: Bowel sounds are normal. There is no distension. Palpations: Abdomen is soft. Tenderness: There is no abdominal tenderness. Lymphadenopathy:      Cervical: No cervical adenopathy. Skin:     General: Skin is warm and dry. Findings: No rash. Neurological:      Mental Status: She is alert. Cranial Nerves: Cranial nerve deficit present. Comments: Intubated, sedated            Data Reviewed:   LABS:  CBC:  Recent Labs     12/06/20  0554 12/07/20 0620 12/08/20  0540   WBC 13.4* 16.2* 11.8*   HGB 13.4 14.2 13.3   HCT 42.0 44.0 41.8   MCV 83.8 83.0 83.4    335 253     BMP:  Recent Labs     12/06/20  0554 12/07/20  0620 12/08/20  0540    148* 148*   K 5.1 4.9 5.6*    104 106   CO2 32 36* 39*   PHOS 4.3 4.7 3.8   BUN 84* 89* 83*   CREATININE 1.6* 1.4* 1.1     LIVER PROFILE:   Recent Labs     12/06/20  0554 12/07/20 0620 12/08/20  0540   AST 17 22 22   ALT 21 28 38   BILITOT 0.3 0.6 0.5   ALKPHOS 67 64 61     PT/INR:  No results for input(s): PROTIME, INR in the last 72 hours. APTT: No results for input(s): APTT in the last 72 hours. UA:  No results for input(s): NITRITE, COLORU, PHUR, LABCAST, WBCUA, RBCUA, MUCUS, TRICHOMONAS, YEAST, BACTERIA, CLARITYU, SPECGRAV, LEUKOCYTESUR, UROBILINOGEN, BILIRUBINUR, BLOODU, GLUCOSEU, AMORPHOUS in the last 72 hours.     Invalid input(s): Juan Francisco Del Cid  Recent Labs     12/07/20  0620 12/08/20  0540   PHART 7.410 7.396   IUF5IXO 56.9* 59.7*   PO2ART 71.5* 63.7*       Vent Information  $Ventilation: $Subsequent Day  Skin Assessment: Clean, dry, & intact  Equipment Changed: HME  Vent Type: 980  Vent Mode: AC/VC+  Vt Ordered: 400 mL  Pressure Ordered: 20  Rate Set: 22 bmp  Peak Flow: 60 L/min  Pressure Support: 0 cmH20  FiO2 : 50 %  SpO2: 92 %  SpO2/FiO2 ratio: 184  Sensitivity: 3  PEEP/CPAP: 8  I Time/ I Time %: 0.9 s  Humidification Source: HME  Humidification Temp: 37  Humidification Temp Measured: 37    CXR personally reviewed,

## 2020-12-08 NOTE — PROGRESS NOTES
12/08/20 1203   Vent Information   Vent Mode AC/VC+   Vt Ordered 400 mL   Rate Set 22 bmp   Peak Flow 60 L/min   Pressure Support 0 cmH20   FiO2  50 %   SpO2 92 %   SpO2/FiO2 ratio 184   Sensitivity 3   PEEP/CPAP 8   I Time/ I Time % 0.9 s   Humidification Source HME   Vent Patient Data   Peak Inspiratory Pressure 31 cmH2O   Mean Airway Pressure 15 cmH20   Rate Measured 22 br/min   Vt Exhaled 396 mL   Minute Volume 8.71 Liters   I:E Ratio 1:2.00   Spontaneous Breathing Trial (SBT) RT Doc   Pulse 57   Additional Respiratory  Assessments   Resp 19   Alarm Settings   High Pressure Alarm 40 cmH2O   Low Minute Volume Alarm 3 L/min   High Respiratory Rate 40 br/min   Low Exhaled Vt  200 mL   ETT (adult)   Placement Date/Time: 12/02/20 1359   Timeout: Patient  Preoxygenation: Yes  Mask Ventilation: Ventilated by mask (1)  Technique: Video laryngoscopy  Type: Cuffed  Tube Size: 8 mm  Laryngoscope: GlideScope  Blade Size: 3  Location: Oral  Placement Veri. ..    Secured at 23 cm   Measured From Lips   ET Placement Right   Secured By Commercial tube cabral   Site Condition Dry

## 2020-12-08 NOTE — PROGRESS NOTES
[facility]    [facility]              [phone]                      She is admitted with hypoxemia and respiratory failure requiring intubation and mechanical ventilation due to COVID-19. We are following for ELOY and related issues    Interval History and plan:      Remains intubated on ventilation  Cr coming down  Sodium going up  K going up    Agree with increased water boluses  Also agree with switching to nepro   and more insulin, all will help both sodium and K                      Assessment :     Acute Kidney Injury  ELOY likely due to -hemodynamic changes, Covid  Cr on consultation 1.2  Baseline Cr-0.8    UA-large blood, has Urena, RBC more than 100  Renal Imaging:-NA  Echo: N/A    Hypertension     Pulse:  [52-62]   BP goal inpatient [phone] systolic inpatient  Blood pressure 200 yesterday  Coming down to 160s  On as needed hydralazine    COVID-19 positive, had respiratory failure requiring intubation and mechanical ventilation         [facility] would like to thank Luciano Anderson MD   for opportunity to serve this patient      Please call with questions at-   24 Hrs Answering service [phone] or  7 am- 5 pm via Perfect serve or cell phone  Dr.Sudhir Saúl Real          CC/reason for consult :     ELOY     HPI :     From consult note-   Orval Barrier is a 70 y.o. female presented to   the hospital on 11/29/2020 with COVID-19 which was diagnosed on 11/26/2016. Per chart see you had fatigue headache loss in appetite since of smell and taste and followed by cough for several weeks. Now came with the shortness of breath. She had a difficulty breathing and came to [facility].  He has she required 10 L/min of oxygen. She is known to have COPD and congestive heart failure but no need for supplemental oxygen at home. She was transferred to ICU. She needed intubation and mechanical ventilation. Post intubation her blood pressure changes significantly. She also had poor urine output and rising creatinine. She got 125 mL/h of fluids overnight now on 25 mL/h  Blood pressure has been fluctuating. Blood pressure went more than 200 but now coming up to 970 systolic    We are consulted for acute renal failure and related issues    ROS:     Seen with- RN    Unable to obtain ROS due to  Altered mental status           PMH/PSH/SH/Family History:     Past Medical History:   Diagnosis Date    COVID-19 11/26/2020    Hyperlipidemia     Hypertension     Neuropathy     Type II or unspecified type diabetes mellitus without mention of complication, not stated as uncontrolled        Past Surgical History:   Procedure Laterality Date    APPENDECTOMY      CHOLECYSTECTOMY      HUMERUS FRACTURE SURGERY      TUBAL LIGATION      TUMOR REMOVAL          reports that she has never smoked. She does not have any smokeless tobacco history on file. family history is not on file.          Medication:     Current Facility-Administered Medications: methylPREDNISolone sodium (SOLU-MEDROL) injection 40 mg, 40 mg, Intravenous, Q12H  insulin glargine (LANTUS) injection vial 30 Units, 30 Units, Subcutaneous, BID  hydrALAZINE (APRESOLINE) injection 10 mg, 10 mg, Intravenous, Q4H PRN  midazolam (VERSED) 100 mg in dextrose 5 % 100 mL infusion, 1 mg/hr, Intravenous, Continuous  potassium chloride 20 mEq/50 mL IVPB (Central Line), 20 mEq, Intravenous, PRN  fentaNYL (SUBLIMAZE) 1,000 mcg in sodium chloride 0.9 % 100 mL infusion, 25 mcg/hr, Intravenous, Titrated  pantoprazole (PROTONIX) injection 40 mg, 40 mg, Intravenous, Daily  insulin lispro (HUMALOG) injection vial 0-18 Units, 0-18 Units, Subcutaneous, 6 times per day  carboxymethylcellulose PF (THERATEARS) 1 % ophthalmic gel 1 drop, 1 drop, Both Eyes, 6 times per day  chlorhexidine (PERIDEX) 0.12 % solution 15 mL, 15 mL, Mouth/Throat, BID  [Held by provider] metoprolol tartrate (LOPRESSOR) tablet 50 mg, 50 mg, Oral, BID  sodium chloride flush 0.9 % injection 10 mL, 10 mL, Intravenous, PRN  magnesium sulfate 1 g in dextrose 5% 100 mL IVPB, 1 g, Intravenous, PRN  acetaminophen (TYLENOL) tablet 650 mg, 650 mg, Oral, Q4H PRN **OR** acetaminophen (TYLENOL) suppository 650 mg, 650 mg, Rectal, Q4H PRN  ondansetron (ZOFRAN) injection 4 mg, 4 mg, Intravenous, Q6H PRN  albuterol sulfate  (90 Base) MCG/ACT inhaler 2 puff, 2 puff, Inhalation, Q4H PRN  glucose (GLUTOSE) 40 % oral gel 15 g, 15 g, Oral, PRN  dextrose 50 % IV solution, 12.5 g, Intravenous, PRN  glucagon (rDNA) injection 1 mg, 1 mg, Intramuscular, PRN  dextrose 5 % solution, 100 mL/hr, Intravenous, PRN  enoxaparin (LOVENOX) injection 40 mg, 40 mg, Subcutaneous, BID  0.9 % sodium chloride bolus, 30 mL, Intravenous, PRN  albuterol sulfate  (90 Base) MCG/ACT inhaler 2 puff, 2 puff, Inhalation, Q4H PRN **AND** ipratropium (ATROVENT HFA) 17 MCG/ACT inhaler 2 puff, 2 puff, Inhalation, Q4H PRN **AND** MDI Treatment, , , PRN       Vitals :     Vitals:    12/08/20 0900   BP:    Pulse: 55   Resp: 22   Temp:    SpO2: 95%       I & O :       Intake/Output Summary (Last 24 hours) at 12/8/2020 0951  Last data filed at 12/8/2020 0800  Gross per 24 hour   Intake 1764.92 ml   Output 1940 ml   Net -175.08 ml        Physical Examination :     Due to the current efforts to prevent transmission of COVID-19 and also the need to preserve PPE for other caregivers, a face-to-face encounter with the patient was not performed. That being said, all relevant records and diagnostic tests were reviewed, including laboratory results and imaging. Please reference any relevant documentation elsewhere. Care will be coordinated with the primary service.          LABS:     Recent Labs     12/06/20  0554 12/07/20  0620 12/08/20  0540   WBC 13.4* 16.2* 11.8*   HGB 13.4 14.2 13.3   HCT 42.0 44.0 41.8    335 253     Recent Labs     12/06/20  0554 12/07/20  0620 12/08/20  0540    148* 148*   K 5.1 4.9 5.6*   CL 109 104 106   CO2 32 36* 39*   BUN 84* 89* 83*   CREATININE 1.6* 1.4* 1.1   GLUCOSE 213* 308* 303*   MG 3.20* 2.70* 2.60*   PHOS 4.3 4.7 3.8

## 2020-12-08 NOTE — FLOWSHEET NOTE
Bedside report given by Bailey Gutierrez, Skin assessment completed. Shift assessment completed and documented; see flowsheets for details and vent settings. Pt resting in bed, VSS, Lines checked and verified, alarms on and audible. Repositioned patient, sacral Mepilex in place, call light within reach, will continue to closely monitor. Recent Labs     12/08/20  0540   WBC 11.8*   HGB 13.3   HCT 41.8   MCV 83.4        Recent Labs     12/08/20  0540   *   K 5.6*      CO2 39*   GLUCOSE 303*   PHOS 3.8   MG 2.60*   BUN 83*   CREATININE 1.1   CALCIUM 8.9   LABGLOM 49*   GFRAA 59*     Corinne Blind 12/8/2020 8:23 AM  Estimated Creatinine Clearance: 54 mL/min (based on SCr of 1.1 mg/dL).     DVT Prophylaxis: enoxaparin (Lovenox) 40mg SQ Daily    GI Prophylaxis: pantoprazole (PROTONIX), per order

## 2020-12-08 NOTE — PROGRESS NOTES
This note also relates to the following rows which could not be included:  Pressure Support - Cannot attach notes to unvalidated device data       12/07/20 1914   Vent Information   Vent Type 980   Vent Mode AC/VC+   Vt Ordered 400 mL   Rate Set 22 bmp   FiO2  50 %   SpO2 94 %   SpO2/FiO2 ratio 188   Sensitivity 3   PEEP/CPAP 10   I Time/ I Time % 0.9 s   Vent Patient Data   Peak Inspiratory Pressure 23 cmH2O   Mean Airway Pressure 14 cmH20   Rate Measured 22 br/min   Vt Exhaled 388 mL   Minute Volume 9 Liters   I:E Ratio 1:2.0   Plateau Pressure 25 [id number]   Static Compliance 25.87 mL/cmH2O   Dynamic Compliance 30.69 mL/cmH2O   Cough/Sputum   Sputum How Obtained Endotracheal;Suctioned   Sputum Amount Moderate   Sputum Color Creamy   Tenacity Thick   Spontaneous Breathing Trial (SBT) RT Doc   Pulse 59   Breath Sounds   Right Upper Lobe Diminished   Right Middle Lobe Diminished   Right Lower Lobe Diminished   Left Upper Lobe Diminished   Left Lower Lobe Diminished   Additional Respiratory  Assessments   Resp 19   Alarm Settings   High Pressure Alarm 40 cmH2O   Low Minute Volume Alarm 3 L/min   Apnea (secs) 20 secs   High Respiratory Rate 40 br/min   Low Exhaled Vt  200 mL   ETT (adult)   Placement Date/Time: 12/02/20 1359   Timeout: Patient  Preoxygenation: Yes  Mask Ventilation: Ventilated by mask (1)  Technique: Video laryngoscopy  Type: Cuffed  Tube Size: 8 mm  Laryngoscope: GlideScope  Blade Size: 3  Location: Oral  Placement Veri. ..    Secured at 23 cm   Measured From [address] [place] [address],[address] By Commercial tube cabral   Site Condition Dry

## 2020-12-08 NOTE — PROGRESS NOTES
12/08/20 0735   Vent Information   Vent Type 980   Vent Mode AC/VC+   Vt Ordered 400 mL   Rate Set 22 bmp   Pressure Support 0 cmH20   FiO2  50 %   SpO2 94 %   SpO2/FiO2 ratio 188   Sensitivity 3   PEEP/CPAP 10   I Time/ I Time % 0 s   Humidification Source HME   Vent Patient Data   Peak Inspiratory Pressure 29 cmH2O   Mean Airway Pressure 16 cmH20   Rate Measured 22 br/min   Vt Exhaled 404 mL   Minute Volume 8.89 Liters   I:E Ratio 1:2.00   Plateau Pressure 25 PIT37   Static Compliance 28 mL/cmH2O   Dynamic Compliance 21.3 mL/cmH2O   Spontaneous Breathing Trial (SBT) RT Doc   Contraindications to SBT? PEEP > 10 cm H2O   Pulse 53   Additional Respiratory  Assessments   Resp 22   Alarm Settings   High Pressure Alarm 40 cmH2O   Low Minute Volume Alarm 3 L/min   High Respiratory Rate 40 br/min   Low Exhaled Vt  200 mL   ETT (adult)   Placement Date/Time: 12/02/20 1359   Timeout: Patient  Preoxygenation: Yes  Mask Ventilation: Ventilated by mask (1)  Technique: Video laryngoscopy  Type: Cuffed  Tube Size: 8 mm  Laryngoscope: GlideScope  Blade Size: 3  Location: Oral  Placement Veri. ..    Secured at 23 cm   Measured From [address] [place] [address],[address] By Commercial tube cabral   Site Condition Dry   Cuff Pressure 30 cm H2O

## 2020-12-08 NOTE — PROGRESS NOTES
Hospitalist Progress Note      PCP: No primary care provider on file. Date of Admission: 11/29/2020         Chief [address] [place] [address] Course:       Subjective:  remains intubated , sedated, on fio2 of 50, weaning down sedation, some mild hematuria ntoed from riggins    Medications:  Reviewed    Infusion Medications    midazolam 3 mg/hr (12/08/20 0905)    fentaNYL (SUBLIMAZE) infusion 100 mcg/hr (12/08/20 0905)    dextrose       Scheduled Medications    methylPREDNISolone  40 mg Intravenous Q12H    insulin glargine  30 Units Subcutaneous BID    pantoprazole  40 mg Intravenous Daily    insulin lispro  0-18 Units Subcutaneous 6 times per day    carboxymethylcellulose PF  1 drop Both Eyes 6 times per day    chlorhexidine  15 mL Mouth/Throat BID    [Held by provider] metoprolol  50 mg Oral BID    enoxaparin  40 mg Subcutaneous BID     PRN Meds: hydrALAZINE, potassium chloride, sodium chloride flush, magnesium sulfate, acetaminophen **OR** acetaminophen, ondansetron, albuterol sulfate HFA, glucose, dextrose, glucagon (rDNA), dextrose, sodium chloride, albuterol sulfate HFA **AND** ipratropium **AND** MDI Treatment      Intake/Output Summary (Last 24 hours) at 12/8/2020 1010  Last data filed at 12/8/2020 0800  Gross per 24 hour   Intake 1764.92 ml   Output 1940 ml   Net -175.08 ml       Physical Exam Performed:    BP (!) 145/45   Pulse 56   Temp 99.2 °F (37.3 °C) (Bladder)   Resp 22   Ht 5' 4\" (1.626 m)   Wt 233 lb 4 oz (105.8 kg)   SpO2 92%   BMI 40.04 kg/m²     General appearance: , appears stated age and cooperative. obese, on vent  HEENT: Pupils equal, round, and reactive to light. Conjunctivae/corneas clear. Neck: Supple, with full range of motion. No jugular venous distention. Trachea midline. Respiratory:  Normal respiratory effort.  Clear to auscultation, bilaterally without Wheezes/Rhonchi. Mild bibas rales  Cardiovascular: Regular rate and rhythm with normal S1/S2 without setting   of known COVID-19 infection. VL Extremity Venous Left   Final Result      XR CHEST PORTABLE   Final Result   Bilateral airspace disease is suspicious for pneumonia, and could be   compatible with history of COVID-19. Assessment/Plan:    Active Hospital Problems    Diagnosis    COPD (chronic obstructive pulmonary disease) ([facility] [J44.9]    Acute respiratory failure due to COVID-19 (HCC) [U07.1, J96.00]    Essential hypertension [I10]    Obesity, Class III, BMI 40-49.9 (morbid obesity) ([facility] [E66.01]    Type 2 diabetes mellitus, with long-term current use of insulin ([facility]) [E11.9, Z79.4]          Respiratory failure, COPD, COVID19    Titrate respiratory support according to patient's needs and advanced care plan.  Patient was requiring vapotherm  -  Patient seems to be at significant risk for progression of respiratory failure.  She was advised of the potential need for intubation and mechanical ventilation.  She was agreeable if necessary.  -  Repeated all pertinent inflammatory labs.  Obtained an ABG.  The first sample obtained was venous.  The second sample was arterial with a pO2 = 53.  Vapotherm made available if needed. -  Started BMI-adjusted low-intensity anticoagulation w/ lovenox 40mg bid. Denver Bailey is suspicion for LLE DVT though so will request duplex venous U/S as well. -  Started solumedrol 40mg IV bid, remdesivir(ended 12/4), and 1U convalescent plasma.   -cvc/A-line placed 12/2  -intubated 12/2   -ID consulted 12/3, apprec recs     DM2  -  Hold all oral antidiabetic agents.  Start s.c.  Insulin regimen based on home regimen.   -on home lantus     Elevated creatinine due to ELOY- noted during stay, partly due to riggins obstruction  -nephro consulted 12/3, apprec mgmt     Hematuria- with hx of bladder ca per pt, supposed to get surgery in [facility]  -urology consulted for further recs  -continued riggins     DVT Prophylaxis: lovenox bid  Diet: DIET TUBE FEED CONTINUOUS/CYCLIC NPO; Renal Formula;  Orogastric; Continuous; 10; 45; Exceptions are: Sips with Meds  Code Status: Full Code    PT/OT Eval Status: not possible    Dispo - icu care, switch to renal TFs, monitor labs    Alvaro Bell MD

## 2020-12-09 LAB
A/G RATIO: 0.9 (ref 1.1–2.2)
ALBUMIN SERPL-MCNC: 2.5 G/DL (ref 3.4–5)
ALP BLD-CCNC: 62 U/L (ref 40–129)
ALT SERPL-CCNC: 32 U/L (ref 10–40)
ANION GAP SERPL CALCULATED.3IONS-SCNC: 7 MMOL/L (ref 3–16)
AST SERPL-CCNC: 20 U/L (ref 15–37)
BANDED NEUTROPHILS RELATIVE PERCENT: 2 % (ref 0–7)
BASE EXCESS ARTERIAL: 11.3 MMOL/L (ref -3–3)
BASE EXCESS ARTERIAL: 11.5 MMOL/L (ref -3–3)
BASOPHILS ABSOLUTE: 0 K/UL (ref 0–0.2)
BASOPHILS RELATIVE PERCENT: 0 %
BILIRUB SERPL-MCNC: 0.5 MG/DL (ref 0–1)
BUN BLDV-MCNC: 75 MG/DL (ref 7–20)
CALCIUM SERPL-MCNC: 9 MG/DL (ref 8.3–10.6)
CARBOXYHEMOGLOBIN ARTERIAL: 0.3 % (ref 0–1.5)
CARBOXYHEMOGLOBIN ARTERIAL: 0.3 % (ref 0–1.5)
CHLORIDE BLD-SCNC: 102 MMOL/L (ref 99–110)
CO2: 35 MMOL/L (ref 21–32)
CREAT SERPL-MCNC: 1 MG/DL (ref 0.6–1.2)
EOSINOPHILS ABSOLUTE: 0 K/UL (ref 0–0.6)
EOSINOPHILS RELATIVE PERCENT: 0 %
GFR AFRICAN AMERICAN: >60
GFR NON-AFRICAN AMERICAN: 54
GLOBULIN: 2.8 G/DL
GLUCOSE BLD-MCNC: 129 MG/DL (ref 70–99)
GLUCOSE BLD-MCNC: 164 MG/DL (ref 70–99)
GLUCOSE BLD-MCNC: 186 MG/DL (ref 70–99)
GLUCOSE BLD-MCNC: 198 MG/DL (ref 70–99)
GLUCOSE BLD-MCNC: 204 MG/DL (ref 70–99)
GLUCOSE BLD-MCNC: 233 MG/DL (ref 70–99)
GLUCOSE BLD-MCNC: 241 MG/DL (ref 70–99)
HCO3 ARTERIAL: 38.2 MMOL/L (ref 21–29)
HCO3 ARTERIAL: 38.2 MMOL/L (ref 21–29)
HCT VFR BLD CALC: 40.9 % (ref 36–48)
HEMOGLOBIN, ART, EXTENDED: 13.1 G/DL (ref 12–16)
HEMOGLOBIN, ART, EXTENDED: 14.6 G/DL (ref 12–16)
HEMOGLOBIN: 13.2 G/DL (ref 12–16)
LYMPHOCYTES ABSOLUTE: 0.9 K/UL (ref 1–5.1)
LYMPHOCYTES RELATIVE PERCENT: 6 %
MAGNESIUM: 2.5 MG/DL (ref 1.8–2.4)
MCH RBC QN AUTO: 26.8 PG (ref 26–34)
MCHC RBC AUTO-ENTMCNC: 32.2 G/DL (ref 31–36)
MCV RBC AUTO: 83.3 FL (ref 80–100)
METHEMOGLOBIN ARTERIAL: 0.4 %
METHEMOGLOBIN ARTERIAL: 0.5 %
MONOCYTES ABSOLUTE: 1.1 K/UL (ref 0–1.3)
MONOCYTES RELATIVE PERCENT: 7 %
MYELOCYTE PERCENT: 1 %
NEUTROPHILS ABSOLUTE: 13.1 K/UL (ref 1.7–7.7)
NEUTROPHILS RELATIVE PERCENT: 84 %
O2 CONTENT ARTERIAL: 17 ML/DL
O2 CONTENT ARTERIAL: 19 ML/DL
O2 SAT, ARTERIAL: 92.3 %
O2 SAT, ARTERIAL: 93.5 %
O2 THERAPY: ABNORMAL
O2 THERAPY: ABNORMAL
PCO2 ARTERIAL: 59.6 MMHG (ref 35–45)
PCO2 ARTERIAL: 59.7 MMHG (ref 35–45)
PDW BLD-RTO: 14.6 % (ref 12.4–15.4)
PERFORMED ON: ABNORMAL
PH ARTERIAL: 7.42 (ref 7.35–7.45)
PH ARTERIAL: 7.42 (ref 7.35–7.45)
PHOSPHORUS: 3.6 MG/DL (ref 2.5–4.9)
PLATELET # BLD: 237 K/UL (ref 135–450)
PMV BLD AUTO: 10.5 FL (ref 5–10.5)
PO2 ARTERIAL: 63.5 MMHG (ref 75–108)
PO2 ARTERIAL: 70.6 MMHG (ref 75–108)
POTASSIUM SERPL-SCNC: 4.7 MMOL/L (ref 3.5–5.1)
RBC # BLD: 4.92 M/UL (ref 4–5.2)
RBC # BLD: NORMAL 10*6/UL
SODIUM BLD-SCNC: 144 MMOL/L (ref 136–145)
TCO2 ARTERIAL: 40 MMOL/L
TCO2 ARTERIAL: 40.1 MMOL/L
TOTAL PROTEIN: 5.3 G/DL (ref 6.4–8.2)
WBC # BLD: 15 K/UL (ref 4–11)

## 2020-12-09 PROCEDURE — 94750 HC PULMONARY COMPLIANCE STUDY: CPT

## 2020-12-09 PROCEDURE — 2000000000 HC ICU R&B

## 2020-12-09 PROCEDURE — 2700000000 HC OXYGEN THERAPY PER DAY

## 2020-12-09 PROCEDURE — 80053 COMPREHEN METABOLIC PANEL: CPT

## 2020-12-09 PROCEDURE — C9113 INJ PANTOPRAZOLE SODIUM, VIA: HCPCS | Performed by: NURSE PRACTITIONER

## 2020-12-09 PROCEDURE — 84100 ASSAY OF PHOSPHORUS: CPT

## 2020-12-09 PROCEDURE — 82803 BLOOD GASES ANY COMBINATION: CPT

## 2020-12-09 PROCEDURE — 6360000002 HC RX W HCPCS: Performed by: INTERNAL MEDICINE

## 2020-12-09 PROCEDURE — 94003 VENT MGMT INPAT SUBQ DAY: CPT

## 2020-12-09 PROCEDURE — 94770 HC ETCO2 MONITOR DAILY: CPT

## 2020-12-09 PROCEDURE — 2500000003 HC RX 250 WO HCPCS: Performed by: NURSE PRACTITIONER

## 2020-12-09 PROCEDURE — 6370000000 HC RX 637 (ALT 250 FOR IP): Performed by: INTERNAL MEDICINE

## 2020-12-09 PROCEDURE — 99291 CRITICAL CARE FIRST HOUR: CPT | Performed by: INTERNAL MEDICINE

## 2020-12-09 PROCEDURE — 6370000000 HC RX 637 (ALT 250 FOR IP): Performed by: NURSE PRACTITIONER

## 2020-12-09 PROCEDURE — 37799 UNLISTED PX VASCULAR SURGERY: CPT

## 2020-12-09 PROCEDURE — 94761 N-INVAS EAR/PLS OXIMETRY MLT: CPT

## 2020-12-09 PROCEDURE — 2580000003 HC RX 258: Performed by: INTERNAL MEDICINE

## 2020-12-09 PROCEDURE — 6360000002 HC RX W HCPCS: Performed by: NURSE PRACTITIONER

## 2020-12-09 PROCEDURE — 85025 COMPLETE CBC W/AUTO DIFF WBC: CPT

## 2020-12-09 PROCEDURE — 83735 ASSAY OF MAGNESIUM: CPT

## 2020-12-09 RX ORDER — DEXMEDETOMIDINE HYDROCHLORIDE 4 UG/ML
0.4 INJECTION, SOLUTION INTRAVENOUS CONTINUOUS
Status: DISCONTINUED | OUTPATIENT
Start: 2020-12-09 | End: 2020-12-10

## 2020-12-09 RX ADMIN — CARBOXYMETHYLCELLULOSE SODIUM 1 DROP: 10 GEL OPHTHALMIC at 20:34

## 2020-12-09 RX ADMIN — INSULIN LISPRO 3 UNITS: 100 INJECTION, SOLUTION INTRAVENOUS; SUBCUTANEOUS at 07:30

## 2020-12-09 RX ADMIN — METHYLPREDNISOLONE SODIUM SUCCINATE 40 MG: 40 INJECTION, POWDER, LYOPHILIZED, FOR SOLUTION INTRAMUSCULAR; INTRAVENOUS at 08:40

## 2020-12-09 RX ADMIN — CARBOXYMETHYLCELLULOSE SODIUM 1 DROP: 10 GEL OPHTHALMIC at 00:45

## 2020-12-09 RX ADMIN — Medication 0.4 MCG/KG/HR: at 18:55

## 2020-12-09 RX ADMIN — INSULIN LISPRO 6 UNITS: 100 INJECTION, SOLUTION INTRAVENOUS; SUBCUTANEOUS at 00:50

## 2020-12-09 RX ADMIN — CARBOXYMETHYLCELLULOSE SODIUM 1 DROP: 10 GEL OPHTHALMIC at 07:30

## 2020-12-09 RX ADMIN — INSULIN LISPRO 3 UNITS: 100 INJECTION, SOLUTION INTRAVENOUS; SUBCUTANEOUS at 05:41

## 2020-12-09 RX ADMIN — ENOXAPARIN SODIUM 40 MG: 40 INJECTION SUBCUTANEOUS at 20:34

## 2020-12-09 RX ADMIN — CARBOXYMETHYLCELLULOSE SODIUM 1 DROP: 10 GEL OPHTHALMIC at 17:14

## 2020-12-09 RX ADMIN — CARBOXYMETHYLCELLULOSE SODIUM 1 DROP: 10 GEL OPHTHALMIC at 12:49

## 2020-12-09 RX ADMIN — HYDRALAZINE HYDROCHLORIDE 10 MG: 20 INJECTION INTRAMUSCULAR; INTRAVENOUS at 21:01

## 2020-12-09 RX ADMIN — PANTOPRAZOLE SODIUM 40 MG: 40 INJECTION, POWDER, FOR SOLUTION INTRAVENOUS at 08:39

## 2020-12-09 RX ADMIN — ENOXAPARIN SODIUM 40 MG: 40 INJECTION SUBCUTANEOUS at 08:40

## 2020-12-09 RX ADMIN — FENTANYL CITRATE 100 MCG/HR: 0.05 INJECTION, SOLUTION INTRAMUSCULAR; INTRAVENOUS at 00:50

## 2020-12-09 RX ADMIN — FENTANYL CITRATE 100 MCG/HR: 0.05 INJECTION, SOLUTION INTRAMUSCULAR; INTRAVENOUS at 12:36

## 2020-12-09 RX ADMIN — CARBOXYMETHYLCELLULOSE SODIUM 1 DROP: 10 GEL OPHTHALMIC at 05:39

## 2020-12-09 RX ADMIN — INSULIN GLARGINE 30 UNITS: 100 INJECTION, SOLUTION SUBCUTANEOUS at 21:00

## 2020-12-09 RX ADMIN — METHYLPREDNISOLONE SODIUM SUCCINATE 40 MG: 40 INJECTION, POWDER, LYOPHILIZED, FOR SOLUTION INTRAMUSCULAR; INTRAVENOUS at 20:34

## 2020-12-09 RX ADMIN — INSULIN LISPRO 3 UNITS: 100 INJECTION, SOLUTION INTRAVENOUS; SUBCUTANEOUS at 17:14

## 2020-12-09 RX ADMIN — Medication 10 ML: at 20:34

## 2020-12-09 RX ADMIN — Medication 1 MCG/KG/HR: at 23:39

## 2020-12-09 RX ADMIN — QUETIAPINE FUMARATE 300 MG: 100 TABLET ORAL at 20:34

## 2020-12-09 RX ADMIN — Medication 15 ML: at 08:41

## 2020-12-09 RX ADMIN — Medication 15 ML: at 20:35

## 2020-12-09 RX ADMIN — INSULIN GLARGINE 30 UNITS: 100 INJECTION, SOLUTION SUBCUTANEOUS at 09:24

## 2020-12-09 RX ADMIN — INSULIN LISPRO 6 UNITS: 100 INJECTION, SOLUTION INTRAVENOUS; SUBCUTANEOUS at 21:01

## 2020-12-09 RX ADMIN — QUETIAPINE FUMARATE 300 MG: 100 TABLET ORAL at 08:40

## 2020-12-09 RX ADMIN — Medication 10 ML: at 08:40

## 2020-12-09 ASSESSMENT — PULMONARY FUNCTION TESTS
PIF_VALUE: 28
PIF_VALUE: 28
PIF_VALUE: 22
PIF_VALUE: 29
PIF_VALUE: 17
PIF_VALUE: 17
PIF_VALUE: 28
PIF_VALUE: 29
PIF_VALUE: 17
PIF_VALUE: 19
PIF_VALUE: 30
PIF_VALUE: 28
PIF_VALUE: 22

## 2020-12-09 ASSESSMENT — PAIN SCALES - GENERAL
PAINLEVEL_OUTOF10: 0

## 2020-12-09 NOTE — PROGRESS NOTES
Pulmonary & Critical Care Inpatient Progress Note   Katie Peoples MD     REASON FOR TODAY'S VISIT:  Critical care management    SUBJECTIVE:   Remains on vent support  Decreasing sedation requirements    Scheduled Meds:   methylPREDNISolone  40 mg Intravenous Q12H    insulin glargine  30 Units Subcutaneous BID    QUEtiapine  300 mg Per NG tube BID    pantoprazole  40 mg Intravenous Daily    insulin lispro  0-18 Units Subcutaneous 6 times per day    carboxymethylcellulose PF  1 drop Both Eyes 6 times per day    chlorhexidine  15 mL Mouth/Throat BID    [Held by provider] metoprolol  50 mg Oral BID    enoxaparin  40 mg Subcutaneous BID       Continuous Infusions:   midazolam Stopped (12/09/20 0930)    fentaNYL (SUBLIMAZE) infusion 100 mcg/hr (12/09/20 1236)    dextrose         PRN Meds:  hydrALAZINE, potassium chloride, sodium chloride flush, magnesium sulfate, acetaminophen **OR** acetaminophen, ondansetron, albuterol sulfate HFA, glucose, dextrose, glucagon (rDNA), dextrose, sodium chloride, albuterol sulfate HFA **AND** ipratropium **AND** MDI Treatment    ALLERGIES:  Patient is allergic to contrast [iodides]; ibuprofen; and lipitor [atorvastatin calcium]. Objective:   PHYSICAL EXAM:  BP (!) 145/45   Pulse 59   Temp 99.4 °F (37.4 °C) (Bladder)   Resp 15   Ht 5' 4\" (1.626 m)   Wt 234 lb 2.1 oz (106.2 kg)   SpO2 95%   BMI 40.19 kg/m²    Physical Exam  Constitutional:       General: She is not in acute distress. Appearance: She is well-developed. She is not diaphoretic. HENT:      Head: Normocephalic and atraumatic. Mouth/Throat:      Pharynx: No oropharyngeal exudate. Eyes:      Pupils: Pupils are equal, round, and reactive to light. Neck:      Musculoskeletal: Neck supple. Vascular: No JVD. Cardiovascular:      Heart sounds: Normal heart sounds. No murmur. No friction rub. No gallop. Pulmonary:      Effort: Pulmonary effort is normal.      Breath sounds: Rales present.  No wheezing. Abdominal:      General: Bowel sounds are normal. There is no distension. Palpations: Abdomen is soft. Tenderness: There is no abdominal tenderness. Lymphadenopathy:      Cervical: No cervical adenopathy. Skin:     General: Skin is warm and dry. Findings: No rash. Neurological:      Mental Status: She is alert. Cranial Nerves: Cranial nerve deficit present. Comments: Intubated, sedated            Data Reviewed:   LABS:  CBC:  Recent Labs     12/07/20  0620 12/08/20  0540 12/09/20  0550   WBC 16.2* 11.8* 15.0*   HGB 14.2 13.3 13.2   HCT 44.0 41.8 40.9   MCV 83.0 83.4 83.3    253 237     BMP:  Recent Labs     12/07/20  0620 12/08/20  0540 12/08/20  1806 12/09/20  0550   * 148* 143 144   K 4.9 5.6* 5.7* 4.7    106 102 102   CO2 36* 39* 38* 35*   PHOS 4.7 3.8  --  3.6   BUN 89* 83* 78* 75*   CREATININE 1.4* 1.1 1.0 1.0     LIVER PROFILE:   Recent Labs     12/07/20  0620 12/08/20  0540 12/09/20  0550   AST 22 22 20   ALT 28 38 32   BILITOT 0.6 0.5 0.5   ALKPHOS 64 61 62     PT/INR:  No results for input(s): PROTIME, INR in the last 72 hours. APTT: No results for input(s): APTT in the last 72 hours. UA:  No results for input(s): NITRITE, COLORU, PHUR, LABCAST, WBCUA, RBCUA, MUCUS, TRICHOMONAS, YEAST, BACTERIA, CLARITYU, SPECGRAV, LEUKOCYTESUR, UROBILINOGEN, BILIRUBINUR, BLOODU, GLUCOSEU, AMORPHOUS in the last 72 hours.     Invalid input(s): Janice Ritter  Recent Labs     12/09/20  0550 12/09/20  1242   PHART 7.424 7.425   [id number] 59.7* 59.6*   PO2ART 63.5* 70.6*       Vent Information  $Ventilation: $Subsequent Day  Skin Assessment: Clean, dry, & intact  Equipment Changed: (S) HME  Vent Type: 980  Vent Mode: CPAP  Vt Ordered: 400 mL  Pressure Ordered: 20  Rate Set: 22 bmp  Peak Flow: 60 L/min  Pressure Support: 10 cmH20  FiO2 : 50 %  SpO2: 95 %  SpO2/FiO2 ratio: (P) 190  Sensitivity: 3  PEEP/CPAP: 6  I Time/ I Time %: 0.95 s  Humidification Source: HME  Humidification Temp: 37  Humidification Temp Measured: 37    CXR personally reviewed, bilateral airspace disease          Assessment:     1. Acute resp failure, hypoxic  2. Acute covid respiratory infection  3. DM2  4. Acute hypokalemia    Plan:      -Vent support, lung protective vent strategy  -Titrate sedation as needed, add precedex and wean off other agents    -Titrate levophed support for SBP>90  -Antimicrobials per ID  -Nephrology involved, serial labs. -Tube feeds, monitor bowel output, free water  -Insulin regimen, monitor blood sugars   -Steroids  -Overall guarded prognosis with high mortality, early in course however. Ongoing discussions with family regarding goals of care. Will be at one week on 12/10    Due to life threatening resp failure this patient is critically ill.  Total critical care time involved in her care was 40 mins    Radha Purcell MD

## 2020-12-09 NOTE — PROGRESS NOTES
Hospitalist Progress Note      PCP: No primary care provider on file. Date of Admission: 11/29/2020    Chief [address] [place] [address] Course:       Subjective:  remains intubated , sedated, on fio2 of 50, weaning down sedation, on spontaneous trial    Medications:  Reviewed    Infusion Medications    midazolam 2 mg/hr (12/08/20 2300)    fentaNYL (SUBLIMAZE) infusion 100 mcg/hr (12/09/20 0050)    dextrose       Scheduled Medications    methylPREDNISolone  40 mg Intravenous Q12H    insulin glargine  30 Units Subcutaneous BID    QUEtiapine  300 mg Per NG tube BID    pantoprazole  40 mg Intravenous Daily    insulin lispro  0-18 Units Subcutaneous 6 times per day    carboxymethylcellulose PF  1 drop Both Eyes 6 times per day    chlorhexidine  15 mL Mouth/Throat BID    [Held by provider] metoprolol  50 mg Oral BID    enoxaparin  40 mg Subcutaneous BID     PRN Meds: hydrALAZINE, potassium chloride, sodium chloride flush, magnesium sulfate, acetaminophen **OR** acetaminophen, ondansetron, albuterol sulfate HFA, glucose, dextrose, glucagon (rDNA), dextrose, sodium chloride, albuterol sulfate HFA **AND** ipratropium **AND** MDI Treatment      Intake/Output Summary (Last 24 hours) at 12/9/2020 0858  Last data filed at 12/9/2020 0600  Gross per 24 hour   Intake 2193.38 ml   Output 1820 ml   Net 373.38 ml       Physical Exam Performed:    BP (!) 145/45   Pulse 69   Temp 99.6 °F (37.6 °C) (Bladder)   Resp 22   Ht 5' 4\" (1.626 m)   Wt 233 lb 4 oz (105.8 kg)   SpO2 93%   BMI 40.04 kg/m²     General appearance: , appears stated age and cooperative. obese, on vent  HEENT: Pupils equal, round, and reactive to light. Conjunctivae/corneas clear. Neck: Supple, with full range of motion. No jugular venous distention. Trachea midline. Respiratory:  Normal respiratory effort.  Clear to auscultation, bilaterally without Wheezes/Rhonchi. Mild bibas rales  Cardiovascular: Regular rate and rhythm with normal S1/S2 without murmurs, rubs or gallops. Abdomen: Soft, non-tender, non-distended with normal bowel sounds. Musculoskeletal: No clubbing, cyanosis or edema bilaterally.  Full range of motion without deformity. Skin: Skin color, texture, turgor normal.  No rashes or lesions. Neurologic:  did not test as sedated  Psychiatric: sedated  Capillary Refill: Brisk,< 3 seconds   Peripheral Pulses: +2 palpable, equal bilaterally       Labs:   Recent Labs     12/07/20  0620 12/08/20  0540 12/09/20  0550   WBC 16.2* 11.8* 15.0*   HGB 14.2 13.3 13.2   HCT 44.0 41.8 40.9    253 237     Recent Labs     12/07/20  0620 12/08/20  0540 12/08/20  1806 12/09/20  0550   * 148* 143 144   K 4.9 5.6* 5.7* 4.7    106 102 102   CO2 36* 39* 38* 35*   BUN 89* 83* 78* 75*   CREATININE 1.4* 1.1 1.0 1.0   CALCIUM 8.7 8.9 8.9 9.0   PHOS 4.7 3.8  --  3.6     Recent Labs     12/07/20  0620 12/08/20  0540 12/09/20  0550   AST 22 22 20   ALT 28 38 32   BILITOT 0.6 0.5 0.5   ALKPHOS 64 61 62     No results for input(s): INR in the last 72 hours. No results for input(s): Amrcelino Hug in the last 72 hours. Urinalysis:      Lab Results   Component Value Date    NITRU Negative 12/01/2020    WBCUA see below 12/01/2020    RBCUA >100 12/01/2020    BLOODU LARGE 12/01/2020    SPECGRAV 1.020 12/01/2020    GLUCOSEU 100 12/01/2020       Radiology:  XR CHEST PORTABLE   Final Result   Support apparatus appears stable. Mild congestive failure, pulmonary edema which is similar to slightly   improved when accounting for differences in technique. Superimposed   pneumonia cannot be excluded in a patient with known COVID-19 infection. XR ABDOMEN FOR NG/OG/NE TUBE PLACEMENT   Final Result   Support lines and tubes as above. Worsening diffuse pulmonary opacities relative to 2 days prior in the setting   of known COVID-19 infection. XR CHEST PORTABLE   Final Result   Support lines and tubes as above.       Worsening diffuse pulmonary opacities relative to 2 days prior in the setting   of known COVID-19 infection. VL Extremity Venous Left   Final Result      XR CHEST PORTABLE   Final Result   Bilateral airspace disease is suspicious for pneumonia, and could be   compatible with history of COVID-19. Assessment/Plan:    Active Hospital Problems    Diagnosis    COPD (chronic obstructive pulmonary disease) ([facility] [J44.9]    Acute respiratory failure due to COVID-19 ([facility]) [U07.1, J96.00]    Essential hypertension [I10]    Obesity, Class III, BMI 40-49.9 (morbid obesity) ([facility] [E66.01]    Type 2 diabetes mellitus, with long-term current use of insulin ([facility]) [E11.9, Z79.4]              Respiratory failure, COPD, COVID19    Titrate respiratory support according to patient's needs and advanced care plan.  Patient was requiring vapotherm  -  Patient seems to be at significant risk for progression of respiratory failure.  She was advised of the potential need for intubation and mechanical ventilation.  She was agreeable if necessary.  -  Repeated all pertinent inflammatory labs.  Obtained an ABG.  The first sample obtained was venous.  The second sample was arterial with a pO2 = 53.  Vapotherm made available if needed. -  Started BMI-adjusted low-intensity anticoagulation w/ lovenox 40mg bid. Lillyshazia Thompson is suspicion for LLE DVT though so will request duplex venous U/S as well. -  Started solumedrol 40mg IV bid, remdesivir(ended 12/4), and 1U convalescent plasma.   -cvc/A-line placed 12/2  -intubated 12/2   -ID consulted 12/3, apprec recs   -precedex ggt started 12/9    DM2  -  Hold all oral antidiabetic agents.  Start s.c.  Insulin regimen based on home regimen.   -on home lantus     Elevated creatinine due to ELOY- noted during stay, partly due to riggins obstruction  -nephro consulted 12/3, apprec mgmt     Hematuria- with hx of bladder ca per pt, supposed to get surgery in [facility]  -urology consulted for further recs  -continued riggins     DVT Prophylaxis: lovenox bid  Diet: DIET TUBE FEED CONTINUOUS/CYCLIC NPO; Renal Formula;  Orogastric; Continuous; 10; 45; Exceptions are: Sips with Meds  Code Status: Full Code    PT/OT Eval Status: not possible    Dispo - icu care, monitor labs    Gerardo Pacheco MD

## 2020-12-09 NOTE — ADT AUTH CERT
Chronic Obstructive Pulmonary Disease - Care Day 8 (12/6/2020) by Jesse Grider RN         Review Status  Review Entered    Completed  12/8/2020 15:09        Criteria Review       Care Day: 8 Care Date: 12/6/2020 Level of Care: ICU    Guideline Day 3    Level Of Care    ( ) Floor to discharge [F]    12/8/2020 3:09 PM EST by Susan Kyle      icu    Clinical Status    ( ) * Hemodynamic stability    12/8/2020 3:09 PM EST by Susan Kyle      98. 9  resps 22  HR 53-57  121/37   remains on Ventilator    bun 84  creat 1.6  magnesium 3.20  wbc  13.4    ( ) * Mental status at baseline    ( ) * No evidence of infection, or outpatient treatment planned    ( ) * Uncompensated acidosis absent    ( ) * Oxygenation at baseline or acceptable for next level of care [G]    ( ) * Airflow rates at baseline or acceptable for next level of care    ( ) * Signs and symptoms of COPD (eg, dyspnea, Tachypnea, cough, sputum production) at baseline or acceptable for next level of care    ( ) * Discharge plans and education understood    Activity    ( ) * Ambulatory or acceptable for next level of care    12/8/2020 3:09 PM EST by Susan Kyle      sedated  droplet plus isolation    Routes    ( ) * Oral hydration, medications, and diet    12/8/2020 3:09 PM EST by Susan Kyle      lasix 60mg iv tid is ordered  loveno x40mg sc bid  lantus 55 u qhs, ssi  Solumedrol 40mg iv q6hrs  Fentanyl iv gtt, Versed iv gtt, Levophed iv gtt    Medications    ( ) * Inhaled bronchodilator regimen established and feasible at next level of care    ( ) Oral steroids    12/8/2020 3:09 PM EST by Susan Kyle      Solumedrol 40mg iv q6hrs    * Milestone

## 2020-12-09 NOTE — PROGRESS NOTES
12/08/20 2003   Vent Information   Vent Type 980   Vent Mode AC/VC+   Vt Ordered 400 mL   Rate Set 22 bmp   Pressure Support 0 cmH20   FiO2  50 %   SpO2 94 %   SpO2/FiO2 ratio 188   Sensitivity 3   PEEP/CPAP 8   I Time/ I Time % 0.95 s   Humidification Source HME   Vent Patient Data   Peak Inspiratory Pressure 28 cmH2O   Mean Airway Pressure 15 cmH20   Rate Measured 22 br/min   Vt Exhaled 402 mL   Minute Volume 8.58 Liters   I:E Ratio 1:1.90   Plateau Pressure 24 OLS70   Static Compliance 25.1 mL/cmH2O   Dynamic Compliance 20.1 mL/cmH2O   Cough/Sputum   Sputum How Obtained Endotracheal   Cough Productive   Sputum Amount Small   Sputum Color Cloudy   Spontaneous Breathing Trial (SBT) RT Doc   Pulse 53   Breath Sounds   Right Upper Lobe Rhonchi   Right Middle Lobe Rhonchi   Right Lower Lobe Diminished   Left Upper Lobe Rhonchi   Left Lower Lobe Diminished   Additional Respiratory  Assessments   Resp 22   Position Semi-Armas's   Cuff Pressure (cm H2O) 30 cm H2O   Alarm Settings   High Pressure Alarm 40 cmH2O   Low Minute Volume Alarm 3 L/min   High Respiratory Rate 40 br/min   ETT (adult)   Placement Date/Time: 12/02/20 1359   Timeout: Patient  Preoxygenation: Yes  Mask Ventilation: Ventilated by mask (1)  Technique: Video laryngoscopy  Type: Cuffed  Tube Size: 8 mm  Laryngoscope: GlideScope  Blade Size: 3  Location: Oral  Placement Veri. ..    Secured at 23 cm   Measured From Lips   ET Placement Right   Secured By Commercial tube cabral   Site Condition Dry

## 2020-12-09 NOTE — PROGRESS NOTES
[facility]    [facility]              [phone]                      She is admitted with hypoxemia and respiratory failure requiring intubation and mechanical ventilation due to COVID-19. We are following for ELOY and related issues    Interval History and plan:      Remains intubated on ventilation  Cr coming down  Sodium going up  K stable  Made 1.9 L of urine yesterday  Systolic blood pressure is high but diastolic is lower than 85\  FiO2 50%  No changes from nephrology point of view today                     Assessment :     Acute Kidney Injury  ELOY likely due to -hemodynamic changes, Covid  Cr on consultation 1.2  Baseline Cr-0.8    UA-large blood, has Urena, RBC more than 100  Renal Imaging:-NA  Echo: N/A    Hypertension     Pulse:  [55-86]   BP goal inpatient [phone] systolic inpatient  Blood pressure 200 yesterday  Coming down to 160s  On as needed hydralazine    COVID-19 positive, had respiratory failure requiring intubation and mechanical ventilation         [facility] would like to thank Lisy Gray MD   for opportunity to serve this patient      Please call with questions at-   24 Hrs Answering service [phone] or  7 am- 5 pm via Perfect serve or cell phone  Dr.Sudhir Esdras Bartholomew          CC/reason for consult :     ELOY     HPI :     From consult note-   Velia Logan is a 70 y.o. female presented to   the hospital on 11/29/2020 with COVID-19 which was diagnosed on 11/26/2016. Per chart see you had fatigue headache loss in appetite since of smell and taste and followed by cough for several weeks. Now came with the shortness of breath. She had a difficulty breathing and came to [facility].  He has she required 10 L/min of oxygen. She is known to have COPD and congestive heart failure but no need for supplemental oxygen at home. She was transferred to ICU. She needed intubation and mechanical ventilation.   Post intubation her blood pressure changes significantly. She also had poor urine output and rising creatinine. She got 125 mL/h of fluids overnight now on 25 mL/h  Blood pressure has been fluctuating. Blood pressure went more than 200 but now coming up to 733 systolic    We are consulted for acute renal failure and related issues    ROS:     Seen with- RN    Unable to obtain ROS due to  Altered mental status           PMH/PSH/SH/Family History:     Past Medical History:   Diagnosis Date    COVID-19 11/26/2020    Hyperlipidemia     Hypertension     Neuropathy     Type II or unspecified type diabetes mellitus without mention of complication, not stated as uncontrolled        Past Surgical History:   Procedure Laterality Date    APPENDECTOMY      CHOLECYSTECTOMY      HUMERUS FRACTURE SURGERY      TUBAL LIGATION      TUMOR REMOVAL          reports that she has never smoked. She does not have any smokeless tobacco history on file. family history is not on file.          Medication:     Current Facility-Administered Medications: methylPREDNISolone sodium (SOLU-MEDROL) injection 40 mg, 40 mg, Intravenous, Q12H  insulin glargine (LANTUS) injection vial 30 Units, 30 Units, Subcutaneous, BID  QUEtiapine (SEROQUEL) tablet 300 mg, 300 mg, Per NG tube, BID  hydrALAZINE (APRESOLINE) injection 10 mg, 10 mg, Intravenous, Q4H PRN  midazolam (VERSED) 100 mg in dextrose 5 % 100 mL infusion, 1 mg/hr, Intravenous, Continuous  potassium chloride 20 mEq/50 mL IVPB (Central Line), 20 mEq, Intravenous, PRN  fentaNYL (SUBLIMAZE) 1,000 mcg in sodium chloride 0.9 % 100 mL infusion, 25 mcg/hr, Intravenous, Titrated  pantoprazole (PROTONIX) injection 40 mg, 40 mg, Intravenous, Daily  insulin lispro (HUMALOG) injection vial 0-18 Units, 0-18 Units, Subcutaneous, 6 times per day  carboxymethylcellulose PF (THERATEARS) 1 % ophthalmic gel 1 drop, 1 drop, Both Eyes, 6 times per day  chlorhexidine (PERIDEX) 0.12 % solution 15 mL, 15 mL, Mouth/Throat, BID  [Held by provider] metoprolol tartrate (LOPRESSOR) tablet 50 mg, 50 mg, Oral, BID  sodium chloride flush 0.9 % injection 10 mL, 10 mL, Intravenous, PRN  magnesium sulfate 1 g in dextrose 5% 100 mL IVPB, 1 g, Intravenous, PRN  acetaminophen (TYLENOL) tablet 650 mg, 650 mg, Oral, Q4H PRN **OR** acetaminophen (TYLENOL) suppository 650 mg, 650 mg, Rectal, Q4H PRN  ondansetron (ZOFRAN) injection 4 mg, 4 mg, Intravenous, Q6H PRN  albuterol sulfate  (90 Base) MCG/ACT inhaler 2 puff, 2 puff, Inhalation, Q4H PRN  glucose (GLUTOSE) 40 % oral gel 15 g, 15 g, Oral, PRN  dextrose 50 % IV solution, 12.5 g, Intravenous, PRN  glucagon (rDNA) injection 1 mg, 1 mg, Intramuscular, PRN  dextrose 5 % solution, 100 mL/hr, Intravenous, PRN  enoxaparin (LOVENOX) injection 40 mg, 40 mg, Subcutaneous, BID  0.9 % sodium chloride bolus, 30 mL, Intravenous, PRN  albuterol sulfate  (90 Base) MCG/ACT inhaler 2 puff, 2 puff, Inhalation, Q4H PRN **AND** ipratropium (ATROVENT HFA) 17 MCG/ACT inhaler 2 puff, 2 puff, Inhalation, Q4H PRN **AND** MDI Treatment, , , PRN       Vitals :     Vitals:    12/09/20 0917   BP:    Pulse:    Resp:    Temp:    SpO2: 95%       I & O :       Intake/Output Summary (Last 24 hours) at 12/9/2020 1006  Last data filed at 12/9/2020 0600  Gross per 24 hour   Intake 2043.38 ml   Output 1820 ml   Net 223.38 ml        Physical Examination :     Due to the current efforts to prevent transmission of COVID-19 and also the need to preserve PPE for other caregivers, a face-to-face encounter with the patient was not performed. That being said, all relevant records and diagnostic tests were reviewed, including laboratory results and imaging. Please reference any relevant documentation elsewhere. Care will be coordinated with the primary service.          LABS:     Recent Labs     12/07/20  0620 12/08/20  0540 12/09/20  0550   WBC 16.2* 11.8* 15.0*   HGB 14.2 13.3 13.2   HCT 44.0 41.8 40.9    253 237     Recent Labs 12/07/20  0620 12/08/20  0540 12/08/20  1806 12/09/20  0550   * 148* 143 144   K 4.9 5.6* 5.7* 4.7    106 102 102   CO2 36* 39* 38* 35*   BUN 89* 83* 78* 75*   CREATININE 1.4* 1.1 1.0 1.0   GLUCOSE 308* 303* 341* 233*   MG 2.70* 2.60*  --  2.50*   PHOS 4.7 3.8  --  3.6

## 2020-12-09 NOTE — PROGRESS NOTES
12/09/20 0037   Vent Information   Vent Type 980   Vent Mode AC/VC+   Vt Ordered 400 mL   Rate Set 22 bmp   Pressure Support 0 cmH20   FiO2  50 %   SpO2 94 %   SpO2/FiO2 ratio 188   Sensitivity 3   PEEP/CPAP 8   I Time/ I Time % 0.95 s   Humidification Source HME   Vent Patient Data   Peak Inspiratory Pressure 30 cmH2O   Mean Airway Pressure 15 cmH20   Rate Measured 22 br/min   Vt Exhaled 401 mL   Minute Volume 8.84 Liters   I:E Ratio 1:1.90   Cough/Sputum   Sputum How Obtained Endotracheal   Cough Non-productive   Spontaneous Breathing Trial (SBT) RT Doc   Pulse 56   Breath Sounds   Right Upper Lobe Rhonchi   Right Middle Lobe Rhonchi   Right Lower Lobe Diminished   Left Upper Lobe Rhonchi   Left Lower Lobe Diminished   Additional Respiratory  Assessments   Resp 22   Position Semi-Armas's   Cuff Pressure (cm H2O) 30 cm H2O   Alarm Settings   High Pressure Alarm 40 cmH2O   Low Minute Volume Alarm 3 L/min   High Respiratory Rate 40 br/min   ETT (adult)   Placement Date/Time: 12/02/20 1359   Timeout: Patient  Preoxygenation: Yes  Mask Ventilation: Ventilated by mask (1)  Technique: Video laryngoscopy  Type: Cuffed  Tube Size: 8 mm  Laryngoscope: GlideScope  Blade Size: 3  Location: Oral  Placement Veri. ..    Secured at 23 cm   Measured From [address] [place] [address],[address] By Commercial tube cabral   Site Condition Dry

## 2020-12-09 NOTE — PROGRESS NOTES
12/09/20 1145   Vent Information   Vent Type 980   Vent Mode CPAP   Pressure Support 10 cmH20   FiO2  50 %   SpO2 92 %   SpO2/FiO2 ratio 184   Sensitivity 3   PEEP/CPAP 6   Humidification Source HME   Vent Patient Data   Peak Inspiratory Pressure 17 cmH2O   Mean Airway Pressure 8.69 cmH20   Rate Measured 21 br/min   Vt Exhaled 342 mL   Spontaneous  mL   Minute Volume 8.01 Liters   I:E Ratio 1:3.20   Cough/Sputum   Sputum How Obtained Endotracheal   Cough Congested;Productive   Sputum Amount Large   Sputum Color Cloudy   Tenacity Thick   Spontaneous Breathing Trial (SBT) RT Doc   Pulse 65   RSBI Calculated 55.26   Additional Respiratory  Assessments   Resp 18   Alarm Settings   High Pressure Alarm 40 cmH2O   Low Minute Volume Alarm 3 L/min   High Respiratory Rate 40 br/min   Low Exhaled Vt  200 mL   ETT (adult)   Placement Date/Time: 12/02/20 1699   Timeout: Patient  Preoxygenation: Yes  Mask Ventilation: Ventilated by mask (1)  Technique: Video laryngoscopy  Type: Cuffed  Tube Size: 8 mm  Laryngoscope: GlideScope  Blade Size: 3  Location: Oral  Placement Veri. ..    Secured at 23 cm   Measured From Lips   ET Placement Right   Secured By Commercial tube cabral   Site Condition Dry   Cuff Pressure 30 cm H2O

## 2020-12-09 NOTE — PROGRESS NOTES
12/09/20 0423   Vent Information   Vent Type 980   Vent Mode AC/VC+   Vt Ordered 400 mL   Rate Set 22 bmp   Pressure Support 0 cmH20   FiO2  50 %   SpO2 93 %   SpO2/FiO2 ratio 186   Sensitivity 3   PEEP/CPAP 8   I Time/ I Time % 0.95 s   Humidification Source HME   Vent Patient Data   Peak Inspiratory Pressure 28 cmH2O   Mean Airway Pressure 14 cmH20   Rate Measured 22 br/min   Vt Exhaled 398 mL   Minute Volume 8.69 Liters   I:E Ratio 1:1.90   Cough/Sputum   Sputum How Obtained Endotracheal   Cough Productive   Frequency Occasional   Sputum Amount Large   Sputum Color Creamy;Tan;Yellow   Tenacity Thick   Spontaneous Breathing Trial (SBT) RT Doc   Pulse 57   Breath Sounds   Right Upper Lobe Rhonchi   Right Middle Lobe Rhonchi   Right Lower Lobe Diminished   Left Upper Lobe Rhonchi   Left Lower Lobe Diminished   Additional Respiratory  Assessments   Resp 22   Position Semi-Armas's   Cuff Pressure (cm H2O) 30 cm H2O   Alarm Settings   High Pressure Alarm 40 cmH2O   Low Minute Volume Alarm 3 L/min   High Respiratory Rate 40 br/min   ETT (adult)   Placement Date/Time: 12/02/20 1359   Timeout: Patient  Preoxygenation: Yes  Mask Ventilation: Ventilated by mask (1)  Technique: Video laryngoscopy  Type: Cuffed  Tube Size: 8 mm  Laryngoscope: GlideScope  Blade Size: 3  Location: Oral  Placement Veri. ..    Secured at 23 cm   Measured From Lips   ET Placement Left   Secured By Commercial tube cabral   Site Condition Dry

## 2020-12-09 NOTE — FLOWSHEET NOTE
Bedside report given by Yuan Bridges, Skin assessment completed. Pt has started having loose BMs, like a result of the TF. Shift assessment completed and documented; see flowsheets for details and vent settings. Pt resting in bed, VSS, Lines checked and verified, alarms on and audible. Repositioned patient, sacral Mepilex in place, call light within reach, will continue to closely monitor. Recent Labs     12/09/20  0550   WBC 15.0*   HGB 13.2   HCT 40.9   MCV 83.3        Recent Labs     12/09/20  0550      K 4.7      CO2 35*   GLUCOSE 233*   PHOS 3.6   MG 2.50*   BUN 75*   CREATININE 1.0   CALCIUM 9.0   LABGLOM 54*   GFRAA >60     Kory Mustard 12/9/2020 8:50 AM  Estimated Creatinine Clearance: 59 mL/min (based on SCr of 1 mg/dL).     DVT Prophylaxis: enoxaparin (Lovenox) 40 mg SQ Daily    GI Prophylaxis: pantoprazole (PROTONIX), per order

## 2020-12-09 NOTE — PROGRESS NOTES
12/09/20 0834   Vent Information   Vent Mode CPAP   Pressure Support 10 cmH20   FiO2  50 %   SpO2 93 %   SpO2/FiO2 ratio 186   Sensitivity 3   PEEP/CPAP 8   Vent Patient Data   Peak Inspiratory Pressure 19 cmH2O   Mean Airway Pressure 11 cmH20   Rate Measured 21 br/min   Vt Exhaled 395 mL   Spontaneous  mL   Minute Volume 8.71 Liters   I:E Ratio 1:3.10   Spontaneous Breathing Trial (SBT) RT Doc   Pulse 69   RSBI Calculated 53.16   Additional Respiratory  Assessments   Resp 22   Alarm Settings   High Pressure Alarm 40 cmH2O   Low Minute Volume Alarm 3 L/min   High Respiratory Rate 40 br/min     Ambu/sx hob  On SBT on PS 10 and PEEP 8 50% SPO2 93 and AVG -390

## 2020-12-10 ENCOUNTER — APPOINTMENT (OUTPATIENT)
Dept: GENERAL RADIOLOGY | Age: 75
DRG: 207 | End: 2020-12-10
Attending: INTERNAL MEDICINE
Payer: MEDICARE

## 2020-12-10 LAB
A/G RATIO: 0.9 (ref 1.1–2.2)
ALBUMIN SERPL-MCNC: 2.7 G/DL (ref 3.4–5)
ALP BLD-CCNC: 62 U/L (ref 40–129)
ALT SERPL-CCNC: 29 U/L (ref 10–40)
ANION GAP SERPL CALCULATED.3IONS-SCNC: 5 MMOL/L (ref 3–16)
AST SERPL-CCNC: 21 U/L (ref 15–37)
BANDED NEUTROPHILS RELATIVE PERCENT: 9 % (ref 0–7)
BASE EXCESS ARTERIAL: 7.6 MMOL/L (ref -3–3)
BASOPHILS ABSOLUTE: 0 K/UL (ref 0–0.2)
BASOPHILS RELATIVE PERCENT: 0 %
BILIRUB SERPL-MCNC: 0.8 MG/DL (ref 0–1)
BUN BLDV-MCNC: 55 MG/DL (ref 7–20)
C-REACTIVE PROTEIN: 44.2 MG/L (ref 0–5.1)
CALCIUM SERPL-MCNC: 9 MG/DL (ref 8.3–10.6)
CARBOXYHEMOGLOBIN ARTERIAL: 0.5 % (ref 0–1.5)
CHLORIDE BLD-SCNC: 97 MMOL/L (ref 99–110)
CO2: 35 MMOL/L (ref 21–32)
CREAT SERPL-MCNC: 0.8 MG/DL (ref 0.6–1.2)
EOSINOPHILS ABSOLUTE: 0 K/UL (ref 0–0.6)
EOSINOPHILS RELATIVE PERCENT: 0 %
GFR AFRICAN AMERICAN: >60
GFR NON-AFRICAN AMERICAN: >60
GLOBULIN: 3.1 G/DL
GLUCOSE BLD-MCNC: 211 MG/DL (ref 70–99)
GLUCOSE BLD-MCNC: 240 MG/DL (ref 70–99)
GLUCOSE BLD-MCNC: 268 MG/DL (ref 70–99)
GLUCOSE BLD-MCNC: 270 MG/DL (ref 70–99)
GLUCOSE BLD-MCNC: 272 MG/DL (ref 70–99)
GLUCOSE BLD-MCNC: 276 MG/DL (ref 70–99)
GLUCOSE BLD-MCNC: 288 MG/DL (ref 70–99)
HCO3 ARTERIAL: 31.4 MMOL/L (ref 21–29)
HCT VFR BLD CALC: 45 % (ref 36–48)
HEMOGLOBIN, ART, EXTENDED: 15.9 G/DL (ref 12–16)
HEMOGLOBIN: 14.6 G/DL (ref 12–16)
LYMPHOCYTES ABSOLUTE: 0.5 K/UL (ref 1–5.1)
LYMPHOCYTES RELATIVE PERCENT: 3 %
MAGNESIUM: 1.9 MG/DL (ref 1.8–2.4)
MCH RBC QN AUTO: 26.6 PG (ref 26–34)
MCHC RBC AUTO-ENTMCNC: 32.5 G/DL (ref 31–36)
MCV RBC AUTO: 81.9 FL (ref 80–100)
METHEMOGLOBIN ARTERIAL: 0.5 %
MONOCYTES ABSOLUTE: 0.5 K/UL (ref 0–1.3)
MONOCYTES RELATIVE PERCENT: 3 %
NEUTROPHILS ABSOLUTE: 16.5 K/UL (ref 1.7–7.7)
NEUTROPHILS RELATIVE PERCENT: 85 %
O2 CONTENT ARTERIAL: 21 ML/DL
O2 SAT, ARTERIAL: 94.5 %
O2 THERAPY: ABNORMAL
PCO2 ARTERIAL: 40.5 MMHG (ref 35–45)
PDW BLD-RTO: 14.5 % (ref 12.4–15.4)
PERFORMED ON: ABNORMAL
PH ARTERIAL: 7.51 (ref 7.35–7.45)
PHOSPHORUS: 4 MG/DL (ref 2.5–4.9)
PLATELET # BLD: 218 K/UL (ref 135–450)
PLATELET SLIDE REVIEW: ADEQUATE
PMV BLD AUTO: 10.1 FL (ref 5–10.5)
PO2 ARTERIAL: 71.9 MMHG (ref 75–108)
POLYCHROMASIA: ABNORMAL
POTASSIUM SERPL-SCNC: 4.5 MMOL/L (ref 3.5–5.1)
RBC # BLD: 5.49 M/UL (ref 4–5.2)
SLIDE REVIEW: ABNORMAL
SODIUM BLD-SCNC: 137 MMOL/L (ref 136–145)
TCO2 ARTERIAL: 32.6 MMOL/L
TOTAL PROTEIN: 5.8 G/DL (ref 6.4–8.2)
TOXIC GRANULATION: PRESENT
WBC # BLD: 17.6 K/UL (ref 4–11)

## 2020-12-10 PROCEDURE — 99291 CRITICAL CARE FIRST HOUR: CPT | Performed by: INTERNAL MEDICINE

## 2020-12-10 PROCEDURE — 82803 BLOOD GASES ANY COMBINATION: CPT

## 2020-12-10 PROCEDURE — 2500000003 HC RX 250 WO HCPCS: Performed by: NURSE PRACTITIONER

## 2020-12-10 PROCEDURE — 94660 CPAP INITIATION&MGMT: CPT

## 2020-12-10 PROCEDURE — 6360000002 HC RX W HCPCS: Performed by: INTERNAL MEDICINE

## 2020-12-10 PROCEDURE — 6360000002 HC RX W HCPCS: Performed by: NURSE PRACTITIONER

## 2020-12-10 PROCEDURE — C9113 INJ PANTOPRAZOLE SODIUM, VIA: HCPCS | Performed by: NURSE PRACTITIONER

## 2020-12-10 PROCEDURE — 2709999900 HC NON-CHARGEABLE SUPPLY: Performed by: INTERNAL MEDICINE

## 2020-12-10 PROCEDURE — 0B938ZZ DRAINAGE OF RIGHT MAIN BRONCHUS, VIA NATURAL OR ARTIFICIAL OPENING ENDOSCOPIC: ICD-10-PCS | Performed by: INTERNAL MEDICINE

## 2020-12-10 PROCEDURE — 2580000003 HC RX 258: Performed by: INTERNAL MEDICINE

## 2020-12-10 PROCEDURE — 94003 VENT MGMT INPAT SUBQ DAY: CPT

## 2020-12-10 PROCEDURE — 6370000000 HC RX 637 (ALT 250 FOR IP): Performed by: NURSE PRACTITIONER

## 2020-12-10 PROCEDURE — 85025 COMPLETE CBC W/AUTO DIFF WBC: CPT

## 2020-12-10 PROCEDURE — 94761 N-INVAS EAR/PLS OXIMETRY MLT: CPT

## 2020-12-10 PROCEDURE — 83735 ASSAY OF MAGNESIUM: CPT

## 2020-12-10 PROCEDURE — 94750 HC PULMONARY COMPLIANCE STUDY: CPT

## 2020-12-10 PROCEDURE — 84100 ASSAY OF PHOSPHORUS: CPT

## 2020-12-10 PROCEDURE — 2700000000 HC OXYGEN THERAPY PER DAY

## 2020-12-10 PROCEDURE — 86140 C-REACTIVE PROTEIN: CPT

## 2020-12-10 PROCEDURE — 3609010900 HC BRONCHOSCOPY THERAPUTIC ASPIRATION INITIAL: Performed by: INTERNAL MEDICINE

## 2020-12-10 PROCEDURE — 2000000000 HC ICU R&B

## 2020-12-10 PROCEDURE — 80053 COMPREHEN METABOLIC PANEL: CPT

## 2020-12-10 PROCEDURE — 94770 HC ETCO2 MONITOR DAILY: CPT

## 2020-12-10 PROCEDURE — 0B978ZZ DRAINAGE OF LEFT MAIN BRONCHUS, VIA NATURAL OR ARTIFICIAL OPENING ENDOSCOPIC: ICD-10-PCS | Performed by: INTERNAL MEDICINE

## 2020-12-10 PROCEDURE — 37799 UNLISTED PX VASCULAR SURGERY: CPT

## 2020-12-10 PROCEDURE — 6370000000 HC RX 637 (ALT 250 FOR IP): Performed by: INTERNAL MEDICINE

## 2020-12-10 PROCEDURE — 0B918ZZ DRAINAGE OF TRACHEA, VIA NATURAL OR ARTIFICIAL OPENING ENDOSCOPIC: ICD-10-PCS | Performed by: INTERNAL MEDICINE

## 2020-12-10 PROCEDURE — 71045 X-RAY EXAM CHEST 1 VIEW: CPT

## 2020-12-10 PROCEDURE — 31645 BRNCHSC W/THER ASPIR 1ST: CPT | Performed by: INTERNAL MEDICINE

## 2020-12-10 RX ORDER — FUROSEMIDE 10 MG/ML
20 INJECTION INTRAMUSCULAR; INTRAVENOUS EVERY 6 HOURS
Status: COMPLETED | OUTPATIENT
Start: 2020-12-10 | End: 2020-12-11

## 2020-12-10 RX ADMIN — Medication 1 MCG/KG/HR: at 03:53

## 2020-12-10 RX ADMIN — INSULIN GLARGINE 30 UNITS: 100 INJECTION, SOLUTION SUBCUTANEOUS at 20:56

## 2020-12-10 RX ADMIN — FENTANYL CITRATE 50 MCG/HR: 0.05 INJECTION, SOLUTION INTRAMUSCULAR; INTRAVENOUS at 03:59

## 2020-12-10 RX ADMIN — HYDRALAZINE HYDROCHLORIDE 10 MG: 20 INJECTION INTRAMUSCULAR; INTRAVENOUS at 05:19

## 2020-12-10 RX ADMIN — INSULIN LISPRO 9 UNITS: 100 INJECTION, SOLUTION INTRAVENOUS; SUBCUTANEOUS at 16:14

## 2020-12-10 RX ADMIN — ENOXAPARIN SODIUM 40 MG: 40 INJECTION SUBCUTANEOUS at 08:24

## 2020-12-10 RX ADMIN — CARBOXYMETHYLCELLULOSE SODIUM 1 DROP: 10 GEL OPHTHALMIC at 16:14

## 2020-12-10 RX ADMIN — Medication 1 MCG/KG/HR: at 08:23

## 2020-12-10 RX ADMIN — INSULIN LISPRO 9 UNITS: 100 INJECTION, SOLUTION INTRAVENOUS; SUBCUTANEOUS at 12:25

## 2020-12-10 RX ADMIN — METHYLPREDNISOLONE SODIUM SUCCINATE 40 MG: 40 INJECTION, POWDER, LYOPHILIZED, FOR SOLUTION INTRAMUSCULAR; INTRAVENOUS at 20:35

## 2020-12-10 RX ADMIN — CARBOXYMETHYLCELLULOSE SODIUM 1 DROP: 10 GEL OPHTHALMIC at 00:40

## 2020-12-10 RX ADMIN — CARBOXYMETHYLCELLULOSE SODIUM 1 DROP: 10 GEL OPHTHALMIC at 08:23

## 2020-12-10 RX ADMIN — CARBOXYMETHYLCELLULOSE SODIUM 1 DROP: 10 GEL OPHTHALMIC at 12:25

## 2020-12-10 RX ADMIN — ENOXAPARIN SODIUM 40 MG: 40 INJECTION SUBCUTANEOUS at 20:56

## 2020-12-10 RX ADMIN — CARBOXYMETHYLCELLULOSE SODIUM 1 DROP: 10 GEL OPHTHALMIC at 04:44

## 2020-12-10 RX ADMIN — HYDRALAZINE HYDROCHLORIDE 10 MG: 20 INJECTION INTRAMUSCULAR; INTRAVENOUS at 10:42

## 2020-12-10 RX ADMIN — Medication 15 ML: at 08:24

## 2020-12-10 RX ADMIN — INSULIN LISPRO 9 UNITS: 100 INJECTION, SOLUTION INTRAVENOUS; SUBCUTANEOUS at 08:24

## 2020-12-10 RX ADMIN — PANTOPRAZOLE SODIUM 40 MG: 40 INJECTION, POWDER, FOR SOLUTION INTRAVENOUS at 08:23

## 2020-12-10 RX ADMIN — FUROSEMIDE 20 MG: 10 INJECTION, SOLUTION INTRAMUSCULAR; INTRAVENOUS at 12:25

## 2020-12-10 RX ADMIN — METHYLPREDNISOLONE SODIUM SUCCINATE 40 MG: 40 INJECTION, POWDER, LYOPHILIZED, FOR SOLUTION INTRAMUSCULAR; INTRAVENOUS at 08:24

## 2020-12-10 RX ADMIN — INSULIN GLARGINE 30 UNITS: 100 INJECTION, SOLUTION SUBCUTANEOUS at 08:23

## 2020-12-10 RX ADMIN — Medication 10 ML: at 12:27

## 2020-12-10 RX ADMIN — Medication 1 MCG/KG/HR: at 12:39

## 2020-12-10 RX ADMIN — QUETIAPINE FUMARATE 300 MG: 100 TABLET ORAL at 08:24

## 2020-12-10 RX ADMIN — FUROSEMIDE 20 MG: 10 INJECTION, SOLUTION INTRAMUSCULAR; INTRAVENOUS at 18:16

## 2020-12-10 RX ADMIN — HYDRALAZINE HYDROCHLORIDE 10 MG: 20 INJECTION INTRAMUSCULAR; INTRAVENOUS at 01:13

## 2020-12-10 RX ADMIN — INSULIN LISPRO 6 UNITS: 100 INJECTION, SOLUTION INTRAVENOUS; SUBCUTANEOUS at 03:54

## 2020-12-10 RX ADMIN — INSULIN LISPRO 6 UNITS: 100 INJECTION, SOLUTION INTRAVENOUS; SUBCUTANEOUS at 00:41

## 2020-12-10 ASSESSMENT — PAIN SCALES - GENERAL
PAINLEVEL_OUTOF10: 0

## 2020-12-10 ASSESSMENT — PULMONARY FUNCTION TESTS
PIF_VALUE: 13
PIF_VALUE: 17
PIF_VALUE: 23
PIF_VALUE: 12
PIF_VALUE: 23
PIF_VALUE: 20
PIF_VALUE: 9
PIF_VALUE: 21
PIF_VALUE: 38
PIF_VALUE: 14
PIF_VALUE: 22
PIF_VALUE: 18
PIF_VALUE: 23
PIF_VALUE: 19
PIF_VALUE: 22
PIF_VALUE: 16
PIF_VALUE: 20
PIF_VALUE: 19
PIF_VALUE: 19
PIF_VALUE: 18
PIF_VALUE: 27
PIF_VALUE: 13
PIF_VALUE: 13
PIF_VALUE: 21
PIF_VALUE: 14
PIF_VALUE: 22

## 2020-12-10 NOTE — PROGRESS NOTES
12/10/20 0438   Vent Information   Vent Type 980   Vent Mode AC/VC+   Vt Ordered 400 mL   Rate Set 22 bmp   Pressure Support 0 cmH20   FiO2  50 %   SpO2 93 %   SpO2/FiO2 ratio 186   Sensitivity 3   PEEP/CPAP 6   I Time/ I Time % 0.95 s   Humidification Source HME   Vent Patient Data   Peak Inspiratory Pressure 19 cmH2O   Mean Airway Pressure 10 cmH20   Rate Measured 22 br/min   Vt Exhaled 467 mL   Minute Volume 9.58 Liters   I:E Ratio 1:1.90   Cough/Sputum   Sputum How Obtained None   Cough None   Sputum Amount None   Sputum Color None   Tenacity None   Spontaneous Breathing Trial (SBT) RT Doc   Pulse 59   Breath Sounds   Right Upper Lobe Diminished   Right Middle Lobe Diminished   Right Lower Lobe Diminished   Left Upper Lobe Diminished   Left Lower Lobe Diminished   Additional Respiratory  Assessments   Resp 21   Position Semi-Armas's   Cuff Pressure (cm H2O) 30 cm H2O   Alarm Settings   High Pressure Alarm 40 cmH2O   Low Minute Volume Alarm 3 L/min   High Respiratory Rate 40 br/min   Low Exhaled Vt  200 mL   ETT (adult)   Placement Date/Time: 12/02/20 9949   Timeout: Patient  Preoxygenation: Yes  Mask Ventilation: Ventilated by mask (1)  Technique: Video laryngoscopy  Type: Cuffed  Tube Size: 8 mm  Laryngoscope: GlideScope  Blade Size: 3  Location: Oral  Placement Veri. ..    Secured at 23 cm   Measured From Lips   ET Placement Right   Secured By Commercial tube cabral   Site Condition Dry

## 2020-12-10 NOTE — PROGRESS NOTES
12/10/20 0803   Vent Information   Skin Assessment Clean, dry, & intact   Vt Ordered 400 mL   Rate Set 22 bmp   Pressure Support 0 cmH20   FiO2  50 %   SpO2 94 %   SpO2/FiO2 ratio 188   Sensitivity 3   PEEP/CPAP 6   I Time/ I Time % 0 s   Humidification Source HME   Vent Patient Data   Peak Inspiratory Pressure 22 cmH2O   Mean Airway Pressure 12 cmH20   Rate Measured 24 br/min   Vt Exhaled 420 mL   Minute Volume 9.73 Liters   I:E Ratio 1:1.90   Plateau Pressure 24 UPA55   Static Compliance 23 mL/cmH2O   Dynamic Compliance 70 mL/cmH2O   Spontaneous Breathing Trial (SBT) RT Doc   Pulse 54   Breath Sounds   Right Upper Lobe Diminished   Right Middle Lobe Diminished   Right Lower Lobe Diminished   Left Upper Lobe Diminished   Left Lower Lobe Diminished   Additional Respiratory  Assessments   Resp 19   $End Tidal CO2 27   Cuff Pressure (cm H2O) 30 cm H2O   Alarm Settings   High Pressure Alarm 40 cmH2O   Low Minute Volume Alarm 3 L/min   High Respiratory Rate 40 br/min   Patient Observation   Observations ambu@ bedside   ETT (adult)   Placement Date/Time: 12/02/20 1359   Timeout: Patient  Preoxygenation: Yes  Mask Ventilation: Ventilated by mask (1)  Technique: Video laryngoscopy  Type: Cuffed  Tube Size: 8 mm  Laryngoscope: GlideScope  Blade Size: 3  Location: Oral  Placement Veri. ..    Secured at 23 cm   Measured From Lips   ET Placement Left   Secured By Commercial tube cabral   Site Condition Dry

## 2020-12-10 NOTE — PROGRESS NOTES
Call placed to patient's daughter and clinical update provided. Patient had bronchoscopy today with mucous plugs removed. On less sedation today with slight improvement in CXR and tolerating weaning trial yesterday for over 4 hours. Discussed with family need for ETT exchange (based on bronch findings) or trial of extubation today if meets criteria. Family would like to proceed with trial extubation today if patient meets criteria. Placed on SBT 8/5 FiO2 50% and all sedation off at this time. Maintaining saturations in the mid 90's, patient remains very drowsy and not yet following commands. Check ABG and continue to assess for trial liberation from MV.      Discussed with RN/RT

## 2020-12-10 NOTE — PROGRESS NOTES
12/10/20 0040   Vent Information   $Ventilation $Subsequent Day   Vent Type 980   Vent Mode AC/VC+   Vt Ordered 400 mL   Rate Set 22 bmp   Pressure Support 0 cmH20   FiO2  50 %   SpO2 92 %   SpO2/FiO2 ratio 184   Sensitivity 3   PEEP/CPAP 6   I Time/ I Time % 0.95 s   Humidification Source HME   Vent Patient Data   Peak Inspiratory Pressure 16 cmH2O   Mean Airway Pressure 10 cmH20   Rate Measured 24 br/min   Vt Exhaled 547 mL   Minute Volume 9.12 Liters   I:E Ratio 1:1.9   Cough/Sputum   Sputum How Obtained Endotracheal;Suctioned   Cough Productive   Sputum Amount Moderate   Sputum Color Cloudy   Tenacity Thick; Thin   Spontaneous Breathing Trial (SBT) RT Doc   Pulse 59   Breath Sounds   Right Upper Lobe Diminished   Right Middle Lobe Diminished   Right Lower Lobe Diminished   Left Upper Lobe Diminished   Left Lower Lobe Diminished   Additional Respiratory  Assessments   Resp 19   Position Semi-Armas's   Cuff Pressure (cm H2O) 30 cm H2O   Alarm Settings   High Pressure Alarm 40 cmH2O   Low Minute Volume Alarm 3 L/min   High Respiratory Rate 40 br/min   Low Exhaled Vt  200 mL   ETT (adult)   Placement Date/Time: 12/02/20 1359   Timeout: Patient  Preoxygenation: Yes  Mask Ventilation: Ventilated by mask (1)  Technique: Video laryngoscopy  Type: Cuffed  Tube Size: 8 mm  Laryngoscope: GlideScope  Blade Size: 3  Location: Oral  Placement Veri. ..    Secured at 23 cm   Measured From Lips   ET Placement Left   Secured By Commercial tube cabral   Site Condition Dry

## 2020-12-10 NOTE — PROGRESS NOTES
Perfect serve to Dr. Guerita Kirby  [address] [place] [address] or Facility: A From: Anay Jeffrey RE: Keon Guevara 1945 RM: 229 Pt's BP remains elevated (Heart rate 57). Myla /65, cuff /52. Hydralazine 10 mg IVP given as ordered prn. Pt's BP remains elevated Myla 197/58, cuff /53. Please advise.  Thanks, Joyce Smith RN

## 2020-12-10 NOTE — CARE COORDINATION
Chart review completed. Pt a 70year old female, admitted for acute respiratory failure. Hospital day 11, currently in ICU level of care on vent. Covid-19 POSITIVE. Initial assessment states patient IPTA living with her . LTACH screening patient. Pt continues to meet criteria and they can accept when medically stable. Should MD feel appropriate CM can discuss with patient/family when appropriate. Per Dr Clotilde Manuel documentation overall prognosis is guarded at this time. Writer placed call to spouse Kierra Whitten to offer support. Daughter Leslie Sanchez answered. Leslie Sanchez stated that she felt the nursing staff were doing a great job and keeping her and her father informed. Discussed likely need for rehab placement at discharge. Leslie Sanchez voiced concern due to high prevalence of Covid in the SNF level of care. CM acknowledged concern and will keep this in mind when working with patient/family.  Danial Hamm RN

## 2020-12-10 NOTE — PROGRESS NOTES
Gave Bedside report to: BODØ RN    4 Eyes Skin Assessment     The patient is being assess for   Shift Handoff    I agree that 2 RN's have performed a thorough Head to Toe Skin Assessment on the patient. ALL assessment sites listed below have been assessed. Areas assessed by both nurses:   [x]   Head, Face, and Ears   [x]   Shoulders, Back, and Chest, Abdomen  [x]   Arms, Elbows, and Hands   [x]   Coccyx, Sacrum, and Ischium  [x]   Legs, Feet, and Heels        Does the Patient have Skin Breakdown?   No          Loyd Prevention initiated:  Yes   Wound Care Orders initiated:  No      WOC nurse consulted for Pressure Injury (Stage 3,4, Unstageable, DTI, NWPT, Complex wounds)and New or Established Ostomies:  NA      Primary Nurse eSignature: Electronically signed by Clayton Rinaldi RN on 12/9/20 at 11:44 PM EST    **SHARE this note so that the co-signing nurse is able to place an eSignature**    Co-signer eSignature: {Esignature:[id number]}

## 2020-12-10 NOTE — PROGRESS NOTES
Pulmonary:      Effort: Pulmonary effort is normal.      Breath sounds: Rales present. No wheezing. Abdominal:      General: Bowel sounds are normal. There is no distension. Palpations: Abdomen is soft. Tenderness: There is no abdominal tenderness. Lymphadenopathy:      Cervical: No cervical adenopathy. Skin:     General: Skin is warm and dry. Findings: No rash. Neurological:      Mental Status: She is alert. Cranial Nerves: Cranial nerve deficit present. Comments: Intubated, sedated            Data Reviewed:   LABS:  CBC:  Recent Labs     12/08/20  0540 12/09/20  0550 12/10/20  0600   WBC 11.8* 15.0* 17.6*   HGB 13.3 13.2 14.6   HCT 41.8 40.9 45.0   MCV 83.4 83.3 81.9    237 218     BMP:  Recent Labs     12/08/20  0540 12/08/20  1806 12/09/20  0550 12/10/20  0600   * 143 144 137   K 5.6* 5.7* 4.7 4.5    102 102 97*   CO2 39* 38* 35* 35*   PHOS 3.8  --  3.6 4.0   BUN 83* 78* 75* 55*   CREATININE 1.1 1.0 1.0 0.8     LIVER PROFILE:   Recent Labs     12/08/20  0540 12/09/20  0550 12/10/20  0600   AST 22 20 21   ALT 38 32 29   BILITOT 0.5 0.5 0.8   ALKPHOS 61 62 62     PT/INR:  No results for input(s): PROTIME, INR in the last 72 hours. APTT: No results for input(s): APTT in the last 72 hours. UA:  No results for input(s): NITRITE, COLORU, PHUR, LABCAST, WBCUA, RBCUA, MUCUS, TRICHOMONAS, YEAST, BACTERIA, CLARITYU, SPECGRAV, LEUKOCYTESUR, UROBILINOGEN, BILIRUBINUR, BLOODU, GLUCOSEU, AMORPHOUS in the last 72 hours.     Invalid input(s): Derrill Nissen  Recent Labs     12/09/20  0550 12/09/20  1242   PHART 7.424 7.425   WNA4XTS 59.7* 59.6*   PO2ART 63.5* 70.6*       Vent Information  $Ventilation: $Subsequent Day  Skin Assessment: Clean, dry, & intact  Equipment Changed: (S) HME  Vent Type: 980  Vent Mode: AC/VC+  Vt Ordered: 400 mL  Pressure Ordered: 20  Rate Set: 22 bmp  Peak Flow: 60 L/min  Pressure Support: 0 cmH20  FiO2 : 50 %  SpO2: 95 %  SpO2/FiO2 ratio: 190  Sensitivity: 3  PEEP/CPAP: 6  I Time/ I Time %: 0 s  Humidification Source: HME  Humidification Temp: 37  Humidification Temp Measured: 37    CXR personally reviewed, bilateral airspace disease          Assessment:     1. Acute resp failure, hypoxic  2. Acute covid respiratory infection  3. DM2  4. Acute hypokalemia    Plan:      -Vent support, lung protective vent strategy  -Titrate sedation as needed  -Titrate levophed support for SBP>90  -Antimicrobials per ID  -Bronch at bedside today  -Start diuresis  -Nephrology involved, serial labs. -Tube feeds, monitor bowel output, free water  -Insulin regimen, monitor blood sugars   -Steroids  -Overall guarded prognosis with high mortality, early in course however. Ongoing discussions with family regarding goals of care. One week since need for vent support     Due to life threatening resp failure this patient is critically ill.  Total critical care time involved in her care was 40 mins excluding separately listed procedures     Anju Barrera MD

## 2020-12-10 NOTE — PROGRESS NOTES
Patient extubated at this time and placed on 8L high flow. Maintaining saturations 91-93%, weak cough but no secretions. Oral care provided. Restraints removed. Patient nodding head and following commands but still somewhat lethargic. ABG ok, may benefit from BiPAP support this evening for sleep. Hold NG feedings tonight. Call placed to patient's daughter and updated on the above. Discussed with primary RN.

## 2020-12-10 NOTE — PLAN OF CARE
Problem: Restraint Use - Nonviolent/Non-Self-Destructive Behavior:  Goal: Absence of restraint indications  Description: Absence of restraint indications  Outcome: Not Met This Shift  Note: Pt is in 2 point BUE soft restraints to protect medical access devices. Explained criteria for release.   No evidence of learning

## 2020-12-10 NOTE — PROGRESS NOTES
12/10/20 1142   Vent Information   Skin Assessment Clean, dry, & intact   Vt Ordered 400 mL   Rate Set 22 bmp   Pressure Support 0 cmH20   FiO2  50 %   SpO2 94 %   SpO2/FiO2 ratio 188   Sensitivity 3   PEEP/CPAP 6   I Time/ I Time % 0 s   Humidification Source HME   Vent Patient Data   Peak Inspiratory Pressure 18 cmH2O   Mean Airway Pressure 11 cmH20   Rate Measured 25 br/min   Vt Exhaled 434 mL   Minute Volume 9.23 Liters   I:E Ratio 1:1.60   Spontaneous Breathing Trial (SBT) RT Doc   Pulse 55   Breath Sounds   Right Upper Lobe Diminished   Right Middle Lobe Diminished   Right Lower Lobe Diminished   Left Upper Lobe Diminished   Left Lower Lobe Diminished   Additional Respiratory  Assessments   Resp (!) 6   Cuff Pressure (cm H2O) 30 cm H2O   Alarm Settings   High Pressure Alarm 40 cmH2O   Low Minute Volume Alarm 3 L/min   High Respiratory Rate 40 br/min   Patient Observation   Observations ambu @ bedside   ETT (adult)   Placement Date/Time: 12/02/20 1359   Timeout: Patient  Preoxygenation: Yes  Mask Ventilation: Ventilated by mask (1)  Technique: Video laryngoscopy  Type: Cuffed  Tube Size: 8 mm  Laryngoscope: GlideScope  Blade Size: 3  Location: Oral  Placement Veri. ..    Secured at 23 cm   Measured From [address] [place] [address],[address] By Commercial tube cabral   Site Condition Dry

## 2020-12-10 NOTE — OP NOTE
Preoperative Diagnosis:  1. Acute resp failure    Postoperative Diagnosis:  1. Acute resp failure    Procedure Performed:  1. Flexible bronchoscopy  2. Therapeutic aspiration of endobronchial secretions    Findings:  1. Improved airway patency after aspiration of secretions from the tracheobronchial tree    Recommendations:  1. Await results of collected specimen    Indications:  Mat Harrell is a pleasant 70year old female who has had ongoing resp failure on vent support. Bronchoscopy indicated for further evaluation of findings and to assist with pulmonary toileting. ASA 4, Mallampati 3    Procedure Details: The patient was correctly identified as  Mat Harrell, a safety timeout was performed. An adult therapeutic bronchoscope was inserted through the endotracheal tube and into the airways. Tenacious white and yellow secretions were aspirated from the tracheobronchial tree with subsequent improved airway patency. An airway exam was then performed, all subsegments were well visualized and did not appear to show any acute abnormality. Bronchoscope was withdrawn and the procedure was terminated. There was no immediate complications, the patient tolerated the procedure well. Estimated blood loss was less than 1 cc.      Specimens:  RML BAL

## 2020-12-10 NOTE — PROGRESS NOTES
Received perfect serve read receipt from Dr. Snyder Payer regarding pt's persistent elevated BP. No new orders received. Will continue to monitor.

## 2020-12-10 NOTE — PROGRESS NOTES
[facility]    [facility]              [phone]                      She is admitted with hypoxemia and respiratory failure requiring intubation and mechanical ventilation due to COVID-19. We are following for ELOY and related issues    Interval History and plan:      Remains intubated on ventilation  Cr normal  Sodium normal  K stable  Made 1.6 L of urine yesterday  Systolic blood pressure is high- on as needed hydralazine  FiO2 50%  Decrease water flushes                     Assessment :     Acute Kidney Injury  ELOY likely due to -hemodynamic changes, Covid  Cr on consultation 1.2  Baseline Cr-0.8    UA-large blood, has Urena, RBC more than 100  Renal Imaging:-NA  Echo: N/A    Hypertension   BP: (157-230)/(52-69)  Pulse:  [54-63]   BP goal inpatient 070-611 systolic inpatient  Coming down to 160s  On as needed hydralazine    COVID-19 positive, had respiratory failure requiring intubation and mechanical ventilation         [facility] would like to thank Kate Johnson MD   for opportunity to serve this patient      Please call with questions at-   24 Hrs Answering service [phone] or  7 am- 5 pm via Perfect serve or cell phone  Dr.Sudhir Madyson Cohen          CC/reason for consult :     ELOY     HPI :     From consult note-   Nathan Betancourt is a 70 y.o. female presented to   the hospital on 11/29/2020 with COVID-19 which was diagnosed on 11/26/2016. Per chart see you had fatigue headache loss in appetite since of smell and taste and followed by cough for several weeks. Now came with the shortness of breath. She had a difficulty breathing and came [facility].  He has she required 10 L/min of oxygen. She is known to have COPD and congestive heart failure but no need for supplemental oxygen at home. She was transferred to ICU. She needed intubation and mechanical ventilation. Post intubation her blood pressure changes significantly.   She also had poor urine output and rising creatinine. She got 125 mL/h of fluids overnight now on 25 mL/h  Blood pressure has been fluctuating. Blood pressure went more than 200 but now coming up to 812 systolic    We are consulted for acute renal failure and related issues    ROS:     Seen with- RN    Unable to obtain ROS due to  Altered mental status           PMH/PSH/SH/Family History:     Past Medical History:   Diagnosis Date    COVID-19 11/26/2020    Hyperlipidemia     Hypertension     Neuropathy     Type II or unspecified type diabetes mellitus without mention of complication, not stated as uncontrolled        Past Surgical History:   Procedure Laterality Date    APPENDECTOMY      CHOLECYSTECTOMY      HUMERUS FRACTURE SURGERY      TUBAL LIGATION      TUMOR REMOVAL          reports that she has never smoked. She does not have any smokeless tobacco history on file. family history is not on file.          Medication:     Current Facility-Administered Medications: dexmedetomidine (PRECEDEX) 400 mcg in sodium chloride 0.9 % 100 mL infusion, 0.4 mcg/kg/hr, Intravenous, Continuous  methylPREDNISolone sodium (SOLU-MEDROL) injection 40 mg, 40 mg, Intravenous, Q12H  insulin glargine (LANTUS) injection vial 30 Units, 30 Units, Subcutaneous, BID  QUEtiapine (SEROQUEL) tablet 300 mg, 300 mg, Per NG tube, BID  hydrALAZINE (APRESOLINE) injection 10 mg, 10 mg, Intravenous, Q4H PRN  midazolam (VERSED) 100 mg in dextrose 5 % 100 mL infusion, 1 mg/hr, Intravenous, Continuous  potassium chloride 20 mEq/50 mL IVPB (Central Line), 20 mEq, Intravenous, PRN  fentaNYL (SUBLIMAZE) 1,000 mcg in sodium chloride 0.9 % 100 mL infusion, 25 mcg/hr, Intravenous, Titrated  pantoprazole (PROTONIX) injection 40 mg, 40 mg, Intravenous, Daily  insulin lispro (HUMALOG) injection vial 0-18 Units, 0-18 Units, Subcutaneous, 6 times per day  carboxymethylcellulose PF (THERATEARS) 1 % ophthalmic gel 1 drop, 1 drop, Both Eyes, 6 times per day  chlorhexidine (PERIDEX) 0.12 % solution 15 mL, 15 mL, Mouth/Throat, BID  [Held by provider] metoprolol tartrate (LOPRESSOR) tablet 50 mg, 50 mg, Oral, BID  sodium chloride flush 0.9 % injection 10 mL, 10 mL, Intravenous, PRN  magnesium sulfate 1 g in dextrose 5% 100 mL IVPB, 1 g, Intravenous, PRN  acetaminophen (TYLENOL) tablet 650 mg, 650 mg, Oral, Q4H PRN **OR** acetaminophen (TYLENOL) suppository 650 mg, 650 mg, Rectal, Q4H PRN  ondansetron (ZOFRAN) injection 4 mg, 4 mg, Intravenous, Q6H PRN  albuterol sulfate  (90 Base) MCG/ACT inhaler 2 puff, 2 puff, Inhalation, Q4H PRN  glucose (GLUTOSE) 40 % oral gel 15 g, 15 g, Oral, PRN  dextrose 50 % IV solution, 12.5 g, Intravenous, PRN  glucagon (rDNA) injection 1 mg, 1 mg, Intramuscular, PRN  dextrose 5 % solution, 100 mL/hr, Intravenous, PRN  enoxaparin (LOVENOX) injection 40 mg, 40 mg, Subcutaneous, BID  0.9 % sodium chloride bolus, 30 mL, Intravenous, PRN  albuterol sulfate  (90 Base) MCG/ACT inhaler 2 puff, 2 puff, Inhalation, Q4H PRN **AND** ipratropium (ATROVENT HFA) 17 MCG/ACT inhaler 2 puff, 2 puff, Inhalation, Q4H PRN **AND** MDI Treatment, , , PRN       Vitals :     Vitals:    12/10/20 0803   BP:    Pulse: 54   Resp: 19   Temp:    SpO2: 94%       I & O :       Intake/Output Summary (Last 24 hours) at 12/10/2020 0825  Last data filed at 12/10/2020 0800  Gross per 24 hour   Intake 3626.9 ml   Output 1905 ml   Net 1721.9 ml        Physical Examination :     Due to the current efforts to prevent transmission of COVID-19 and also the need to preserve PPE for other caregivers, a face-to-face encounter with the patient was not performed. That being said, all relevant records and diagnostic tests were reviewed, including laboratory results and imaging. Please reference any relevant documentation elsewhere. Care will be coordinated with the primary service.          LABS:     Recent Labs     12/08/20  0540 12/09/20  0550 12/10/20  0600   WBC 11.8* 15.0* 17.6*   HGB 13.3 13.2 14.6   HCT 41.8 40.9 45.0    237 218     Recent Labs     12/08/20  0540 12/08/20  1806 12/09/20  0550 12/10/20  0600   * 143 144 137   K 5.6* 5.7* 4.7 4.5    102 102 97*   CO2 39* 38* 35* 35*   BUN 83* 78* 75* 55*   CREATININE 1.1 1.0 1.0 0.8   GLUCOSE 303* 341* 233* 288*   MG 2.60*  --  2.50* 1.90   PHOS 3.8  --  3.6 4.0

## 2020-12-10 NOTE — PROGRESS NOTES
Hospitalist Progress Note      PCP: No primary care provider on file. Date of Admission: 11/29/2020    Chief [address] [place] [address] Course:       Subjective:  remains intubated , sedated, on fio2 of 50, weaning down sedation, on spontaneous trial      Medications:  Reviewed    Infusion Medications    dexmedetomidine 1 mcg/kg/hr (12/10/20 0353)    midazolam Stopped (12/09/20 0930)    fentaNYL (SUBLIMAZE) infusion 50 mcg/hr (12/10/20 0359)    dextrose       Scheduled Medications    methylPREDNISolone  40 mg Intravenous Q12H    insulin glargine  30 Units Subcutaneous BID    QUEtiapine  300 mg Per NG tube BID    pantoprazole  40 mg Intravenous Daily    insulin lispro  0-18 Units Subcutaneous 6 times per day    carboxymethylcellulose PF  1 drop Both Eyes 6 times per day    chlorhexidine  15 mL Mouth/Throat BID    [Held by provider] metoprolol  50 mg Oral BID    enoxaparin  40 mg Subcutaneous BID     PRN Meds: hydrALAZINE, potassium chloride, sodium chloride flush, magnesium sulfate, acetaminophen **OR** acetaminophen, ondansetron, albuterol sulfate HFA, glucose, dextrose, glucagon (rDNA), dextrose, sodium chloride, albuterol sulfate HFA **AND** ipratropium **AND** MDI Treatment      Intake/Output Summary (Last 24 hours) at 12/10/2020 0837  Last data filed at 12/10/2020 0800  Gross per 24 hour   Intake 3626.9 ml   Output 1905 ml   Net 1721.9 ml       Physical Exam Performed:    BP (!) 179/61   Pulse 54   Temp 99.3 °F (37.4 °C) (Bladder)   Resp 19   Ht 5' 4\" (1.626 m)   Wt 235 lb 7.2 oz (106.8 kg)   SpO2 94%   BMI 40.42 kg/m²     General appearance: , appears stated age and cooperative. obese, on vent  HEENT: Pupils equal, round, and reactive to light. Conjunctivae/corneas clear. Neck: Supple, with full range of motion. No jugular venous distention. Trachea midline. Respiratory:  Normal respiratory effort.  Clear to auscultation, bilaterally without Final Result   Support lines and tubes as above. Worsening diffuse pulmonary opacities relative to 2 days prior in the setting   of known COVID-19 infection. VL Extremity Venous Left   Final Result      XR CHEST PORTABLE   Final Result   Bilateral airspace disease is suspicious for pneumonia, and could be   compatible with history of COVID-19. Assessment/Plan:    Active Hospital Problems    Diagnosis    COPD (chronic obstructive pulmonary disease) ([facility] [J44.9]    Acute respiratory failure due to COVID-19 (HCC) [U07.1, J96.00]    Essential hypertension [I10]    Obesity, Class III, BMI 40-49.9 (morbid obesity) ([facility] [E66.01]    Type 2 diabetes mellitus, with long-term current use of insulin ([facility]) [E11.9, Z79.4]        Respiratory failure, COPD, COVID19    Titrate respiratory support according to patient's needs and advanced care plan.  Patient was requiring vapotherm  -  Patient seems to be at significant risk for progression of respiratory failure.  She was advised of the potential need for intubation and mechanical ventilation.  She was agreeable if necessary.  -  Repeated all pertinent inflammatory labs.  Obtained an ABG.  The first sample obtained was venous.  The second sample was arterial with a pO2 = 53.  Vapotherm made available if needed. -  Started BMI-adjusted low-intensity anticoagulation w/ lovenox 40mg bid. Vincenzo Pichardo is suspicion for LLE DVT though so will request duplex venous U/S as well. -  Started solumedrol 40mg IV bid, remdesivir(ended 12/4), and 1U convalescent plasma.   -cvc/A-line placed 12/2  -intubated 12/2   -ID consulted 12/3, apprec recs   -precedex ggt was started 12/9, weaned off   -bronch 12/10    DM2  -  Hold all oral antidiabetic agents.  Start s.c.  Insulin regimen based on home regimen.   -on home lantus     Elevated creatinine due to ELOY- noted during stay, partly due to riggins obstruction  -nephro consulted 12/3, apprec mgmt     Hematuria- with hx of bladder ca per pt, supposed to get surgery in [facility]  -urology consulted for further recs  -continued riggins    DVT Prophylaxis: lovenox bid  Diet: DIET TUBE FEED CONTINUOUS/CYCLIC NPO; Renal Formula;  Orogastric; Continuous; 10; 45; Exceptions are: Sips with Meds  Code Status: Full Code       PT/OT Eval Status: not possible     Dispo - icu care, monitor labs, extubation soon hopefully       Kandace Lay MD

## 2020-12-11 LAB
A/G RATIO: 0.8 (ref 1.1–2.2)
ALBUMIN SERPL-MCNC: 2.6 G/DL (ref 3.4–5)
ALP BLD-CCNC: 67 U/L (ref 40–129)
ALT SERPL-CCNC: 27 U/L (ref 10–40)
ANION GAP SERPL CALCULATED.3IONS-SCNC: 8 MMOL/L (ref 3–16)
AST SERPL-CCNC: 22 U/L (ref 15–37)
BANDED NEUTROPHILS RELATIVE PERCENT: 7 % (ref 0–7)
BASOPHILS ABSOLUTE: 0 K/UL (ref 0–0.2)
BASOPHILS RELATIVE PERCENT: 0 %
BILIRUB SERPL-MCNC: 0.8 MG/DL (ref 0–1)
BUN BLDV-MCNC: 58 MG/DL (ref 7–20)
CALCIUM SERPL-MCNC: 8.9 MG/DL (ref 8.3–10.6)
CHLORIDE BLD-SCNC: 96 MMOL/L (ref 99–110)
CO2: 35 MMOL/L (ref 21–32)
CREAT SERPL-MCNC: 1 MG/DL (ref 0.6–1.2)
EOSINOPHILS ABSOLUTE: 0 K/UL (ref 0–0.6)
EOSINOPHILS RELATIVE PERCENT: 0 %
GFR AFRICAN AMERICAN: >60
GFR NON-AFRICAN AMERICAN: 54
GLOBULIN: 3.3 G/DL
GLUCOSE BLD-MCNC: 140 MG/DL (ref 70–99)
GLUCOSE BLD-MCNC: 186 MG/DL (ref 70–99)
GLUCOSE BLD-MCNC: 249 MG/DL (ref 70–99)
GLUCOSE BLD-MCNC: 273 MG/DL (ref 70–99)
GLUCOSE BLD-MCNC: 283 MG/DL (ref 70–99)
GLUCOSE BLD-MCNC: 327 MG/DL (ref 70–99)
GLUCOSE BLD-MCNC: 82 MG/DL (ref 70–99)
HCT VFR BLD CALC: 45.3 % (ref 36–48)
HEMOGLOBIN: 15 G/DL (ref 12–16)
LYMPHOCYTES ABSOLUTE: 0.9 K/UL (ref 1–5.1)
LYMPHOCYTES RELATIVE PERCENT: 5 %
MAGNESIUM: 1.8 MG/DL (ref 1.8–2.4)
MCH RBC QN AUTO: 27.3 PG (ref 26–34)
MCHC RBC AUTO-ENTMCNC: 33.1 G/DL (ref 31–36)
MCV RBC AUTO: 82.5 FL (ref 80–100)
METAMYELOCYTES RELATIVE PERCENT: 1 %
MONOCYTES ABSOLUTE: 0.5 K/UL (ref 0–1.3)
MONOCYTES RELATIVE PERCENT: 3 %
NEUTROPHILS ABSOLUTE: 16.7 K/UL (ref 1.7–7.7)
NEUTROPHILS RELATIVE PERCENT: 84 %
PDW BLD-RTO: 14.7 % (ref 12.4–15.4)
PERFORMED ON: ABNORMAL
PERFORMED ON: NORMAL
PHOSPHORUS: 4.6 MG/DL (ref 2.5–4.9)
PLATELET # BLD: 222 K/UL (ref 135–450)
PLATELET SLIDE REVIEW: ADEQUATE
PMV BLD AUTO: 10.1 FL (ref 5–10.5)
POTASSIUM SERPL-SCNC: 4.1 MMOL/L (ref 3.5–5.1)
RBC # BLD: 5.49 M/UL (ref 4–5.2)
RBC # BLD: NORMAL 10*6/UL
SODIUM BLD-SCNC: 139 MMOL/L (ref 136–145)
TOTAL PROTEIN: 5.9 G/DL (ref 6.4–8.2)
TOXIC GRANULATION: PRESENT
WBC # BLD: 18.2 K/UL (ref 4–11)

## 2020-12-11 PROCEDURE — 2000000000 HC ICU R&B

## 2020-12-11 PROCEDURE — C9113 INJ PANTOPRAZOLE SODIUM, VIA: HCPCS | Performed by: NURSE PRACTITIONER

## 2020-12-11 PROCEDURE — 6360000002 HC RX W HCPCS: Performed by: INTERNAL MEDICINE

## 2020-12-11 PROCEDURE — 85025 COMPLETE CBC W/AUTO DIFF WBC: CPT

## 2020-12-11 PROCEDURE — 94761 N-INVAS EAR/PLS OXIMETRY MLT: CPT

## 2020-12-11 PROCEDURE — 92610 EVALUATE SWALLOWING FUNCTION: CPT

## 2020-12-11 PROCEDURE — 2580000003 HC RX 258: Performed by: INTERNAL MEDICINE

## 2020-12-11 PROCEDURE — 94660 CPAP INITIATION&MGMT: CPT

## 2020-12-11 PROCEDURE — 6360000002 HC RX W HCPCS: Performed by: NURSE PRACTITIONER

## 2020-12-11 PROCEDURE — 6370000000 HC RX 637 (ALT 250 FOR IP): Performed by: NURSE PRACTITIONER

## 2020-12-11 PROCEDURE — 6370000000 HC RX 637 (ALT 250 FOR IP): Performed by: INTERNAL MEDICINE

## 2020-12-11 PROCEDURE — 99233 SBSQ HOSP IP/OBS HIGH 50: CPT | Performed by: INTERNAL MEDICINE

## 2020-12-11 PROCEDURE — 83735 ASSAY OF MAGNESIUM: CPT

## 2020-12-11 PROCEDURE — 84100 ASSAY OF PHOSPHORUS: CPT

## 2020-12-11 PROCEDURE — 2700000000 HC OXYGEN THERAPY PER DAY

## 2020-12-11 PROCEDURE — 80053 COMPREHEN METABOLIC PANEL: CPT

## 2020-12-11 RX ORDER — METHYLPREDNISOLONE SODIUM SUCCINATE 40 MG/ML
40 INJECTION, POWDER, LYOPHILIZED, FOR SOLUTION INTRAMUSCULAR; INTRAVENOUS DAILY
Status: DISCONTINUED | OUTPATIENT
Start: 2020-12-12 | End: 2020-12-12

## 2020-12-11 RX ADMIN — METOPROLOL TARTRATE 50 MG: 50 TABLET, FILM COATED ORAL at 21:30

## 2020-12-11 RX ADMIN — INSULIN LISPRO 6 UNITS: 100 INJECTION, SOLUTION INTRAVENOUS; SUBCUTANEOUS at 08:27

## 2020-12-11 RX ADMIN — INSULIN LISPRO 3 UNITS: 100 INJECTION, SOLUTION INTRAVENOUS; SUBCUTANEOUS at 16:17

## 2020-12-11 RX ADMIN — INSULIN GLARGINE 30 UNITS: 100 INJECTION, SOLUTION SUBCUTANEOUS at 21:00

## 2020-12-11 RX ADMIN — Medication 10 ML: at 00:22

## 2020-12-11 RX ADMIN — INSULIN LISPRO 3 UNITS: 100 INJECTION, SOLUTION INTRAVENOUS; SUBCUTANEOUS at 11:52

## 2020-12-11 RX ADMIN — CEFEPIME HYDROCHLORIDE 2 G: 2 INJECTION, POWDER, FOR SOLUTION INTRAVENOUS at 09:53

## 2020-12-11 RX ADMIN — QUETIAPINE FUMARATE 300 MG: 100 TABLET ORAL at 08:24

## 2020-12-11 RX ADMIN — ENOXAPARIN SODIUM 40 MG: 40 INJECTION SUBCUTANEOUS at 21:30

## 2020-12-11 RX ADMIN — PANTOPRAZOLE SODIUM 40 MG: 40 INJECTION, POWDER, FOR SOLUTION INTRAVENOUS at 08:23

## 2020-12-11 RX ADMIN — INSULIN GLARGINE 30 UNITS: 100 INJECTION, SOLUTION SUBCUTANEOUS at 09:53

## 2020-12-11 RX ADMIN — ENOXAPARIN SODIUM 40 MG: 40 INJECTION SUBCUTANEOUS at 08:24

## 2020-12-11 RX ADMIN — Medication 10 ML: at 08:23

## 2020-12-11 RX ADMIN — FUROSEMIDE 20 MG: 10 INJECTION, SOLUTION INTRAMUSCULAR; INTRAVENOUS at 00:22

## 2020-12-11 RX ADMIN — METHYLPREDNISOLONE SODIUM SUCCINATE 40 MG: 40 INJECTION, POWDER, LYOPHILIZED, FOR SOLUTION INTRAMUSCULAR; INTRAVENOUS at 08:22

## 2020-12-11 RX ADMIN — INSULIN LISPRO 9 UNITS: 100 INJECTION, SOLUTION INTRAVENOUS; SUBCUTANEOUS at 04:51

## 2020-12-11 RX ADMIN — CEFEPIME HYDROCHLORIDE 2 G: 2 INJECTION, POWDER, FOR SOLUTION INTRAVENOUS at 21:30

## 2020-12-11 ASSESSMENT — PAIN SCALES - GENERAL
PAINLEVEL_OUTOF10: 0

## 2020-12-11 ASSESSMENT — PULMONARY FUNCTION TESTS: PIF_VALUE: 26

## 2020-12-11 NOTE — PROGRESS NOTES
Hospitalist Progress Note      PCP: No primary care provider on file. Date of Admission: 11/29/2020    Chief [address] [place] [address] Course:       Subjective:  on HFNC, extubated yesterday, feels weak       Medications:  Reviewed    Infusion Medications    dextrose       Scheduled Medications    methylPREDNISolone  40 mg Intravenous Q12H    insulin glargine  30 Units Subcutaneous BID    QUEtiapine  300 mg Per NG tube BID    pantoprazole  40 mg Intravenous Daily    insulin lispro  0-18 Units Subcutaneous 6 times per day    [Held by provider] metoprolol  50 mg Oral BID    enoxaparin  40 mg Subcutaneous BID     PRN Meds: hydrALAZINE, potassium chloride, sodium chloride flush, magnesium sulfate, acetaminophen **OR** acetaminophen, ondansetron, albuterol sulfate HFA, glucose, dextrose, glucagon (rDNA), dextrose, sodium chloride, albuterol sulfate HFA **AND** ipratropium **AND** MDI Treatment      Intake/Output Summary (Last 24 hours) at 12/11/2020 0749  Last data filed at 12/11/2020 0222  Gross per 24 hour   Intake 1380.5 ml   Output 3385 ml   Net -2004.5 ml       Physical Exam Performed:    BP (!) 147/60   Pulse 93   Temp 99.1 °F (37.3 °C) (Core)   Resp 18   Ht 5' 4\" (1.626 m)   Wt 226 lb 6.6 oz (102.7 kg)   SpO2 97%   BMI 38.86 kg/m²     General appearance: No apparent distress, appears stated age and cooperative. obese  HEENT: Pupils equal, round, and reactive to light. Conjunctivae/corneas clear. Neck: Supple, with full range of motion. No jugular venous distention. Trachea midline. Respiratory:  Normal respiratory effort. Clear to auscultation, bilaterally without Rales/Wheezes/Rhonchi. Cardiovascular: Regular rate and rhythm with normal S1/S2 without murmurs, rubs or gallops. Abdomen: Soft, non-tender, non-distended with normal bowel sounds. Musculoskeletal: No clubbing, cyanosis or edema bilaterally. Full range of motion without deformity.   Skin: Skin color, texture, turgor to 2 days prior in the setting   of known COVID-19 infection. VL Extremity Venous Left   Final Result      XR CHEST PORTABLE   Final Result   Bilateral airspace disease is suspicious for pneumonia, and could be   compatible with history of COVID-19. Assessment/Plan:    Active Hospital Problems    Diagnosis    COPD (chronic obstructive pulmonary disease) ([facility] [J44.9]    Acute respiratory failure due to COVID-19 (HCC) [U07.1, J96.00]    Essential hypertension [I10]    Obesity, Class III, BMI 40-49.9 (morbid obesity) ([facility] [E66.01]    Type 2 diabetes mellitus, with long-term current use of insulin ([facility]) [E11.9, Z79.4]          Respiratory failure, COPD, COVID19    Titrate respiratory support according to patient's needs and advanced care plan.  Patient was requiring vapotherm  -  Patient seems to be at significant risk for progression of respiratory failure.  She was advised of the potential need for intubation and mechanical ventilation.  She was agreeable if necessary.  -  Repeated all pertinent inflammatory labs.  Obtained an ABG.  The first sample obtained was venous.  The second sample was arterial with a pO2 = 53.  Vapotherm made available if needed. -  Started BMI-adjusted low-intensity anticoagulation w/ lovenox 40mg bid. Lina Cheeks is suspicion for LLE DVT though so will request duplex venous U/S as well. -  Started solumedrol 40mg IV bid, remdesivir(ended 12/4), and 1U convalescent plasma.   -cvc/A-line placed 12/2  -intubated 12/2   -ID consulted 12/3, apprec recs   -precedex ggt was started 12/9, weaned off   -bronch 12/10     DM2  -  Hold all oral antidiabetic agents.  Start s.c.  Insulin regimen based on home regimen.   -on home lantus     Elevated creatinine due to ELOY- noted during stay, partly due to riggins obstruction  -nephro consulted 12/3, apprec mgmt     Hematuria- with hx of bladder ca per pt, supposed to get surgery in [facility]  -urology consulted for further recs  -continued riggins     DVT Prophylaxis: lovenox bid  Diet: DIET TUBE FEED CONTINUOUS/CYCLIC NPO; Renal Formula;  Orogastric; Continuous; 10; 45; Exceptions are: Sips with Meds  Code Status: Full Code    PT/OT Eval Status: not possible     Dispo - by Tues?, icu care, monitor labs,   Eli Starks MD

## 2020-12-11 NOTE — PROGRESS NOTES
12/11/20 1608   Oxygen Therapy/Pulse Ox   O2 Therapy Oxygen humidified   O2 Device High flow nasal cannula   O2 Flow Rate (L/min) 5 L/min   Resp 27   SpO2 100 %

## 2020-12-11 NOTE — PROGRESS NOTES
Pulmonary & Critical Care Inpatient Progress Note   Malika Roche MD     REASON FOR TODAY'S VISIT:  Critical care management    SUBJECTIVE:   Decreased oxygen requirements  Still with NG tube, failed speech eval again today    Scheduled Meds:   cefepime  2 g Intravenous Q12H    [START ON 12/12/2020] methylPREDNISolone  40 mg Intravenous Daily    insulin glargine  30 Units Subcutaneous BID    pantoprazole  40 mg Intravenous Daily    insulin lispro  0-18 Units Subcutaneous 6 times per day    metoprolol  50 mg Oral BID    enoxaparin  40 mg Subcutaneous BID       Continuous Infusions:   dextrose         PRN Meds:  hydrALAZINE, potassium chloride, sodium chloride flush, magnesium sulfate, acetaminophen **OR** acetaminophen, ondansetron, albuterol sulfate HFA, glucose, dextrose, glucagon (rDNA), dextrose, sodium chloride, albuterol sulfate HFA **AND** ipratropium **AND** MDI Treatment    ALLERGIES:  Patient is allergic to contrast [iodides]; ibuprofen; and lipitor [atorvastatin calcium]. Objective:   PHYSICAL EXAM:  BP (!) 142/64   Pulse 98   Temp 98.9 °F (37.2 °C) (Bladder)   Resp 22   Ht 5' 4\" (1.626 m)   Wt 226 lb 6.6 oz (102.7 kg)   SpO2 93%   BMI 38.86 kg/m²    Physical Exam  Constitutional:       General: She is not in acute distress. Appearance: She is well-developed. She is not diaphoretic. HENT:      Head: Normocephalic and atraumatic. Mouth/Throat:      Pharynx: No oropharyngeal exudate. Eyes:      Pupils: Pupils are equal, round, and reactive to light. Neck:      Musculoskeletal: Neck supple. Vascular: No JVD. Cardiovascular:      Heart sounds: Normal heart sounds. No murmur. No friction rub. No gallop. Pulmonary:      Effort: Pulmonary effort is normal.      Breath sounds: Rales present. No wheezing. Abdominal:      General: Bowel sounds are normal. There is no distension. Palpations: Abdomen is soft. Tenderness: There is no abdominal tenderness.

## 2020-12-11 NOTE — PROGRESS NOTES
[facility]    [facility]              [phone]                      She is admitted with hypoxemia and respiratory failure requiring intubation and mechanical ventilation due to COVID-19. We are following for ELOY and related issues    Interval History and plan:      Extubated   On NG tube  Cr 1  On water flushes  Made 3.3 L urine yesterday  No changes                     Assessment :     Acute Kidney Injury  ELOY likely due to -hemodynamic changes, Covid  Cr on consultation 1.2  Baseline Cr-0.8    UA-large blood, has Urena, RBC more than 100  Renal Imaging:-NA  Echo: N/A    Hypertension   BP: (125-147)/(60-80)  Pulse:  []   BP goal inpatient [id number] systolic inpatient  Coming down to 160s  On as needed hydralazine    COVID-19 positive, had respiratory failure requiring intubation and mechanical ventilation         [facility] would like to thank Vani Plascencia MD   for opportunity to serve this patient      Please call with questions at-   24 Hrs Answering service [phone] or  7 am- 5 pm via Perfect serve or cell phone  Dr.Sudhir Shira Epley          CC/reason for consult :     ELOY     HPI :     From consult note-   Kishor Rivera is a 70 y.o. female presented to   the hospital on 11/29/2020 with COVID-19 which was diagnosed on 11/26/2016. Per chart see you had fatigue headache loss in appetite since of smell and taste and followed by cough for several weeks. Now came with the shortness of breath. She had a difficulty breathing and came to [facility].  He has she required 10 L/min of oxygen. She is known to have COPD and congestive heart failure but no need for supplemental oxygen at home. She was transferred to ICU. She needed intubation and mechanical ventilation. Post intubation her blood pressure changes significantly. She also had poor urine output and rising creatinine.     She got 125 mL/h of fluids overnight now on 25 mL/h  Blood pressure has been fluctuating. Blood pressure went more than 200 but now coming up to 558 systolic    We are consulted for acute renal failure and related issues    ROS:     Seen with- RN    Unable to obtain ROS due to  Altered mental status           PMH/PSH/SH/Family History:     Past Medical History:   Diagnosis Date    COVID-19 11/26/2020    Hyperlipidemia     Hypertension     Neuropathy     Type II or unspecified type diabetes mellitus without mention of complication, not stated as uncontrolled        Past Surgical History:   Procedure Laterality Date    APPENDECTOMY      CHOLECYSTECTOMY      HUMERUS FRACTURE SURGERY      TUBAL LIGATION      TUMOR REMOVAL          reports that she has never smoked. She does not have any smokeless tobacco history on file. family history is not on file.          Medication:     Current Facility-Administered Medications: methylPREDNISolone sodium (SOLU-MEDROL) injection 40 mg, 40 mg, Intravenous, Q12H  insulin glargine (LANTUS) injection vial 30 Units, 30 Units, Subcutaneous, BID  QUEtiapine (SEROQUEL) tablet 300 mg, 300 mg, Per NG tube, BID  hydrALAZINE (APRESOLINE) injection 10 mg, 10 mg, Intravenous, Q4H PRN  potassium chloride 20 mEq/50 mL IVPB (Central Line), 20 mEq, Intravenous, PRN  pantoprazole (PROTONIX) injection 40 mg, 40 mg, Intravenous, Daily  insulin lispro (HUMALOG) injection vial 0-18 Units, 0-18 Units, Subcutaneous, 6 times per day  [Held by provider] metoprolol tartrate (LOPRESSOR) tablet 50 mg, 50 mg, Oral, BID  sodium chloride flush 0.9 % injection 10 mL, 10 mL, Intravenous, PRN  magnesium sulfate 1 g in dextrose 5% 100 mL IVPB, 1 g, Intravenous, PRN  acetaminophen (TYLENOL) tablet 650 mg, 650 mg, Oral, Q4H PRN **OR** acetaminophen (TYLENOL) suppository 650 mg, 650 mg, Rectal, Q4H PRN  ondansetron (ZOFRAN) injection 4 mg, 4 mg, Intravenous, Q6H PRN  albuterol sulfate  (90 Base) MCG/ACT inhaler 2 puff, 2 puff, Inhalation, Q4H PRN  glucose (GLUTOSE) 40 % oral gel 15 g, 15 g, Oral, PRN  dextrose 50 % IV solution, 12.5 g, Intravenous, PRN  glucagon (rDNA) injection 1 mg, 1 mg, Intramuscular, PRN  dextrose 5 % solution, 100 mL/hr, Intravenous, PRN  enoxaparin (LOVENOX) injection 40 mg, 40 mg, Subcutaneous, BID  0.9 % sodium chloride bolus, 30 mL, Intravenous, PRN  albuterol sulfate  (90 Base) MCG/ACT inhaler 2 puff, 2 puff, Inhalation, Q4H PRN **AND** ipratropium (ATROVENT HFA) 17 MCG/ACT inhaler 2 puff, 2 puff, Inhalation, Q4H PRN **AND** MDI Treatment, , , PRN       Vitals :     Vitals:    12/11/20 0821   BP:    Pulse:    Resp: 18   Temp:    SpO2: 96%       I & O :       Intake/Output Summary (Last 24 hours) at 12/11/2020 0911  Last data filed at 12/11/2020 0800  Gross per 24 hour   Intake 1076.6 ml   Output 3110 ml   Net -2033.4 ml        Physical Examination :     Due to the current efforts to prevent transmission of COVID-19 and also the need to preserve PPE for other caregivers, a face-to-face encounter with the patient was not performed. That being said, all relevant records and diagnostic tests were reviewed, including laboratory results and imaging. Please reference any relevant documentation elsewhere. Care will be coordinated with the primary service.          LABS:     Recent Labs     12/09/20  0550 12/10/20  0600 12/11/20  0445   WBC 15.0* 17.6* 18.2*   HGB 13.2 14.6 15.0   HCT 40.9 45.0 45.3    218 222     Recent Labs     12/09/20  0550 12/10/20  0600 12/11/20  0445    137 139   K 4.7 4.5 4.1    97* 96*   CO2 35* 35* 35*   BUN 75* 55* 58*   CREATININE 1.0 0.8 1.0   GLUCOSE 233* 288* 327*   MG 2.50* 1.90 1.80   PHOS 3.6 4.0 4.6

## 2020-12-11 NOTE — PROGRESS NOTES
This note also relates to the following rows which could not be included:  Resp - Cannot attach notes to unvalidated device data       12/10/20 1949   NIV Type   $NIV $Daily Charge   Skin Protection for O2 Device Yes   NIV Started/Stopped On   Equipment Type v60   Mode Bilevel   Mask Type Full face mask   Mask Size Large   Settings/Measurements   IPAP 12 cmH20   CPAP/EPAP 6 cmH2O   Rate Ordered 14   FiO2  50 %   I Time/ I Time % 0.9 s   Vt Exhaled 597 mL   Minute Volume 12.4 Liters   Mask Leak (lpm) 6 lpm   Comfort Level Good   Using Accessory Muscles No   SpO2 93   Breath Sounds   Right Upper Lobe Diminished   Right Middle Lobe Diminished   Right Lower Lobe Diminished   Left Upper Lobe Diminished   Left Lower Lobe Diminished   Alarm Settings   Alarms On Y

## 2020-12-11 NOTE — PROGRESS NOTES
Speech Language Pathology  Facility/Department: [facility] C2 CARD TELEMETRY   CLINICAL BEDSIDE SWALLOW EVALUATION        Recommended Diet and Intervention  Diet Solids Recommendation: NPO  Liquid Consistency Recommendation: NPO  Recommended Form of Meds: Via alternative means of nutrition  Oral care 3x/day with simultaneous suction; low volume ice chips (2-3) only after completion of oral care      NAME: Rene Sumner  : 1945  MRN: [id number]    ADMISSION DATE: 2020  ADMITTING DIAGNOSIS: has Lateral malleolar fracture; Metatarsalgia of left foot; Plantar fascial fibromatosis; Acute respiratory failure due to COVID-19 [facility]); Essential hypertension; Obesity, Class III, BMI 40-49.9 (morbid obesity) ([facility] Type 2 diabetes mellitus, with long-term current use of insulin ([facility] DM (diabetes mellitus), secondary uncontrolled ([facility] Morbid obesity with BMI of 40.0-44.9, adult ([facility] and COPD (chronic obstructive pulmonary disease) ([facility] on their problem list.  ONSET DATE: Pt admitted to [facility] on 20    Recent Chest Xray (12/10/20): Impression    Persistent bilateral airspace disease with mild improvement on the right        Date of Eval: 2020  Evaluating Therapist: Campos Olmstead    Current Diet level:  Current Diet : NPO(w/ NG)  Current Liquid Diet : NPO(w/ NG)      Primary Complaint  Patient Complaint: Per MD H&P, \"Angelita Castaneda is a 70 y.o. female. She was accepted in transfer from [facility]. She presentd there w/ respiratory failure requiring 10L/min O2 supplementation. She was diagnosed with COVID19 on . She was tested 3 days prior to that at [facility] but she never got the result. The documentation accompanying her includes only the  result.       She estimates she has been symptomatic for about two weeks overall. Initial symptoms were profound fatigue, headache, loss of appetite and sense of smell & taste, and finally cough. Dyspnea has evolved over time.   She relates her dyspnea has rapidly accelerated over the past 2 - 3 days.     Cough has been usually non-productive, but sometimes she expectorates white sputum. Denies pleuritic pain and hemoptysis.     She reports a h/o COPD CHF. She does not require supplemental O2 at baseline\". Pain:  Pain Assessment  Pain Assessment: 0-10  Pain Level: 0  Patient's Stated Pain Goal: No pain  Non-Pharmaceutical Pain Intervention(s): Repositioned  Response to Pain Intervention: Drowsy  RASS Score: Alert and calm    Reason for Referral  Diego Montalvo was referred for a bedside swallow evaluation to assess the efficiency of her swallow function, identify signs and symptoms of aspiration and make recommendations regarding safe dietary consistencies, effective compensatory strategies, and safe eating environment. Impression  Dysphagia Diagnosis: Moderate pharyngeal stage dysphagia; Moderate oral stage dysphagia; Suspected needs further assessment  Dysphagia Outcome Severity Scale: Level 1: Severe dysphagia- NPO. Unable to tolerate any PO safely   Pt seen upright in bed, leaning to R side despite repositioning, fatigued. RN OK'd SLP entry and evaluation. Pt with NG in place. RN reported pt given tsp of water and single pill PO with immediate cough. Pt observed with minimal PO trials this date (x3 ice chips, x2 1/4 tsp puree) d/t overt s/s of aspiration/penetration observed with limited PO and reduced JUAN ALBERTO. Pt required suctioning of scant oral secretions after several PO trials. See oral and pharyngeal phase sections below for details. Recommend pt continue to be strict NPO with NG for meds / nutrition. RN aware of recs. ST to continue to follow. Treatment Plan  Requires SLP Intervention: Yes  Duration/Frequency of Treatment: 3-5x/week for LOS  D/C Recommendations:  To be determined    Recommended Diet and Intervention  Diet Solids Recommendation: NPO  Liquid Consistency Recommendation: NPO  Recommended Form of Meds: Via alternative means of nutrition  Recommendations: NPO;Dysphagia treatment  Therapeutic Interventions: Diet tolerance monitoring;Oral care; Patient/Family education; Therapeutic PO trials with SLP    Compensatory Swallowing Strategies  NPO  Oral care with simultaneous suction 3x/day  Upright positioning as able    Treatment/Goals  Short-term Goals  Timeframe for Short-term Goals: 7 days (12/18/20)  Long-term Goals  Timeframe for Long-term Goals: 10 days (12/21/20)  Goal 1: The pt will tolerate safest and least restrictive diet without s/s of aspiration. Dysphagia Goals: The patient will tolerate recommended diet without observed clinical signs of aspiration; The patient/caregiver will demonstrate understanding of compensatory strategies for improved swallowing safety. ;The patient will tolerate instrumental swallowing procedure; The patient will tolerate puree foods 10/10. General  Chart Reviewed: Yes  Comments: Pt admitted d/t COVID. Pt s/p bronch on 12/10 with report of \"copious ETT secretions\". Pt intubated 12/2-12/10. Behavior/Cognition: Cooperative;Lethargic;Requires cueing  Respiratory Status: O2 via nasual cannula  O2 Device: Nasal cannula  Liters of Oxygen: 7 L  Communication Observation: Functional(Lethargic)  Follows Directions: Simple  Dentition: Adequate; Some missing teeth  Patient Positioning: Upright in bed(Leaning to R side despite repositioning)  Baseline Vocal Quality: Weak;Hoarse  Volitional Cough: Weak  Volitional Swallow: Delayed  Prior Dysphagia History: No hx of dysphagia per chart review. Consistencies Administered: Dysphagia Pureed (Dysphagia I); Ice Chips    Vision/Hearing  Vision  Vision: Within Functional Limits(Appears WFL)  Hearing  Hearing: Within functional limits(Appears WFL)    Oral Motor Deficits  Oral/Motor  Oral Motor: Exceptions to WFL(Generalized weakness)  Labial ROM: Reduced left; Reduced right  Labial Strength: Reduced  Labial Coordination: Reduced  Lingual ROM: Reduced left;Reduced right  Lingual Strength: Reduced  Lingual Coordination: Reduced    Oral Phase Dysfunction  Oral Phase  Oral Phase: Exceptions  Oral Phase Dysfunction  Decreased Anterior to Posterior Transit: Puree  Suspected Premature Bolus Loss: All  Oral Phase  Oral Phase - Comment: Weak lingual manipulation and reduced bolus control observed with minimal ice chips (x3) and 1/4 tsp puree trials (x2). Indicators of Pharyngeal Phase Dysfunction   Pharyngeal Phase  Pharyngeal Phase: Exceptions  Indicators of Pharyngeal Phase Dysfunction  Decreased Laryngeal Elevation: All  Throat Clearing - Immediate: Puree; Ice chips  Throat Clearing - Delayed: Ice chips  Pharyngeal Phase   Pharyngeal: Pt with immediate extremely weak throat clear in 2/3 ice chip trials, 2/2 1/4 tsp puree (*of note, pt also has NG in place). PO trials discontinued at this time. No instances of wet vocal quality throughout. Pt's RR 24-28/min throughout BSE, O2 sats 93-94%. Prognosis  Prognosis  Prognosis for safe diet advancement: fair  Barriers to reach goals: time post onset;severity of dysphagia;fatigue  Individuals consulted  Consulted and agree with results and recommendations: Patient;RN(NP)    Education  Patient Education: Pt educated on reason for referral, role of ST, assessment results and recommendations. Patient Education Response: Needs reinforcement; No evidence of learning  Safety Devices in place: Yes  Type of devices: Bed alarm in place;Call light within reach;Nurse notified; Left in bed; All fall risk precautions in place       Therapy Time  SLP Individual Minutes  Time In: 6313  Time Out: [address]  Minutes: 16; dysphagia fatuma Shaver M.S. Kelvin Alley  Speech-language pathologist  [phone]

## 2020-12-11 NOTE — PROGRESS NOTES
12/11/20 0047   NIV Type   Equipment Changed Other (comment)  (new mepliex )   NIV Started/Stopped On   Equipment Type v60   Mode Bilevel   Mask Type Full face mask   Mask Size Large   Settings/Measurements   IPAP 12 cmH20   CPAP/EPAP 6 cmH2O   Rate Ordered 14   Resp 21   FiO2  50 %   Vt Exhaled 522 mL   Minute Volume 10.6 Liters   Mask Leak (lpm) 14 lpm   Comfort Level Good   Using Accessory Muscles No   SpO2 95   Breath Sounds   Right Upper Lobe Diminished   Right Middle Lobe Diminished   Right Lower Lobe Diminished   Left Upper Lobe Diminished   Left Lower Lobe Diminished   Alarm Settings   Alarms On Y   Press Low Alarm 6 cmH2O   High Pressure Alarm 30 cmH2O   Delay Alarm 20 sec(s)   Resp Rate Low Alarm 6   High Respiratory Rate 40 br/min

## 2020-12-11 NOTE — PLAN OF CARE
Problem: Nutrition  Intervention: Swallowing evaluation  Note: Bedside swallow evaluation completed this date. Jessica Joy M.S. [id number] [facility]  Speech-language pathologist  [id number]      Intervention: Aspiration precautions  Note: Bedside swallow evaluation completed this date.     Jessica Joy M.S. [id number] [facility]  Speech-language pathologist  [id number]

## 2020-12-11 NOTE — PROGRESS NOTES
12/11/20 0346   NIV Type   Skin Protection for O2 Device Yes   NIV Started/Stopped On   Equipment Type v60   Mode Bilevel   Mask Type Full face mask   Mask Size Medium   Settings/Measurements   IPAP 12 cmH20   CPAP/EPAP 6 cmH2O   Rate Ordered 14   Resp 26   FiO2  60 %   Vt Exhaled 348 mL   Minute Volume 7.9 Liters   Mask Leak (lpm) 37 lpm   Comfort Level Good   Using Accessory Muscles No   SpO2 96   Breath Sounds   Right Upper Lobe Diminished   Right Middle Lobe Diminished   Right Lower Lobe Diminished   Left Upper Lobe Diminished   Left Lower Lobe Diminished   Alarm Settings   Alarms On Y   Press Low Alarm 6 cmH2O   High Pressure Alarm 30 cmH2O   Delay Alarm 20 sec(s)   Resp Rate Low Alarm 6   High Respiratory Rate 40 br/min

## 2020-12-11 NOTE — PROGRESS NOTES
38.8  · BMI Categories: Obese Class 3 (BMI 40.0 or greater)       Nutrition Diagnosis:   · Inadequate energy intake related to lack or limited access to food, impaired respiratory function as evidenced by NPO or clear liquid status due to medical condition, intubation      Nutrition Interventions:   Food and/or Nutrient Delivery:  Start Oral Diet, Start Oral Nutrition Supplement  Nutrition Education/Counseling:  No recommendation at this time   Coordination of Nutrition Care:  Swallow Evaluation, Speech Therapy    Goals:  Patient will tolerate nutrition initiation without worsening respiratory function or blood sugars greater than 180 mg/dl. Nutrition Monitoring and Evaluation:   Food/Nutrient Intake Outcomes:  Enteral Nutrition Intake/Tolerance  Physical Signs/Symptoms Outcomes:  Biochemical Data, Nutrition Focused Physical Findings     Discharge Planning: Too soon to determine     Electronically signed by Gary Munroe.  Alfie Metcalf RD, LD on 12/11/20 at 10:52 AM EST    Contact: [phone]

## 2020-12-11 NOTE — PROGRESS NOTES
12/11/20 0821   Oxygen Therapy/Pulse Ox   O2 Therapy Oxygen humidified   $Oxygen $Daily Charge   O2 Device High flow nasal cannula   O2 Flow Rate (L/min) 8 L/min   Resp 18   SpO2 96 %   $Pulse Oximeter $Spot check (multiple/continuous)

## 2020-12-12 ENCOUNTER — APPOINTMENT (OUTPATIENT)
Dept: GENERAL RADIOLOGY | Age: 75
DRG: 207 | End: 2020-12-12
Attending: INTERNAL MEDICINE
Payer: MEDICARE

## 2020-12-12 LAB
A/G RATIO: 0.8 (ref 1.1–2.2)
ALBUMIN SERPL-MCNC: 2.6 G/DL (ref 3.4–5)
ALP BLD-CCNC: 53 U/L (ref 40–129)
ALT SERPL-CCNC: 25 U/L (ref 10–40)
ANION GAP SERPL CALCULATED.3IONS-SCNC: 6 MMOL/L (ref 3–16)
AST SERPL-CCNC: 26 U/L (ref 15–37)
BANDED NEUTROPHILS RELATIVE PERCENT: 6 % (ref 0–7)
BASE EXCESS ARTERIAL: 8.8 MMOL/L (ref -3–3)
BASOPHILS ABSOLUTE: 0 K/UL (ref 0–0.2)
BASOPHILS RELATIVE PERCENT: 0 %
BILIRUB SERPL-MCNC: 0.9 MG/DL (ref 0–1)
BUN BLDV-MCNC: 51 MG/DL (ref 7–20)
C-REACTIVE PROTEIN: 4.5 MG/L (ref 0–5.1)
CALCIUM SERPL-MCNC: 8.6 MG/DL (ref 8.3–10.6)
CARBOXYHEMOGLOBIN ARTERIAL: 0.5 % (ref 0–1.5)
CHLORIDE BLD-SCNC: 102 MMOL/L (ref 99–110)
CO2: 33 MMOL/L (ref 21–32)
CREAT SERPL-MCNC: 0.9 MG/DL (ref 0.6–1.2)
EOSINOPHILS ABSOLUTE: 0 K/UL (ref 0–0.6)
EOSINOPHILS RELATIVE PERCENT: 0 %
GFR AFRICAN AMERICAN: >60
GFR NON-AFRICAN AMERICAN: >60
GLOBULIN: 3.3 G/DL
GLUCOSE BLD-MCNC: 152 MG/DL (ref 70–99)
GLUCOSE BLD-MCNC: 153 MG/DL (ref 70–99)
GLUCOSE BLD-MCNC: 71 MG/DL (ref 70–99)
GLUCOSE BLD-MCNC: 72 MG/DL (ref 70–99)
GLUCOSE BLD-MCNC: 76 MG/DL (ref 70–99)
GLUCOSE BLD-MCNC: 92 MG/DL (ref 70–99)
GLUCOSE BLD-MCNC: 94 MG/DL (ref 70–99)
HCO3 ARTERIAL: 31.7 MMOL/L (ref 21–29)
HCT VFR BLD CALC: 45.2 % (ref 36–48)
HEMOGLOBIN, ART, EXTENDED: 17.4 G/DL (ref 12–16)
HEMOGLOBIN: 14.9 G/DL (ref 12–16)
LYMPHOCYTES ABSOLUTE: 0.8 K/UL (ref 1–5.1)
LYMPHOCYTES RELATIVE PERCENT: 5 %
MAGNESIUM: 2.1 MG/DL (ref 1.8–2.4)
MCH RBC QN AUTO: 27.1 PG (ref 26–34)
MCHC RBC AUTO-ENTMCNC: 33.1 G/DL (ref 31–36)
MCV RBC AUTO: 81.8 FL (ref 80–100)
METHEMOGLOBIN ARTERIAL: 0.4 %
MONOCYTES ABSOLUTE: 1 K/UL (ref 0–1.3)
MONOCYTES RELATIVE PERCENT: 6 %
NEUTROPHILS ABSOLUTE: 14.2 K/UL (ref 1.7–7.7)
NEUTROPHILS RELATIVE PERCENT: 83 %
O2 CONTENT ARTERIAL: 22 ML/DL
O2 SAT, ARTERIAL: 92.3 %
O2 THERAPY: ABNORMAL
PCO2 ARTERIAL: 37.3 MMHG (ref 35–45)
PDW BLD-RTO: 14.5 % (ref 12.4–15.4)
PERFORMED ON: ABNORMAL
PERFORMED ON: ABNORMAL
PERFORMED ON: NORMAL
PH ARTERIAL: 7.55 (ref 7.35–7.45)
PHOSPHORUS: 3.4 MG/DL (ref 2.5–4.9)
PLATELET # BLD: 235 K/UL (ref 135–450)
PMV BLD AUTO: 10.4 FL (ref 5–10.5)
PO2 ARTERIAL: 61.6 MMHG (ref 75–108)
POTASSIUM SERPL-SCNC: 3.6 MMOL/L (ref 3.5–5.1)
RBC # BLD: 5.52 M/UL (ref 4–5.2)
RBC # BLD: NORMAL 10*6/UL
SODIUM BLD-SCNC: 141 MMOL/L (ref 136–145)
TCO2 ARTERIAL: 32.8 MMOL/L
TOTAL PROTEIN: 5.9 G/DL (ref 6.4–8.2)
WBC # BLD: 15.9 K/UL (ref 4–11)

## 2020-12-12 PROCEDURE — 6370000000 HC RX 637 (ALT 250 FOR IP): Performed by: NURSE PRACTITIONER

## 2020-12-12 PROCEDURE — 6360000002 HC RX W HCPCS: Performed by: INTERNAL MEDICINE

## 2020-12-12 PROCEDURE — C9113 INJ PANTOPRAZOLE SODIUM, VIA: HCPCS | Performed by: NURSE PRACTITIONER

## 2020-12-12 PROCEDURE — 82803 BLOOD GASES ANY COMBINATION: CPT

## 2020-12-12 PROCEDURE — 83735 ASSAY OF MAGNESIUM: CPT

## 2020-12-12 PROCEDURE — 94761 N-INVAS EAR/PLS OXIMETRY MLT: CPT

## 2020-12-12 PROCEDURE — 2580000003 HC RX 258: Performed by: INTERNAL MEDICINE

## 2020-12-12 PROCEDURE — 2700000000 HC OXYGEN THERAPY PER DAY

## 2020-12-12 PROCEDURE — 85025 COMPLETE CBC W/AUTO DIFF WBC: CPT

## 2020-12-12 PROCEDURE — 86140 C-REACTIVE PROTEIN: CPT

## 2020-12-12 PROCEDURE — 84100 ASSAY OF PHOSPHORUS: CPT

## 2020-12-12 PROCEDURE — 2000000000 HC ICU R&B

## 2020-12-12 PROCEDURE — 74018 RADEX ABDOMEN 1 VIEW: CPT

## 2020-12-12 PROCEDURE — 92526 ORAL FUNCTION THERAPY: CPT

## 2020-12-12 PROCEDURE — 6360000002 HC RX W HCPCS: Performed by: NURSE PRACTITIONER

## 2020-12-12 PROCEDURE — 37799 UNLISTED PX VASCULAR SURGERY: CPT

## 2020-12-12 PROCEDURE — 80053 COMPREHEN METABOLIC PANEL: CPT

## 2020-12-12 RX ORDER — FUROSEMIDE 10 MG/ML
40 INJECTION INTRAMUSCULAR; INTRAVENOUS ONCE
Status: COMPLETED | OUTPATIENT
Start: 2020-12-12 | End: 2020-12-12

## 2020-12-12 RX ORDER — CHLOROTHIAZIDE SODIUM 500 MG/1
500 INJECTION INTRAVENOUS ONCE
Status: COMPLETED | OUTPATIENT
Start: 2020-12-12 | End: 2020-12-12

## 2020-12-12 RX ORDER — METHYLPREDNISOLONE SODIUM SUCCINATE 40 MG/ML
40 INJECTION, POWDER, LYOPHILIZED, FOR SOLUTION INTRAMUSCULAR; INTRAVENOUS EVERY 6 HOURS
Status: DISCONTINUED | OUTPATIENT
Start: 2020-12-12 | End: 2020-12-14

## 2020-12-12 RX ADMIN — CEFEPIME HYDROCHLORIDE 2 G: 2 INJECTION, POWDER, FOR SOLUTION INTRAVENOUS at 09:55

## 2020-12-12 RX ADMIN — METHYLPREDNISOLONE SODIUM SUCCINATE 40 MG: 40 INJECTION, POWDER, FOR SOLUTION INTRAMUSCULAR; INTRAVENOUS at 08:02

## 2020-12-12 RX ADMIN — INSULIN GLARGINE 30 UNITS: 100 INJECTION, SOLUTION SUBCUTANEOUS at 08:03

## 2020-12-12 RX ADMIN — INSULIN LISPRO 3 UNITS: 100 INJECTION, SOLUTION INTRAVENOUS; SUBCUTANEOUS at 22:30

## 2020-12-12 RX ADMIN — CEFEPIME HYDROCHLORIDE 2 G: 2 INJECTION, POWDER, FOR SOLUTION INTRAVENOUS at 21:51

## 2020-12-12 RX ADMIN — ENOXAPARIN SODIUM 40 MG: 40 INJECTION SUBCUTANEOUS at 08:01

## 2020-12-12 RX ADMIN — HYDRALAZINE HYDROCHLORIDE 10 MG: 20 INJECTION INTRAMUSCULAR; INTRAVENOUS at 05:11

## 2020-12-12 RX ADMIN — ENOXAPARIN SODIUM 40 MG: 40 INJECTION SUBCUTANEOUS at 21:51

## 2020-12-12 RX ADMIN — FUROSEMIDE 40 MG: 10 INJECTION, SOLUTION INTRAMUSCULAR; INTRAVENOUS at 10:33

## 2020-12-12 RX ADMIN — METHYLPREDNISOLONE SODIUM SUCCINATE 40 MG: 40 INJECTION, POWDER, LYOPHILIZED, FOR SOLUTION INTRAMUSCULAR; INTRAVENOUS at 14:47

## 2020-12-12 RX ADMIN — INSULIN LISPRO 3 UNITS: 100 INJECTION, SOLUTION INTRAVENOUS; SUBCUTANEOUS at 16:45

## 2020-12-12 RX ADMIN — METOPROLOL TARTRATE 50 MG: 50 TABLET, FILM COATED ORAL at 21:51

## 2020-12-12 RX ADMIN — PANTOPRAZOLE SODIUM 40 MG: 40 INJECTION, POWDER, FOR SOLUTION INTRAVENOUS at 08:01

## 2020-12-12 RX ADMIN — CHLOROTHIAZIDE SODIUM 500 MG: 500 INJECTION, POWDER, LYOPHILIZED, FOR SOLUTION INTRAVENOUS at 11:23

## 2020-12-12 RX ADMIN — METHYLPREDNISOLONE SODIUM SUCCINATE 40 MG: 40 INJECTION, POWDER, LYOPHILIZED, FOR SOLUTION INTRAMUSCULAR; INTRAVENOUS at 21:51

## 2020-12-12 RX ADMIN — METOPROLOL TARTRATE 50 MG: 50 TABLET, FILM COATED ORAL at 08:01

## 2020-12-12 RX ADMIN — INSULIN GLARGINE 30 UNITS: 100 INJECTION, SOLUTION SUBCUTANEOUS at 22:30

## 2020-12-12 RX ADMIN — Medication 10 ML: at 08:01

## 2020-12-12 ASSESSMENT — PAIN SCALES - GENERAL
PAINLEVEL_OUTOF10: 0

## 2020-12-12 NOTE — PROGRESS NOTES
Hospitalist Progress Note      PCP: No primary care provider on file. Date of Admission: 11/29/2020    Chief [address] [place] [address] Course:       Subjective:  on 6 L O2.        Medications:  Reviewed    Infusion Medications    dextrose       Scheduled Medications    methylPREDNISolone  40 mg Intravenous Q6H    cefepime  2 g Intravenous Q12H    insulin glargine  30 Units Subcutaneous BID    pantoprazole  40 mg Intravenous Daily    insulin lispro  0-18 Units Subcutaneous 6 times per day    metoprolol  50 mg Oral BID    enoxaparin  40 mg Subcutaneous BID     PRN Meds: hydrALAZINE, potassium chloride, sodium chloride flush, magnesium sulfate, acetaminophen **OR** acetaminophen, ondansetron, albuterol sulfate HFA, glucose, dextrose, glucagon (rDNA), dextrose, sodium chloride, albuterol sulfate HFA **AND** ipratropium **AND** MDI Treatment      Intake/Output Summary (Last 24 hours) at 12/12/2020 1509  Last data filed at 12/12/2020 1445  Gross per 24 hour   Intake 156.9 ml   Output 3875 ml   Net -3718.1 ml       Physical Exam Performed:    /69   Pulse 84   Temp 99.4 °F (37.4 °C) (Bladder)   Resp 20   Ht 5' 4\" (1.626 m)   Wt 226 lb 6.6 oz (102.7 kg)   SpO2 96%   BMI 38.86 kg/m²     General appearance: No apparent distress, appears stated age and cooperative. obese  HEENT: Pupils equal, round, and reactive to light. Conjunctivae/corneas clear. Neck: Supple, with full range of motion. No jugular venous distention. Trachea midline. Respiratory:  Normal respiratory effort. Clear to auscultation, bilaterally without Rales/Wheezes/Rhonchi. Cardiovascular: Regular rate and rhythm with normal S1/S2 without murmurs, rubs or gallops. Abdomen: Soft, non-tender, non-distended with normal bowel sounds. Musculoskeletal: No clubbing, cyanosis or edema bilaterally. Full range of motion without deformity. Skin: Skin color, texture, turgor normal.  No rashes or lesions. Neurologic:  Neurovascularly intact without any focal sensory/motor deficits. Cranial nerves: II-XII intact, grossly non-focal.  Psychiatric: Alert and oriented, thought content appropriate, normal insight  Capillary Refill: Brisk,< 3 seconds   Peripheral Pulses: +2 palpable, equal bilaterally       Labs:   Recent Labs     12/10/20  0600 12/11/20 0445 12/12/20 0441   WBC 17.6* 18.2* 15.9*   HGB 14.6 15.0 14.9   HCT 45.0 45.3 45.2    222 235     Recent Labs     12/10/20  0600 12/11/20 0445 12/12/20 0441    139 141   K 4.5 4.1 3.6   CL 97* 96* 102   CO2 35* 35* 33*   BUN 55* 58* 51*   CREATININE 0.8 1.0 0.9   CALCIUM 9.0 8.9 8.6   PHOS 4.0 4.6 3.4     Recent Labs     12/10/20  0600 12/11/20  0445 12/12/20  0441   AST 21 22 26   ALT 29 27 25   BILITOT 0.8 0.8 0.9   ALKPHOS 62 67 53     No results for input(s): INR in the last 72 hours. No results for input(s): Connee Matar in the last 72 hours. Urinalysis:      Lab Results   Component Value Date    NITRU Negative 12/01/2020    WBCUA see below 12/01/2020    RBCUA >100 12/01/2020    BLOODU LARGE 12/01/2020    SPECGRAV 1.020 12/01/2020    GLUCOSEU 100 12/01/2020       Radiology:  XR CHEST PORTABLE   Final Result   Persistent bilateral airspace disease with mild improvement on the right         XR CHEST PORTABLE   Final Result   Support apparatus appears stable. Mild congestive failure, pulmonary edema which is similar to slightly   improved when accounting for differences in technique. Superimposed   pneumonia cannot be excluded in a patient with known COVID-19 infection. XR ABDOMEN FOR NG/OG/NE TUBE PLACEMENT   Final Result   Support lines and tubes as above. Worsening diffuse pulmonary opacities relative to 2 days prior in the setting   of known COVID-19 infection. XR CHEST PORTABLE   Final Result   Support lines and tubes as above.       Worsening diffuse pulmonary opacities relative to 2 days prior in the setting

## 2020-12-12 NOTE — PROGRESS NOTES
Speech Language Pathology  Facility/Department: [facility] C2 CARD TELEMETRY  Dysphagia Daily Treatment Note    NAME: Sandy Thakkar  :   MRN: [id number]    Patient Diagnosis(es):   Patient Active Problem List    Diagnosis Date Noted    COPD (chronic obstructive pulmonary disease) ([facility] 2020    Acute respiratory failure due to COVID-19 [facility]) 2020    Essential hypertension 2020    Obesity, Class III, BMI 40-49.9 (morbid obesity) ([facility] 2020    Type 2 diabetes mellitus, with long-term current use of insulin ([facility] 2020    DM ([facility]abetes mellitus), secondary uncontrolled [facility]    Morbid obesity with BMI of 40.0-44.9, adult ([facility] 2020    Metatarsalgia of left foot 2015    Plantar fascial fibromatosis 2015    Lateral malleolar fracture 10/16/2014     Allergies: Allergies   Allergen Reactions    Contrast [Iodides] Other (See Comments)     Kidney problem    Ibuprofen     Lipitor [Atorvastatin Calcium] Other (See Comments)     Joint pains     Subjective: 70year old female admitted on 20 with covid-19. Pt was intubated from 20-12/10/20. Pain: pt denies    Current Diet: Diet NPO Effective Now    Diet Tolerance:  Patient tolerating current diet level without signs/symptoms of penetration / aspiration. P.O. Trials: Thin   x Ice chip x 1, spoon sip x 2   Puree   x X 8     Dysphagia Treatment and Impressions:  RN okays SLP entry into pt's room. RN reports that pt is quite congested this date. Pt is on 6lpm O2 via NC. RR is 20/min prior to po trials with O2 sat of 96%. Oral phase of swallow is [facility] for all po trials. Pt exhibits swallow x 5-6 per pureed food trial, indicating pharyngeal retention post swallow. However, pt develops wet throat clearing after pureed food trial, 4th trial on, with wet coughing post swallow after 7th and 8th trial.  Vocal quality is mildly wet sounding. RR has increased to 28/min. Suspected laryngeal penetration versus aspiration of pureed food trials. Pt cued to cough hard and is able to clear vocal quality. Pt displays immediate wet coughing post swallow with spoon sip trials of water. RR sustained at 28/min. PO trials discontinued at this point due to risk of aspiration with all po trials. Dysphagia Goals:  Timeframe for Long-term Goals: 10 days (12/21/20)  Goal 1: The pt will tolerate safest and least restrictive diet without s/s of aspiration. Today, 12/12: Pt still displaying s/s of aspiration with po intake. Pt NPO. Ongoing. Short-term Goals  Timeframe for Short-term Goals: 7 days (12/18/20)  1. The patient will tolerate recommended diet without observed clinical signs of aspiration, Today, 12/12: Pt still displaying s/s of aspiration with po intake. Pt NPO. Ongoing. 2. The patient/caregiver will demonstrate understanding of compensatory strategies for improved swallowing safety., Not applicable  3. The patient will tolerate instrumental swallowing procedure, Not applicable this date  4. The patient will tolerate puree foods 10/10. Today, 12/12: Not progressing. Ongoing. Recommendations:  Continue NPO; continue alternative means of nutrition  Consider MBS once pt our of droplet plus precautions    Patient/Family/Caregiver Education: reviewed plan of care and recommedations    Compensatory Strategies:  - Ice chip x 2 following pasted toothbrushing; 2x per day    Plan:    Continued Dysphagia treatment with goals per plan of care. Discharge Recommendations: defer to PT/OT    If pt discharges from hospital prior to Speech/Swallowing discharge, this note serves as tx and discharge summary. Total Treatment Time / Charges     Time in Time out Total Time / units   Cognitive Tx         Speech Tx      Dysphagia Tx  [id number] 13 min / 1 unit     Signature:    Rachelle Dunlap, [id number]  Speech-Language Pathologist

## 2020-12-12 NOTE — PROGRESS NOTES
Nutrition:    If tube feeding started since unable to start oral diet recommend     · Nepro @ 45 mL/hr x 20 hrs to provide 900 mL TV, 1620 kcals, 73 g protein, 654 mL free water daily. + 1 proteinex modular to provide an additioanl 26 g protein and 104 kcals. + FWF per MD to promote free water deficit.     Office: [phone]; [address] [place] [address] Road: [id number]  Electronically signed by Natalia Antoine RD, LD on 12/12/2020 at 2:13 PM

## 2020-12-13 LAB
A/G RATIO: 0.9 (ref 1.1–2.2)
ALBUMIN SERPL-MCNC: 3 G/DL (ref 3.4–5)
ALP BLD-CCNC: 68 U/L (ref 40–129)
ALT SERPL-CCNC: 31 U/L (ref 10–40)
ANION GAP SERPL CALCULATED.3IONS-SCNC: 12 MMOL/L (ref 3–16)
AST SERPL-CCNC: 30 U/L (ref 15–37)
BANDED NEUTROPHILS RELATIVE PERCENT: 8 % (ref 0–7)
BASOPHILS ABSOLUTE: 0 K/UL (ref 0–0.2)
BASOPHILS RELATIVE PERCENT: 0 %
BILIRUB SERPL-MCNC: 1.1 MG/DL (ref 0–1)
BUN BLDV-MCNC: 52 MG/DL (ref 7–20)
CALCIUM SERPL-MCNC: 8.8 MG/DL (ref 8.3–10.6)
CHLORIDE BLD-SCNC: 99 MMOL/L (ref 99–110)
CO2: 29 MMOL/L (ref 21–32)
CREAT SERPL-MCNC: 0.9 MG/DL (ref 0.6–1.2)
EKG ATRIAL RATE: 85 BPM
EKG DIAGNOSIS: NORMAL
EKG P AXIS: 57 DEGREES
EKG P-R INTERVAL: 124 MS
EKG Q-T INTERVAL: 384 MS
EKG QRS DURATION: 84 MS
EKG QTC CALCULATION (BAZETT): 456 MS
EKG R AXIS: 30 DEGREES
EKG T AXIS: 128 DEGREES
EKG VENTRICULAR RATE: 85 BPM
EOSINOPHILS ABSOLUTE: 0 K/UL (ref 0–0.6)
EOSINOPHILS RELATIVE PERCENT: 0 %
GFR AFRICAN AMERICAN: >60
GFR NON-AFRICAN AMERICAN: >60
GLOBULIN: 3.3 G/DL
GLUCOSE BLD-MCNC: 165 MG/DL (ref 70–99)
GLUCOSE BLD-MCNC: 166 MG/DL (ref 70–99)
GLUCOSE BLD-MCNC: 182 MG/DL (ref 70–99)
GLUCOSE BLD-MCNC: 189 MG/DL (ref 70–99)
GLUCOSE BLD-MCNC: 202 MG/DL (ref 70–99)
GLUCOSE BLD-MCNC: 207 MG/DL (ref 70–99)
GLUCOSE BLD-MCNC: 207 MG/DL (ref 70–99)
HCT VFR BLD CALC: 50.6 % (ref 36–48)
HEMOGLOBIN: 16.8 G/DL (ref 12–16)
LYMPHOCYTES ABSOLUTE: 0.7 K/UL (ref 1–5.1)
LYMPHOCYTES RELATIVE PERCENT: 4 %
MAGNESIUM: 2.2 MG/DL (ref 1.8–2.4)
MCH RBC QN AUTO: 27.1 PG (ref 26–34)
MCHC RBC AUTO-ENTMCNC: 33.3 G/DL (ref 31–36)
MCV RBC AUTO: 81.4 FL (ref 80–100)
MONOCYTES ABSOLUTE: 0 K/UL (ref 0–1.3)
MONOCYTES RELATIVE PERCENT: 0 %
NEUTROPHILS ABSOLUTE: 16.9 K/UL (ref 1.7–7.7)
NEUTROPHILS RELATIVE PERCENT: 88 %
PDW BLD-RTO: 14.7 % (ref 12.4–15.4)
PERFORMED ON: ABNORMAL
PHOSPHORUS: 4 MG/DL (ref 2.5–4.9)
PLATELET # BLD: 280 K/UL (ref 135–450)
PLATELET SLIDE REVIEW: ADEQUATE
PMV BLD AUTO: 10.9 FL (ref 5–10.5)
POTASSIUM SERPL-SCNC: 3.5 MMOL/L (ref 3.5–5.1)
RBC # BLD: 6.21 M/UL (ref 4–5.2)
RBC # BLD: NORMAL 10*6/UL
SODIUM BLD-SCNC: 140 MMOL/L (ref 136–145)
TOTAL PROTEIN: 6.3 G/DL (ref 6.4–8.2)
WBC # BLD: 17.6 K/UL (ref 4–11)

## 2020-12-13 PROCEDURE — 6370000000 HC RX 637 (ALT 250 FOR IP): Performed by: INTERNAL MEDICINE

## 2020-12-13 PROCEDURE — 6360000002 HC RX W HCPCS: Performed by: INTERNAL MEDICINE

## 2020-12-13 PROCEDURE — 80053 COMPREHEN METABOLIC PANEL: CPT

## 2020-12-13 PROCEDURE — 2580000003 HC RX 258: Performed by: INTERNAL MEDICINE

## 2020-12-13 PROCEDURE — C9113 INJ PANTOPRAZOLE SODIUM, VIA: HCPCS | Performed by: NURSE PRACTITIONER

## 2020-12-13 PROCEDURE — 93010 ELECTROCARDIOGRAM REPORT: CPT | Performed by: INTERNAL MEDICINE

## 2020-12-13 PROCEDURE — 6370000000 HC RX 637 (ALT 250 FOR IP): Performed by: NURSE PRACTITIONER

## 2020-12-13 PROCEDURE — 85025 COMPLETE CBC W/AUTO DIFF WBC: CPT

## 2020-12-13 PROCEDURE — 2700000000 HC OXYGEN THERAPY PER DAY

## 2020-12-13 PROCEDURE — 2000000000 HC ICU R&B

## 2020-12-13 PROCEDURE — 83735 ASSAY OF MAGNESIUM: CPT

## 2020-12-13 PROCEDURE — 6360000002 HC RX W HCPCS: Performed by: NURSE PRACTITIONER

## 2020-12-13 PROCEDURE — 93005 ELECTROCARDIOGRAM TRACING: CPT | Performed by: INTERNAL MEDICINE

## 2020-12-13 PROCEDURE — 37799 UNLISTED PX VASCULAR SURGERY: CPT

## 2020-12-13 PROCEDURE — 94761 N-INVAS EAR/PLS OXIMETRY MLT: CPT

## 2020-12-13 PROCEDURE — 84100 ASSAY OF PHOSPHORUS: CPT

## 2020-12-13 RX ORDER — MORPHINE SULFATE 2 MG/ML
2 INJECTION, SOLUTION INTRAMUSCULAR; INTRAVENOUS
Status: DISCONTINUED | OUTPATIENT
Start: 2020-12-13 | End: 2020-12-16 | Stop reason: HOSPADM

## 2020-12-13 RX ORDER — FUROSEMIDE 10 MG/ML
20 INJECTION INTRAMUSCULAR; INTRAVENOUS ONCE
Status: COMPLETED | OUTPATIENT
Start: 2020-12-13 | End: 2020-12-13

## 2020-12-13 RX ORDER — AMLODIPINE BESYLATE 5 MG/1
5 TABLET ORAL DAILY
Status: DISCONTINUED | OUTPATIENT
Start: 2020-12-13 | End: 2020-12-16 | Stop reason: HOSPADM

## 2020-12-13 RX ADMIN — FUROSEMIDE 20 MG: 10 INJECTION, SOLUTION INTRAMUSCULAR; INTRAVENOUS at 09:35

## 2020-12-13 RX ADMIN — HYDRALAZINE HYDROCHLORIDE 10 MG: 20 INJECTION INTRAMUSCULAR; INTRAVENOUS at 09:33

## 2020-12-13 RX ADMIN — CEFEPIME HYDROCHLORIDE 2 G: 2 INJECTION, POWDER, FOR SOLUTION INTRAVENOUS at 21:37

## 2020-12-13 RX ADMIN — INSULIN GLARGINE 30 UNITS: 100 INJECTION, SOLUTION SUBCUTANEOUS at 21:42

## 2020-12-13 RX ADMIN — METHYLPREDNISOLONE SODIUM SUCCINATE 40 MG: 40 INJECTION, POWDER, LYOPHILIZED, FOR SOLUTION INTRAMUSCULAR; INTRAVENOUS at 01:40

## 2020-12-13 RX ADMIN — METOPROLOL TARTRATE 50 MG: 50 TABLET, FILM COATED ORAL at 21:37

## 2020-12-13 RX ADMIN — ENOXAPARIN SODIUM 40 MG: 40 INJECTION SUBCUTANEOUS at 09:26

## 2020-12-13 RX ADMIN — INSULIN LISPRO 3 UNITS: 100 INJECTION, SOLUTION INTRAVENOUS; SUBCUTANEOUS at 05:22

## 2020-12-13 RX ADMIN — INSULIN LISPRO 3 UNITS: 100 INJECTION, SOLUTION INTRAVENOUS; SUBCUTANEOUS at 12:15

## 2020-12-13 RX ADMIN — METHYLPREDNISOLONE SODIUM SUCCINATE 40 MG: 40 INJECTION, POWDER, LYOPHILIZED, FOR SOLUTION INTRAMUSCULAR; INTRAVENOUS at 10:18

## 2020-12-13 RX ADMIN — INSULIN LISPRO 3 UNITS: 100 INJECTION, SOLUTION INTRAVENOUS; SUBCUTANEOUS at 01:34

## 2020-12-13 RX ADMIN — Medication 10 ML: at 09:28

## 2020-12-13 RX ADMIN — AMLODIPINE BESYLATE 5 MG: 5 TABLET ORAL at 10:18

## 2020-12-13 RX ADMIN — CEFEPIME HYDROCHLORIDE 2 G: 2 INJECTION, POWDER, FOR SOLUTION INTRAVENOUS at 09:28

## 2020-12-13 RX ADMIN — METOPROLOL TARTRATE 50 MG: 50 TABLET, FILM COATED ORAL at 09:28

## 2020-12-13 RX ADMIN — PANTOPRAZOLE SODIUM 40 MG: 40 INJECTION, POWDER, FOR SOLUTION INTRAVENOUS at 09:27

## 2020-12-13 RX ADMIN — INSULIN LISPRO 6 UNITS: 100 INJECTION, SOLUTION INTRAVENOUS; SUBCUTANEOUS at 21:41

## 2020-12-13 RX ADMIN — HYDRALAZINE HYDROCHLORIDE 10 MG: 20 INJECTION INTRAMUSCULAR; INTRAVENOUS at 01:28

## 2020-12-13 RX ADMIN — ENOXAPARIN SODIUM 40 MG: 40 INJECTION SUBCUTANEOUS at 21:38

## 2020-12-13 RX ADMIN — METHYLPREDNISOLONE SODIUM SUCCINATE 40 MG: 40 INJECTION, POWDER, LYOPHILIZED, FOR SOLUTION INTRAMUSCULAR; INTRAVENOUS at 15:15

## 2020-12-13 RX ADMIN — INSULIN LISPRO 3 UNITS: 100 INJECTION, SOLUTION INTRAVENOUS; SUBCUTANEOUS at 16:36

## 2020-12-13 RX ADMIN — INSULIN GLARGINE 30 UNITS: 100 INJECTION, SOLUTION SUBCUTANEOUS at 10:05

## 2020-12-13 RX ADMIN — METHYLPREDNISOLONE SODIUM SUCCINATE 40 MG: 40 INJECTION, POWDER, LYOPHILIZED, FOR SOLUTION INTRAMUSCULAR; INTRAVENOUS at 21:37

## 2020-12-13 RX ADMIN — INSULIN LISPRO 6 UNITS: 100 INJECTION, SOLUTION INTRAVENOUS; SUBCUTANEOUS at 09:46

## 2020-12-13 ASSESSMENT — PAIN SCALES - GENERAL
PAINLEVEL_OUTOF10: 0
PAINLEVEL_OUTOF10: 0

## 2020-12-13 NOTE — PROGRESS NOTES
[facility]    [facility]              [phone]                      She is admitted with hypoxemia and respiratory failure requiring intubation and mechanical ventilation due to COVID-19. We are following for ELOY and related issues    Interval History and plan:      Extubated  6 L O2.    says SOB a bit better. On NG tube  Cr 1.1--> 0.9  Got IV lasix / diuril once. Made 3.8 L urine yesterday  Continue conservative mx. Avoid fluid overload. Keep lungs on the dry side. Weights daily. use diuretics per intensivist discretion. Assessment :     Acute Kidney Injury  ELOY likely due to -hemodynamic changes, Covid  Cr on consultation 1.2  Baseline Cr-0.8    UA-large blood, has Urena, RBC more than 100  Renal Imaging:-NA  Echo: N/A    Hypertension   BP: (170-193)/(52-82)  Pulse:  []   BP goal inpatient [phone] systolic inpatient  Coming down to 160s  On as needed hydralazine    COVID-19 positive, had respiratory failure requiring intubation and mechanical ventilation         [facility] would like to thank Kun Andre MD   for opportunity to serve this patient      Please call with questions at-   24 Hrs Answering service [phone] or  7 am- 5 pm via Perfect serve or cell phone  [employer]          CC/reason for consult :     ELOY     HPI :     From consult note-   Nathan Betancourt is a 70 y.o. female presented to   the hospital on 11/29/2020 with COVID-19 which was diagnosed on 11/26/2016. Per chart see you had fatigue headache loss in appetite since of smell and taste and followed by cough for several weeks. Now came with the shortness of breath. She had a difficulty breathing and came to [facility].  He has she required 10 L/min of oxygen. She is known to have COPD and congestive heart failure but no need for supplemental oxygen at home. She was transferred to ICU. She needed intubation and mechanical ventilation. Post intubation her blood pressure changes significantly. She also had poor urine output and rising creatinine. She got 125 mL/h of fluids overnight now on 25 mL/h  Blood pressure has been fluctuating. Blood pressure went more than 200 but now coming up to 877 systolic    We are consulted for acute renal failure and related issues    ROS:     Seen with- RN    Unable to obtain ROS due to  Altered mental status           PMH/PSH/SH/Family History:     Past Medical History:   Diagnosis Date    COVID-19 11/26/2020    Hyperlipidemia     Hypertension     Neuropathy     Type II or unspecified type diabetes mellitus without mention of complication, not stated as uncontrolled        Past Surgical History:   Procedure Laterality Date    APPENDECTOMY      CHOLECYSTECTOMY      HUMERUS FRACTURE SURGERY      TUBAL LIGATION      TUMOR REMOVAL          reports that she has never smoked. She does not have any smokeless tobacco history on file. family history is not on file.          Medication:     Current Facility-Administered Medications: morphine (PF) injection 2 mg, 2 mg, Intravenous, Q1H PRN  methylPREDNISolone sodium (SOLU-MEDROL) injection 40 mg, 40 mg, Intravenous, Q6H  cefepime (MAXIPIME) 2 g IVPB minibag, 2 g, Intravenous, Q12H  insulin glargine (LANTUS) injection vial 30 Units, 30 Units, Subcutaneous, BID  hydrALAZINE (APRESOLINE) injection 10 mg, 10 mg, Intravenous, Q4H PRN  potassium chloride 20 mEq/50 mL IVPB (Central Line), 20 mEq, Intravenous, PRN  pantoprazole (PROTONIX) injection 40 mg, 40 mg, Intravenous, Daily  insulin lispro (HUMALOG) injection vial 0-18 Units, 0-18 Units, Subcutaneous, 6 times per day  metoprolol tartrate (LOPRESSOR) tablet 50 mg, 50 mg, Oral, BID  sodium chloride flush 0.9 % injection 10 mL, 10 mL, Intravenous, PRN  magnesium sulfate 1 g in dextrose 5% 100 mL IVPB, 1 g, Intravenous, PRN acetaminophen (TYLENOL) tablet 650 mg, 650 mg, Oral, Q4H PRN **OR** acetaminophen (TYLENOL) suppository 650 mg, 650 mg, Rectal, Q4H PRN  ondansetron (ZOFRAN) injection 4 mg, 4 mg, Intravenous, Q6H PRN  albuterol sulfate  (90 Base) MCG/ACT inhaler 2 puff, 2 puff, Inhalation, Q4H PRN  glucose (GLUTOSE) 40 % oral gel 15 g, 15 g, Oral, PRN  dextrose 50 % IV solution, 12.5 g, Intravenous, PRN  glucagon (rDNA) injection 1 mg, 1 mg, Intramuscular, PRN  dextrose 5 % solution, 100 mL/hr, Intravenous, PRN  enoxaparin (LOVENOX) injection 40 mg, 40 mg, Subcutaneous, BID  0.9 % sodium chloride bolus, 30 mL, Intravenous, PRN  albuterol sulfate  (90 Base) MCG/ACT inhaler 2 puff, 2 puff, Inhalation, Q4H PRN **AND** ipratropium (ATROVENT HFA) 17 MCG/ACT inhaler 2 puff, 2 puff, Inhalation, Q4H PRN **AND** MDI Treatment, , , PRN       Vitals :     Vitals:    12/13/20 0700   BP: (!) 170/82   Pulse: 136   Resp: 26   Temp:    SpO2: (!) 89%       I & O :       Intake/Output Summary (Last 24 hours) at 12/13/2020 0904  Last data filed at 12/13/2020 7097  Gross per 24 hour   Intake 209.3 ml   Output 4160 ml   Net -3950.7 ml        Physical Examination :     Due to the current efforts to prevent transmission of COVID-19 and also the need to preserve PPE for other caregivers, a face-to-face encounter with the patient was not performed. That being said, all relevant records and diagnostic tests were reviewed, including laboratory results and imaging. Please reference any relevant documentation elsewhere. Care will be coordinated with the primary service.          LABS:     Recent Labs     12/11/20  0445 12/12/20  0441 12/13/20  0517   WBC 18.2* 15.9* 17.6*   HGB 15.0 14.9 16.8*   HCT 45.3 45.2 50.6*    235 280     Recent Labs     12/11/20  0445 12/12/20  0441 12/13/20  0517    141 140   K 4.1 3.6 3.5   CL 96* 102 99   CO2 35* 33* 29   BUN 58* 51* 52*   CREATININE 1.0 0.9 0.9   GLUCOSE 327* 76 207*   MG 1.80 2.10 2.20 PHOS 4.6 3.4 4.0

## 2020-12-13 NOTE — PROGRESS NOTES
[facility]    [facility]              [phone]                      She is admitted with hypoxemia and respiratory failure requiring intubation and mechanical ventilation due to COVID-19. We are following for ELOY and related issues    Interval History and plan:      Extubated  6 L O2.    says SOB a bit better. On NG tube  Cr 1.1--> 0.9  Got IV lasix / diuril once. Made 1.5 L urine yesterday  Continue conservative mx. Avoid fluid overload. Keep lungs on the dry side. Weights daily. use diuretics per intensivist discretion. Assessment :     Acute Kidney Injury  ELOY likely due to -hemodynamic changes, Covid  Cr on consultation 1.2  Baseline Cr-0.8    UA-large blood, has Urena, RBC more than 100  Renal Imaging:-NA  Echo: N/A    Hypertension   BP: (116-141)/(69-74)  Pulse:  [79-91]   BP goal inpatient [phone] systolic inpatient  Coming down to 160s  On as needed hydralazine    COVID-19 positive, had respiratory failure requiring intubation and mechanical ventilation         [facility] would like to thank Rosemary Castaneda MD   for opportunity to serve this patient      Please call with questions at-   24 Hrs Answering service [phone] or  7 am- 5 pm via Perfect serve or cell phone  Olivia Mack          CC/reason for consult :     ELOY     HPI :     From consult note-   Kishor Rivera is a 70 y.o. female presented to   the hospital on 11/29/2020 with COVID-19 which was diagnosed on 11/26/2016. Per chart see you had fatigue headache loss in appetite since of smell and taste and followed by cough for several weeks. Now came with the shortness of breath. She had a difficulty breathing and came [facility].  He has she required 10 L/min of oxygen. She is known to have COPD and congestive heart failure but no need for supplemental oxygen at home. She was transferred to ICU. She needed intubation and mechanical ventilation. Post intubation her blood pressure changes significantly. She also had poor urine output and rising creatinine. She got 125 mL/h of fluids overnight now on 25 mL/h  Blood pressure has been fluctuating. Blood pressure went more than 200 but now coming up to 285 systolic    We are consulted for acute renal failure and related issues    ROS:     Seen with- RN    Unable to obtain ROS due to  Altered mental status           PMH/PSH/SH/Family History:     Past Medical History:   Diagnosis Date    COVID-19 11/26/2020    Hyperlipidemia     Hypertension     Neuropathy     Type II or unspecified type diabetes mellitus without mention of complication, not stated as uncontrolled        Past Surgical History:   Procedure Laterality Date    APPENDECTOMY      CHOLECYSTECTOMY      HUMERUS FRACTURE SURGERY      TUBAL LIGATION      TUMOR REMOVAL          reports that she has never smoked. She does not have any smokeless tobacco history on file. family history is not on file.          Medication:     Current Facility-Administered Medications: methylPREDNISolone sodium (SOLU-MEDROL) injection 40 mg, 40 mg, Intravenous, Q6H  cefepime (MAXIPIME) 2 g IVPB minibag, 2 g, Intravenous, Q12H  insulin glargine (LANTUS) injection vial 30 Units, 30 Units, Subcutaneous, BID  hydrALAZINE (APRESOLINE) injection 10 mg, 10 mg, Intravenous, Q4H PRN  potassium chloride 20 mEq/50 mL IVPB (Central Line), 20 mEq, Intravenous, PRN  pantoprazole (PROTONIX) injection 40 mg, 40 mg, Intravenous, Daily  insulin lispro (HUMALOG) injection vial 0-18 Units, 0-18 Units, Subcutaneous, 6 times per day  metoprolol tartrate (LOPRESSOR) tablet 50 mg, 50 mg, Oral, BID  sodium chloride flush 0.9 % injection 10 mL, 10 mL, Intravenous, PRN  magnesium sulfate 1 g in dextrose 5% 100 mL IVPB, 1 g, Intravenous, PRN acetaminophen (TYLENOL) tablet 650 mg, 650 mg, Oral, Q4H PRN **OR** acetaminophen (TYLENOL) suppository 650 mg, 650 mg, Rectal, Q4H PRN  ondansetron (ZOFRAN) injection 4 mg, 4 mg, Intravenous, Q6H PRN  albuterol sulfate  (90 Base) MCG/ACT inhaler 2 puff, 2 puff, Inhalation, Q4H PRN  glucose (GLUTOSE) 40 % oral gel 15 g, 15 g, Oral, PRN  dextrose 50 % IV solution, 12.5 g, Intravenous, PRN  glucagon (rDNA) injection 1 mg, 1 mg, Intramuscular, PRN  dextrose 5 % solution, 100 mL/hr, Intravenous, PRN  enoxaparin (LOVENOX) injection 40 mg, 40 mg, Subcutaneous, BID  0.9 % sodium chloride bolus, 30 mL, Intravenous, PRN  albuterol sulfate  (90 Base) MCG/ACT inhaler 2 puff, 2 puff, Inhalation, Q4H PRN **AND** ipratropium (ATROVENT HFA) 17 MCG/ACT inhaler 2 puff, 2 puff, Inhalation, Q4H PRN **AND** MDI Treatment, , , PRN       Vitals :     Vitals:    12/12/20 1700   BP: 116/69   Pulse: 88   Resp: 20   Temp:    SpO2: 95%       I & O :       Intake/Output Summary (Last 24 hours) at 12/12/2020 1938  Last data filed at 12/12/2020 1641  Gross per 24 hour   Intake 106 ml   Output 4035 ml   Net -3929 ml        Physical Examination :     Due to the current efforts to prevent transmission of COVID-19 and also the need to preserve PPE for other caregivers, a face-to-face encounter with the patient was not performed. That being said, all relevant records and diagnostic tests were reviewed, including laboratory results and imaging. Please reference any relevant documentation elsewhere. Care will be coordinated with the primary service.          LABS:     Recent Labs     12/10/20  0600 12/11/20  0445 12/12/20  0441   WBC 17.6* 18.2* 15.9*   HGB 14.6 15.0 14.9   HCT 45.0 45.3 45.2    222 235     Recent Labs     12/10/20  0600 12/11/20  0445 12/12/20  0441    139 141   K 4.5 4.1 3.6   CL 97* 96* 102   CO2 35* 35* 33*   BUN 55* 58* 51*   CREATININE 0.8 1.0 0.9   GLUCOSE 288* 327* 76   MG 1.90 1.80 2.10

## 2020-12-13 NOTE — PROGRESS NOTES
Hospitalist Progress Note      PCP: No primary care provider on file. Date of Admission: 11/29/2020    Chief [address] [place] [address] Course:       Subjective:  on 6 L O2.        Medications:  Reviewed    Infusion Medications    dextrose       Scheduled Medications    amLODIPine  5 mg Oral Daily    methylPREDNISolone  40 mg Intravenous Q6H    cefepime  2 g Intravenous Q12H    insulin glargine  30 Units Subcutaneous BID    pantoprazole  40 mg Intravenous Daily    insulin lispro  0-18 Units Subcutaneous 6 times per day    metoprolol  50 mg Oral BID    enoxaparin  40 mg Subcutaneous BID     PRN Meds: morphine, hydrALAZINE, potassium chloride, sodium chloride flush, magnesium sulfate, acetaminophen **OR** acetaminophen, ondansetron, albuterol sulfate HFA, glucose, dextrose, glucagon (rDNA), dextrose, sodium chloride, albuterol sulfate HFA **AND** ipratropium **AND** MDI Treatment      Intake/Output Summary (Last 24 hours) at 12/13/2020 1406  Last data filed at 12/13/2020 1349  Gross per 24 hour   Intake 334.3 ml   Output 3790 ml   Net -3455.7 ml       Physical Exam Performed:    /65   Pulse 86   Temp 99.3 °F (37.4 °C) (Bladder)   Resp 26   Ht 5' 4\" (1.626 m)   Wt 226 lb 6.6 oz (102.7 kg)   SpO2 96%   BMI 38.86 kg/m²     General appearance: No apparent distress, appears stated age and cooperative. obese  HEENT: Pupils equal, round, and reactive to light. Conjunctivae/corneas clear. Neck: Supple, with full range of motion. No jugular venous distention. Trachea midline. Respiratory:  Normal respiratory effort. Clear to auscultation, bilaterally without Rales/Wheezes/Rhonchi. Cardiovascular: Regular rate and rhythm with normal S1/S2 without murmurs, rubs or gallops. Abdomen: Soft, non-tender, non-distended with normal bowel sounds. Musculoskeletal: No clubbing, cyanosis or edema bilaterally. Full range of motion without deformity. Skin: Skin color, texture, turgor normal.  No rashes or lesions. Neurologic:  Neurovascularly intact without any focal sensory/motor deficits. Cranial nerves: II-XII intact, grossly non-focal.  Psychiatric: Alert and oriented, thought content appropriate, normal insight  Capillary Refill: Brisk,< 3 seconds   Peripheral Pulses: +2 palpable, equal bilaterally       Labs:   Recent Labs     12/11/20 0445 12/12/20 0441 12/13/20  0517   WBC 18.2* 15.9* 17.6*   HGB 15.0 14.9 16.8*   HCT 45.3 45.2 50.6*    235 280     Recent Labs     12/11/20 0445 12/12/20 0441 12/13/20  0517    141 140   K 4.1 3.6 3.5   CL 96* 102 99   CO2 35* 33* 29   BUN 58* 51* 52*   CREATININE 1.0 0.9 0.9   CALCIUM 8.9 8.6 8.8   PHOS 4.6 3.4 4.0     Recent Labs     12/11/20 0445 12/12/20 0441 12/13/20  0517   AST 22 26 30   ALT 27 25 31   BILITOT 0.8 0.9 1.1*   ALKPHOS 67 53 68     No results for input(s): INR in the last 72 hours. No results for input(s): Rylee Comment in the last 72 hours. Urinalysis:      Lab Results   Component Value Date    NITRU Negative 12/01/2020    WBCUA see below 12/01/2020    RBCUA >100 12/01/2020    BLOODU LARGE 12/01/2020    SPECGRAV 1.020 12/01/2020    GLUCOSEU 100 12/01/2020       Radiology:  XR ABDOMEN (KUB) (SINGLE AP VIEW)   Final Result   NG tip in region of gastric antrum and side port in body of stomach         XR CHEST PORTABLE   Final Result   Persistent bilateral airspace disease with mild improvement on the right         XR CHEST PORTABLE   Final Result   Support apparatus appears stable. Mild congestive failure, pulmonary edema which is similar to slightly   improved when accounting for differences in technique. Superimposed   pneumonia cannot be excluded in a patient with known COVID-19 infection. XR ABDOMEN FOR NG/OG/NE TUBE PLACEMENT   Final Result   Support lines and tubes as above. Worsening diffuse pulmonary opacities relative to 2 days prior in the setting   of known COVID-19 infection. XR CHEST PORTABLE   Final Result   Support lines and tubes as above. Worsening diffuse pulmonary opacities relative to 2 days prior in the setting   of known COVID-19 infection. VL Extremity Venous Left   Final Result      XR CHEST PORTABLE   Final Result   Bilateral airspace disease is suspicious for pneumonia, and could be   compatible with history of COVID-19. Assessment/Plan:    Active Hospital Problems    Diagnosis    COPD (chronic obstructive pulmonary disease) ([facility] [J44.9]    Acute respiratory failure due to COVID-19 ([facility]) [U07.1, J96.00]    Essential hypertension [I10]    Obesity, Class III, BMI 40-49.9 (morbid obesity) ([facility] [facility] [E66.01]    Type 2 diabetes mellitus, with long-term current use of insulin ([facility]) [E11.9, Z79.4]         Respiratory failure, COPD, COVID19   solumedrol 40mg IV bid, remdesivir(ended 12/4), and 1U convalescent plasma.   cvc/A-line placed 12/2  intubated 12/2, now extubated. On 6 L O2 and weaning.      DM2  -  Hold all oral antidiabetic agents.  Start s.c. Insulin regimen based on home regimen.   -on home lantus     Elevated creatinine due to ELOY- noted during stay, partly due to riggins obstruction. Now resolved. -nephro consulted 12/3, apprec mgmt     Hematuria- with hx of bladder ca per pt, supposed to get surgery in [facility]  -urology consulted for further recs  -continued riggins     HTN- uncontrolled, restarted home meds. DVT Prophylaxis: lovenox bid  Diet: Diet NPO Effective Now  Diet Tube Feed Continuous/Cyclic w/ Diet  Code Status: Full Code    PT/OT Eval Status: not possible  Diet- Tube feeds.  Needs to pass swallow eval. May need PEG eventually.      Dispo - can transfer out of ICU, continue to wean O2.  2-3 days  Suzi Polanco MD

## 2020-12-13 NOTE — PROGRESS NOTES
Pt requesting to get into chair, x2 assist to EOB. Pt very fatigued and SpO2 dropped to 84%. Pt assisted back to bed. Spo2 increased to 94%. Rn encourging pt to cough and deep breathe. Will continue to monitor.

## 2020-12-14 PROBLEM — R31.9 HEMATURIA: Status: ACTIVE | Noted: 2020-12-14

## 2020-12-14 PROBLEM — D72.829 LEUKOCYTOSIS: Status: ACTIVE | Noted: 2020-12-14

## 2020-12-14 PROBLEM — R13.12 OROPHARYNGEAL DYSPHAGIA: Status: ACTIVE | Noted: 2020-12-14

## 2020-12-14 PROBLEM — Z85.51 H/O PRIMARY MALIGNANT NEOPLASM OF URINARY BLADDER: Status: ACTIVE | Noted: 2020-12-14

## 2020-12-14 LAB
A/G RATIO: 0.9 (ref 1.1–2.2)
ALBUMIN SERPL-MCNC: 3 G/DL (ref 3.4–5)
ALP BLD-CCNC: 72 U/L (ref 40–129)
ALT SERPL-CCNC: 37 U/L (ref 10–40)
ANION GAP SERPL CALCULATED.3IONS-SCNC: 11 MMOL/L (ref 3–16)
AST SERPL-CCNC: 32 U/L (ref 15–37)
BASOPHILS ABSOLUTE: 0 K/UL (ref 0–0.2)
BASOPHILS RELATIVE PERCENT: 0.3 %
BILIRUB SERPL-MCNC: 0.9 MG/DL (ref 0–1)
BUN BLDV-MCNC: 69 MG/DL (ref 7–20)
CALCIUM SERPL-MCNC: 9 MG/DL (ref 8.3–10.6)
CHLORIDE BLD-SCNC: 98 MMOL/L (ref 99–110)
CO2: 30 MMOL/L (ref 21–32)
CREAT SERPL-MCNC: 1.1 MG/DL (ref 0.6–1.2)
EOSINOPHILS ABSOLUTE: 0 K/UL (ref 0–0.6)
EOSINOPHILS RELATIVE PERCENT: 0 %
GFR AFRICAN AMERICAN: 59
GFR NON-AFRICAN AMERICAN: 49
GLOBULIN: 3.2 G/DL
GLUCOSE BLD-MCNC: 205 MG/DL (ref 70–99)
GLUCOSE BLD-MCNC: 207 MG/DL (ref 70–99)
GLUCOSE BLD-MCNC: 231 MG/DL (ref 70–99)
GLUCOSE BLD-MCNC: 287 MG/DL (ref 70–99)
GLUCOSE BLD-MCNC: 312 MG/DL (ref 70–99)
GLUCOSE BLD-MCNC: 320 MG/DL (ref 70–99)
GLUCOSE BLD-MCNC: 334 MG/DL (ref 70–99)
HCT VFR BLD CALC: 49.3 % (ref 36–48)
HEMOGLOBIN: 16.2 G/DL (ref 12–16)
LYMPHOCYTES ABSOLUTE: 0.7 K/UL (ref 1–5.1)
LYMPHOCYTES RELATIVE PERCENT: 3.9 %
MAGNESIUM: 2.5 MG/DL (ref 1.8–2.4)
MCH RBC QN AUTO: 26.9 PG (ref 26–34)
MCHC RBC AUTO-ENTMCNC: 32.9 G/DL (ref 31–36)
MCV RBC AUTO: 81.9 FL (ref 80–100)
MONOCYTES ABSOLUTE: 0.9 K/UL (ref 0–1.3)
MONOCYTES RELATIVE PERCENT: 4.9 %
NEUTROPHILS ABSOLUTE: 16.7 K/UL (ref 1.7–7.7)
NEUTROPHILS RELATIVE PERCENT: 90.9 %
PDW BLD-RTO: 14.8 % (ref 12.4–15.4)
PERFORMED ON: ABNORMAL
PHOSPHORUS: 4.9 MG/DL (ref 2.5–4.9)
PLATELET # BLD: 226 K/UL (ref 135–450)
PMV BLD AUTO: 10.9 FL (ref 5–10.5)
POTASSIUM SERPL-SCNC: 3.7 MMOL/L (ref 3.5–5.1)
RBC # BLD: 6.03 M/UL (ref 4–5.2)
SODIUM BLD-SCNC: 139 MMOL/L (ref 136–145)
TOTAL PROTEIN: 6.2 G/DL (ref 6.4–8.2)
WBC # BLD: 18.4 K/UL (ref 4–11)

## 2020-12-14 PROCEDURE — 97530 THERAPEUTIC ACTIVITIES: CPT

## 2020-12-14 PROCEDURE — 2580000003 HC RX 258: Performed by: INTERNAL MEDICINE

## 2020-12-14 PROCEDURE — 83735 ASSAY OF MAGNESIUM: CPT

## 2020-12-14 PROCEDURE — 6360000002 HC RX W HCPCS: Performed by: INTERNAL MEDICINE

## 2020-12-14 PROCEDURE — 6370000000 HC RX 637 (ALT 250 FOR IP): Performed by: NURSE PRACTITIONER

## 2020-12-14 PROCEDURE — 84100 ASSAY OF PHOSPHORUS: CPT

## 2020-12-14 PROCEDURE — 97166 OT EVAL MOD COMPLEX 45 MIN: CPT

## 2020-12-14 PROCEDURE — 2000000000 HC ICU R&B

## 2020-12-14 PROCEDURE — 85025 COMPLETE CBC W/AUTO DIFF WBC: CPT

## 2020-12-14 PROCEDURE — C9113 INJ PANTOPRAZOLE SODIUM, VIA: HCPCS | Performed by: NURSE PRACTITIONER

## 2020-12-14 PROCEDURE — 80053 COMPREHEN METABOLIC PANEL: CPT

## 2020-12-14 PROCEDURE — 6370000000 HC RX 637 (ALT 250 FOR IP): Performed by: INTERNAL MEDICINE

## 2020-12-14 PROCEDURE — 97163 PT EVAL HIGH COMPLEX 45 MIN: CPT

## 2020-12-14 PROCEDURE — 99233 SBSQ HOSP IP/OBS HIGH 50: CPT | Performed by: INTERNAL MEDICINE

## 2020-12-14 PROCEDURE — 6360000002 HC RX W HCPCS: Performed by: NURSE PRACTITIONER

## 2020-12-14 PROCEDURE — 2700000000 HC OXYGEN THERAPY PER DAY

## 2020-12-14 PROCEDURE — 94761 N-INVAS EAR/PLS OXIMETRY MLT: CPT

## 2020-12-14 RX ORDER — METHYLPREDNISOLONE SODIUM SUCCINATE 40 MG/ML
40 INJECTION, POWDER, LYOPHILIZED, FOR SOLUTION INTRAMUSCULAR; INTRAVENOUS EVERY 12 HOURS
Status: DISCONTINUED | OUTPATIENT
Start: 2020-12-14 | End: 2020-12-16

## 2020-12-14 RX ADMIN — INSULIN LISPRO 6 UNITS: 100 INJECTION, SOLUTION INTRAVENOUS; SUBCUTANEOUS at 20:50

## 2020-12-14 RX ADMIN — CEFEPIME HYDROCHLORIDE 2 G: 2 INJECTION, POWDER, FOR SOLUTION INTRAVENOUS at 09:40

## 2020-12-14 RX ADMIN — METOPROLOL TARTRATE 50 MG: 50 TABLET, FILM COATED ORAL at 09:41

## 2020-12-14 RX ADMIN — PANTOPRAZOLE SODIUM 40 MG: 40 INJECTION, POWDER, FOR SOLUTION INTRAVENOUS at 09:40

## 2020-12-14 RX ADMIN — INSULIN GLARGINE 30 UNITS: 100 INJECTION, SOLUTION SUBCUTANEOUS at 20:31

## 2020-12-14 RX ADMIN — METOPROLOL TARTRATE 50 MG: 50 TABLET, FILM COATED ORAL at 20:31

## 2020-12-14 RX ADMIN — METHYLPREDNISOLONE SODIUM SUCCINATE 40 MG: 40 INJECTION, POWDER, FOR SOLUTION INTRAMUSCULAR; INTRAVENOUS at 20:31

## 2020-12-14 RX ADMIN — METHYLPREDNISOLONE SODIUM SUCCINATE 40 MG: 40 INJECTION, POWDER, LYOPHILIZED, FOR SOLUTION INTRAMUSCULAR; INTRAVENOUS at 09:41

## 2020-12-14 RX ADMIN — INSULIN LISPRO 6 UNITS: 100 INJECTION, SOLUTION INTRAVENOUS; SUBCUTANEOUS at 00:50

## 2020-12-14 RX ADMIN — ONDANSETRON 4 MG: 2 INJECTION INTRAMUSCULAR; INTRAVENOUS at 17:03

## 2020-12-14 RX ADMIN — ENOXAPARIN SODIUM 40 MG: 40 INJECTION SUBCUTANEOUS at 20:31

## 2020-12-14 RX ADMIN — Medication 10 ML: at 09:41

## 2020-12-14 RX ADMIN — AMLODIPINE BESYLATE 5 MG: 5 TABLET ORAL at 09:41

## 2020-12-14 RX ADMIN — INSULIN LISPRO 12 UNITS: 100 INJECTION, SOLUTION INTRAVENOUS; SUBCUTANEOUS at 11:52

## 2020-12-14 RX ADMIN — METHYLPREDNISOLONE SODIUM SUCCINATE 40 MG: 40 INJECTION, POWDER, LYOPHILIZED, FOR SOLUTION INTRAMUSCULAR; INTRAVENOUS at 02:20

## 2020-12-14 RX ADMIN — INSULIN LISPRO 6 UNITS: 100 INJECTION, SOLUTION INTRAVENOUS; SUBCUTANEOUS at 05:17

## 2020-12-14 RX ADMIN — INSULIN GLARGINE 30 UNITS: 100 INJECTION, SOLUTION SUBCUTANEOUS at 09:40

## 2020-12-14 RX ADMIN — INSULIN LISPRO 9 UNITS: 100 INJECTION, SOLUTION INTRAVENOUS; SUBCUTANEOUS at 09:40

## 2020-12-14 RX ADMIN — ENOXAPARIN SODIUM 40 MG: 40 INJECTION SUBCUTANEOUS at 09:40

## 2020-12-14 RX ADMIN — INSULIN LISPRO 12 UNITS: 100 INJECTION, SOLUTION INTRAVENOUS; SUBCUTANEOUS at 17:03

## 2020-12-14 ASSESSMENT — PAIN SCALES - GENERAL
PAINLEVEL_OUTOF10: 0

## 2020-12-14 NOTE — PROGRESS NOTES
Hospitalist Progress Note      PCP: No primary care provider on file. Date of Admission: 11/29/2020    [facility] Course: Admitted with COVID-19 pneumonia. Requires disease specific treatment. Still has high oxygen requirements. Remains in the ICU.     Subjective: Has shortness of breath, no chest pain, no nausea or vomiting.       Medications:  Reviewed    Infusion Medications    dextrose       Scheduled Medications    methylPREDNISolone  40 mg Intravenous Q12H    amLODIPine  5 mg Oral Daily    insulin glargine  30 Units Subcutaneous BID    pantoprazole  40 mg Intravenous Daily    insulin lispro  0-18 Units Subcutaneous 6 times per day    metoprolol  50 mg Oral BID    enoxaparin  40 mg Subcutaneous BID     PRN Meds: morphine, hydrALAZINE, potassium chloride, sodium chloride flush, magnesium sulfate, acetaminophen **OR** acetaminophen, ondansetron, albuterol sulfate HFA, glucose, dextrose, glucagon (rDNA), dextrose, sodium chloride, albuterol sulfate HFA **AND** ipratropium **AND** MDI Treatment      Intake/Output Summary (Last 24 hours) at 12/14/2020 1514  Last data filed at 12/14/2020 1200  Gross per 24 hour   Intake 568 ml   Output 1000 ml   Net -432 ml       Physical Exam Performed:    BP (!) 154/88   Pulse 84   Temp 98.4 °F (36.9 °C) (Bladder)   Resp 29   Ht 5' 4\" (1.626 m)   Wt 214 lb 11.7 oz (97.4 kg)   SpO2 96%   BMI 36.86 kg/m²     I performed an audiovisual assessment, based on new provisions and guidance offered by [facility] on March 18, 2020 in setting of COVID-19 outbreak and in order to preserve personal protective equipment in accordance with the flexibilities announced by CMS on March 30, 2020. References:   https://med. [place].gov/Portals/0/Resources/COVID-19/3_18%20Telemed%20Guidance%20Updated%20March%2018. pdf?xwa=[email or web address]   [email or web address]/. pdf Bedside physical examination deferred. However, I did visually inspect the patient and observed the following:     General appearance: No apparent distress, appears stated age and cooperative. Neck: Deferred. Respiratory: Noted tachypneic  Cardiovascular: Deferred. Abdomen: Deferred. Musculoskelatal: Deferred. Neurologic:  Deferred. Psychiatric: Alert   Skin: Deferred. Labs:   Recent Labs     12/12/20  0441 12/13/20  0517 12/14/20  0500   WBC 15.9* 17.6* 18.4*   HGB 14.9 16.8* 16.2*   HCT 45.2 50.6* 49.3*    280 226     Recent Labs     12/12/20  0441 12/13/20  0517 12/14/20  0500    140 139   K 3.6 3.5 3.7    99 98*   CO2 33* 29 30   BUN 51* 52* 69*   CREATININE 0.9 0.9 1.1   CALCIUM 8.6 8.8 9.0   PHOS 3.4 4.0 4.9     Recent Labs     12/12/20  0441 12/13/20  0517 12/14/20  0500   AST 26 30 32   ALT 25 31 37   BILITOT 0.9 1.1* 0.9   ALKPHOS 53 68 72     No results for input(s): INR in the last 72 hours. No results for input(s): Eric Oatman in the last 72 hours. Urinalysis:      Lab Results   Component Value Date    NITRU Negative 12/01/2020    WBCUA see below 12/01/2020    RBCUA >100 12/01/2020    BLOODU LARGE 12/01/2020    SPECGRAV 1.020 12/01/2020    GLUCOSEU 100 12/01/2020       Radiology:  XR ABDOMEN (KUB) (SINGLE AP VIEW)   Final Result   NG tip in region of gastric antrum and side port in body of stomach         XR CHEST PORTABLE   Final Result   Persistent bilateral airspace disease with mild improvement on the right         XR CHEST PORTABLE   Final Result   Support apparatus appears stable. Mild congestive failure, pulmonary edema which is similar to slightly   improved when accounting for differences in technique. Superimposed   pneumonia cannot be excluded in a patient with known COVID-19 infection. XR ABDOMEN FOR NG/OG/NE TUBE PLACEMENT   Final Result   Support lines and tubes as above. Worsening diffuse pulmonary opacities relative to 2 days prior in the setting   of known COVID-19 infection. XR CHEST PORTABLE   Final Result   Support lines and tubes as above. Worsening diffuse pulmonary opacities relative to 2 days prior in the setting   of known COVID-19 infection. VL Extremity Venous Left   Final Result      XR CHEST PORTABLE   Final Result   Bilateral airspace disease is suspicious for pneumonia, and could be   compatible with history of COVID-19. Assessment/Plan:    Active Hospital Problems    Diagnosis    Oropharyngeal dysphagia [R13.12]    H/O primary malignant neoplasm of urinary bladder [Z85.51]    Hematuria [R31.9]    Leukocytosis [D72.829]    COPD (chronic obstructive pulmonary disease) ([facility]) [J44.9]    Acute respiratory failure due to COVID-19 ([facility]) [U07.1, J96.00]    Essential hypertension [I10]    Obesity, Class III, BMI 40-49.9 (morbid obesity) ([facility] [address] [E66.01]    Type 2 diabetes mellitus, with long-term current use of insulin ([facility]) [E11.9, Z79.4]         Respiratory failure, COPD, COVID19  Currently on solumedrol 40mg IV bid,  Completed remdesivir(ended 12/4), and received 1U convalescent plasma. Previously intubated. Now extubated. Remains on 6 L oxygen.     DM2  On basal bolus insulin protocol. Receiving tube feedings as unable to swallow. Acute kidney injury-  Most likely secondary to poor circulation. Creatinine back close to baseline now. Nephrology following     Hematuria-   Patient has previously known bladder cancer. Was supposed to get surgery in [facility], but obviously postponed. Urology consulted. Conservative management recommended. Nothing new.     Hypertension  Acceptable blood pressure. Continue same.     DVT Prophylaxis: lovenox bid  Diet: Diet NPO Effective Now  Diet Tube Feed Continuous/Cyclic w/ Diet  Code Status: Full Code    Dispo - can transfer out of ICU, continue to wean O2.   Unclear length of stay       Luca Soriano MD

## 2020-12-14 NOTE — PROGRESS NOTES
Physical Therapy    Facility/Department: [facility] C2 CARD TELEMETRY  Initial Assessment    NAME: Jermaine Thompson  : 1945  MRN: [id number]    Date of Service: 2020    Discharge Recommendations:  Subacute/Skilled Nursing Facility   PT Equipment Recommendations  Equipment Needed: No    Assessment   Body structures, Functions, Activity limitations: Decreased functional mobility ; Decreased balance;Decreased strength;Decreased safe awareness;Decreased cognition;Decreased endurance  Assessment: Pt is 71 yo female who presents with diagnosis of acute respiratory failure due to COVID. Pt indep with mobility at baseline. Max A x 2 for mobility this date with use of alissa stedy to chair. Pt limited d/t significantly impaired activity tolerance, anxiety, and fear of falling. Increased time required for all mobility tasks. Pt would benefit from continued skilled therapy to address deficits. Recommend SNF at d/c due to impaired activity tolerance and high level of assist.  Treatment Diagnosis: impaired functional mobility  Specific instructions for Next Treatment: progress mobility as tolerated  Prognosis: Good;Fair  Decision Making: High Complexity  PT Education: General Safety;Gait Training;Goals;PT Role;Disease Specific Education;Plan of Care; Functional Mobility Training;Home Exercise Program;Transfer Training;Energy Conservation  Patient Education: pt educated on pursed lip breathing-- verbalized understanding but would benefit from reinforcement  Barriers to Learning: anxiety  REQUIRES PT FOLLOW UP: Yes  Activity Tolerance  Activity Tolerance: Patient Tolerated treatment well;Patient limited by fatigue;Patient limited by endurance  Activity Tolerance: SpO2 84% with activity. Educated on pursed lip breathing and SpO2 increased to 90s on 6L. Reclined in chair at end of session SpO2 90% on 6L, /67; HR 87       Patient Diagnosis(es): There were no encounter diagnoses. has a past medical history of COVID-19, Hyperlipidemia, Hypertension, Neuropathy, and Type II or unspecified type diabetes mellitus without mention of complication, not stated as uncontrolled. has a past surgical history that includes Appendectomy; Cholecystectomy; Tubal ligation; tumor removal; and Humerus fracture surgery.     Restrictions  Restrictions/Precautions  Restrictions/Precautions: Isolation, General Precautions  Position Activity Restriction  Other position/activity restrictions: NG tube feeding, 6 L O 2, ICU monitering, IV's, COVID +  Vision/Hearing  Vision: Impaired  Vision Exceptions: Wears glasses for reading  Hearing: Within functional limits     Subjective  General  Chart Reviewed: Yes  Patient assessed for rehabilitation services?: Yes  Response To Previous Treatment: Not applicable  Family / Caregiver Present: No  Referring Practitioner: TRACIE Faustin  Referral Date : 12/14/20  Diagnosis: Acute Respiratory Failure due to COVID 19  Follows Commands: Within Functional Limits  General Comment  Comments: Pt resting in bed on approach, RN cleared pt for therapy  Subjective  Subjective: pt agreeable to therapy  Pain Screening  Patient Currently in Pain: Denies    Orientation   WFL  Social/Functional History  Social/Functional History  Lives With: Spouse  Type of Home: House  Home Layout: Two level, Able to Live on Main level with bedroom/bathroom  Home Access: Stairs to enter without rails  Entrance Stairs - Number of Steps: 2  Bathroom Shower/Tub: Tub/Shower unit  Bathroom Toilet: Standard  Bathroom Equipment: Shower chair, Grab bars in [address]: Cane  ADL Assistance: Independent  Homemaking Assistance: Independent  Homemaking Responsibilities: Yes  Ambulation Assistance: Independent  Transfer Assistance: Independent  Active : Yes  Occupation: Full time employment  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 Max A x 2 to stand from EOB with height elevated to alissa stedy. Max A x 2 to stand from alissa stedy paddles and to control descent to chair     Ambulation  Ambulation?: No(unsafe this date d/t poor activity tolerance and impaired standing abilities)     Balance  Sitting - Static: Good  Sitting - Dynamic: Good;-  Comments: SBA to sit EOB     Exercises  Heelslides: x 5 BLE  Ankle Pumps: x 10 BLE  Comments: Cues for technique     Plan   Plan  Times per week: 2-3x/wk  Times per day: Daily  Specific instructions for Next Treatment: progress mobility as tolerated  Current Treatment Recommendations: Strengthening, Neuromuscular Re-education, Home Exercise Program, ROM, Safety Education & Training, Endurance Training, Balance Training, Functional Mobility Training, Transfer Training, Gait Training, Patient/Caregiver Education & Training, Equipment Evaluation, Education, & procurement  Safety Devices  Type of devices:  All fall risk precautions in place, Call light within reach, Chair alarm in place, Gait belt, Patient at risk for falls, Nurse notified, Left in chair      AM-PAC Score  AM-PAC Inpatient Mobility Raw Score : 9 (12/14/20 1316)  AM-PAC Inpatient T-Scale Score : 30.55 (12/14/20 1316)  Mobility Inpatient CMS 0-100% Score: 81.38 (12/14/20 1316)  Mobility Inpatient CMS G-Code Modifier : CM (12/14/20 1316)          Goals  Short term goals  Time Frame for Short term goals: 1 week (12/21) unless otherwise specified Short term goal 1: Pt will be mod A for bed mobility. Short term goal 2: Pt will be mod A x 2 for sit<>Stand and bed<>chair transfers with RW. Short term goal 3: Pt will ambulate 10 ft with mod A x 2 and RW. Short term goal 4: 12/18: Pt will participate in 12-15 reps of BLE exercises to promote strength and activity tolerance. Patient Goals   Patient goals : \"to get better\"       Therapy Time   Individual Concurrent Group Co-treatment   Time In 1150         Time Out 1230         Minutes 40         Timed Code Treatment Minutes: [address], PT, DPT  If pt is unable to be seen after this session, please let this note serve as discharge summary. Please see case management note for discharge disposition. Thank you.

## 2020-12-14 NOTE — PROGRESS NOTES
INPATIENT PULMONARY CRITICAL CARE PROGRESS NOTE      Reason for visit     Respiratory failure d/t COVID-19    SUBJECTIVE: Patient was successfully extubated during the course of the hospitalization, patient continues to be on high flow oxygen and patient when evaluated this morning was on 6 L of high flow oxygen with saturation of 96%, patient does not have any increasing respite secretions, patient is afebrile and hemodynamically maintained, patient's blood sugars are not controlled, patient has failed swallowing and patient is getting NG tube feeds, patient has had adequate urine output overnight with cumulative fluid balance of -6.1 L, patient continues to have intermittent hematuria, no other pertinent review of system of concern       Physical Exam:  Blood pressure (!) 154/88, pulse 84, temperature 98.4 °F (36.9 °C), temperature source Bladder, resp. rate 29, height 5' 4\" (1.626 m), weight 214 lb 11.7 oz (97.4 kg), SpO2 96 %.'       In-person bedside physical examination deferred. Pursuant to the emergency declaration under the [address] [place] [address] and the [employer] and [employer] Act, this clinical encounter was conducted to provide necessary medical care. (Also consistent with new provisions and guidance offered by [facility] on March 18, 2020 in setting of COVID 19 outbreak and in order to preserve personal protective equipment in accordance with the flexibilities announced by CMS on March 30, 2020)   References: https://[facility]. [place].[facility]/Portals/0/Resources/COVID-19/3_18%20Telemed%20Guidance%20Updated%20March%2018. pdf?[email or web address]                      https://[facility]. [email or web address].[facility]/Portals/0/Resources/COVID-19/3_18%20Telemed%20Guidance%20Updated%20March%2018. pdf?[id number]                      [email or web address] pdf Results:  CBC:   Recent Labs     12/12/20  0441 12/13/20  0517 12/14/20  0500   WBC 15.9* 17.6* 18.4*   HGB 14.9 16.8* 16.2*   HCT 45.2 50.6* 49.3*   MCV 81.8 81.4 81.9    280 226     BMP:   Recent Labs     12/12/20  0441 12/13/20  0517 12/14/20  0500    140 139   K 3.6 3.5 3.7    99 98*   CO2 33* 29 30   PHOS 3.4 4.0 4.9   BUN 51* 52* 69*   CREATININE 0.9 0.9 1.1     LIVER PROFILE:   Recent Labs     12/12/20  0441 12/13/20  0517 12/14/20  0500   AST 26 30 32   ALT 25 31 37   BILITOT 0.9 1.1* 0.9   ALKPHOS 53 68 72       Imaging:  I have reviewed radiology images personally. XR ABDOMEN (KUB) (SINGLE AP VIEW)   Final Result   NG tip in region of gastric antrum and side port in body of stomach         XR CHEST PORTABLE   Final Result   Persistent bilateral airspace disease with mild improvement on the right         XR CHEST PORTABLE   Final Result   Support apparatus appears stable. Mild congestive failure, pulmonary edema which is similar to slightly   improved when accounting for differences in technique. Superimposed   pneumonia cannot be excluded in a patient with known COVID-19 infection. XR ABDOMEN FOR NG/OG/NE TUBE PLACEMENT   Final Result   Support lines and tubes as above. Worsening diffuse pulmonary opacities relative to 2 days prior in the setting   of known COVID-19 infection. XR CHEST PORTABLE   Final Result   Support lines and tubes as above. Worsening diffuse pulmonary opacities relative to 2 days prior in the setting   of known COVID-19 infection. VL Extremity Venous Left   Final Result      XR CHEST PORTABLE   Final Result   Bilateral airspace disease is suspicious for pneumonia, and could be   compatible with history of COVID-19.            Xr Abdomen (kub) (single Ap View)    Result Date: 12/12/2020 EXAMINATION: ONE SUPINE XRAY VIEW(S) OF THE ABDOMEN 12/12/2020 3:09 pm COMPARISON: 12/02/2020 HISTORY: ORDERING SYSTEM PROVIDED HISTORY: increased nausea, NGT with brown output TECHNOLOGIST PROVIDED HISTORY: Reason for exam:->increased nausea, NGT with brown output FINDINGS: NG tip in the region of the gastric antrum and side port in the region of the gastric body. Gas seen in nondilated colon. No significant small bowel gas.      NG tip in region of gastric antrum and side port in body of stomach     Vl Extremity Venous Left    Result Date: 11/30/2020 Lower Extremities DVT Study  Demographics   Patient Name       Dalton Berry   Date of Study      11/30/2020        Gender              Female   Patient Number     [id number]        Date of Birth       1945   Visit Number       [id number]         Age                 70 year(s)   Accession Number   [id number]        Room Number         2760   Corporate ID       [id number]          Sonographer         Elier Galvan [facility]   Ordering Physician Renetta Jaffe MD Interpreting        [facility]                                       Physician           Vascular                                                           Abelardo Rush MD, [facility], [facility]  Procedure Type of Study:   Veins:Lower Extremities DVT Study, VL EXTREMITY VENOUS DUPLEX LEFT. Vascular Sonographer Report  Indications for Study:Swelling. Additional Indications:Left leg swelling and redness for approximately two years. Venous Duplex Scan: B-mode imaging of the deep and superficial veins, with compression maneuvers, including color and Doppler spectral waveform analysis. Impressions Right Impression There is no evidence of deep venous thrombosis involving the common femoral vein. Left Impression There is no evidence of deep or superficial venous thrombosis involving the left lower extremity. There is no previous exam for comparison. Conclusions   Summary   No evidence of deep vein or superficial thrombosis involving the left lower  extremity and the contralateral proximal common femoral vein. Signature   ------------------------------------------------------------------  Electronically signed by Abelardo Rush MD, Lv Robles  (Interpreting physician) on 11/30/2020 at 01:30 PM  ------------------------------------------------------------------  Patient Status:Routine.  [facility] - Vascular EXAMINATION: ONE XRAY VIEW OF THE CHEST 12/10/2020 10:47 am COMPARISON: 12/03/2020 HISTORY: ORDERING SYSTEM PROVIDED HISTORY: fever, increased wbc TECHNOLOGIST PROVIDED HISTORY: Reason for exam:->fever, increased wbc Reason for Exam: fever sob Acuity: Acute Type of Exam: Initial FINDINGS: Endotracheal tube unchanged. Gastric tube and right IJ line remain in place. Heart size stable. Coronary artery stent noted. Minimal change in appearance of bilateral airspace disease, with slight improvement noted on the right. No new abnormality identified     Persistent bilateral airspace disease with mild improvement on the right     Xr Chest Portable    Result Date: 12/3/2020  EXAMINATION: ONE XRAY VIEW OF THE CHEST 12/3/2020 6:08 am COMPARISON: 12/02/2020 HISTORY: ORDERING SYSTEM PROVIDED HISTORY: resp failure TECHNOLOGIST PROVIDED HISTORY: Reason for exam:->resp failure Reason for Exam: Resp failure FINDINGS: Endotracheal tube, right IJ line and NG tube appear stable. Study is limited by overlying support and monitoring apparatus. Heart size is stable. There is mild pulmonary vascular congestion and diffuse increased ground-glass and interstitial opacities with asymmetric involvement of the lung bases right greater than left. Findings appear similar to slightly improved given differences in technique from the comparison. Osseous structures demonstrate ORIF of the left humerus. Support apparatus appears stable. Mild congestive failure, pulmonary edema which is similar to slightly improved when accounting for differences in technique. Superimposed pneumonia cannot be excluded in a patient with known COVID-19 infection.      Xr Chest Portable    Result Date: 12/2/2020 EXAMINATION: ONE XRAY VIEW OF THE CHEST; ONE SUPINE XRAY VIEW(S) OF THE ABDOMEN 12/2/2020 12:21 pm COMPARISON: 12/02/2020, 11/30/2020 HISTORY: ORDERING SYSTEM PROVIDED HISTORY: Verify placement of ETT and CVC TECHNOLOGIST PROVIDED HISTORY: Reason for exam:->Verify placement of ETT and CVC Reason for Exam: verify cvc and ett Acuity: Acute Type of Exam: Initial; ORDERING SYSTEM PROVIDED HISTORY: Confirmation of course of NG/OG/NE tube and location of tip of tube TECHNOLOGIST PROVIDED HISTORY: Reason for exam:->Confirmation of course of NG/OG/NE tube and location of tip of tube Portable? ->Yes Reason for Exam: ngt placement Acuity: Acute Type of Exam: Initial FINDINGS: The tip of the ET tube is 3.6 cm above the shannan. Right internal jugular central venous catheter tip projects over the in for azygos superior vena cava. Enteric tube is subdiaphragmatic in location coursing beyond the inferior field of view. Persistent diffuse pulmonary opacities bilaterally, slightly increased relative to 2 days prior. No pneumothorax or pleural effusion. No acute bony findings. Support lines and tubes as above. Worsening diffuse pulmonary opacities relative to 2 days prior in the setting of known COVID-19 infection. Xr Chest Portable    Result Date: 11/30/2020  EXAMINATION: ONE XRAY VIEW OF THE CHEST 11/30/2020 5:14 am COMPARISON: None. HISTORY: ORDERING SYSTEM PROVIDED HISTORY: Respiratory failure due to COVID-19 TECHNOLOGIST PROVIDED HISTORY: Reason for exam:->Respiratory failure due to COVID-19 Reason for Exam: Respiratory failure due to COVID-19 Type of Exam: Subsequent/Follow-up FINDINGS: Asymmetric patchy hazy bilateral airspace disease. No pneumothorax or sizable effusion. Cardiomediastinal contours are obscured. Heart size is grossly normal.  Intact skeleton. Bilateral airspace disease is suspicious for pneumonia, and could be compatible with history of COVID-19.      Xr Abdomen For Ng/og/ne Tube Placement Result Date: 12/2/2020  EXAMINATION: ONE XRAY VIEW OF THE CHEST; ONE SUPINE XRAY VIEW(S) OF THE ABDOMEN 12/2/2020 12:21 pm COMPARISON: 12/02/2020, 11/30/2020 HISTORY: ORDERING SYSTEM PROVIDED HISTORY: Verify placement of ETT and CVC TECHNOLOGIST PROVIDED HISTORY: Reason for exam:->Verify placement of ETT and CVC Reason for Exam: verify cvc and ett Acuity: Acute Type of Exam: Initial; ORDERING SYSTEM PROVIDED HISTORY: Confirmation of course of NG/OG/NE tube and location of tip of tube TECHNOLOGIST PROVIDED HISTORY: Reason for exam:->Confirmation of course of NG/OG/NE tube and location of tip of tube Portable? ->Yes Reason for Exam: ngt placement Acuity: Acute Type of Exam: Initial FINDINGS: The tip of the ET tube is 3.6 cm above the shannan. Right internal jugular central venous catheter tip projects over the in for azygos superior vena cava. Enteric tube is subdiaphragmatic in location coursing beyond the inferior field of view. Persistent diffuse pulmonary opacities bilaterally, slightly increased relative to 2 days prior. No pneumothorax or pleural effusion. No acute bony findings. Support lines and tubes as above. Worsening diffuse pulmonary opacities relative to 2 days prior in the setting of known COVID-19 infection. Results for Gaby Velasquez (MRN [id number]) as of 12/14/2020 12:14   Ref.  Range 12/14/2020 05:00 12/14/2020 05:08 12/14/2020 08:17 12/14/2020 11:50   Sodium Latest Ref Range: 136 - 145 mmol/L 139      Potassium Latest Ref Range: 3.5 - 5.1 mmol/L 3.7      Chloride Latest Ref Range: 99 - 110 mmol/L 98 (L)      CO2 Latest Ref Range: 21 - 32 mmol/L 30      BUN Latest Ref Range: 7 - 20 mg/dL 69 (H)      Creatinine Latest Ref Range: 0.6 - 1.2 mg/dL 1.1      Anion Gap Latest Ref Range: 3 - 16  11      GFR Non- Latest Ref Range: >60  49 (A)      GFR  Latest Ref Range: >60  59 (A)      Magnesium Latest Ref Range: 1.80 - 2.40 mg/dL 2.50 (H) Glucose Latest Ref Range: 70 - 99 mg/dL 334 (H)      POC Glucose Latest Ref Range: 70 - 99 mg/dl  231 (H) 287 (H) 312 (H)   Calcium Latest Ref Range: 8.3 - 10.6 mg/dL 9.0      Phosphorus Latest Ref Range: 2.5 - 4.9 mg/dL 4.9      Total Protein Latest Ref Range: 6.4 - 8.2 g/dL 6.2 (L)        Results for Vilma Candelario (MRN [id number]) as of 12/14/2020 12:14   Ref. Range 12/10/2020 06:00 12/11/2020 04:45 12/12/2020 04:41 12/13/2020 05:17 12/14/2020 05:00   WBC Latest Ref Range: 4.0 - 11.0 K/uL 17.6 (H) 18.2 (H) 15.9 (H) 17.6 (H) 18.4 (H)   RBC Latest Ref Range: 4.00 - 5.20 M/uL 5.49 (H) 5.49 (H) 5.52 (H) 6.21 (H) 6.03 (H)   Hemoglobin Quant Latest Ref Range: 12.0 - 16.0 g/dL 14.6 15.0 14.9 16.8 (H) 16.2 (H)   Hematocrit Latest Ref Range: 36.0 - 48.0 % 45.0 45.3 45.2 50.6 (H) 49.3 (H)   MCV Latest Ref Range: 80.0 - 100.0 fL 81.9 82.5 81.8 81.4 81.9   MCH Latest Ref Range: 26.0 - 34.0 pg 26.6 27.3 27.1 27.1 26.9   MCHC Latest Ref Range: 31.0 - 36.0 g/dL 32.5 33.1 33.1 33.3 32.9   MPV Latest Ref Range: 5.0 - 10.5 fL 10.1 10.1 10.4 10.9 (H) 10.9 (H)   RDW Latest Ref Range: 12.4 - 15.4 % 14.5 14.7 14.5 14.7 14.8   Platelet Count Latest Ref Range: 135 - 450 K/uL 218 222 235 280 226   Neutrophils % Latest Units: % 85.0 84.0 83.0 88.0 90.9     Results for Vilma Candelario (MRN [id number]) as of 12/14/2020 12:14   Ref.  Range 12/8/2020 05:40 12/9/2020 05:50 12/9/2020 12:42 12/10/2020 16:13 12/12/2020 10:15   O2 Therapy Unknown Unknown Unknown Unknown Unknown Unknown   Hemoglobin, Art, Extended Latest Ref Range: 12.0 - 16.0 g/dL 14.2 13.1 14.6 15.9 17.4 (H)   pH, Arterial Latest Ref Range: 7.350 - 7.450  7.396 7.424 7.425 7.507 (H) 7.547 (H)   pCO2, Arterial Latest Ref Range: 35.0 - 45.0 mmHg 59.7 (H) 59.7 (H) 59.6 (H) 40.5 37.3   pO2, Arterial Latest Ref Range: 75.0 - 108.0 mmHg 63.7 (L) 63.5 (L) 70.6 (L) 71.9 (L) 61.6 (L)   HCO3, Arterial Latest Ref Range: 21.0 - 29.0 mmol/L 35.8 (H) 38.2 (H) 38.2 (H) 31.4 (H) 31.7 (H) TCO2 (calc), Art Latest Ref Range: Not Established mmol/L 37.7 40.0 40.1 32.6 32.8   Base Excess, Arterial Latest Ref Range: -3.0 - 3.0 mmol/L 8.7 (H) 11.5 (H) 11.3 (H) 7.6 (H) 8.8 (H)   O2 Sat, Arterial Latest Ref Range: >92 % 92.1 (L) 92.3 (L) 93.5 94.5 92.3 (L)   O2 Content, Arterial Latest Ref Range: Not Established mL/dL 18 17 19 21 22   Methemoglobin, Arterial Latest Ref Range: <1.5 % 0.5 0.5 0.4 0.5 0.4   Carboxyhgb, Arterial Latest Ref Range: 0.0 - 1.5 % 0.1 0.3 0.3 0.5 0.5     Results for Kishore Cho (MRN [id number]) as of 12/14/2020 12:14   Ref.  Range 12/1/2020 16:00   Color, UA Latest Ref Range: Straw/Yellow  RED (A)   Clarity, UA Latest Ref Range: Clear  TURBID (A)   Glucose, UA Latest Ref Range: Negative mg/dL 100 (A)   Bilirubin, Urine Latest Ref Range: Negative  Negative   Ketones, Urine Latest Ref Range: Negative mg/dL Negative   Specific Gravity, UA Latest Ref Range: 1.005 - 1.030  1.020   Blood, Urine Latest Ref Range: Negative  LARGE (A)   pH, UA Latest Ref Range: 5.0 - 8.0  7.5   Protein, UA Latest Ref Range: Negative mg/dL 100 (A)   Urobilinogen, Urine Latest Ref Range: <2.0 E.U./dL 0.2   Nitrite, Urine Latest Ref Range: Negative  Negative   Leukocyte Esterase, Urine Latest Ref Range: Negative  TRACE (A)   Urine Type Unknown NotGiven   Urinalysis Comments Unknown see below   WBC, UA Latest Ref Range: 0 - 5 /HPF see below (A)   RBC, UA Latest Ref Range: 0 - 4 /HPF >100 (A)   Epithelial Cells, UA Latest Ref Range: 0 - 5 /HPF 0-1   Renal Epithelial, UA Latest Ref Range: 0 - 1 /HPF 2-5 (A)   Microscopic Examination Unknown YES       Assessment:  Active Problems:    Acute respiratory failure due to COVID-19 [facility])    Essential hypertension    Obesity, Class III, BMI 40-49.9 (morbid obesity) (HCC)    Type 2 diabetes mellitus, with long-term current use of insulin (HCC)    COPD (chronic obstructive pulmonary disease) (HCC)    Oropharyngeal dysphagia H/O primary malignant neoplasm of urinary bladder    Hematuria    Leukocytosis  Resolved Problems:    * No resolved hospital problems.  *          Plan:   Oxygen supplementation to keep saturation between 90 to 94%  Please titrate down the oxygen as per the above parameters  Status post intubation and extubation  Patient is tolerating extubation so far  Patient was started on IV Solu-Medrol and the dose of Solu-Medrol being decreased to every 12 hours  Patient's blood sugar are elevated and will worsen with the Solu-Medrol  Patient is getting Lantus insulin twice a day and also patient's blood sugars on the higher side  Decreasing the Solu-Medrol may improve the blood sugars  Will also change the enteral feeds to diabetic formula  BGM with SSI  Status post remdesivir and convalescent plasma  Monitor input output and BMP  Monitor hematuria  Urology follow-up  Correct electrolytes on whenever necessary basis  Bronchodilators can be given on as needed basis  Enteral feeds as per metabolic support  Trend WBC count  No reason to continue IV Cefepime -will d/c and reassess   Speech follow up   PUD prophylaxis as per IM  PT/OT consults    Case d/w ICU team       Electronically signed by:  González Cano MD    12/14/2020    12:20 PM.

## 2020-12-14 NOTE — FLOWSHEET NOTE
Bedside report given by [facility] RN, Skin assessment completed. Shift assessment completed and documented; see flowsheets for details. Pt resting in bed, VSS, Lines checked and verified, alarms on and audible. Repositioned patient, sacral Mepilex in place, call light within reach, will continue to closely monitor. Recent Labs     12/14/20  0500   WBC 18.4*   HGB 16.2*   HCT 49.3*   MCV 81.9        Recent Labs     12/14/20  0500      K 3.7   CL 98*   CO2 30   GLUCOSE 334*   PHOS 4.9   MG 2.50*   BUN 69*   CREATININE 1.1   CALCIUM 9.0   LABGLOM 49*   GFRAA 59*     Jorge Khan 12/14/2020 10:58 AM  Estimated Creatinine Clearance: 52 mL/min (based on SCr of 1.1 mg/dL).     DVT Prophylaxis: enoxaparin (Lovenox) 40 mg SQ Daily    GI Prophylaxis: pantoprazole (PROTONIX), per order

## 2020-12-14 NOTE — PROGRESS NOTES
Occupational Therapy   Occupational Therapy Initial Assessment  Date: 2020   Patient Name: Gerard Lin  MRN: [id number]     : 1945    Date of Service: 2020    Discharge Recommendations:  LTACH(vs. SNF)       Assessment   Performance deficits / Impairments: Decreased functional mobility ; Decreased balance;Decreased safe awareness;Decreased endurance;Decreased ADL status; Decreased strength  Assessment: pt normally independent with high level IADL's, working full-time & independent with functional mobility without AD; pt in ICU since 20, COVID +, on 6 L O 2, requiring max assist of 2 sit<-->stand with LiFT/EMMA STEDY & dependent with eating, & self care; pt to benefit from skilled OT services  OT Education: OT Role;Plan of Care;Energy Conservation  Patient Education: disease specific:  importance of OOB to chair with LiFT assistance at this time, pursed lip breathing  Activity Tolerance  Activity Tolerance: Patient Tolerated treatment well  Activity Tolerance: increased SOB with decreased O 2 sats on 6 L O 2 due to poor pursed lip breathing technique at 83%; with max cues for purse lip breathing increases O 2 sats to 92 %  Safety Devices  Safety Devices in place: Yes  Type of devices: Call light within reach; Chair alarm in place; Left in chair;Nurse notified           Patient Diagnosis(es): There were no encounter diagnoses. has a past medical history of COVID-19, Hyperlipidemia, Hypertension, Neuropathy, and Type II or unspecified type diabetes mellitus without mention of complication, not stated as uncontrolled. has a past surgical history that includes Appendectomy; Cholecystectomy; Tubal ligation; tumor removal; and Humerus fracture surgery.            Restrictions  Restrictions/Precautions  Restrictions/Precautions: Isolation, General Precautions  Position Activity Restriction  Other position/activity restrictions: NG tube feeding, 6 L O 2, ICU monitering, IV's, COVID + Arousal/Alertness: Appropriate responses to stimuli  Following Commands:  Follows one step commands consistently  Attention Span: Appears intact  Memory: Appears intact  Safety Judgement: Decreased awareness of need for safety  Initiation: Requires cues for some  Sequencing: Requires cues for some    Type of ROM/Therapeutic Exercise: AROM  Comment: CINTHYAE semi-david's in bed   Hand flex/ext: x   10 Reps  Elbow flex/ext:  x 10   Reps    LUE AROM : WFL  RUE AROM : WFL           Plan   Plan  Times per week: 3 x/ week  Current Treatment Recommendations: Strengthening, Balance Training, Functional Mobility Training, Safety Education & Training, Positioning, Endurance Training, Self-Care / ADL    AM-PAC Score        AM-[facility] Inpatient Daily Activity Raw Score: 7 (12/14/20 1316)  AM-PAC Inpatient ADL T-Scale Score : 20.13 (12/14/20 1316)  ADL Inpatient CMS 0-100% Score: 92.44 (12/14/20 1316)  ADL Inpatient CMS G-Code Modifier : CM (12/14/20 1316)    Goals  Short term goals  Time Frame for Short term goals: 1 week(12-21-20)  Short term goal 1: mod assist of 2 stand-pivot transfers by 12-21-20  Short term goal 2: min assist with UE light grooming  Short term goal 3: tolerate 15 reps BUE exercises  Patient Goals   Patient goals : get stronger       Therapy Time   Individual Concurrent Group Co-treatment   Time In 1150         Time Out 1230         Minutes [address]

## 2020-12-14 NOTE — CARE COORDINATION
Chart review completed. Patient a 70year old female, admitted for respiratory failure r/t covid-19. Hospital day 15, currently in ICU level of care. Pt recently extubated and tolerating well. Therapy and SLP ordered for ongoing treatments and rehab recommendations. Pt currently on 6 liters of high flow oxygen SpO2 96%. Per documentation patient ready to transfer out of ICU when bed available. CM will await therapy recommendations to follow up so that we can discuss rehab needs. Anticipate ARU vs SNF. She has fallen out of ltach criteria.   Lennie Cha, RN

## 2020-12-14 NOTE — PROGRESS NOTES
[facility]    [facility]              [phone]                      She is admitted with hypoxemia and respiratory failure requiring intubation and mechanical ventilation due to COVID-19. We are following for ELOY and related issues    Interval History and plan:      Seen an examined in ICU. Still on 6 L O2.  sats 95 %   says SOB a bit better. On NG tube  Cr 1.1--> 0.9--> 1.1  Made 1.3 L urine yesterday, no edema. Continue conservative mx. Avoid fluid overload. Keep lungs on the dry side. Weights daily. 241---> 214 lbs.    use diuretics per intensivist discretion. Assessment :     Acute Kidney Injury  EOLY likely due to -hemodynamic changes, Covid  Cr on consultation 1.2  Baseline Cr-0.8    UA-large blood, has Urena, RBC more than 100  Renal Imaging:-NA  Echo: N/A    Hypertension   BP: (150-165)/(66-88)  Pulse:  [82-97]   BP goal inpatient [phone] systolic inpatient  Coming down to 160s  On as needed hydralazine    COVID-19 positive, had respiratory failure requiring intubation and mechanical ventilation         [facility] would like to thank Krystal Quiles MD   for opportunity to serve this patient      Please call with questions at-   24 Hrs Answering service [phone] or  7 am- 5 pm via Perfect serve or cell phone  Obie Brand          CC/reason for consult :     ELOY     HPI :     From consult note-   Irma Theodore is a 70 y.o. female presented to   the hospital on 11/29/2020 with COVID-19 which was diagnosed on 11/26/2016. Per chart see you had fatigue headache loss in appetite since of smell and taste and followed by cough for several weeks. Now came with the shortness of breath. She had a difficulty breathing and came to [facility].  He has she required 10 L/min of oxygen. She is known to have COPD and congestive heart failure but no need for supplemental oxygen at home. acetaminophen (TYLENOL) tablet 650 mg, 650 mg, Oral, Q4H PRN **OR** acetaminophen (TYLENOL) suppository 650 mg, 650 mg, Rectal, Q4H PRN  ondansetron (ZOFRAN) injection 4 mg, 4 mg, Intravenous, Q6H PRN  albuterol sulfate  (90 Base) MCG/ACT inhaler 2 puff, 2 puff, Inhalation, Q4H PRN  glucose (GLUTOSE) 40 % oral gel 15 g, 15 g, Oral, PRN  dextrose 50 % IV solution, 12.5 g, Intravenous, PRN  glucagon (rDNA) injection 1 mg, 1 mg, Intramuscular, PRN  dextrose 5 % solution, 100 mL/hr, Intravenous, PRN  enoxaparin (LOVENOX) injection 40 mg, 40 mg, Subcutaneous, BID  0.9 % sodium chloride bolus, 30 mL, Intravenous, PRN  albuterol sulfate  (90 Base) MCG/ACT inhaler 2 puff, 2 puff, Inhalation, Q4H PRN **AND** ipratropium (ATROVENT HFA) 17 MCG/ACT inhaler 2 puff, 2 puff, Inhalation, Q4H PRN **AND** MDI Treatment, , , PRN       Vitals :     Vitals:    12/14/20 1200   BP: (!) 154/88   Pulse: 84   Resp: 29   Temp:    SpO2: 96%       I & O :       Intake/Output Summary (Last 24 hours) at 12/14/2020 1222  Last data filed at 12/14/2020 0800  Gross per 24 hour   Intake 693 ml   Output 1575 ml   Net -882 ml        Physical Examination :     Due to the current efforts to prevent transmission of COVID-19 and also the need to preserve PPE for other caregivers, a face-to-face encounter with the patient was not performed. That being said, all relevant records and diagnostic tests were reviewed, including laboratory results and imaging. Please reference any relevant documentation elsewhere. Care will be coordinated with the primary service.          LABS:     Recent Labs     12/12/20  0441 12/13/20  0517 12/14/20  0500   WBC 15.9* 17.6* 18.4*   HGB 14.9 16.8* 16.2*   HCT 45.2 50.6* 49.3*    280 226     Recent Labs     12/12/20  0441 12/13/20  0517 12/14/20  0500    140 139   K 3.6 3.5 3.7    99 98*   CO2 33* 29 30   BUN 51* 52* 69*   CREATININE 0.9 0.9 1.1   GLUCOSE 76 207* 334*   MG 2.10 2.20 2.50* PHOS 3.4 4.0 4.9

## 2020-12-15 ENCOUNTER — APPOINTMENT (OUTPATIENT)
Dept: INTERVENTIONAL RADIOLOGY/VASCULAR | Age: 75
DRG: 207 | End: 2020-12-15
Attending: INTERNAL MEDICINE
Payer: MEDICARE

## 2020-12-15 PROBLEM — R79.9 ELEVATED BUN: Status: ACTIVE | Noted: 2020-12-15

## 2020-12-15 LAB
A/G RATIO: 1.1 (ref 1.1–2.2)
ALBUMIN SERPL-MCNC: 3.3 G/DL (ref 3.4–5)
ALP BLD-CCNC: 70 U/L (ref 40–129)
ALT SERPL-CCNC: 47 U/L (ref 10–40)
ANION GAP SERPL CALCULATED.3IONS-SCNC: 10 MMOL/L (ref 3–16)
AST SERPL-CCNC: 36 U/L (ref 15–37)
BASOPHILS ABSOLUTE: 0 K/UL (ref 0–0.2)
BASOPHILS RELATIVE PERCENT: 0.1 %
BILIRUB SERPL-MCNC: 1 MG/DL (ref 0–1)
BUN BLDV-MCNC: 80 MG/DL (ref 7–20)
CALCIUM SERPL-MCNC: 9.4 MG/DL (ref 8.3–10.6)
CHLORIDE BLD-SCNC: 103 MMOL/L (ref 99–110)
CO2: 31 MMOL/L (ref 21–32)
CREAT SERPL-MCNC: 1.1 MG/DL (ref 0.6–1.2)
EOSINOPHILS ABSOLUTE: 0 K/UL (ref 0–0.6)
EOSINOPHILS RELATIVE PERCENT: 0 %
GFR AFRICAN AMERICAN: 59
GFR NON-AFRICAN AMERICAN: 49
GLOBULIN: 3.1 G/DL
GLUCOSE BLD-MCNC: 133 MG/DL (ref 70–99)
GLUCOSE BLD-MCNC: 134 MG/DL (ref 70–99)
GLUCOSE BLD-MCNC: 135 MG/DL (ref 70–99)
GLUCOSE BLD-MCNC: 154 MG/DL (ref 70–99)
GLUCOSE BLD-MCNC: 155 MG/DL (ref 70–99)
GLUCOSE BLD-MCNC: 172 MG/DL (ref 70–99)
GLUCOSE BLD-MCNC: 231 MG/DL (ref 70–99)
HCT VFR BLD CALC: 50.5 % (ref 36–48)
HEMOGLOBIN: 16.5 G/DL (ref 12–16)
LYMPHOCYTES ABSOLUTE: 0.8 K/UL (ref 1–5.1)
LYMPHOCYTES RELATIVE PERCENT: 3.4 %
MAGNESIUM: 2.5 MG/DL (ref 1.8–2.4)
MCH RBC QN AUTO: 27.1 PG (ref 26–34)
MCHC RBC AUTO-ENTMCNC: 32.7 G/DL (ref 31–36)
MCV RBC AUTO: 82.7 FL (ref 80–100)
MONOCYTES ABSOLUTE: 0.8 K/UL (ref 0–1.3)
MONOCYTES RELATIVE PERCENT: 3.3 %
NEUTROPHILS ABSOLUTE: 21.6 K/UL (ref 1.7–7.7)
NEUTROPHILS RELATIVE PERCENT: 93.2 %
PDW BLD-RTO: 15 % (ref 12.4–15.4)
PERFORMED ON: ABNORMAL
PHOSPHORUS: 4.4 MG/DL (ref 2.5–4.9)
PLATELET # BLD: 222 K/UL (ref 135–450)
PMV BLD AUTO: 10.7 FL (ref 5–10.5)
POTASSIUM SERPL-SCNC: 3.6 MMOL/L (ref 3.5–5.1)
RBC # BLD: 6.1 M/UL (ref 4–5.2)
SODIUM BLD-SCNC: 144 MMOL/L (ref 136–145)
TOTAL PROTEIN: 6.4 G/DL (ref 6.4–8.2)
WBC # BLD: 23.2 K/UL (ref 4–11)

## 2020-12-15 PROCEDURE — 6360000002 HC RX W HCPCS: Performed by: NURSE PRACTITIONER

## 2020-12-15 PROCEDURE — 84100 ASSAY OF PHOSPHORUS: CPT

## 2020-12-15 PROCEDURE — 85025 COMPLETE CBC W/AUTO DIFF WBC: CPT

## 2020-12-15 PROCEDURE — 2000000000 HC ICU R&B

## 2020-12-15 PROCEDURE — 83735 ASSAY OF MAGNESIUM: CPT

## 2020-12-15 PROCEDURE — 6360000002 HC RX W HCPCS: Performed by: INTERNAL MEDICINE

## 2020-12-15 PROCEDURE — 99233 SBSQ HOSP IP/OBS HIGH 50: CPT | Performed by: INTERNAL MEDICINE

## 2020-12-15 PROCEDURE — C9113 INJ PANTOPRAZOLE SODIUM, VIA: HCPCS | Performed by: NURSE PRACTITIONER

## 2020-12-15 PROCEDURE — 92526 ORAL FUNCTION THERAPY: CPT

## 2020-12-15 PROCEDURE — 6370000000 HC RX 637 (ALT 250 FOR IP): Performed by: INTERNAL MEDICINE

## 2020-12-15 PROCEDURE — 36592 COLLECT BLOOD FROM PICC: CPT

## 2020-12-15 PROCEDURE — C1751 CATH, INF, PER/CENT/MIDLINE: HCPCS

## 2020-12-15 PROCEDURE — 02HV33Z INSERTION OF INFUSION DEVICE INTO SUPERIOR VENA CAVA, PERCUTANEOUS APPROACH: ICD-10-PCS | Performed by: INTERNAL MEDICINE

## 2020-12-15 PROCEDURE — 80053 COMPREHEN METABOLIC PANEL: CPT

## 2020-12-15 PROCEDURE — 6370000000 HC RX 637 (ALT 250 FOR IP): Performed by: NURSE PRACTITIONER

## 2020-12-15 PROCEDURE — 36573 INSJ PICC RS&I 5 YR+: CPT

## 2020-12-15 PROCEDURE — 94761 N-INVAS EAR/PLS OXIMETRY MLT: CPT

## 2020-12-15 PROCEDURE — 2700000000 HC OXYGEN THERAPY PER DAY

## 2020-12-15 PROCEDURE — 2580000003 HC RX 258: Performed by: NURSE PRACTITIONER

## 2020-12-15 PROCEDURE — 2500000003 HC RX 250 WO HCPCS: Performed by: INTERNAL MEDICINE

## 2020-12-15 RX ORDER — SODIUM CHLORIDE 0.9 % (FLUSH) 0.9 %
10 SYRINGE (ML) INJECTION EVERY 12 HOURS SCHEDULED
Status: DISCONTINUED | OUTPATIENT
Start: 2020-12-15 | End: 2020-12-16 | Stop reason: HOSPADM

## 2020-12-15 RX ORDER — SODIUM CHLORIDE 0.9 % (FLUSH) 0.9 %
10 SYRINGE (ML) INJECTION PRN
Status: DISCONTINUED | OUTPATIENT
Start: 2020-12-15 | End: 2020-12-16 | Stop reason: HOSPADM

## 2020-12-15 RX ORDER — LIDOCAINE HYDROCHLORIDE 10 MG/ML
5 INJECTION, SOLUTION INFILTRATION; PERINEURAL ONCE
Status: DISCONTINUED | OUTPATIENT
Start: 2020-12-15 | End: 2020-12-16 | Stop reason: HOSPADM

## 2020-12-15 RX ORDER — INSULIN GLARGINE 100 [IU]/ML
35 INJECTION, SOLUTION SUBCUTANEOUS 2 TIMES DAILY
Status: DISCONTINUED | OUTPATIENT
Start: 2020-12-16 | End: 2020-12-16 | Stop reason: HOSPADM

## 2020-12-15 RX ADMIN — INSULIN LISPRO 6 UNITS: 100 INJECTION, SOLUTION INTRAVENOUS; SUBCUTANEOUS at 16:26

## 2020-12-15 RX ADMIN — SODIUM CHLORIDE, PRESERVATIVE FREE 10 ML: 5 INJECTION INTRAVENOUS at 14:33

## 2020-12-15 RX ADMIN — ENOXAPARIN SODIUM 40 MG: 40 INJECTION SUBCUTANEOUS at 21:25

## 2020-12-15 RX ADMIN — FAMOTIDINE 20 MG: 10 INJECTION, SOLUTION INTRAVENOUS at 04:54

## 2020-12-15 RX ADMIN — ONDANSETRON 4 MG: 2 INJECTION INTRAMUSCULAR; INTRAVENOUS at 02:55

## 2020-12-15 RX ADMIN — ENOXAPARIN SODIUM 40 MG: 40 INJECTION SUBCUTANEOUS at 08:03

## 2020-12-15 RX ADMIN — MORPHINE SULFATE 2 MG: 2 INJECTION, SOLUTION INTRAMUSCULAR; INTRAVENOUS at 03:08

## 2020-12-15 RX ADMIN — METOPROLOL TARTRATE 50 MG: 50 TABLET, FILM COATED ORAL at 21:25

## 2020-12-15 RX ADMIN — HYDRALAZINE HYDROCHLORIDE 10 MG: 20 INJECTION INTRAMUSCULAR; INTRAVENOUS at 02:11

## 2020-12-15 RX ADMIN — SODIUM CHLORIDE, PRESERVATIVE FREE 10 ML: 5 INJECTION INTRAVENOUS at 21:25

## 2020-12-15 RX ADMIN — METHYLPREDNISOLONE SODIUM SUCCINATE 40 MG: 40 INJECTION, POWDER, FOR SOLUTION INTRAMUSCULAR; INTRAVENOUS at 08:03

## 2020-12-15 RX ADMIN — INSULIN GLARGINE 30 UNITS: 100 INJECTION, SOLUTION SUBCUTANEOUS at 08:19

## 2020-12-15 RX ADMIN — METOPROLOL TARTRATE 50 MG: 50 TABLET, FILM COATED ORAL at 08:04

## 2020-12-15 RX ADMIN — METHYLPREDNISOLONE SODIUM SUCCINATE 40 MG: 40 INJECTION, POWDER, FOR SOLUTION INTRAMUSCULAR; INTRAVENOUS at 21:25

## 2020-12-15 RX ADMIN — PANTOPRAZOLE SODIUM 40 MG: 40 INJECTION, POWDER, FOR SOLUTION INTRAVENOUS at 08:03

## 2020-12-15 RX ADMIN — AMLODIPINE BESYLATE 5 MG: 5 TABLET ORAL at 08:04

## 2020-12-15 ASSESSMENT — PAIN DESCRIPTION - DESCRIPTORS: DESCRIPTORS: BURNING;DISCOMFORT

## 2020-12-15 ASSESSMENT — ENCOUNTER SYMPTOMS: TACHYPNEA: 1

## 2020-12-15 ASSESSMENT — PAIN SCALES - GENERAL
PAINLEVEL_OUTOF10: 0
PAINLEVEL_OUTOF10: 7

## 2020-12-15 ASSESSMENT — PAIN DESCRIPTION - LOCATION: LOCATION: ABDOMEN;GENERALIZED

## 2020-12-15 ASSESSMENT — PAIN DESCRIPTION - PAIN TYPE: TYPE: ACUTE PAIN

## 2020-12-15 NOTE — PROGRESS NOTES
Pt c/o acid reflux, burning sensation in abd. Pt states has hx gerd. Notified Dr. Silvia Torres regarding pt's complaint and aware. Pepcid 20 mg IV given as ordered per Dr. Silvia Torres. Will continue to assess.  [id number] Belkis Burns RN

## 2020-12-15 NOTE — PROGRESS NOTES
Pt alert and oriented x4. Follows commands. Pt on ICU cardiac monitor showing normal sinus rhythm. Remains afebrile. O2 at 6L high flow nasal cannula with O2 sats 95%-96%. Resp even and nonlabored. Pt with generalized weakness. Pt has NGT flushed and capped. Pt refused to have tube feeding restarted tonight d/t pt c/o's feeling full and bloated with flatus. FSBS 154. Held SSI coverage at this time d/t pt npo status and TF off. Pt received evening dose lantus. Will recheck FSBS in 4 hours. Repositioned pt in bed and currently lying on left side. Pt ready to go to bed and sleep. Shift assessment completed. See flowsheet for vitals and assessment. See MAR for meds Nursing call light within pt's reach. Continue to monitor.

## 2020-12-15 NOTE — PROGRESS NOTES
[facility]    [facility]              [phone]                      She is admitted with hypoxemia and respiratory failure requiring intubation and mechanical ventilation due to COVID-19. We are following for ELOY and related issues    Interval History and plan:      Seen in ICU. Did not examine due to covid precautions, and to avoid cross contamination. Still on 5 L O2.  sats 95 %   says SOB a bit better. On NG tube  Cr 1.1--> 0.9--> 1.1  Made 1.5 L urine yesterday, no edema. Continue conservative mx. Avoid fluid overload. Keep lungs on the dry side. Weights daily. 241---> 214-> 206 lbs.    use diuretics per intensivist discretion.                       Assessment :     Acute Kidney Injury  ELOY likely due to -hemodynamic changes, Covid  Cr on consultation 1.2  Baseline Cr-0.8    UA-large blood, has Urena, RBC more than 100  Renal Imaging:-NA  Echo: N/A    Hypertension   BP: (157-161)/(65-68)  Pulse:  [74-97]   BP goal inpatient [phone] systolic inpatient  Coming down to 160s  On as needed hydralazine    COVID-19 positive, had respiratory failure requiring intubation and mechanical ventilation         [facility] would like to thank Danelle Bates MD   for opportunity to serve this patient      Please call with questions at-   24 Hrs Answering service [phone] or  7 am- 5 pm via Perfect serve or cell phone  Olivia Mack          CC/reason for consult :     ELOY     HPI :     From consult note-   Kishor Rivera is a 70 y.o. female presented to the hospital on 11/29/2020 with COVID-19 which was diagnosed on 11/26/2016. Per chart see you had fatigue headache loss in appetite since of smell and taste and followed by cough for several weeks. Now came with the shortness of breath. She had a difficulty breathing and came to [facility].  He has she required 10 L/min of oxygen. She is known to have COPD and congestive heart failure but no need for supplemental oxygen at home. She was transferred to ICU. She needed intubation and mechanical ventilation. Post intubation her blood pressure changes significantly. She also had poor urine output and rising creatinine. She got 125 mL/h of fluids overnight now on 25 mL/h  Blood pressure has been fluctuating. Blood pressure went more than 200 but now coming up to 479 systolic    We are consulted for acute renal failure and related issues    ROS:     Seen with- RN    Unable to obtain ROS due to  Altered mental status           PMH/PSH/SH/Family History:     Past Medical History:   Diagnosis Date    COVID-19 11/26/2020    Hyperlipidemia     Hypertension     Neuropathy     Type II or unspecified type diabetes mellitus without mention of complication, not stated as uncontrolled        Past Surgical History:   Procedure Laterality Date    APPENDECTOMY      CHOLECYSTECTOMY      HUMERUS FRACTURE SURGERY      TUBAL LIGATION      TUMOR REMOVAL          reports that she has never smoked. She does not have any smokeless tobacco history on file. family history is not on file.          Medication:     Current Facility-Administered Medications: lidocaine 1 % injection 5 mL, 5 mL, Intradermal, Once  sodium chloride flush 0.9 % injection 10 mL, 10 mL, Intravenous, 2 times per day  sodium chloride flush 0.9 % injection 10 mL, 10 mL, Intravenous, PRN  methylPREDNISolone sodium (SOLU-MEDROL) injection 40 mg, 40 mg, Intravenous, Q12H  morphine (PF) injection 2 mg, 2 mg, Intravenous, Q1H PRN amLODIPine (NORVASC) tablet 5 mg, 5 mg, Oral, Daily  insulin glargine (LANTUS) injection vial 30 Units, 30 Units, Subcutaneous, BID  hydrALAZINE (APRESOLINE) injection 10 mg, 10 mg, Intravenous, Q4H PRN  potassium chloride 20 mEq/50 mL IVPB (Central Line), 20 mEq, Intravenous, PRN  pantoprazole (PROTONIX) injection 40 mg, 40 mg, Intravenous, Daily  insulin lispro (HUMALOG) injection vial 0-18 Units, 0-18 Units, Subcutaneous, 6 times per day  metoprolol tartrate (LOPRESSOR) tablet 50 mg, 50 mg, Oral, BID  sodium chloride flush 0.9 % injection 10 mL, 10 mL, Intravenous, PRN  magnesium sulfate 1 g in dextrose 5% 100 mL IVPB, 1 g, Intravenous, PRN  acetaminophen (TYLENOL) tablet 650 mg, 650 mg, Oral, Q4H PRN **OR** acetaminophen (TYLENOL) suppository 650 mg, 650 mg, Rectal, Q4H PRN  ondansetron (ZOFRAN) injection 4 mg, 4 mg, Intravenous, Q6H PRN  albuterol sulfate  (90 Base) MCG/ACT inhaler 2 puff, 2 puff, Inhalation, Q4H PRN  glucose (GLUTOSE) 40 % oral gel 15 g, 15 g, Oral, PRN  dextrose 50 % IV solution, 12.5 g, Intravenous, PRN  glucagon (rDNA) injection 1 mg, 1 mg, Intramuscular, PRN  dextrose 5 % solution, 100 mL/hr, Intravenous, PRN  enoxaparin (LOVENOX) injection 40 mg, 40 mg, Subcutaneous, BID  0.9 % sodium chloride bolus, 30 mL, Intravenous, PRN  albuterol sulfate  (90 Base) MCG/ACT inhaler 2 puff, 2 puff, Inhalation, Q4H PRN **AND** ipratropium (ATROVENT HFA) 17 MCG/ACT inhaler 2 puff, 2 puff, Inhalation, Q4H PRN **AND** MDI Treatment, , , PRN       Vitals :     Vitals:    12/15/20 1000   BP: (!) 157/65   Pulse: 74   Resp: 12   Temp:    SpO2: 95%       I & O :       Intake/Output Summary (Last 24 hours) at 12/15/2020 1419  Last data filed at 12/15/2020 0834  Gross per 24 hour   Intake 744 ml   Output 1110 ml   Net -366 ml        Physical Examination : Due to the current efforts to prevent transmission of COVID-19 and also the need to preserve PPE for other caregivers, a face-to-face encounter with the patient was not performed. That being said, all relevant records and diagnostic tests were reviewed, including laboratory results and imaging. Please reference any relevant documentation elsewhere. Care will be coordinated with the primary service.          LABS:     Recent Labs     12/13/20  0517 12/14/20  0500 12/15/20  0500   WBC 17.6* 18.4* 23.2*   HGB 16.8* 16.2* 16.5*   HCT 50.6* 49.3* 50.5*    226 222     Recent Labs     12/13/20  0517 12/14/20  0500 12/15/20  0500    139 144   K 3.5 3.7 3.6   CL 99 98* 103   CO2 29 30 31   BUN 52* 69* 80*   CREATININE 0.9 1.1 1.1   GLUCOSE 207* 334* 155*   MG 2.20 2.50* 2.50*   PHOS 4.0 4.9 4.4

## 2020-12-15 NOTE — PROGRESS NOTES
Comprehensive Nutrition Assessment    Type and Reason for Visit:  Reassess    Nutrition Recommendations/Plan:   Recommend diet textures and liquid consistencies per SLP recommendations  Add magic cups TID  Monitor nutrition adequacy, pertinent labs, bowel habits, wt changes, and clinical progress    Nutrition Assessment:  Follow up: Pt continues off the vent. NG in place, but pt refusing TF. SLP evaluation completed this am. Pt desires to take PO. Plans to remove NG and initiate diet per SLP. Will trial ONS appropriate with diet and monitor PO adequacy. Malnutrition Assessment:  Malnutrition Status: At risk for malnutrition (Comment)(limited access to nutrition + increased nutrient needs for compromised lung function)      Estimated Daily Nutrient Needs:  Energy (kcal):  1867-3614; Weight Used for Energy Requirements:  Current(110 kg)     Protein (g):   g(1.5-2); Weight Used for Protein Requirements:  Ideal        Fluid (ml/day):  per MD with current condition; Method Used for Fluid Requirements:  1 ml/kcal      Nutrition Related Findings:  LABS: 290-300, +BM 12/11      Wounds:  (redness on L ankle)       Current Nutrition Therapies:    Dietary Nutrition Supplements: Frozen Oral Supplement  DIET DYSPHAGIA PUREED; Dysphagia Pureed; Moderately Thick (Honey);  No Drinking Straw    Anthropometric Measures:  · Height: 5' 4\" (162.6 cm)  · Current Body Weight: 206 lb (93.4 kg)   · Admission Body Weight: 245 lb (111.1 kg)    · Ideal Body Weight: 120 lbs; % Ideal Body Weight 201.7 %   · BMI: 35.3  · BMI Categories: Obese Class 3 (BMI 40.0 or greater)       Nutrition Diagnosis:   · Inadequate energy intake related to lack or limited access to food, impaired respiratory function as evidenced by NPO or clear liquid status due to medical condition, intubation      Nutrition Interventions:   Food and/or Nutrient Delivery:  Start Oral Diet, Start Oral Nutrition Supplement Nutrition Education/Counseling:  No recommendation at this time   Coordination of Nutrition Care:  Swallow Evaluation, Speech Therapy    Goals:  Patient will tolerate nutrition initiation without worsening respiratory function or blood sugars greater than 180 mg/dl. Nutrition Monitoring and Evaluation:   Food/Nutrient Intake Outcomes:  Enteral Nutrition Intake/Tolerance  Physical Signs/Symptoms Outcomes:  Biochemical Data, Nutrition Focused Physical Findings     Discharge Planning: Too soon to determine     Electronically signed by Maria Teresa Metcalf RD, LD on 12/15/20 at 11:34 AM EST    Contact: [phone]

## 2020-12-15 NOTE — PROGRESS NOTES
Gave Bedside report to: Deja RN    4 Eyes Skin Assessment     The patient is being assess for   Shift Handoff    I agree that 2 RN's have performed a thorough Head to Toe Skin Assessment on the patient. ALL assessment sites listed below have been assessed. Areas assessed by both nurses:   [x]   Head, Face, and Ears   [x]   Shoulders, Back, and Chest, Abdomen  [x]   Arms, Elbows, and Hands   [x]   Coccyx, Sacrum, and Ischium  [x]   Legs, Feet, and Heels        Does the Patient have Skin Breakdown?   No          Loyd Prevention initiated:  Yes   Wound Care Orders initiated:  Yes      [id number]  nurse consulted for Pressure Injury (Stage 3,4, Unstageable, DTI, NWPT, Complex wounds)and New or Established Ostomies:  NA      Primary Nurse eSignature: Electronically signed by Lennie West RN on 12/14/20 at 7:19 PM EST    **SHARE this note so that the co-signing nurse is able to place an eSignature**    Co-signer eSignature: {Esignature:[id number]}

## 2020-12-15 NOTE — PROGRESS NOTES
Pain: pt denies    Current Diet: Dietary Nutrition Supplements: Frozen Oral Supplement  DIET DYSPHAGIA PUREED; Dysphagia Pureed; Moderately Thick (Honey); No Drinking Straw    Diet Tolerance:  Pt currently NPO prior to f/u    P.O. Trials: Thin   x Ice chip x 2, tsp x2, cup x2   Nectar-thick x Tsp x2   Honey-thick x Tsp x1, cup x5   Puree     Solid x x5 tsp jello     Dysphagia Treatment and Impressions:  NG in place, however tube feedings held d/t pt's reported nausea, reflux, and abdominal discomfort yesterday. Pt currently on 5 L O2 NC. She required small, single bites / sips throughout and rest breaks with conversation d/t pt fatigue and occasional drop in O2 sats. Pt observed with ice chips, TL via tsp and cup, nectar-thick liquid via tsp and cup, honey-thick via tsp and cup, as well as, mech soft trials. Pt's RR consistent at 20-24/min throughout all PO trials, O2 sats 95-97% at baseline / prior to PO. Grossly timely swallow initiation observed, 2-3 swallows required with majority of PO. Drop in O2 sats to 81% post-swallow with TL trials via cup requiring ~1 minute to recover to 93%. Continued drop in O2 sats post-swallow with NTL via tsp to 86%. Immediate dry throat clear noted post-swallow with nectar-thick via tsp, ice chip in 1/2 trials, and 1/4 tsp jello. Honey-thick liquid observed via tsp and cup (SLP provided balancing assistance only) with O2 sats remaining between 95-97%, no instances of wet vocal quality throughout. x1 immediate dry throat clear post-swallow with trial via cup. *However, difficult to discern whether instances of throat clearing r/t PO intake or possibly r/t presence of NG? Pt would benefit from completion of instrumental swallow study via MBSS to correlate clinical findings, however, pt currently in droplet plus precautions. Adequate A-P bolus transit observed with trials of jello, no oral / lingual residue post-swallow. Pt currently edentulous, reported dentures are at home. Pt denied globus sensation post-swallow with all PO trials. After discussion with RN and NP, will remove NG this AM.     Dysphagia Goals:  Timeframe for Long-term Goals: 10 days (12/21/20)  Goal 1: The pt will tolerate safest and least restrictive diet without s/s of aspiration. Today, 12/15: progressing, initiate dysphagia I (puree) diet with honey-thick (moderately thick) liquids this date     Short-term Goals  Timeframe for Short-term Goals: 7 days (12/18/20)  1. The patient will tolerate recommended diet without observed clinical signs of aspiration, Today, 12/15: ongoing, see above  2. The patient/caregiver will demonstrate understanding of compensatory strategies for improved swallowing safety. , 12/15: pt verbalized understanding  3. The patient will tolerate instrumental swallowing procedure, Not applicable this date; will complete in the future as clinically indicated  4. The patient will tolerate puree foods 10/10. Today, 12/15: ongoing, see above    Recommendations:  · Recommend initiating Dysphagia I (pureed) diet with honey-thick (moderately thick) liquids, crushable meds crushed in puree, strict aspiration precautions, *slow rate. NG will now be removed per RN, CNP. · *Pt would benefit from instrumental swallow study via MBSS to correlate clinical findings, however, unable to complete at this time d/t current droplet plus precautions  · *If pt has overt s/s of aspiration with PO intake or worsening respiratory status, recommend downgrade to strict NPO pending re-assessment by ST    Patient/Family/Caregiver Education:   SLP re: role of ST, aspiration precautions, rationale for current modified diet rec and ongoing diet tolerance monitoring. Pt verbalized understanding, would benefit from ongoing reinforcement.       Compensatory Strategies: HOB 90* and 30\" after meals; small bites/sips; alternate solids/liquids every 3-5 bites; oral care after every meal; no straws; slow rate    Plan:    Continued Dysphagia treatment with goals per plan of care. Discharge Recommendations: defer to PT/OT    If pt discharges from hospital prior to Speech/Swallowing discharge, this note serves as tx and discharge summary.      Total Treatment Time / Charges     Time in Time out Total Time / units   Cognitive Tx         Speech Tx      Dysphagia Tx  0820 0850 30 min / 1 unit     Signature:  Irvin Ricardo M.S. [id number] [facility]  Speech-language pathologist  [id number]

## 2020-12-15 NOTE — PROGRESS NOTES
Hospitalist Progress Note      PCP: No primary care provider on file. Date of Admission: 11/29/2020    Chief [address] [place] [address] Course: Pt. Is a 71 yo F who was admitted with COVID-19 pneumonia, then intubated.  She received 1 U convalescent plasma, remdesivir. She has subsequently been extubated. Subjective: Pt. Is still requiring 5-6 L O2 per NC. Pt. Has been nauseated. Medications:  Reviewed    Infusion Medications    dextrose       Scheduled Medications    lidocaine 1 % injection  5 mL Intradermal Once    sodium chloride flush  10 mL Intravenous 2 times per day    methylPREDNISolone  40 mg Intravenous Q12H    amLODIPine  5 mg Oral Daily    insulin glargine  30 Units Subcutaneous BID    pantoprazole  40 mg Intravenous Daily    insulin lispro  0-18 Units Subcutaneous 6 times per day    metoprolol  50 mg Oral BID    enoxaparin  40 mg Subcutaneous BID     PRN Meds: sodium chloride flush, morphine, hydrALAZINE, potassium chloride, sodium chloride flush, magnesium sulfate, acetaminophen **OR** acetaminophen, ondansetron, albuterol sulfate HFA, glucose, dextrose, glucagon (rDNA), dextrose, sodium chloride, albuterol sulfate HFA **AND** ipratropium **AND** MDI Treatment      Intake/Output Summary (Last 24 hours) at 12/15/2020 1735  Last data filed at 12/15/2020 1500  Gross per 24 hour   Intake 250 ml   Output 1375 ml   Net -1125 ml       Physical Exam Performed:    BP (!) 150/58   Pulse 87   Temp 97.9 °F (36.6 °C) (Bladder)   Resp 16   Ht 5' 4\" (1.626 m)   Wt 206 lb 12.7 oz (93.8 kg)   SpO2 93%   BMI 35.50 kg/m²     I performed an audiovisual assessment, based on new provisions and guidance offered by [facility] on March 18, 2020 in setting of COVID-19 outbreak and in order to preserve personal protective equipment in accordance with the flexibilities announced by CMS on March 30, 2020.    References: https://[facility]. [facility]/Portals/0/Resources/COVID-19/3_18%20Telemed%20Guidance%20Updated%20March%2018. pdf?zfn=[email or web address]   [email or web address] pdf     Bedside physical examination deferred. However, I did visually inspect the patient and observed the following:     General appearance: No apparent distress, appears stated age and cooperative. Neck: Deferred. Respiratory: Noted tachypneic  Cardiovascular: Deferred. Abdomen: Deferred. Musculoskelatal: Deferred. Neurologic:  Deferred. Psychiatric: Alert   Skin: Deferred. Labs:   Recent Labs     12/13/20  0517 12/14/20  0500 12/15/20  0500   WBC 17.6* 18.4* 23.2*   HGB 16.8* 16.2* 16.5*   HCT 50.6* 49.3* 50.5*    226 222     Recent Labs     12/13/20  0517 12/14/20  0500 12/15/20  0500    139 144   K 3.5 3.7 3.6   CL 99 98* 103   CO2 29 30 31   BUN 52* 69* 80*   CREATININE 0.9 1.1 1.1   CALCIUM 8.8 9.0 9.4   PHOS 4.0 4.9 4.4     Recent Labs     12/13/20  0517 12/14/20  0500 12/15/20  0500   AST 30 32 36   ALT 31 37 47*   BILITOT 1.1* 0.9 1.0   ALKPHOS 68 72 70     No results for input(s): INR in the last 72 hours. No results for input(s): Towana Ball in the last 72 hours. Urinalysis:      Lab Results   Component Value Date    NITRU Negative 12/01/2020    WBCUA see below 12/01/2020    RBCUA >100 12/01/2020    BLOODU LARGE 12/01/2020    SPECGRAV 1.020 12/01/2020    GLUCOSEU 100 12/01/2020       Radiology:  IR PICC WO SQ PORT/PUMP > 5 YEARS   Final Result   Unsuccessful for placement of venous catheter. XR ABDOMEN (KUB) (SINGLE AP VIEW)   Final Result   NG tip in region of gastric antrum and side port in body of stomach         XR CHEST PORTABLE   Final Result   Persistent bilateral airspace disease with mild improvement on the right         XR CHEST PORTABLE   Final Result   Support apparatus appears stable. Mild congestive failure, pulmonary edema which is similar to slightly   improved when accounting for differences in technique. Superimposed   pneumonia cannot be excluded in a patient with known COVID-19 infection. XR ABDOMEN FOR NG/OG/NE TUBE PLACEMENT   Final Result   Support lines and tubes as above. Worsening diffuse pulmonary opacities relative to 2 days prior in the setting   of known COVID-19 infection. XR CHEST PORTABLE   Final Result   Support lines and tubes as above. Worsening diffuse pulmonary opacities relative to 2 days prior in the setting   of known COVID-19 infection. VL Extremity Venous Left   Final Result      XR CHEST PORTABLE   Final Result   Bilateral airspace disease is suspicious for pneumonia, and could be   compatible with history of COVID-19. Assessment/Plan:    Active Hospital Problems    Diagnosis    Oropharyngeal dysphagia [R13.12]    H/O primary malignant neoplasm of urinary bladder [Z85.51]    Hematuria [R31.9]    Leukocytosis [D72.829]    COPD (chronic obstructive pulmonary disease) ([facility]) [J44.9]    Acute respiratory failure due to COVID-19 ([facility]) [U07.1, J96.00]    Essential hypertension [I10]    Obesity, Class III, BMI 40-49.9 (morbid obesity) ([facility] [E66.01]    Type 2 diabetes mellitus, with long-term current use of insulin ([facility]) [E11.9, Z79.4]     Acute Respiratory failure, COPD, COVID19 Pneumonia  Currently on solumedrol 40mg IV bid,  Completed remdesivir(ended 12/4)  received 1U convalescent plasma. Previously intubated. Now extubated. Remains on 5 L oxygen.     DM2  On basal bolus insulin protocol. Receiving tube feedings as unable to swallow. Acute kidney injury-  Most likely secondary to poor circulation. Creatinine back close to baseline now. Nephrology following     Hematuria-   Patient has previously known bladder cancer. Was supposed to get surgery in [facility], but obviously postponed.

## 2020-12-15 NOTE — PROGRESS NOTES
Dr. Carola Neff, nephrology gave okay to place a PICC line, via telephone. Consent signed by patient.

## 2020-12-15 NOTE — PROGRESS NOTES
A/O x4. Up to chair with assist x2 with a Stedy. Will most likely need maxi lift back to bed. NGT out today and eating small amnts puree and honey thick liquid. PICC was attempted but veins too small. Right PIV inserted. IJ removed due to increase in WBC today. Granddaughter updated via telephone. She dropped off large bag of pt belongings. VSS. Will monitor.

## 2020-12-16 VITALS
BODY MASS INDEX: 35.3 KG/M2 | SYSTOLIC BLOOD PRESSURE: 144 MMHG | HEART RATE: 67 BPM | TEMPERATURE: 97.8 F | RESPIRATION RATE: 13 BRPM | DIASTOLIC BLOOD PRESSURE: 69 MMHG | HEIGHT: 64 IN | OXYGEN SATURATION: 100 % | WEIGHT: 206.79 LBS

## 2020-12-16 LAB
GLUCOSE BLD-MCNC: 148 MG/DL (ref 70–99)
GLUCOSE BLD-MCNC: 76 MG/DL (ref 70–99)
GLUCOSE BLD-MCNC: 99 MG/DL (ref 70–99)
PERFORMED ON: ABNORMAL
PERFORMED ON: NORMAL
PERFORMED ON: NORMAL

## 2020-12-16 PROCEDURE — 6370000000 HC RX 637 (ALT 250 FOR IP): Performed by: INTERNAL MEDICINE

## 2020-12-16 PROCEDURE — 2580000003 HC RX 258: Performed by: NURSE PRACTITIONER

## 2020-12-16 PROCEDURE — 6360000002 HC RX W HCPCS: Performed by: INTERNAL MEDICINE

## 2020-12-16 PROCEDURE — 99232 SBSQ HOSP IP/OBS MODERATE 35: CPT | Performed by: INTERNAL MEDICINE

## 2020-12-16 PROCEDURE — 94761 N-INVAS EAR/PLS OXIMETRY MLT: CPT

## 2020-12-16 PROCEDURE — 92526 ORAL FUNCTION THERAPY: CPT

## 2020-12-16 PROCEDURE — 6370000000 HC RX 637 (ALT 250 FOR IP): Performed by: NURSE PRACTITIONER

## 2020-12-16 PROCEDURE — 2700000000 HC OXYGEN THERAPY PER DAY

## 2020-12-16 RX ORDER — ALBUTEROL SULFATE 90 UG/1
2 AEROSOL, METERED RESPIRATORY (INHALATION) EVERY 4 HOURS PRN
Qty: 1 INHALER | Refills: 3 | DISCHARGE
Start: 2020-12-16

## 2020-12-16 RX ORDER — METOPROLOL TARTRATE 50 MG/1
50 TABLET, FILM COATED ORAL 2 TIMES DAILY
Qty: 60 TABLET | Refills: 3 | DISCHARGE
Start: 2020-12-16

## 2020-12-16 RX ORDER — PREDNISONE 20 MG/1
20 TABLET ORAL DAILY
Qty: 5 TABLET | Refills: 0 | DISCHARGE
Start: 2020-12-17 | End: 2020-12-22

## 2020-12-16 RX ORDER — PREDNISONE 20 MG/1
20 TABLET ORAL DAILY
Status: DISCONTINUED | OUTPATIENT
Start: 2020-12-16 | End: 2020-12-16 | Stop reason: HOSPADM

## 2020-12-16 RX ORDER — PANTOPRAZOLE SODIUM 40 MG/1
40 TABLET, DELAYED RELEASE ORAL
Status: DISCONTINUED | OUTPATIENT
Start: 2020-12-17 | End: 2020-12-16 | Stop reason: HOSPADM

## 2020-12-16 RX ORDER — ACETAMINOPHEN 325 MG/1
650 TABLET ORAL EVERY 4 HOURS PRN
Qty: 120 TABLET | Refills: 3 | Status: ON HOLD | DISCHARGE
Start: 2020-12-16 | End: 2022-01-21 | Stop reason: HOSPADM

## 2020-12-16 RX ORDER — INSULIN GLARGINE 100 [IU]/ML
35 INJECTION, SOLUTION SUBCUTANEOUS 2 TIMES DAILY
Qty: 1 VIAL | Refills: 3 | DISCHARGE
Start: 2020-12-16 | End: 2021-07-15 | Stop reason: ALTCHOICE

## 2020-12-16 RX ORDER — PANTOPRAZOLE SODIUM 40 MG/1
40 TABLET, DELAYED RELEASE ORAL
Qty: 30 TABLET | Refills: 3 | DISCHARGE
Start: 2020-12-17 | End: 2021-07-15 | Stop reason: ALTCHOICE

## 2020-12-16 RX ORDER — AMLODIPINE BESYLATE 5 MG/1
5 TABLET ORAL DAILY
Qty: 30 TABLET | Refills: 3 | DISCHARGE
Start: 2020-12-17 | End: 2021-07-15 | Stop reason: ALTCHOICE

## 2020-12-16 RX ADMIN — PREDNISONE 20 MG: 20 TABLET ORAL at 10:09

## 2020-12-16 RX ADMIN — SODIUM CHLORIDE, PRESERVATIVE FREE 10 ML: 5 INJECTION INTRAVENOUS at 10:11

## 2020-12-16 RX ADMIN — INSULIN GLARGINE 35 UNITS: 100 INJECTION, SOLUTION SUBCUTANEOUS at 10:10

## 2020-12-16 RX ADMIN — ENOXAPARIN SODIUM 40 MG: 40 INJECTION SUBCUTANEOUS at 10:09

## 2020-12-16 RX ADMIN — AMLODIPINE BESYLATE 5 MG: 5 TABLET ORAL at 10:10

## 2020-12-16 RX ADMIN — METOPROLOL TARTRATE 50 MG: 50 TABLET, FILM COATED ORAL at 10:09

## 2020-12-16 RX ADMIN — INSULIN LISPRO 3 UNITS: 100 INJECTION, SOLUTION INTRAVENOUS; SUBCUTANEOUS at 13:02

## 2020-12-16 ASSESSMENT — PAIN SCALES - GENERAL: PAINLEVEL_OUTOF10: 0

## 2020-12-16 NOTE — PROGRESS NOTES
Report given to Adam Hackett at [facility]. Nurse gave pt a bed bath. Pt's med and phone given to pt. Pt's belonging packed and ready for transport. Discharge papers printed.

## 2020-12-16 NOTE — CARE COORDINATION
CM spoke to Dr Matilda Beltran earlier today patient ok to discharge to SNF level of care when rest of care team comfortable with move. Referral was made late yesterday to [facility]. Call placed today Virgil Ramirez with [facility] to follow up on referral.  They are able to accept, they have started precert but due to waivers does not have to be complete. They can accept at any time. No new covid testing needed.  Jacque Meyers RN

## 2020-12-16 NOTE — PROGRESS NOTES
INPATIENT PULMONARY CRITICAL CARE PROGRESS NOTE      Reason for visit     Respiratory failure d/t COVID-19    SUBJECTIVE: Patient when seen this morning continues to be on 5 L of nasal cannula oxygen and was saturating 100%, patient is tolerating modified diet as per speech therapist, patient was not having any increasing shortness of breath or any increased work of breathing, patient was afebrile, patient had sinus rhythm on the monitor, patient's blood pressure was better controlled , patient's glycemic control was acceptable , patient was not complaining of any chest pains per se,  no other pertinent review of system of concern      Physical Exam:  Blood pressure (!) 144/69, pulse 67, temperature 97.8 °F (36.6 °C), temperature source Oral, resp. rate 13, height 5' 4\" (1.626 m), weight 206 lb 12.7 oz (93.8 kg), SpO2 100 %.'       Constitutional:  No acute distress. HENT:  Oropharynx is clear and moist. No thyromegaly. Eyes:  Conjunctivae are normal. Pupils equal, round, and reactive to light. No scleral icterus. Neck: . No tracheal deviation present. No obvious thyroid mass. Cardiovascular: Normal rate, regular rhythm, normal heart sounds. No right ventricular heave. No lower extremity edema. Pulmonary/Chest: No wheezes. bibasilar rales. Chest wall is not dull to percussion. No accessory muscle usage or stridor. Abdominal: Soft. Bowel sounds present. No distension or hernia. No tenderness. Musculoskeletal: No cyanosis. No clubbing. No obvious joint deformity. Lymphadenopathy: No cervical or supraclavicular adenopathy. Skin: Skin is warm and dry. No rash or nodules on the exposed extremities. Psychiatric: Normal mood and affect. Behavior is normal.  No anxiety. Neurologic: Alert, awake and oriented. PERRL.   Speech fluent          Results:  CBC:   Recent Labs     12/14/20  0500 12/15/20  0500   WBC 18.4* 23.2*   HGB 16.2* 16.5*   HCT 49.3* 50.5*   MCV 81.9 82.7    222     BMP: Recent Labs     12/14/20  0500 12/15/20  0500    144   K 3.7 3.6   CL 98* 103   CO2 30 31   PHOS 4.9 4.4   BUN 69* 80*   CREATININE 1.1 1.1     LIVER PROFILE:   Recent Labs     12/14/20  0500 12/15/20  0500   AST 32 36   ALT 37 47*   BILITOT 0.9 1.0   ALKPHOS 72 70       Imaging:  I have reviewed radiology images personally. IR PICC WO SQ PORT/PUMP > 5 YEARS   Final Result   Unsuccessful for placement of venous catheter. XR ABDOMEN (KUB) (SINGLE AP VIEW)   Final Result   NG tip in region of gastric antrum and side port in body of stomach         XR CHEST PORTABLE   Final Result   Persistent bilateral airspace disease with mild improvement on the right         XR CHEST PORTABLE   Final Result   Support apparatus appears stable. Mild congestive failure, pulmonary edema which is similar to slightly   improved when accounting for differences in technique. Superimposed   pneumonia cannot be excluded in a patient with known COVID-19 infection. XR ABDOMEN FOR NG/OG/NE TUBE PLACEMENT   Final Result   Support lines and tubes as above. Worsening diffuse pulmonary opacities relative to 2 days prior in the setting   of known COVID-19 infection. XR CHEST PORTABLE   Final Result   Support lines and tubes as above. Worsening diffuse pulmonary opacities relative to 2 days prior in the setting   of known COVID-19 infection. VL Extremity Venous Left   Final Result      XR CHEST PORTABLE   Final Result   Bilateral airspace disease is suspicious for pneumonia, and could be   compatible with history of COVID-19.                  Assessment:  Active Problems:    Acute respiratory failure due to COVID-19 [facility])    Essential hypertension    Obesity, Class III, BMI 40-49.9 (morbid obesity) (HCC)    Type 2 diabetes mellitus, with long-term current use of insulin (HCC)    COPD (chronic obstructive pulmonary disease) (HCC)    Oropharyngeal dysphagia H/O primary malignant neoplasm of urinary bladder    Hematuria    Leukocytosis    Elevated BUN  Resolved Problems:    * No resolved hospital problems.  *          Plan:   Oxygen supplementation to keep saturation between 90 to 94%  Please titrate down the oxygen as per the above parameters  IV solumedrol changed to PO prednisne   Lantus insulin as per BGM   Will also change the enteral feeds to diabetic formula  Speech reevaluation  Patient has increasing WBC count which needs to be trended  Patient has elevated BUN which may be secondary to steroids but needs to be trended  BGM with SSI  Status post remdesivir and convalescent plasma  Monitor input output and BMP  Monitor hematuria  Urology follow-up  Correct electrolytes on whenever necessary basis  Bronchodilators can be given on as needed basis  Enteral feeds as per metabolic support  Trend WBC count  Speech follow up   PUD prophylaxis as per IM  PT/OT consults    Case d/w ICU team     ?Discharge planning       Electronically signed by:  Nasima Winston MD    12/16/2020    4:13 PM.

## 2020-12-16 NOTE — DISCHARGE INSTR - COC
Continuity of Care Form    Patient Name: Dru Graham   :    MRN:  [id number]    Admit date:  2020  Discharge date:  ***    Code Status Order: Full Code   Advance Directives:   Advance Care Flowsheet Documentation       Date/Time Healthcare Directive Type of Healthcare Directive Copy in [facility] [address] Agent's Name Healthcare Agent's Phone Number    [phone]  No, patient does not have an advance directive for healthcare treatment -- -- -- -- --            Admitting Physician:  Kristopher Negrete MD  PCP: No primary care provider on file. Discharging Nurse: [facility] Unit/Room#: 0229/0229-01  Discharging Unit Phone Number: [phone]    Emergency Contact:   Extended Emergency Contact Information  Primary Emergency Contact: Kiki Casarez [address] Phone: [phone]  Relation: Spouse  Secondary Emergency Contact: Tere Matthews [address] Phone: [phone]  Relation: Child    Past Surgical History:  Past Surgical History:   Procedure Laterality Date    APPENDECTOMY      BRONCHOSCOPY N/A 12/10/2020    BRONCHOSCOPY THERAPUTIC ASPIRATION INITIAL performed by Kwesi Diego MD at [address] [place] [address]      TUBAL LIGATION      TUMOR REMOVAL         Immunization History: There is no immunization history on file for this patient.     Active Problems:  Patient Active Problem List   Diagnosis Code    Lateral malleolar fracture S82.63XA    Metatarsalgia of left foot M77.42    Plantar fascial fibromatosis M72.2    Acute respiratory failure due to COVID-19 ([address] U07.1, J96.00    Essential hypertension I10    Obesity, Class III, BMI 40-49.9 (morbid obesity) ([address] E66.01    Type 2 diabetes mellitus, with long-term current use of insulin (HCC) E11.9, Z79.4    DM (diabetes mellitus), secondary uncontrolled ([address] E13.65  Morbid obesity with BMI of 40.0-44.9, adult ([facility]) E66.01, Z68.41    COPD (chronic obstructive pulmonary disease) ([facility]) J44.9    Oropharyngeal dysphagia R13.12    H/O primary malignant neoplasm of urinary bladder Z85.51    Hematuria R31.9    Leukocytosis D72.829    Elevated BUN R79.9       Isolation/Infection:   Isolation            Droplet Plus          Patient Infection Status       Infection Onset Added Last Indicated Last Indicated By Review Planned Expiration Resolved Resolved By    COVID-19 11/26/20 11/30/20 11/26/20 COVID-19 12/09/20 12/28/20              Nurse Assessment:  Last Vital Signs: /71   Pulse 78   Temp 97.9 °F (36.6 °C) (Oral)   Resp 15   Ht 5' 4\" (1.626 m)   Wt 206 lb 12.7 oz (93.8 kg)   SpO2 100%   BMI 35.50 kg/m²     Last documented pain score (0-10 scale): Pain Level: 0  Last Weight:   Wt Readings from Last 1 Encounters:   12/15/20 206 lb 12.7 oz (93.8 kg)     Mental Status:  oriented    IV Access:  - None    Nursing Mobility/ADLs:  Walking   Assisted  Transfer  Assisted  Bathing  Assisted  Dressing  Assisted  Toileting  Assisted  Feeding  Independent  Med Admin  Independent  Med Delivery   whole    Wound Care Documentation and Therapy:        Elimination:  Continence:   · Bowel: No  · Bladder: No  Urinary Catheter: None   Colostomy/Ileostomy/Ileal Conduit: No       Date of Last BM: 12/11    Intake/Output Summary (Last 24 hours) at 12/16/2020 1154  Last data filed at 12/15/2020 1500  Gross per 24 hour   Intake 220 ml   Output 400 ml   Net -180 ml     I/O last 3 completed shifts: In: 250 [P.O.:220; NG/GT:30]  Out: 400 [Urine:400]    Safety Concerns:     None    Impairments/Disabilities:      None    Nutrition Therapy:  Current Nutrition Therapy:   - Oral Diet:  General    Routes of Feeding: Oral  Liquids:  Thin Liquids  Daily Fluid Restriction: no  Last Modified Barium Swallow with Video (Video Swallowing Test): not done    Treatments at the Time of Hospital Discharge: Respiratory Treatments: ***  Oxygen Therapy:  is on oxygen at 5 L/min per nasal cannula. Ventilator:    - No ventilator support    Rehab Therapies: Physical Therapy and Occupational Therapy  Weight Bearing Status/Restrictions: No weight bearing restirctions  Other Medical Equipment (for information only, NOT a DME order):  walker  Other Treatments: ***    Patient's personal belongings (please select all that are sent with patient):  {[facility] DME Belongings:[id number]:::0}    RN SIGNATURE:  Electronically signed by Patricia Daniels RN on 12/16/20 at 3:40 PM EST    CASE MANAGEMENT/SOCIAL WORK SECTION    Inpatient Status Date: 11/29/20    Readmission Risk Assessment Score:  Readmission Risk              Risk of Unplanned Readmission:        19           Discharging to Facility/ Agency   · Name: [facility]  · Address:  · Phone:  · Fax:    Dialysis Facility (if applicable)   · Name:  · Address:  · Dialysis Schedule:  · Phone:  · Fax:    / signature: Electronically signed by Yuliya Milian RN on 12/16/20 at 1:41 PM EST    PHYSICIAN SECTION    Prognosis: Good    Condition at Discharge: Stable    Rehab Potential (if transferring to Rehab): Good    Recommended Labs or Other Treatments After Discharge: BMP Twice a week. CBC once a week    Physician Certification: I certify the above information and transfer of Elma Ortega  is necessary for the continuing treatment of the diagnosis listed and that she requires [facility] for less 30 days.      Update Admission H&P: No change in H&P    PHYSICIAN SIGNATURE:  Electronically signed by Veronica Mark MD on 12/16/20 at 11:55 AM EST

## 2020-12-16 NOTE — PROGRESS NOTES
INPATIENT PULMONARY CRITICAL CARE PROGRESS NOTE      Reason for visit     Respiratory failure d/t COVID-19    SUBJECTIVE: Patient when seen this morning continues to be on 5 L of nasal cannula oxygen and was saturating 95%, patient still had NG tube in place but patient apparently was developing increased bloating last night and did not want nursing to continue with tube feedings, patient was not having any increasing shortness of breath or any increased work of breathing, patient was afebrile, patient had sinus rhythm on the monitor, patient's blood pressure was slightly on the higher side, patient's glycemic control was better as compared to previous times, patient was not complaining of any chest pains per se, patient was being evaluated by the speech therapist again as patient was desperately wanted to eat by mouth, patient has had good urine output overnight with cumulative fluid balance of -7.1 L, no other pertinent review of system of concern      Physical Exam:  Blood pressure (!) 169/77, pulse 91, temperature 97.9 °F (36.6 °C), temperature source Bladder, resp. rate (!) 31, height 5' 4\" (1.626 m), weight 206 lb 12.7 oz (93.8 kg), SpO2 96 %.'       In-person bedside physical examination deferred. Pursuant to the emergency declaration under the [address] [place] [address], [address] [place] [address] and the [employer] Act, this clinical encounter was conducted to provide necessary medical care. (Also consistent with new provisions and guidance offered by [facility] on March 18, 2020 in setting of COVID 19 outbreak and in order to preserve personal protective equipment in accordance with the flexibilities announced by CMS on March 30, 2020)   References: https://med. [place].gov/Portals/0/Resources/COVID-19/3_18%20Telemed%20Guidance%20Updated%20March%2018. pdf?ysw=[email or web address] https://[facility]/Portals/0/Resources/COVID-19/3_18%20Telemed%20Guidance%20Updated%20March%2018. pdf?can=[email or web address]                      http://[employer]/. pdf               Results:  CBC:   Recent Labs     12/13/20  0517 12/14/20  0500 12/15/20  0500   WBC 17.6* 18.4* 23.2*   HGB 16.8* 16.2* 16.5*   HCT 50.6* 49.3* 50.5*   MCV 81.4 81.9 82.7    226 222     BMP:   Recent Labs     12/13/20  0517 12/14/20  0500 12/15/20  0500    139 144   K 3.5 3.7 3.6   CL 99 98* 103   CO2 29 30 31   PHOS 4.0 4.9 4.4   BUN 52* 69* 80*   CREATININE 0.9 1.1 1.1     LIVER PROFILE:   Recent Labs     12/13/20  0517 12/14/20  0500 12/15/20  0500   AST 30 32 36   ALT 31 37 47*   BILITOT 1.1* 0.9 1.0   ALKPHOS 68 72 70       Imaging:  I have reviewed radiology images personally. IR PICC WO SQ PORT/PUMP > 5 YEARS   Final Result   Unsuccessful for placement of venous catheter. XR ABDOMEN (KUB) (SINGLE AP VIEW)   Final Result   NG tip in region of gastric antrum and side port in body of stomach         XR CHEST PORTABLE   Final Result   Persistent bilateral airspace disease with mild improvement on the right         XR CHEST PORTABLE   Final Result   Support apparatus appears stable. Mild congestive failure, pulmonary edema which is similar to slightly   improved when accounting for differences in technique. Superimposed   pneumonia cannot be excluded in a patient with known COVID-19 infection. XR ABDOMEN FOR NG/OG/NE TUBE PLACEMENT   Final Result   Support lines and tubes as above. Worsening diffuse pulmonary opacities relative to 2 days prior in the setting   of known COVID-19 infection. XR CHEST PORTABLE   Final Result   Support lines and tubes as above. Worsening diffuse pulmonary opacities relative to 2 days prior in the setting   of known COVID-19 infection.          VL Extremity Venous Left Final Result      XR CHEST PORTABLE   Final Result   Bilateral airspace disease is suspicious for pneumonia, and could be   compatible with history of COVID-19. Results for Gabrielle Basket (MRN [id number]) as of 12/15/2020 22:17   Ref. Range 12/15/2020 05:00   Sodium Latest Ref Range: 136 - 145 mmol/L 144   Potassium Latest Ref Range: 3.5 - 5.1 mmol/L 3.6   Chloride Latest Ref Range: 99 - 110 mmol/L 103   CO2 Latest Ref Range: 21 - 32 mmol/L 31   BUN Latest Ref Range: 7 - 20 mg/dL 80 (H)   Creatinine Latest Ref Range: 0.6 - 1.2 mg/dL 1.1   Anion Gap Latest Ref Range: 3 - 16  10   GFR Non- Latest Ref Range: >60  49 (A)   GFR  Latest Ref Range: >60  59 (A)   Magnesium Latest Ref Range: 1.80 - 2.40 mg/dL 2.50 (H)   Glucose Latest Ref Range: 70 - 99 mg/dL 155 (H)   Calcium Latest Ref Range: 8.3 - 10.6 mg/dL 9.4   Phosphorus Latest Ref Range: 2.5 - 4.9 mg/dL 4.4   Total Protein Latest Ref Range: 6.4 - 8.2 g/dL 6.4   Albumin Latest Ref Range: 3.4 - 5.0 g/dL 3.3 (L)   Globulin Latest Units: g/dL 3.1   Albumin/Globulin Ratio Latest Ref Range: 1.1 - 2.2  1.1   Alk Phos Latest Ref Range: 40 - 129 U/L 70   ALT Latest Ref Range: 10 - 40 U/L 47 (H)   AST Latest Ref Range: 15 - 37 U/L 36   Bilirubin Latest Ref Range: 0.0 - 1.0 mg/dL 1.0     Results for Gabrielle Basket (MRN [id number]) as of 12/15/2020 22:17   Ref.  Range 12/11/2020 04:45 12/12/2020 04:41 12/13/2020 05:17 12/14/2020 05:00 12/15/2020 05:00   WBC Latest Ref Range: 4.0 - 11.0 K/uL 18.2 (H) 15.9 (H) 17.6 (H) 18.4 (H) 23.2 (H)   RBC Latest Ref Range: 4.00 - 5.20 M/uL 5.49 (H) 5.52 (H) 6.21 (H) 6.03 (H) 6.10 (H)   Hemoglobin Quant Latest Ref Range: 12.0 - 16.0 g/dL 15.0 14.9 16.8 (H) 16.2 (H) 16.5 (H)   Hematocrit Latest Ref Range: 36.0 - 48.0 % 45.3 45.2 50.6 (H) 49.3 (H) 50.5 (H)   MCV Latest Ref Range: 80.0 - 100.0 fL 82.5 81.8 81.4 81.9 82.7   MCH Latest Ref Range: 26.0 - 34.0 pg 27.3 27.1 27.1 26.9 27.1 MCHC Latest Ref Range: 31.0 - 36.0 g/dL 33.1 33.1 33.3 32.9 32.7   MPV Latest Ref Range: 5.0 - 10.5 fL 10.1 10.4 10.9 (H) 10.9 (H) 10.7 (H)   RDW Latest Ref Range: 12.4 - 15.4 % 14.7 14.5 14.7 14.8 15.0   Platelet Count Latest Ref Range: 135 - 450 K/uL 222 235 280 226 222   Neutrophils % Latest Units: % 84.0 83.0 88.0 90.9 93.2   Lymphocyte % Latest Units: % 5.0 5.0 4.0 3.9 3.4   Monocytes % Latest Units: % 3.0 6.0 0.0 4.9 3.3   Eosinophils % Latest Units: % 0.0 0.0 0.0 0.0 0.0   Basophils % Latest Units: % 0.0 0.0 0.0 0.3 0.1       Assessment:  Active Problems:    Acute respiratory failure due to COVID-19 [facility])    Essential hypertension    Obesity, Class III, BMI 40-49.9 (morbid obesity) (HCC)    Type 2 diabetes mellitus, with long-term current use of insulin (HCC)    COPD (chronic obstructive pulmonary disease) (HCC)    Oropharyngeal dysphagia    H/O primary malignant neoplasm of urinary bladder    Hematuria    Leukocytosis    Elevated BUN  Resolved Problems:    * No resolved hospital problems.  *          Plan:   Oxygen supplementation to keep saturation between 90 to 94%  Please titrate down the oxygen as per the above parameters  Status post intubation and extubation  Patient is tolerating extubation so far  Patient was started on IV Solu-Medrol and the dose of Solu-Medrol being decreased to every 12 hours  Patient's blood sugar are elevated and will worsen with the Solu-Medrol  Patient is getting Lantus insulin twice a day and also patient's blood sugars on the higher side-dose increased to 35 units twice a day and retitration as per blood glucose monitoring  Will also change the enteral feeds to diabetic formula  Speech reevaluation  Patient has increasing WBC count which needs to be trended  Patient has elevated BUN which may be secondary to steroids but needs to be trended  BGM with SSI  Status post remdesivir and convalescent plasma  Monitor input output and BMP  Monitor hematuria  Urology follow-up Correct electrolytes on whenever necessary basis  Bronchodilators can be given on as needed basis  Enteral feeds as per metabolic support  Trend WBC count  Speech follow up   PUD prophylaxis as per IM  PT/OT consults    Case d/w ICU team     Patient can be transferred out of the ICU to stepdown unit from pulmonary/critical care standpoint of view      Electronically signed by:  Yuri Birmingham MD    12/15/2020    10:19 PM.

## 2020-12-16 NOTE — PROGRESS NOTES
Speech Language Pathology  Facility/Department: [facility] C2 CARD TELEMETRY  Dysphagia Daily Treatment Note      Recommendations:  · Recommend upgrade to regular solids/thin liquids , meds whole in puree. · Pt would benefit from instrumental swallow study via MBSS given prolonged intubation and change in voice quality making clinical assessment unreliable as pt has throat clear occasionally with and without po intake  however, unable to complete at this time d/t current droplet plus precautions  · *If pt has overt s/s of aspiration with PO intake or worsening respiratory status, recommend downgrade to strict NPO pending re-assessment by ST. Recommend ST upon d/c at [facility] to ensure diet tolerance. NAME: Dionisio Colin  :   MRN: [id number]    Patient Diagnosis(es):   Patient Active Problem List    Diagnosis Date Noted    Elevated BUN 12/15/2020    Oropharyngeal dysphagia 2020    H/O primary malignant neoplasm of urinary bladder 2020    Hematuria 2020    Leukocytosis 2020    COPD (chronic obstructive pulmonary disease) ([facility] [address] [address] 2020    Acute respiratory failure due to COVID-19 [facility]) 2020    Essential hypertension 2020    Obesity, Class III, BMI 40-49.9 (morbid obesity) ([address] 2020    Type 2 diabetes mellitus, with long-term current use of insulin ([facility] [address] [facility] 2020    DM (diabetes mellitus), secondary uncontrolled ([facility] [address] 2020    Morbid obesity with BMI of 40.0-44.9, adult ([facility] [address] 2020    Metatarsalgia of left foot 2015    Plantar fascial fibromatosis 2015    Lateral malleolar fracture 10/16/2014     Allergies:    Allergies   Allergen Reactions    Contrast [Iodides] Other (See Comments)     Kidney problem    Ibuprofen     Lipitor [Atorvastatin Calcium] Other (See Comments)     Joint pains · *If pt has overt s/s of aspiration with PO intake or worsening respiratory status, recommend downgrade to strict NPO pending re-assessment by ST. Recommend ST upon d/c at SNF to ensure diet tolerance. Dysphagia Goals:  Timeframe for Long-term Goals: 10 days (12/21/20)  Goal 1: The pt will tolerate safest and least restrictive diet without s/s of aspiration. 12/16: progressing, upgraded to regular solids/thin liquids this date     Short-term Goals  Timeframe for Short-term Goals: 7 days (12/18/20)  1. The patient will tolerate recommended diet without observed clinical signs of aspiration, Today, 12/16: ongoing, see above  2. The patient/caregiver will demonstrate understanding of compensatory strategies for improved swallowing safety. , 12/16: pt verbalized understanding  3. The patient will tolerate instrumental swallowing procedure, Not applicable this date; recommend complete in the future as clinically indicated  4. The patient will tolerate puree foods 10/10. Today, 12/16: ongoing, see above      Patient/Family/Caregiver Education:   SLP re: role of ST, aspiration precautions, rationale for current  diet rec and ongoing diet tolerance monitoring. Pt verbalized understanding, would benefit from ongoing reinforcement. Compensatory Strategies:  HOB 90* and 30\" after meals; small bites/sips; oral care after every meal; straws okay; slow rate; encourage self feeding; increase physical mobility as is medically feasible    Plan:    Continued Dysphagia treatment with goals per plan of care. Discharge Recommendations: SNF  If pt discharges from hospital prior to Speech/Swallowing discharge, this note serves as tx and discharge summary. Total Treatment Time / Charges     Time in Time out Total Time / units   Cognitive Tx         Speech Tx      Dysphagia Tx  1421 1502 41 min / 1 unit     Signature:  Jaime Upton. Paulino Gu M.A.  [id number] [facility]  Speech-Language Pathologist

## 2020-12-16 NOTE — DISCHARGE SUMMARY
Hospital Medicine Discharge Summary    Patient ID: Macy Baez      Patient's PCP: No primary care provider on file. Admit Date: 11/29/2020     Discharge Date:   12/16/20     Admitting Physician: Doc Quarles MD     Discharge Physician: Marilynn Moore MD     Discharge Diagnoses: Active Hospital Problems    Diagnosis    Elevated BUN [R79.9]    Oropharyngeal dysphagia [R13.12]    H/O primary malignant neoplasm of urinary bladder [Z85.51]    Hematuria [R31.9]    Leukocytosis [D72.829]    COPD (chronic obstructive pulmonary disease) (HCC) [J44.9]    Acute respiratory failure due to COVID-19 (HCC) [U07.1, J96.00]    Essential hypertension [I10]    Obesity, Class III, BMI 40-49.9 (morbid obesity) ([facility] [address] [E66.01]    Type 2 diabetes mellitus, with long-term current use of insulin (HCC) [E11.9, Z79.4]       The patient was seen and examined on day of discharge and this discharge summary is in conjunction with any daily progress note from day of discharge. Hospital Course:     Patient is a 71 yo female who was admitted with COVID-19 pneumonia, then intubated.  She received 1 U convalescent plasma, completed course of remdesivir. She has subsequently  been extubated. Currently on oxygen to nasal cannula. Developed acute renal failure. Diuretics were stopped. Improved with supportive management. On the day of discharge, patient's blood sugar is controlled with current diabetic management. Developed dysphagia after extubation. Diet consistency was altered. Recommend continuous speech therapy to improve swallowing function. Accepted by skilled nursing facility and will be discharged today. XR CHEST PORTABLE   Final Result   Support apparatus appears stable. Mild congestive failure, pulmonary edema which is similar to slightly   improved when accounting for differences in technique. Superimposed   pneumonia cannot be excluded in a patient with known COVID-19 infection. XR ABDOMEN FOR NG/OG/NE TUBE PLACEMENT   Final Result   Support lines and tubes as above. Worsening diffuse pulmonary opacities relative to 2 days prior in the setting   of known COVID-19 infection. XR CHEST PORTABLE   Final Result   Support lines and tubes as above. Worsening diffuse pulmonary opacities relative to 2 days prior in the setting   of known COVID-19 infection. VL Extremity Venous Left   Final Result      XR CHEST PORTABLE   Final Result   Bilateral airspace disease is suspicious for pneumonia, and could be   compatible with history of COVID-19. Consults:     IP CONSULT TO CRITICAL CARE  IP CONSULT TO PHARMACY  IP CONSULT TO SPIRITUAL SERVICES  IP CONSULT TO UROLOGY  IP CONSULT TO DIETITIAN  IP CONSULT TO DIETITIAN  IP CONSULT TO INFECTIOUS DISEASES  IP CONSULT TO NEPHROLOGY    Disposition: Skilled nursing facility    Condition at Discharge: Stable    Discharge Instructions/Follow-up: Follow-up blood work ordered to be drawn at the facility    Code Status:  Full Code     Activity: activity as tolerated    Diet: Dietary Nutrition Supplements: Frozen Oral Supplement  DIET DYSPHAGIA PUREED; Carb Control: 5 carb choices (75 gms)/meal; Dysphagia Pureed; Moderately Thick (Honey);  No Drinking Straw       Discharge Medications:     Current Discharge Medication List           Details   acetaminophen (TYLENOL) 325 MG tablet Take 2 tablets by mouth every 4 hours as needed for Pain or Fever  Qty: 120 tablet, Refills: 3      albuterol sulfate  (90 Base) MCG/ACT inhaler Inhale 2 puffs into the lungs every 4 hours as needed for Wheezing or Shortness of Breath Qty: 1 Inhaler, Refills: 3      apixaban (ELIQUIS) 2.5 MG TABS tablet Take 1 tablet by mouth 2 times daily  Qty: 60 tablet, Refills: 0      insulin glargine (LANTUS) 100 UNIT/ML injection vial Inject 35 Units into the skin 2 times daily  Qty: 1 vial, Refills: 3      amLODIPine (NORVASC) 5 MG tablet Take 1 tablet by mouth daily  Qty: 30 tablet, Refills: 3      predniSONE (DELTASONE) 20 MG tablet Take 1 tablet by mouth daily for 5 days  Qty: 5 tablet, Refills: 0      pantoprazole (PROTONIX) 40 MG tablet Take 1 tablet by mouth every morning (before breakfast)  Qty: 30 tablet, Refills: 3              Details   insulin lispro (HUMALOG) 100 UNIT/ML injection vial Can proceed with facility sliding scale per medical director  Qty: 1 vial, Refills: 3      metoprolol tartrate (LOPRESSOR) 50 MG tablet Take 1 tablet by mouth 2 times daily  Qty: 60 tablet, Refills: 3              Details   VICTOZA 18 MG/3ML SOPN SC injection              Time Spent on discharge is more than 45 minutes in the examination, evaluation, counseling and review of medications and discharge plan. Signed:    Cj Flores MD   12/16/2020      Thank you  for the opportunity to be involved in this patient's care. If you have any questions or concerns please feel free to contact me at [phone].

## 2020-12-16 NOTE — PLAN OF CARE
Problem: Falls - Risk of:  Goal: Will remain free from falls  Description: Will remain free from falls  Outcome: Completed  Goal: Absence of physical injury  Description: Absence of physical injury  Outcome: Completed     Problem: Breathing Pattern - Ineffective:  Goal: Ability to achieve and maintain a regular respiratory rate will improve  Description: Ability to achieve and maintain a regular respiratory rate will improve  Outcome: Completed     Problem: Nutrition  Goal: Optimal nutrition therapy  Outcome: Completed     Problem: Restraint Use - Nonviolent/Non-Self-Destructive Behavior:  Goal: Absence of restraint indications  Description: Absence of restraint indications  Outcome: Completed  Goal: Absence of restraint-related injury  Description: Absence of restraint-related injury  Outcome: Completed     Problem: Skin Integrity:  Goal: Will show no infection signs and symptoms  Description: Will show no infection signs and symptoms  Outcome: Completed  Goal: Absence of new skin breakdown  Description: Absence of new skin breakdown  Outcome: Completed

## 2020-12-16 NOTE — ADT AUTH CERT
Chronic Obstructive Pulmonary Disease - Care Day 15 (12/13/2020) by Monse Jimenez RN       Review Status Review Entered   Completed 12/15/2020 16:10      Criteria Review      Care Day: 15 Care Date: 12/13/2020 Level of Care: ICU    Guideline Day 3    Clinical Status    ( ) * Hemodynamic stability    12/15/2020 4:10 PM EST by Natalie Alvares      wbc  17.6  bili 1.1  bun 52    ( ) * Mental status at baseline    ( ) * No evidence of infection, or outpatient treatment planned    ( ) * Uncompensated acidosis absent    ( ) * Oxygenation at baseline or acceptable for next level of care [G]    12/15/2020 4:10 PM EST by Natalie Alvares      89-93% on 6 liter nc      98.8   resps 21-32  104   193/67, 170/82    ( ) * Airflow rates at baseline or acceptable for next level of care    ( ) * Signs and symptoms of COPD (eg, dyspnea, Tachypnea, cough, sputum production) at baseline or acceptable for next level of care    ( ) * Discharge plans and education understood    Activity    ( ) * Ambulatory or acceptable for next level of care    12/15/2020 4:10 PM EST by Natalie Alvares      droplet plus isolation    Routes    (X) * Oral hydration, medications, and diet    12/15/2020 4:10 PM EST by Natalie Alvares      norvasc 5 mg qd is ordered  lopressor 50 mg bid  apresoline 10 mg iv q 4hrs prn is given x2 doses  cefepime 2 gm iv q12hrs  lovenox 40mg sc bid  lasix 20mg iv given today  solumedrol 40mg iv q6hrs    Interventions    (X) Pulse oximetry    (X) Oxygen as needed    Medications    ( ) * Inhaled bronchodilator regimen established and feasible at next level of care    * Milestone   Additional Notes   12/13      Per Hospitalist:   HTN- uncontrolled, restarted home meds.        DVT Prophylaxis: lovenox bid   Diet: Diet NPO Effective Now   Diet Tube Feed Continuous/Cyclic w/ Diet, via NG tube

## 2020-12-16 NOTE — CARE COORDINATION
CASE MANAGEMENT DISCHARGE SUMMARY      Discharge to: [address] completed: started/not needed to [facility] Exemption Notification (HENS) completed: Completed. New Durable Medical Equipment ordered/agency: none    Transportation: Ambulance   Medical Transport explained to [employer]. Pt/family voice no agency preference. Agency used: [address] [place] [address] up time: 5pm   Ambulance form completed: Yes    Confirmed discharge plan with:   Patient: yes   Family, name and contact number: Larry Esteves [phone]   Facility/Agency, name:  [facility] faxed to [facility]   Phone number for report to facility: [phone]   RN, name: Fransisco Shah RN aware of the above. Note: Discharging nurse to complete [facility], reconcile AVS, and place final copy with patient's discharge packet. RN to ensure that written prescriptions for  Level II medications are sent with patient to the facility as per protocol.       Adrienne Silveira RN

## 2020-12-16 NOTE — PROGRESS NOTES
ELOY improved. Chart reviewed . Shon Williamson Will sign off. Diuretics per intensivist.     PICC ok by me. Will sign off. Plz reconsult if our services are needed. Thank you for consulting [facility] for your nephrology needs. Please call me if you have any questions. Chivo. [facility]. Off: [phone]  Cell: [phone].  ( until 5 pm)

## 2020-12-21 NOTE — ADT AUTH CERT
Chronic Obstructive Pulmonary Disease - Care Day 17 (12/15/2020) by Monse Jimenez RN       Review Status Review Entered   Completed 12/21/2020 08:48      Criteria Review      Care Day: 17 Care Date: 12/15/2020 Level of Care: ICU    Guideline Day 3    Clinical Status    ( ) * Hemodynamic stability    12/21/2020 8:48 AM EST by Natalie Alvares      97.8  resps 18-31  92    /79,  168/71   93% on 5 liter nc    (X) * Mental status at baseline    ( ) * No evidence of infection, or outpatient treatment planned    ( ) * Uncompensated acidosis absent    ( ) * Oxygenation at baseline or acceptable for next level of care [G]    12/21/2020 8:48 AM EST by Natalie Alvares      hypoxia requiring 5 liter nc    ( ) * Airflow rates at baseline or acceptable for next level of care    ( ) * Signs and symptoms of COPD (eg, dyspnea, Tachypnea, cough, sputum production) at baseline or acceptable for next level of care    ( ) * Discharge plans and education understood    Activity    ( ) * Ambulatory or acceptable for next level of care    12/21/2020 8:48 AM EST by Natalie Alvares      up to chair with assist of 2, need maxi stedy to go back to bed    Routes    (X) * Oral hydration, medications, and diet    12/21/2020 8:48 AM EST by Natalie Alvares      norvasc 5 mg qd, lopressor 50mg  bid  lovenox 40mg sc bid  pepcid 20mg iv given  protonix 40mg iv qd  Apresoline 10mg iv given x1 dose  mso4 2 mg iv given  zofran 4 mg iv given    NG tube dc'd today  pureed diet    Interventions    (X) Pulse oximetry    (X) Oxygen as needed    Medications    ( ) * Inhaled bronchodilator regimen established and feasible at next level of care    ( ) Oral steroids    12/21/2020 8:48 AM EST by Natalie Alvares      solumedrol 40mg iv q12hrs    * Milestone   Additional Notes   12/15   Per Pulmonary: Patient is getting Lantus insulin twice a day and also patient's blood sugars on the higher side-dose increased to 35 units twice a day and retitration as per blood glucose monitoring   Will also change the enteral feeds to diabetic formula   Speech reevaluation   Patient has increasing WBC count which needs to be trended   Patient has elevated BUN which may be secondary to steroids but needs to be trended      To transfer out of icu

## 2020-12-28 ENCOUNTER — OUTSIDE SERVICES (OUTPATIENT)
Dept: WOUND CARE | Age: 75
End: 2020-12-28
Payer: MEDICARE

## 2020-12-28 PROCEDURE — 97597 DBRDMT OPN WND 1ST 20 CM/<: CPT | Performed by: CLINICAL NURSE SPECIALIST

## 2020-12-28 NOTE — PROGRESS NOTES
88 [facility]    Patient name: Mat Harrell  :   [birth year]  Facility:   [facility] Service: skilled nursing facility (05)    Primary diagnosis for wound-care consultation: Open area to coccyx    Additional ulcer(s) noted? None    History of Present Illness: Initial visit. Open area to coccyx extending to gluteal cleft. Stage II pressure injury with fungal rash and moisture associated skin damage with erosion. Current treatment: Triad, alginate AG and hydrocolloid. Improving with current treatment. Turning side to side in bed as much as possible. Minimal time in chair. Pressure redistribution chair cushion. Recent cold good. Fungal rash to groin and gluteal cleft. Occasional incontinence. Nystatin powder in place. Recent Diflucan. Appetite poor. 2020: Albumin 2.7. Lactose intolerant. Dietitian to visit tomorrow. Decreased mobility. Weakness. Working with OT, PT and [address] [place] [address] . No fever or chills. Review of Systems: Pertinent systems reviewed in the HPI; all other systems reviewed, and negative. Pertinent elements of past medical, surgical, family, and/or social history: Recent positive Covid.   Patient with diabetes, neuropathy, history of bladder cancer, COPD, morbid obesity, dysphagia and essential hypertension    Medications and allergies are detailed in the nursing home chart, and were reviewed by me today.  _______________________    General Physical exam:    Vital signs:  140/59, 98.4, 96, 18    General Appearance: alert and oriented to person, place and time, obese, in no acute distress  Psychiatric:  Mood and affect appropriate for situation  Skin: warm and dry, no rash  Head: normocephalic and atraumatic  Eyes: pupils equal, round, sclerae anicteric, conjunctivae normal  ENT: no thrush or oral ulcers  Neck:No complaints, normal appearance  Pulmonary/Chest: Respirations easy at rest, no cough or respiratory distress Cardiovascular: No chest pain, normal rate, toes warm, cap refill normal  Abdomen: No nausea or vomiting  Extremities: no cyanosis, edema or cellulitis  Musculoskeletal: Working with therapy  Neurologic: distal sensation to light touch intact, no allodynia. Wound exam:    Wound location: Coccyx extending to gluteal cleft   Length (cm) 3.8   Width (cm) 1.5   Depth (cm) 0.1   Tunneling 0   Undermining 0    Wound type:   Pressure with fungal rash and moisture associated skin damage with erosion  Grade - stage - thickness: Stage 2     Description of periwound: Fungal rash    Description of wound bed: Wound with red base and biofilm. Wound bed moist, modderate amount of serous drainage, edges open and attached. Surrounding tissue and ulcer without signs and symptoms of infection. No purulence, malodor, erythema, increased temperature, or increased pain.  _______________________    Recent labs and data reviewed: 12/19/2020: Glucose 90, BUN 35, creatinine 0.9, GFR 61, WBC 12.2, hemoglobin/hematocrit: 11/35.  12/17/2020 albumin 2.7  _______________________     Skylar Furnish diagnoses & assessment: Stage II pressure injury with fungal rash and moisture associated skin damage with erosion. Current treatment: Triad, alginate AG and hydrocolloid. Improving with current treatment. Turning side to side in bed as much as possible. Minimal time in chair. Pressure redistribution chair cushion. Recent cold good. Fungal rash to groin and gluteal cleft. Occasional incontinence. Nystatin powder in place. Recent Diflucan. Appetite poor. 12/17/2020: Albumin 2.7. Lactose intolerant. Dietitian to visit tomorrow. Decreased mobility. Weakness. Working with OT, PT and [facility] Marilynn Burns. No fever or chills    Debridement is  indicated today, based on the history and exam above.  _______________________    Procedure:    Consent obtained. Time out performed per [facility]. Topical anesthetic applied: 4% topical lidocaine. Using a curette, I sharply debrided the coccyx ulcer(s) down through and including the removal of epidermis and dermis. The type(s) of tissue debrided included biofilm. Total Surface Area Debrided: 5.7 sq cm. The ulcers were then irrigated with normal saline solution. The procedure was completed with a small amount of bleeding, and hemostasis was by pressure. The patient tolerated the procedure well, with no significant complications. The patient's level of pain during and after the procedure was monitored. If post-debridement measurements are significantly different from initial measurements, those will be noted here.   _______________________    Recommendations:    - Dressings / Compression / Offloading: Triad, alginate AG and hydrocolloid. Wash groin gluteal cleft area with soap and water, dry thoroughly. Apply nystatin powder 2 times a day. Low-air-loss mattress if possible. Pressure redistribution chair cushion. Side to side when in bed as much as possible. - Labs / Diagnostic studies: None    - Medications / nutritional support: Order for Prostat daily.    - Further Consultations recommended: Dietitian consult tomorrow.   Working with OT, PT and ST.    - Anticipated follow-up: Weekly evaluation  _______________________    Electronically signed by HAKEEM Magaña CNP on 12/28/2020 at 5:19 PM

## 2021-01-04 ENCOUNTER — OUTSIDE SERVICES (OUTPATIENT)
Dept: WOUND CARE | Age: 76
End: 2021-01-04
Payer: MEDICARE

## 2021-01-04 DIAGNOSIS — L89.152 PRESSURE INJURY OF COCCYGEAL REGION, STAGE 2 (HCC): ICD-10-CM

## 2021-01-04 PROCEDURE — 97597 DBRDMT OPN WND 1ST 20 CM/<: CPT | Performed by: CLINICAL NURSE SPECIALIST

## 2021-01-04 NOTE — PROGRESS NOTES
88 [facility]    Patient name: Draek Saleem  :   [birth year]  Facility:  [address] [place] [address] Service: skilled nursing facility (37)    Primary diagnosis for wound-care consultation: Stage II pressure injury coccyx    Additional ulcer(s) noted? None    History of Present Illness: Stage II pressure injury to coccyx, with fungal rash and moisture associated skin damage with erosion. Fungal rash improving. Healing slowly with current treatment. Patient is offloading. Low-air-loss mattress. Side to side when in bed as much as possible. Minimal time in chair. Pressure redistribution chair cushion. Occasional incontinence. Appetite poor to fair. Low albumin. Dietitian involved. Protein supplements provided. Glucerna and Prostat. Multivitamin daily. Decreased mobility. Working with OT, PT and [address] [place] [address] . No fever or chills. Review of Systems: Pertinent systems reviewed in the HPI; all other systems reviewed, and negative. Pertinent elements of past medical, surgical, family, and/or social history: Patient diabetes, neuropathy, history of bladder cancer, COPD, morbid obesity, dysphagia and essential hypertension.     Medications and allergies are detailed in the nursing home chart, and were reviewed by me today.  _______________________    General Physical exam:    Vital signs:  154/65, 97.7, 83, 16    General Appearance: alert and oriented to person, place and time, obese, in no acute distress  Psychiatric:  Mood and affect appropriate for situation  Skin: warm and dry, no rash  Head: normocephalic and atraumatic  Eyes: pupils equal, round, sclerae anicteric, conjunctivae normal  ENT: no thrush or oral ulcers  Neck:No complaints, normal appearance  Pulmonary/Chest: Respirations easy at rest, no cough or respiratory distress  Cardiovascular: No chest pain, normal rate, toes warm, cap refill normal  Abdomen: No nausea or vomiting Extremities: no cyanosis, edema or cellulitis  Musculoskeletal: Working with therapy   Neurologic: distal sensation to light touch intact, no allodynia. Wound exam:    Wound location: Coccyx   Length (cm) 3.2   Width (cm) 0.9   Depth (cm) 0.1   Tunneling 0   Undermining 0    Wound type:  Pressure injury with fungal rash and moisture associated skin damage with erosion   Grade - stage - thickness: Stage 2     Description of periwound: Fungal rash, improving    Description of wound bed: Wound with red base and biofilm. Wound bed moist, moderate amount of serous drainage, edges open, irregular and attached. Surrounding tissue and ulcer without signs and symptoms of infection. No purulence, malodor, erythema, increased temperature, or increased pain.  _______________________    Recent labs and data reviewed:  No new labs    _______________________     Renaye Faster diagnoses & assessment: Stage II pressure injury to coccyx, with fungal rash and moisture associated skin damage with erosion. Fungal rash improving. Healing slowly with current treatment. Patient is offloading. Low-air-loss mattress. Side to side when in bed as much as possible. Minimal time in chair. Pressure redistribution chair cushion. Occasional incontinence. Appetite poor to fair. Low albumin. Dietitian involved. Protein supplements provided. Glucerna and Prostat. Multivitamin daily. Decreased mobility. Working with OT, PT and [address] [place] [address]  No fever or chills. Debridement is  indicated today, based on the history and exam above.  _______________________    Procedure:    Consent obtained. Time out performed per [facility]. Topical anesthetic applied: 4% topical lidocaine. Using a curette, I sharply debrided the coccyx ulcer(s) down through and including the removal of epidermis and dermis. The type(s) of tissue debrided included biofilm. Total Surface Area Debrided: 2.88 sq cm. The ulcers were then irrigated with normal saline solution. The procedure was completed with a small amount of bleeding, and hemostasis was by pressure. The patient tolerated the procedure well, with no significant complications. The patient's level of pain during and after the procedure was monitored. If post-debridement measurements are significantly different from initial measurements, those will be noted here.   _______________________    Recommendations:    - Dressings / Compression / Offloading:  Crusting with nystatin powder to periwound. Change treatment at collagen, alginate AG and hydrocolloid. Low air loss mattress. Side to side when in bed as much as possible. Minimal time in chair. Pressure redistribution chair cushion. Wash groin and gluteal cleft with soap and water, dry thoroughly and apply nystatin powder 2 times a day. - Labs / Diagnostic studies: None    - Medications / nutritional support: Glucerna and Protostat. Multivitamin daily.    - Further Consultations recommendations: Working with OT, PT and [address] [place] [address]    Dietitian following.    - Anticipated follow-up: Weekly evaluation  _______________________    Electronically signed by HAKEEM Yee CNP on 1/4/2021 at 5:00 PM

## 2021-01-11 ENCOUNTER — OUTSIDE SERVICES (OUTPATIENT)
Dept: WOUND CARE | Age: 76
End: 2021-01-11
Payer: MEDICARE

## 2021-01-11 DIAGNOSIS — L89.152 PRESSURE INJURY OF COCCYGEAL REGION, STAGE 2 (HCC): ICD-10-CM

## 2021-01-11 PROCEDURE — 97597 DBRDMT OPN WND 1ST 20 CM/<: CPT | Performed by: CLINICAL NURSE SPECIALIST

## 2021-01-11 NOTE — PROGRESS NOTES
88 [facility]    Patient name: Terese Ellington  :   [birth year]  Facility:   [facility] Service: skilled nursing facility (72)    Primary diagnosis for wound-care consultation: Stage 2 pressure injury to coccyx    Additional ulcer(s) noted?  none    History of Present Illness: Stage 2 pressure injury to coccyx, extending to gluteal cleft. Fungal rash to robert wound resolving. Off loading with low air loss mattress. Minimal time in chair. Pressure redistribution chair cushion. Decreased mobility. Obese. Weakness. Working with therapy. Appetite fair to good. Dietician involved. Protein supplement provided. No fever or chills. Review of Systems: Pertinent systems reviewed in the HPI; all other systems reviewed, and negative. Pertinent elements of past medical, surgical, family, and/or social history: DM, neuropathy, COPD, pneumonia and morbid obesity    Medications and allergies are detailed in the nursing home chart, and were reviewed by me today.  _______________________    General Physical exam:    Vital signs:  154/65, 97.7, 83, 16    General Appearance: alert and oriented to person, place and time, morbidly obese, in no acute distress  Psychiatric:  Mood and affect appropriate for situation  Skin: warm and dry, no rash  Head: normocephalic and atraumatic  Eyes: pupils equal, round, sclerae anicteric, conjunctivae normal  ENT: no thrush or oral ulcers  Neck:No complaints, normal appearance  Pulmonary/Chest: Respirations easy at rest, no cough or respiratory distress  Cardiovascular: No chest pain, normal rate, toes warm, cap refill normal  Abdomen: No nausea or vomiting  Extremities: no cyanosis, edema or cellulitis  Musculoskeletal: Ambulatory with assist, working with therapy, moves all extremities, no deformities  Neurologic: distal sensation to light touch impaired with neuropathy, no allodynia.       Wound exam: Wound location: Coccyx, extending to gluteal cleft   Length (cm) 3.2   Width (cm) 0.8   Depth (cm) 0.1   Tunneling 0   Undermining 0    Wound type:   Pressure  Grade - stage - thickness: Stage 2     Description of periwound: Intact    Description of wound bed: Wound with red base and biofilm. Wound bed moist, moderate amount of serous drainage, edges open and attached. Surrounding tissue and ulcer without signs and symptoms of infection. No purulence, malodor, erythema, increased temperature, or increased pain.  _______________________    Recent labs and data reviewed: 1/9/21: Bilateral lower lobe pneumonia  _______________________     Jeyson Oneil diagnoses & assessment: Stage 2 pressure injury to coccyx, extending to gluteal cleft. Fungal rash to robert wound resolving. Off loading with low air loss mattress. Minimal time in chair. Pressure redistribution chair cushion. Decreased mobility. Obese. Weakness. Working with therapy. Appetite fair to good. Dietician involved. Protein supplement provided. No fever or chills. Debridement is  indicated today, based on the history and exam above.  _______________________    Procedure:    Consent obtained. Time out performed per [facility]. Topical anesthetic applied: 4% topical lidocaine. Using a curette, I sharply debrided the coccyx ulcer(s) down through and including the removal of epidermis and dermis. The type(s) of tissue debrided included biofilm. Total Surface Area Debrided: 2.56 sq cm. The ulcers were then irrigated with normal saline solution. The procedure was completed with a small amount of bleeding, and hemostasis was by pressure. The patient tolerated the procedure well, with no significant complications. The patient's level of pain during and after the procedure was monitored.  If post-debridement measurements are significantly different from initial measurements, those will be noted here.   _______________________    Recommendations: - Dressings / Compression / Offloading: Collagen and hydrocolloid. Low air loss mattress. Side to side as much as possible when in bed. Minimal time in chair, less than 2-3 hours at a time if possible. Pressure redistribution chair cushion.    - Labs / Diagnostic studies: none    - Medications / nutritional support: Prostat daily    - Further Consultations recommended: Working with OT, PT and ST.  Dietician involved    - Anticipated follow-up: Weekly evaluation  _______________________    Electronically signed by HAKEEM Degroot CNP on 1/11/2021 at 3:22 PM

## 2021-01-18 ENCOUNTER — OUTSIDE SERVICES (OUTPATIENT)
Dept: WOUND CARE | Age: 76
End: 2021-01-18
Payer: MEDICARE

## 2021-01-18 DIAGNOSIS — L89.152 PRESSURE INJURY OF COCCYGEAL REGION, STAGE 2 (HCC): ICD-10-CM

## 2021-01-18 PROCEDURE — 99308 SBSQ NF CARE LOW MDM 20: CPT | Performed by: CLINICAL NURSE SPECIALIST

## 2021-01-19 NOTE — PROGRESS NOTES
[address] [place] [address]    Patient name: Los Carrillo  :   [birth year]  Facility:    [facility] Service: skilled nursing facility (41)    Primary diagnosis for wound-care consultation: Stage II pressure injury coccyx    Additional ulcer(s) noted? None    History of Present Illness: Stage II pressure injury to coccyx, healing slowly. Patient is offloading. Side to side when in bed as much as possible. Minimal time in chair. Pressure redistribution chair cushion. Working with therapies. Getting stronger. Obese. Appetite improving. Receiving Prostat and multivitamin daily. No fever or chills. Review of Systems: Pertinent systems reviewed in the HPI; all other systems reviewed, and negative. Pertinent elements of past medical, surgical, family, and/or social history: Patient with diabetes, neuropathy, history of bladder cancer, COPD, morbid obesity, dysphagia and essential hypertension    Medications and allergies are detailed in the nursing home chart, and were reviewed by me today.  _______________________    General Physical exam:    Vital signs:  148/59, 98, 84, 16    General Appearance: alert and oriented to person, place and time, obese, in no acute distress  Psychiatric:  Mood and affect appropriate for situation  Skin: warm and dry, fungal rash nearly resolved  Head: normocephalic and atraumatic  Eyes: pupils equal, round, sclerae anicteric, conjunctivae normal  ENT: no thrush or oral ulcers  Neck:No complaints, normal appearance  Pulmonary/Chest: Respirations easy at rest, no cough or respiratory distress  Cardiovascular: No chest pain, normal rate, toes warm, cap refill normal  Abdomen: No nausea or vomiting  Extremities: no cyanosis, edema or cellulitis  Musculoskeletal: Working with therapy, moves all extremities, no deformities  Neurologic: distal sensation to light touch intact, no allodynia.       Wound exam:    Wound location: Coccyx Length (cm) 2   Width (cm) 0.5   Depth (cm) 0.1   Tunneling 0   Undermining 0    Wound type:   Pressure  Grade - stage - thickness: Stage 2     Description of periwound: Intact    Description of wound bed: Wound with red base. Wound bed moist, moderate amount of serous drainage, edges open and attached. Surrounding tissue and ulcer without signs and symptoms of infection. No purulence, malodor, erythema, increased temperature, or increased pain.  _______________________    Recent labs and data reviewed: No new labs  _______________________     Chapito Carvajal diagnoses & assessment: Stage II pressure injury to coccyx, healing slowly. Patient is offloading. Side to side when in bed as much as possible. Minimal time in chair. Pressure redistribution chair cushion. Working with therapies. Getting stronger. Obese. Appetite improving. Receiving Prostat and multivitamin daily. No fever or chills. Debridement is not indicated today, based on the history and exam above.  _______________________    Procedure:    Consent obtained. Time out performed per [facility]. _______________________    Recommendations:    - Dressings / Compression / Offloading: Collagen, alginate AG and hydrocolloid. Side to side when in bed as much as possible. Minimal time in chair.   Pressure redistribution chair cushion.    - Labs / Diagnostic studies: None    - Medications / nutritional support: Multivitamin and Prostat daily    - Further Consultations recommended: Working with OT, PT and ST.    - Anticipated follow-up: Weekly evaluation  _______________________    Electronically signed by HAKEEM Pereira CNP on 1/18/2021 at 7:42 PM

## 2021-01-25 ENCOUNTER — OUTSIDE SERVICES (OUTPATIENT)
Dept: WOUND CARE | Age: 76
End: 2021-01-25
Payer: MEDICARE

## 2021-01-25 DIAGNOSIS — L89.152 PRESSURE INJURY OF COCCYGEAL REGION, STAGE 2 (HCC): ICD-10-CM

## 2021-01-25 PROCEDURE — 99308 SBSQ NF CARE LOW MDM 20: CPT | Performed by: CLINICAL NURSE SPECIALIST

## 2021-01-25 NOTE — PROGRESS NOTES
Length (cm) 0.4   Width (cm) 0.2   Depth (cm) 0.1   Tunneling 0   Undermining 0    Wound type:   Pressure  Grade - stage - thickness: Stage 2     Description of periwound: Intact    Description of wound bed: Wound with red base. Wound bed moist, moderate amount of serous drainage, edges open, with new epithelium and attached. Surrounding tissue and ulcer without signs and symptoms of infection. No purulence, malodor, erythema, increased temperature, or increased pain.  _______________________    Recent labs and data reviewed: No new labs  _______________________     Esteban Childress diagnoses & assessment: Stage II pressure injury to coccyx healing with current treatment. Offloading. Side to side when in bed. Low-air-loss mattress. Minimal time in chair. Pressure redistribution chair cushion. Appetite is good. Protein supplements provided    Debridement is not indicated today, based on the history and exam above.  _______________________    Procedure:    Consent obtained. Time out performed per [facility]. _______________________    Recommendations:    - Dressings / Compression / Offloading: Collagen, alginate AG and hydrocolloid. Side to side when in bed as much as possible. Low-air-loss mattress. Minimal time in chair.   Pressure redistribution chair cushion.    - Labs / Diagnostic studies: None    - Medications / nutritional support: Prostat daily    - Further Consultations recommended: Working with OT, PT and ST.    - Anticipated follow-up: Weekly evaluation  _______________________    Electronically signed by HAKEEM Alberto CNP on 1/25/2021 at 6:06 PM

## 2021-02-01 ENCOUNTER — OUTSIDE SERVICES (OUTPATIENT)
Dept: WOUND CARE | Age: 76
End: 2021-02-01
Payer: MEDICARE

## 2021-02-01 DIAGNOSIS — L89.152 PRESSURE INJURY OF COCCYGEAL REGION, STAGE 2 (HCC): ICD-10-CM

## 2021-02-01 PROCEDURE — 99308 SBSQ NF CARE LOW MDM 20: CPT | Performed by: CLINICAL NURSE SPECIALIST

## 2021-02-01 NOTE — PROGRESS NOTES
88 [facility]    Patient name: Juan F Hall  :   [birth year]  Facility:    [facility] Service: skilled nursing facility (81)    Primary diagnosis for wound-care consultation: Stage II pressure injury with fungal rash and moisture associated skin damage with erosion to coccyx extending to gluteal cleft resolved today. Additional ulcer(s) noted? None    History of Present Illness: Wound resolved today. Patient is offloading. Occasional incontinence. Turning side-to-side as much as possible. Minimal time in chair. Pressure redistribution chair cushion. Appetite improving. Dietitian is following. Working with OT and PT and [address] [place] [address] Dr. No fever or chills. Review of Systems: Pertinent systems reviewed in the HPI; all other systems reviewed, and negative. Pertinent elements of past medical, surgical, family, and/or social history: Patient here for Rehab. Recent positive for Covid.   Patient with diabetes, neuropathy, history of bladder cancer, COPD, morbid obesity, dysphagia and essential hypertension    Medications and allergies are detailed in the nursing home chart, and were reviewed by me today.  _______________________    General Physical exam:    Vital signs:  144/65, 98.8, 82, 16    General Appearance: alert and oriented to person, place and time, obese, in no acute distress  Psychiatric:  Mood and affect appropriate for situation  Skin: warm and dry, no rash  Head: normocephalic and atraumatic  Eyes: pupils equal, round, sclerae anicteric, conjunctivae normal  ENT: no thrush or oral ulcers  Neck:No complaints, normal appearance  Pulmonary/Chest: Respirations easy at rest, no cough or respiratory distress  Cardiovascular: No chest pain, normal rate, toes warm, cap refill normal  Abdomen: No nausea or vomiting  Extremities: no cyanosis, edema or cellulitis  Musculoskeletal: Ambulatory, moves all extremities, no deformities Neurologic: distal sensation to light touch intact, no allodynia. Wound exam:    Wound location: Coccyx, extending to gluteal cleft - Resolved today     Description of wound bed: Wound resolved. Surrounding tissue without signs and symptoms of infection. No purulence, malodor, erythema, increased temperature, or increased pain.  _______________________    Recent labs and data reviewed: 1/21/2021: Glucose 252, BUN 13, creatinine 1.0, GFR 54.  1/25/2021: A1c 6.4  _______________________     Thomas Deleon diagnoses & assessment: Stage II pressure injury with fungal rash and moisture associated skin damage with erosion to coccyx extending to gluteal cleft resolved today. Patient is offloading. Occasional incontinence. Turning side-to-side as much as possible. Minimal time in chair. Pressure redistribution chair cushion. Appetite improving. Dietitian is following. Working with OT and PT and [address] [place] [address] . Debridement is not indicated today, based on the history and exam above.  _______________________    Procedure:    Consent obtained. Time out performed per [facility]. _______________________    Recommendations:    - Dressings / Compression / Offloading: Skin prep and hydrocolloid for protection. Continue offloading, side to side when in bed as much as possible. Pressure redistribution chair cushion. Minimal time in chair.    - Labs / Diagnostic studies: None    - Medications / nutritional support: Prostat daily    - Further Consultations recommended: Working with OT, PT and ST    - Anticipated follow-up: Nursing to follow. Reconsult if necessary.   _______________________    Electronically signed by HAKEEM Stinson CNP on 2/1/2021 at 4:38 PM

## 2021-07-15 ENCOUNTER — ANESTHESIA EVENT (OUTPATIENT)
Dept: OPERATING ROOM | Age: 76
End: 2021-07-15
Payer: MEDICARE

## 2021-07-15 RX ORDER — CLOPIDOGREL BISULFATE 75 MG/1
75 TABLET ORAL DAILY
COMMUNITY

## 2021-07-15 RX ORDER — FUROSEMIDE 40 MG/1
40 TABLET ORAL DAILY
COMMUNITY

## 2021-07-15 RX ORDER — DULOXETIN HYDROCHLORIDE 30 MG/1
30 CAPSULE, DELAYED RELEASE ORAL DAILY
COMMUNITY

## 2021-07-15 RX ORDER — LEVOTHYROXINE SODIUM 0.05 MG/1
50 TABLET ORAL DAILY
COMMUNITY

## 2021-07-15 RX ORDER — ISOSORBIDE MONONITRATE 30 MG/1
30 TABLET, EXTENDED RELEASE ORAL DAILY
COMMUNITY

## 2021-07-15 RX ORDER — TURMERIC ROOT EXTRACT 500 MG
1500 TABLET ORAL 3 TIMES DAILY
COMMUNITY
End: 2022-07-19

## 2021-07-15 RX ORDER — OMEGA-3 FATTY ACIDS/FISH OIL 300-1000MG
1 CAPSULE ORAL DAILY
COMMUNITY
End: 2022-01-14

## 2021-07-15 RX ORDER — M-VIT,TX,IRON,MINS/CALC/FOLIC 27MG-0.4MG
1 TABLET ORAL DAILY
COMMUNITY

## 2021-07-15 RX ORDER — DIMENHYDRINATE 50 MG
1000 TABLET ORAL DAILY
Status: ON HOLD | COMMUNITY
End: 2022-01-19

## 2021-07-15 RX ORDER — GABAPENTIN 600 MG/1
600 TABLET ORAL 3 TIMES DAILY
COMMUNITY

## 2021-07-15 NOTE — PROGRESS NOTES
PRE OP INSTRUCTION SHEET   1. Do not eat or drink anything after 12 midnight  prior to surgery. This includes no water, chewing gum or mints. 2. Take the following pills will a small sip of water (see MAR)                                        3. Aspirin, Ibuprofen, Advil, Naproxen, Vitamin E, fish oil and other Anti-inflammatory products should be stopped for 5 days before surgery or as directed by your physician. 4. Check with your Doctor regarding stopping Plavix, Coumadin, Lovenox, Fragmin or other blood thinners   5. Do not smoke, and do not drink any alcoholic beverages 24 hours prior to surgery. This includes NA Beer. 6. You may brush your teeth and gargle the morning of surgery. DO NOT SWALLOW WATER   7. You MUST make arrangements for a responsible adult to take you home after your surgery. You will not be allowed to leave alone or drive yourself home. It is strongly suggested someone stay with you the first 24 hrs. Your surgery will be cancelled if you do not have a ride home. 8. A parent/legal guardian must accompany a child scheduled for surgery and plan to stay at the hospital until the child is discharged. Please do not bring other children with you. 9. Please wear simple, loose fitting clothing to the hospital.  Bimal Lugo not bring valuables (money, credit cards, checkbooks, etc.) Do not wear any makeup (including no eye makeup) or nail polish on your fingers or toes. 10. DO NOT wear any jewelry or piercings on day of surgery. All body piercing jewelry must be removed. 11. If you have dentures,glasses, or contacts they will be removed before going to the OR; we will provide you a container. 12. Please see your family doctor/and cardiologist for a history & physical and/or concerning medications. Bring any test results/reports from your physician's office. Have history and labs faxed to [phone].  Remember to bring Blood Bank bracelet on the day of surgery. 14. If you have a Living Will and Durable Power of  for Healthcare, please bring in a copy. 13. Notify your Surgeon if you develop any illness between now and surgery  time, cough, cold, fever, sore throat, nausea, vomiting, etc.  Please notify your surgeon if you experience dizziness, shortness of breath or blurred vision between now & the time of your surgery   16. DO NOT shave your operative site 96 hours prior to surgery. For face & neck surgery, men may use an electric razor 48 hours prior to surgery. 17. Shower with _x__Antibacterial soap (x_chlorhexidine for total joint  Pt's) shower two times before surgery.(the morning of and the night before. 18. To provide excellent care visitors will be limited to one in the room at any given time.   Please call pre admission testing if you any further questions [phone] or [phone]

## 2021-07-16 ENCOUNTER — HOSPITAL ENCOUNTER (OUTPATIENT)
Age: 76
Discharge: HOME | End: 2021-07-16
Payer: MEDICARE

## 2021-07-16 LAB — SARS-COV-2: NOT DETECTED

## 2021-07-16 PROCEDURE — U0005 INFEC AGEN DETEC AMPLI PROBE: HCPCS

## 2021-07-16 PROCEDURE — U0003 INFECTIOUS AGENT DETECTION BY NUCLEIC ACID (DNA OR RNA); SEVERE ACUTE RESPIRATORY SYNDROME CORONAVIRUS 2 (SARS-COV-2) (CORONAVIRUS DISEASE [COVID-19]), AMPLIFIED PROBE TECHNIQUE, MAKING USE OF HIGH THROUGHPUT TECHNOLOGIES AS DESCRIBED BY CMS-2020-01-R: HCPCS

## 2021-07-21 ENCOUNTER — HOSPITAL ENCOUNTER (OUTPATIENT)
Age: 76
Setting detail: OUTPATIENT SURGERY
Discharge: HOME | End: 2021-07-21
Attending: UROLOGY | Admitting: UROLOGY
Payer: MEDICARE

## 2021-07-21 ENCOUNTER — ANESTHESIA (OUTPATIENT)
Dept: OPERATING ROOM | Age: 76
End: 2021-07-21
Payer: MEDICARE

## 2021-07-21 VITALS
HEIGHT: 64 IN | DIASTOLIC BLOOD PRESSURE: 57 MMHG | SYSTOLIC BLOOD PRESSURE: 142 MMHG | TEMPERATURE: 97.2 F | OXYGEN SATURATION: 92 % | RESPIRATION RATE: 17 BRPM | HEART RATE: 80 BPM | BODY MASS INDEX: 38.93 KG/M2 | WEIGHT: 228 LBS

## 2021-07-21 VITALS
OXYGEN SATURATION: 100 % | SYSTOLIC BLOOD PRESSURE: 93 MMHG | RESPIRATION RATE: 10 BRPM | DIASTOLIC BLOOD PRESSURE: 52 MMHG

## 2021-07-21 LAB
ANION GAP SERPL CALCULATED.3IONS-SCNC: 10 MMOL/L (ref 3–16)
APTT: 39 SEC (ref 26.2–38.6)
BUN BLDV-MCNC: 18 MG/DL (ref 7–20)
CALCIUM SERPL-MCNC: 9.1 MG/DL (ref 8.3–10.6)
CHLORIDE BLD-SCNC: 101 MMOL/L (ref 99–110)
CO2: 26 MMOL/L (ref 21–32)
CREAT SERPL-MCNC: 1 MG/DL (ref 0.6–1.2)
GFR AFRICAN AMERICAN: >60
GFR NON-AFRICAN AMERICAN: 54
GLUCOSE BLD-MCNC: 101 MG/DL (ref 70–99)
GLUCOSE BLD-MCNC: 116 MG/DL (ref 70–99)
HCT VFR BLD CALC: 42.8 % (ref 36–48)
HEMOGLOBIN: 14.2 G/DL (ref 12–16)
INR BLD: 1.03 (ref 0.88–1.12)
MCH RBC QN AUTO: 26.3 PG (ref 26–34)
MCHC RBC AUTO-ENTMCNC: 33.2 G/DL (ref 31–36)
MCV RBC AUTO: 79.1 FL (ref 80–100)
PDW BLD-RTO: 16 % (ref 12.4–15.4)
PERFORMED ON: ABNORMAL
PLATELET # BLD: 246 K/UL (ref 135–450)
PMV BLD AUTO: 8.6 FL (ref 5–10.5)
POTASSIUM REFLEX MAGNESIUM: 4 MMOL/L (ref 3.5–5.1)
PROTHROMBIN TIME: 11.7 SEC (ref 9.9–12.7)
RBC # BLD: 5.41 M/UL (ref 4–5.2)
SODIUM BLD-SCNC: 137 MMOL/L (ref 136–145)
WBC # BLD: 12.1 K/UL (ref 4–11)

## 2021-07-21 PROCEDURE — 85610 PROTHROMBIN TIME: CPT

## 2021-07-21 PROCEDURE — 7100000011 HC PHASE II RECOVERY - ADDTL 15 MIN: Performed by: UROLOGY

## 2021-07-21 PROCEDURE — 85027 COMPLETE CBC AUTOMATED: CPT

## 2021-07-21 PROCEDURE — 3600000004 HC SURGERY LEVEL 4 BASE: Performed by: UROLOGY

## 2021-07-21 PROCEDURE — 85730 THROMBOPLASTIN TIME PARTIAL: CPT

## 2021-07-21 PROCEDURE — 80048 BASIC METABOLIC PNL TOTAL CA: CPT

## 2021-07-21 PROCEDURE — 2500000003 HC RX 250 WO HCPCS: Performed by: ANESTHESIOLOGY

## 2021-07-21 PROCEDURE — 7100000010 HC PHASE II RECOVERY - FIRST 15 MIN: Performed by: UROLOGY

## 2021-07-21 PROCEDURE — 2580000003 HC RX 258: Performed by: ANESTHESIOLOGY

## 2021-07-21 PROCEDURE — 88307 TISSUE EXAM BY PATHOLOGIST: CPT

## 2021-07-21 PROCEDURE — 7100000000 HC PACU RECOVERY - FIRST 15 MIN: Performed by: UROLOGY

## 2021-07-21 PROCEDURE — 3700000001 HC ADD 15 MINUTES (ANESTHESIA): Performed by: UROLOGY

## 2021-07-21 PROCEDURE — 7100000001 HC PACU RECOVERY - ADDTL 15 MIN: Performed by: UROLOGY

## 2021-07-21 PROCEDURE — 6360000002 HC RX W HCPCS: Performed by: UROLOGY

## 2021-07-21 PROCEDURE — 36415 COLL VENOUS BLD VENIPUNCTURE: CPT

## 2021-07-21 PROCEDURE — 2709999900 HC NON-CHARGEABLE SUPPLY: Performed by: UROLOGY

## 2021-07-21 PROCEDURE — 2500000003 HC RX 250 WO HCPCS: Performed by: NURSE ANESTHETIST, CERTIFIED REGISTERED

## 2021-07-21 PROCEDURE — 3600000014 HC SURGERY LEVEL 4 ADDTL 15MIN: Performed by: UROLOGY

## 2021-07-21 PROCEDURE — 6360000002 HC RX W HCPCS: Performed by: NURSE ANESTHETIST, CERTIFIED REGISTERED

## 2021-07-21 PROCEDURE — 2580000003 HC RX 258: Performed by: NURSE ANESTHETIST, CERTIFIED REGISTERED

## 2021-07-21 PROCEDURE — 3700000000 HC ANESTHESIA ATTENDED CARE: Performed by: UROLOGY

## 2021-07-21 RX ORDER — LIDOCAINE HYDROCHLORIDE 20 MG/ML
INJECTION, SOLUTION INFILTRATION; PERINEURAL PRN
Status: DISCONTINUED | OUTPATIENT
Start: 2021-07-21 | End: 2021-07-21 | Stop reason: SDUPTHER

## 2021-07-21 RX ORDER — OXYCODONE HYDROCHLORIDE AND ACETAMINOPHEN 5; 325 MG/1; MG/1
2 TABLET ORAL PRN
Status: DISCONTINUED | OUTPATIENT
Start: 2021-07-21 | End: 2021-07-21 | Stop reason: HOSPADM

## 2021-07-21 RX ORDER — SODIUM CHLORIDE 9 MG/ML
25 INJECTION, SOLUTION INTRAVENOUS PRN
Status: DISCONTINUED | OUTPATIENT
Start: 2021-07-21 | End: 2021-07-21 | Stop reason: HOSPADM

## 2021-07-21 RX ORDER — MORPHINE SULFATE 10 MG/ML
1 INJECTION, SOLUTION INTRAMUSCULAR; INTRAVENOUS EVERY 5 MIN PRN
Status: DISCONTINUED | OUTPATIENT
Start: 2021-07-21 | End: 2021-07-21 | Stop reason: HOSPADM

## 2021-07-21 RX ORDER — SODIUM CHLORIDE 0.9 % (FLUSH) 0.9 %
10 SYRINGE (ML) INJECTION EVERY 12 HOURS SCHEDULED
Status: DISCONTINUED | OUTPATIENT
Start: 2021-07-21 | End: 2021-07-21 | Stop reason: HOSPADM

## 2021-07-21 RX ORDER — SODIUM CHLORIDE, SODIUM LACTATE, POTASSIUM CHLORIDE, CALCIUM CHLORIDE 600; 310; 30; 20 MG/100ML; MG/100ML; MG/100ML; MG/100ML
INJECTION, SOLUTION INTRAVENOUS CONTINUOUS
Status: DISCONTINUED | OUTPATIENT
Start: 2021-07-21 | End: 2021-07-21 | Stop reason: HOSPADM

## 2021-07-21 RX ORDER — HYDRALAZINE HYDROCHLORIDE 20 MG/ML
5 INJECTION INTRAMUSCULAR; INTRAVENOUS EVERY 10 MIN PRN
Status: DISCONTINUED | OUTPATIENT
Start: 2021-07-21 | End: 2021-07-21 | Stop reason: HOSPADM

## 2021-07-21 RX ORDER — DIPHENHYDRAMINE HYDROCHLORIDE 50 MG/ML
12.5 INJECTION INTRAMUSCULAR; INTRAVENOUS
Status: DISCONTINUED | OUTPATIENT
Start: 2021-07-21 | End: 2021-07-21 | Stop reason: HOSPADM

## 2021-07-21 RX ORDER — CIPROFLOXACIN 250 MG/1
250 TABLET, FILM COATED ORAL 2 TIMES DAILY
Qty: 10 TABLET | Refills: 0 | Status: SHIPPED | OUTPATIENT
Start: 2021-07-21 | End: 2021-07-26

## 2021-07-21 RX ORDER — PHENAZOPYRIDINE HYDROCHLORIDE 200 MG/1
200 TABLET, FILM COATED ORAL 3 TIMES DAILY PRN
Qty: 15 TABLET | Refills: 0 | Status: SHIPPED | OUTPATIENT
Start: 2021-07-21 | End: 2021-07-26

## 2021-07-21 RX ORDER — PROMETHAZINE HYDROCHLORIDE 25 MG/ML
6.25 INJECTION, SOLUTION INTRAMUSCULAR; INTRAVENOUS
Status: DISCONTINUED | OUTPATIENT
Start: 2021-07-21 | End: 2021-07-21 | Stop reason: HOSPADM

## 2021-07-21 RX ORDER — FENTANYL CITRATE 50 UG/ML
INJECTION, SOLUTION INTRAMUSCULAR; INTRAVENOUS PRN
Status: DISCONTINUED | OUTPATIENT
Start: 2021-07-21 | End: 2021-07-21 | Stop reason: SDUPTHER

## 2021-07-21 RX ORDER — GLYCINE 1.5 G/100ML
SOLUTION IRRIGATION PRN
Status: DISCONTINUED | OUTPATIENT
Start: 2021-07-21 | End: 2021-07-21 | Stop reason: ALTCHOICE

## 2021-07-21 RX ORDER — SODIUM CHLORIDE, SODIUM LACTATE, POTASSIUM CHLORIDE, CALCIUM CHLORIDE 600; 310; 30; 20 MG/100ML; MG/100ML; MG/100ML; MG/100ML
INJECTION, SOLUTION INTRAVENOUS CONTINUOUS PRN
Status: DISCONTINUED | OUTPATIENT
Start: 2021-07-21 | End: 2021-07-21 | Stop reason: SDUPTHER

## 2021-07-21 RX ORDER — EPHEDRINE SULFATE 50 MG/ML
INJECTION INTRAVENOUS PRN
Status: DISCONTINUED | OUTPATIENT
Start: 2021-07-21 | End: 2021-07-21 | Stop reason: SDUPTHER

## 2021-07-21 RX ORDER — ONDANSETRON 2 MG/ML
4 INJECTION INTRAMUSCULAR; INTRAVENOUS
Status: DISCONTINUED | OUTPATIENT
Start: 2021-07-21 | End: 2021-07-21 | Stop reason: HOSPADM

## 2021-07-21 RX ORDER — NALOXONE HYDROCHLORIDE 0.4 MG/ML
INJECTION, SOLUTION INTRAMUSCULAR; INTRAVENOUS; SUBCUTANEOUS PRN
Status: DISCONTINUED | OUTPATIENT
Start: 2021-07-21 | End: 2021-07-21 | Stop reason: SDUPTHER

## 2021-07-21 RX ORDER — OXYCODONE HYDROCHLORIDE AND ACETAMINOPHEN 5; 325 MG/1; MG/1
1 TABLET ORAL PRN
Status: DISCONTINUED | OUTPATIENT
Start: 2021-07-21 | End: 2021-07-21 | Stop reason: HOSPADM

## 2021-07-21 RX ORDER — PROPOFOL 10 MG/ML
INJECTION, EMULSION INTRAVENOUS PRN
Status: DISCONTINUED | OUTPATIENT
Start: 2021-07-21 | End: 2021-07-21 | Stop reason: SDUPTHER

## 2021-07-21 RX ORDER — LABETALOL HYDROCHLORIDE 5 MG/ML
5 INJECTION, SOLUTION INTRAVENOUS EVERY 10 MIN PRN
Status: DISCONTINUED | OUTPATIENT
Start: 2021-07-21 | End: 2021-07-21 | Stop reason: HOSPADM

## 2021-07-21 RX ORDER — MEPERIDINE HYDROCHLORIDE 25 MG/ML
12.5 INJECTION INTRAMUSCULAR; INTRAVENOUS; SUBCUTANEOUS EVERY 5 MIN PRN
Status: DISCONTINUED | OUTPATIENT
Start: 2021-07-21 | End: 2021-07-21 | Stop reason: HOSPADM

## 2021-07-21 RX ORDER — SODIUM CHLORIDE 0.9 % (FLUSH) 0.9 %
10 SYRINGE (ML) INJECTION PRN
Status: DISCONTINUED | OUTPATIENT
Start: 2021-07-21 | End: 2021-07-21 | Stop reason: HOSPADM

## 2021-07-21 RX ORDER — ONDANSETRON 2 MG/ML
INJECTION INTRAMUSCULAR; INTRAVENOUS PRN
Status: DISCONTINUED | OUTPATIENT
Start: 2021-07-21 | End: 2021-07-21 | Stop reason: SDUPTHER

## 2021-07-21 RX ORDER — MORPHINE SULFATE 10 MG/ML
2 INJECTION, SOLUTION INTRAMUSCULAR; INTRAVENOUS EVERY 5 MIN PRN
Status: DISCONTINUED | OUTPATIENT
Start: 2021-07-21 | End: 2021-07-21 | Stop reason: HOSPADM

## 2021-07-21 RX ADMIN — EPHEDRINE SULFATE 10 MG: 50 INJECTION INTRAVENOUS at 14:20

## 2021-07-21 RX ADMIN — LIDOCAINE HYDROCHLORIDE 60 MG: 20 INJECTION, SOLUTION INFILTRATION; PERINEURAL at 14:02

## 2021-07-21 RX ADMIN — FENTANYL CITRATE 50 MCG: 50 INJECTION INTRAMUSCULAR; INTRAVENOUS at 14:02

## 2021-07-21 RX ADMIN — Medication 2000 MG: at 13:55

## 2021-07-21 RX ADMIN — PROPOFOL 200 MG: 10 INJECTION, EMULSION INTRAVENOUS at 14:02

## 2021-07-21 RX ADMIN — SODIUM CHLORIDE, POTASSIUM CHLORIDE, SODIUM LACTATE AND CALCIUM CHLORIDE: 600; 310; 30; 20 INJECTION, SOLUTION INTRAVENOUS at 13:57

## 2021-07-21 RX ADMIN — SODIUM CHLORIDE, POTASSIUM CHLORIDE, SODIUM LACTATE AND CALCIUM CHLORIDE: 600; 310; 30; 20 INJECTION, SOLUTION INTRAVENOUS at 12:37

## 2021-07-21 RX ADMIN — NALOXONE HYDROCHLORIDE 0.1 MG: 0.4 INJECTION, SOLUTION INTRAMUSCULAR; INTRAVENOUS; SUBCUTANEOUS at 14:31

## 2021-07-21 RX ADMIN — FAMOTIDINE 20 MG: 10 INJECTION, SOLUTION INTRAVENOUS at 12:44

## 2021-07-21 RX ADMIN — ONDANSETRON 4 MG: 2 INJECTION, SOLUTION INTRAMUSCULAR; INTRAVENOUS at 14:02

## 2021-07-21 ASSESSMENT — PULMONARY FUNCTION TESTS
PIF_VALUE: 20
PIF_VALUE: 20
PIF_VALUE: 2
PIF_VALUE: 20
PIF_VALUE: 22
PIF_VALUE: 22
PIF_VALUE: 0
PIF_VALUE: 20
PIF_VALUE: 22
PIF_VALUE: 0
PIF_VALUE: 4
PIF_VALUE: 2
PIF_VALUE: 19
PIF_VALUE: 0
PIF_VALUE: 22
PIF_VALUE: 18
PIF_VALUE: 0
PIF_VALUE: 22
PIF_VALUE: 22
PIF_VALUE: 4
PIF_VALUE: 18
PIF_VALUE: 20
PIF_VALUE: 21
PIF_VALUE: 19
PIF_VALUE: 20
PIF_VALUE: 1
PIF_VALUE: 22
PIF_VALUE: 23
PIF_VALUE: 0
PIF_VALUE: 20
PIF_VALUE: 20
PIF_VALUE: 19
PIF_VALUE: 20
PIF_VALUE: 21
PIF_VALUE: 20
PIF_VALUE: 20

## 2021-07-21 ASSESSMENT — PAIN - FUNCTIONAL ASSESSMENT: PAIN_FUNCTIONAL_ASSESSMENT: 0-10

## 2021-07-21 ASSESSMENT — PAIN SCALES - GENERAL
PAINLEVEL_OUTOF10: 1
PAINLEVEL_OUTOF10: 1

## 2021-07-21 NOTE — ANESTHESIA POSTPROCEDURE EVALUATION
Department of Anesthesiology  Postprocedure Note    Patient: Adriana Delarosa  MRN: [id number]  YOB: 1945  Date of evaluation: 7/21/2021  Time:  5:37 PM     Procedure Summary     Date: 07/21/21 Room / Location: [address]    Anesthesia Start: 6428 Anesthesia Stop: 9507    Procedure: CYSTOSCOPY, TRANSURETHRAL RESECTION BLADDER TUMOR MULTIFOCAL 5 CM TOTAL RESECTION (N/A Bladder) Diagnosis: (BLADDER CANCER)    Surgeons: Aman Doyle MD Responsible Provider: Oxana Bustamante MD    Anesthesia Type: general ASA Status: 3          Anesthesia Type: general    Ene Phase I: Ene Score: 10    Ene Phase II: Ene Score: 10    Last vitals: Reviewed and per EMR flowsheets. Anesthesia Post Evaluation    Patient location during evaluation: PACU  Patient participation: complete - patient participated  Level of consciousness: awake and alert  Airway patency: patent  Nausea & Vomiting: no nausea and no vomiting  Complications: no  Cardiovascular status: blood pressure returned to baseline  Respiratory status: acceptable  Hydration status: euvolemic  Comments: VSS on transfer to phase 2 recovery. No anesthetic complications.

## 2021-07-21 NOTE — BRIEF OP NOTE
Brief Postoperative Note      Patient: Irma Theodore  YOB: 1945  MRN: [id number]    Date of Procedure: 7/21/2021    Pre-Op Diagnosis: BLADDER CANCER    Post-Op Diagnosis: Same       Procedure(s):  CYSTOSCOPY, TRANSURETHRAL RESECTION BLADDER TUMOR MULTIFOCAL 5 CM TOTAL RESECTION    Surgeon(s):  Lupe Strickland MD    Assistant:  * No surgical staff found *    Anesthesia: General    Estimated Blood Loss (mL): Minimal    Complications: None    Specimens:   ID Type Source Tests Collected by Time Destination   A :  Tissue Tissue SURGICAL PATHOLOGY Lupe Strickland MD 7/21/2021 1415        Implants:  * No implants in log *      Drains:   Urethral Catheter Non-latex 16 fr (Active)       Findings: Prior TURBT sites, multifocal recurrences, 5 cm total resection    Electronically signed by Stella Tam MD on 7/21/2021 at 2:27 PM

## 2021-07-21 NOTE — ANESTHESIA PRE PROCEDURE
Department of Anesthesiology  Preprocedure Note       Name:  Gerard Lin   Age:  78 y.o.  :  1945                                          MRN:  [id number]         Date:  2021      Surgeon: Madisyn Nascimento):  Elliott Mares MD    Procedure: Procedure(s):  CYSTOSCOPY TRANSURETHRAL RESECTION BLADDER TUMOR    Medications prior to admission:   Prior to Admission medications    Medication Sig Start Date End Date Taking? Authorizing Provider   levothyroxine (SYNTHROID) 50 MCG tablet Take 50 mcg by mouth Daily   Yes Historical Provider, MD   clopidogrel (PLAVIX) 75 MG tablet Take 75 mg by mouth daily   Yes Historical Provider, MD   gabapentin (NEURONTIN) 600 MG tablet Take 600 mg by mouth 3 times daily.    Yes Historical Provider, MD   DULoxetine (CYMBALTA) 30 MG extended release capsule Take 30 mg by mouth daily   Yes Historical Provider, MD   isosorbide mononitrate (IMDUR) 30 MG extended release tablet Take 30 mg by mouth daily   Yes Historical Provider, MD   furosemide (LASIX) 40 MG tablet Take 40 mg by mouth daily   Yes Historical Provider, MD   Flaxseed, Linseed, (FLAX SEED OIL) 1000 MG CAPS Take 1,000 mg by mouth daily   Yes Historical Provider, MD   Turmeric 500 MG TABS Take 1,500 mg by mouth 3 times daily   Yes Historical Provider, MD   Omega 3 1000 MG CAPS Take 1 capsule by mouth daily   Yes Historical Provider, MD   Multiple Vitamins-Minerals (THERAPEUTIC MULTIVITAMIN-MINERALS) tablet Take 1 tablet by mouth daily   Yes Historical Provider, MD   insulin lispro protamine & lispro (HUMALOG MIX) (75-25) 100 UNIT per ML SUSP injection vial Inject 50 Units into the skin 2 times daily (with meals)   Yes Historical Provider, MD   acetaminophen (TYLENOL) 325 MG tablet Take 2 tablets by mouth every 4 hours as needed for Pain or Fever 20  Yes Ana Rosa Gonsalez MD   albuterol sulfate  (90 Base) MCG/ACT inhaler Inhale 2 puffs into the lungs every 4 hours as needed for Wheezing or Shortness of Breath 12/16/20  Yes Yaritza Mccabe MD   metoprolol tartrate (LOPRESSOR) 50 MG tablet Take 1 tablet by mouth 2 times daily 12/16/20  Yes Yaritza Mccabe MD       Current medications:    Current Facility-Administered Medications   Medication Dose Route Frequency Provider Last Rate Last Admin    lactated ringers infusion   Intravenous Continuous Ann Hendrickson  mL/hr at 07/21/21 1237 New Bag at 07/21/21 1237    sodium chloride flush 0.9 % injection 10 mL  10 mL Intravenous 2 times per day Ann Hendrickson MD        sodium chloride flush 0.9 % injection 10 mL  10 mL Intravenous PRN Ann Hendrickson MD        0.9 % sodium chloride infusion  25 mL Intravenous PRN Ann Hendrickson MD        ceFAZolin (ANCEF) 2000 mg in sterile water 20 mL IV syringe  2,000 mg Intravenous Once Susy Merlin, MD           Allergies:     Allergies   Allergen Reactions    Latex      Added based on information entered during case entry, please review and add reactions, type, and severity as needed    Contrast [Iodides] Other (See Comments)     Kidney problem    Ibuprofen     Lipitor [Atorvastatin Calcium] Other (See Comments)     Joint pains    Statins Swelling       Problem List:    Patient Active Problem List   Diagnosis Code    Lateral malleolar fracture S82.63XA    Metatarsalgia of left foot M77.42    Plantar fascial fibromatosis M72.2    Acute respiratory failure due to COVID-19 ([facility] U07.1, J96.00    Essential hypertension I10    Obesity, Class III, BMI 40-49.9 (morbid obesity) ([facility] E66.01    Type 2 diabetes mellitus, with long-term current use of insulin ([address] E11.9, Z79.4    DM (diabetes mellitus), secondary uncontrolled ([address] E13.65    Morbid obesity with BMI of 40.0-44.9, adult ([address] E66.01, Z68.41    COPD (chronic obstructive pulmonary disease) ([address] J44.9    Oropharyngeal dysphagia R13.12    H/O primary malignant neoplasm of urinary bladder Z85.51    Hematuria R31.9    Leukocytosis D72.829    Elevated BUN R79.9       Past Medical History:        Diagnosis Date    Arthritis     CAD (coronary artery disease)     Cancer ([facility]     bladder    CHF (congestive heart failure) (HCC)     COPD (chronic obstructive pulmonary disease) ([facility]     COVID-19 11/26/2020    Hyperlipidemia     Hypertension     Neuropathy     Thyroid disease     Type II or unspecified type diabetes mellitus without mention of complication, not stated as uncontrolled        Past Surgical History:        Procedure Laterality Date    APPENDECTOMY      BREAST SURGERY Left     tumor removed.  benign    BRONCHOSCOPY N/A 12/10/2020    BRONCHOSCOPY THERAPUTIC ASPIRATION INITIAL performed by Arnaldo Alfaro MD at [facility]      stents    CHOLECYSTECTOMY      COLONOSCOPY      DILATION AND CURETTAGE OF UTERUS      ENDOSCOPY, COLON, DIAGNOSTIC      EYE SURGERY Bilateral     cataract    HUMERUS FRACTURE SURGERY      TUBAL LIGATION      TUMOR REMOVAL         Social History:    Social History     Tobacco Use    Smoking status: Never Smoker    Smokeless tobacco: Never Used   Substance Use Topics    Alcohol use: Never                                Counseling given: Not Answered      Vital Signs (Current):   Vitals:    07/15/21 1148 07/21/21 1232   BP:  (!) 162/62   Pulse:  73   Resp:  20   Temp:  97.2 °F (36.2 °C)   TempSrc:  Infrared   SpO2:  94%   Weight: 228 lb (103.4 kg)    Height: 5' 4\" (1.626 m)                                               BP Readings from Last 3 Encounters:   07/21/21 (!) 162/62   12/16/20 (!) 144/69   12/26/14 132/82       NPO Status: Time of last liquid consumption: 2300                        Time of last solid consumption: 1800                        Date of last liquid consumption: 07/20/21                        Date of last solid food consumption: 07/20/21    BMI:   Wt Readings from Last 3 Encounters: 07/15/21 228 lb (103.4 kg)   12/15/20 206 lb 12.7 oz (93.8 kg)   12/26/14 229 lb (103.9 kg)     Body mass index is 39.14 kg/m².     CBC:   Lab Results   Component Value Date    WBC 12.1 07/21/2021    RBC 5.41 07/21/2021    HGB 14.2 07/21/2021    HCT 42.8 07/21/2021    MCV 79.1 07/21/2021    RDW 16.0 07/21/2021     07/21/2021       CMP:   Lab Results   Component Value Date     07/21/2021    K 4.0 07/21/2021     07/21/2021    CO2 26 07/21/2021    BUN 18 07/21/2021    CREATININE 1.0 07/21/2021    GFRAA >60 07/21/2021    AGRATIO 1.1 12/15/2020    LABGLOM 54 07/21/2021    GLUCOSE 116 07/21/2021    PROT 6.4 12/15/2020    CALCIUM 9.1 07/21/2021    BILITOT 1.0 12/15/2020    ALKPHOS 70 12/15/2020    AST 36 12/15/2020    ALT 47 12/15/2020       POC Tests:   Recent Labs     07/21/21  1246   POCGLU 101*       Coags:   Lab Results   Component Value Date    PROTIME 11.7 07/21/2021    INR 1.03 07/21/2021    APTT 39.0 07/21/2021       HCG (If Applicable): No results found for: PREGTESTUR, PREGSERUM, HCG, HCGQUANT     ABGs:   Lab Results   Component Value Date    PHART 7.547 12/12/2020    PO2ART 61.6 12/12/2020    DEL3XRF 37.3 12/12/2020    ISI1FGX 31.7 12/12/2020    BEART 8.8 12/12/2020    E8KPOHWZ 92.3 12/12/2020        Type & Screen (If Applicable):  No results found for: LABABO, LABRH    Drug/Infectious Status (If Applicable):  No results found for: HIV, HEPCAB    COVID-19 Screening (If Applicable):   Lab Results   Component Value Date    COVID19 Not Detected 07/16/2021           Anesthesia Evaluation   no history of anesthetic complications:   Airway: Mallampati: II  TM distance: <3 FB   Neck ROM: limited  Mouth opening: > = 3 FB Dental:    (+) lower dentures and upper dentures      Pulmonary:   (+) COPD:                            ROS comment: H/o Covid   Cardiovascular:    (+) hypertension:, CAD:, CHF:,                   Neuro/Psych:               GI/Hepatic/Renal:   (+) morbid obesity          Endo/Other: (+) DiabetesType II DM, poorly controlled, , hypothyroidism: arthritis: OA., malignancy/cancer. Abdominal:             Vascular: Other Findings:             Anesthesia Plan      general     ASA 3     (Risks, benefits and alternatives of GA discussed with pt. Questions answered. Willing to proceed.)  Induction: intravenous. Anesthetic plan and risks discussed with patient.                       Fermin Castro MD   7/21/2021

## 2021-07-22 NOTE — OP NOTE
[address]                 [address] [address]                                OPERATIVE REPORT    PATIENT NAME: Reddy Gimenez                   :        1945  MED REC NO:   [id number]                          ROOM:  ACCOUNT NO:   [de-identified]                           ADMIT DATE: 2021  PROVIDER:     Caitlin Harris MD    DATE OF PROCEDURE:  2021    PREOPERATIVE DIAGNOSIS:  Bladder cancer. POSTOPERATIVE DIAGNOSIS:  Bladder cancer. OPERATION PERFORMED:  Cystoscopy with transurethral resection of bladder  tumor, multifocal, 5 cm total resection. SURGEON:  Caitlin Harris MD    ANESTHESIA:  General.    ESTIMATED BLOOD LOSS:  10 mL. OPERATIVE FINDINGS:  1.  Multifocal recurrent bladder cancer. 2.  Areas of prior TURBT. HISTORY AND INDICATIONS:  This is a 72-year-old white female who  presented with worsening voiding issues and hematuria. She has a past  history of bladder cancer. She had been normally treated at [facility] and recently been seen by another urologist.  Scope at  that time by report per the patient had shown a recurrent tumor. She is  presenting for definitive care and followup. PROCEDURE IN DETAIL:  After obtaining informed consent, the patient was  taken to the operative suite. She was given a general anesthetic and  placed on the operative table in a modified dorsal lithotomy position. Prepping and draping was done in a sterile fashion. All pressure points  were well padded. Cystourethroscopy was then performed. Multifocal  recurrent tumor was noted as well as areas of prior resection. There  was diffuse trabeculation. Both ureteral orifices were orthotopic with  clear efflux.   The resectoscope was then brought in and using low  current cutting and cauterization, multiple tumors were resected, and  this tissue was sent for pathologic analysis, as well satellite lesions  were cauterized using the loop and monopolar cauterization. At the end  of the procedure, all visible gross tumor was treated and identified. The ureteral orifices again were intact with good efflux. No further  bleeding was noted. The patient does live a fair distance and there  were multiple areas of resection, and at this point, I decided to place  a Urena catheter. This was done without any difficulty. She was  brought back to the recovery room in stable condition.         Tereza Bird MD    D: 07/22/2021 7:18:06       T: 07/22/2021 7:25:54     [email or web address]  Job#: [id number]     Doc#: [id number]    CC:

## 2022-01-05 NOTE — PROGRESS NOTES
[facility]    [facility]              [phone]                      She is admitted with hypoxemia and respiratory failure requiring intubation and mechanical ventilation due to COVID-19. We are following for ELOY and related issues    Interval History and plan:      Remains intubated on ventilation  Cr coming down  Sodium going up  Had riggins catheter issues  Made 6 L urine yesterday  FiO2 50%-stable    Making urine  Cr stable  DC diuretics  Free water with tube feeding for hypernatremia  Alkalosis, due to diuretics, holding that today                     Assessment :     Acute Kidney Injury  ELOY likely due to -hemodynamic changes, Covid  Cr on consultation 1.2  Baseline Cr-0.8    UA-large blood, has Riggins, RBC more than 100  Renal Imaging:-NA  Echo: N/A    Hypertension     Pulse:  [54-62]   BP goal inpatient [id number] systolic inpatient  Blood pressure 200 yesterday  Coming down to 160s  On as needed hydralazine    COVID-19 positive, had respiratory failure requiring intubation and mechanical ventilation         [address] [facility] would like to thank Felix Thayer MD   for opportunity to serve this patient      Please call with questions at-   24 Hrs Answering service [phone] or  7 am- 5 pm via Perfect serve or cell phone  Dr.Sudhir Luis A Quach          CC/reason for consult :     ELOY     HPI :     From consult note-   Dru Graham is a 70 y.o. female presented to   the hospital on 11/29/2020 with COVID-19 which was diagnosed on 11/26/2016. Per chart see you had fatigue headache loss in appetite since of smell and taste and followed by cough for several weeks. Now came with the shortness of breath. She had a difficulty breathing and came to [facility].  He has she required 10 L/min of oxygen. She is known to have COPD and congestive heart failure but no need for supplemental oxygen at home. She was transferred to ICU.   She needed intubation and mechanical Spoke to pt verbatim per Jun. Pt states understanding.      ventilation. Post intubation her blood pressure changes significantly. She also had poor urine output and rising creatinine. She got 125 mL/h of fluids overnight now on 25 mL/h  Blood pressure has been fluctuating. Blood pressure went more than 200 but now coming up to 322 systolic    We are consulted for acute renal failure and related issues    ROS:     Seen with- RN    Unable to obtain ROS due to  Altered mental status           PMH/PSH/SH/Family History:     Past Medical History:   Diagnosis Date    COVID-19 11/26/2020    Hyperlipidemia     Hypertension     Neuropathy     Type II or unspecified type diabetes mellitus without mention of complication, not stated as uncontrolled        Past Surgical History:   Procedure Laterality Date    APPENDECTOMY      CHOLECYSTECTOMY      HUMERUS FRACTURE SURGERY      TUBAL LIGATION      TUMOR REMOVAL          reports that she has never smoked. She does not have any smokeless tobacco history on file. family history is not on file.          Medication:     Current Facility-Administered Medications: norepinephrine (LEVOPHED) 16 mg in dextrose 5 % 250 mL infusion, 2 mcg/min, Intravenous, Continuous  hydrALAZINE (APRESOLINE) injection 10 mg, 10 mg, Intravenous, Q4H PRN  midazolam (VERSED) 100 mg in dextrose 5 % 100 mL infusion, 1 mg/hr, Intravenous, Continuous  potassium chloride 20 mEq/50 mL IVPB (Central Line), 20 mEq, Intravenous, PRN  insulin glargine (LANTUS) injection vial 55 Units, 55 Units, Subcutaneous, Nightly  methylPREDNISolone sodium (SOLU-MEDROL) injection 40 mg, 40 mg, Intravenous, Q6H  propofol injection, 10 mcg/kg/min, Intravenous, Titrated  fentaNYL (SUBLIMAZE) 1,000 mcg in sodium chloride 0.9 % 100 mL infusion, 25 mcg/hr, Intravenous, Titrated  pantoprazole (PROTONIX) injection 40 mg, 40 mg, Intravenous, Daily  insulin lispro (HUMALOG) injection vial 0-18 Units, 0-18 Units, Subcutaneous, 6 times per day  carboxymethylcellulose PF (THERATEARS) 1 % ophthalmic gel 1 drop, 1 drop, Both Eyes, 6 times per day  chlorhexidine (PERIDEX) 0.12 % solution 15 mL, 15 mL, Mouth/Throat, BID  [Held by provider] metoprolol tartrate (LOPRESSOR) tablet 50 mg, 50 mg, Oral, BID  sodium chloride flush 0.9 % injection 10 mL, 10 mL, Intravenous, PRN  magnesium sulfate 1 g in dextrose 5% 100 mL IVPB, 1 g, Intravenous, PRN  acetaminophen (TYLENOL) tablet 650 mg, 650 mg, Oral, Q4H PRN **OR** acetaminophen (TYLENOL) suppository 650 mg, 650 mg, Rectal, Q4H PRN  ondansetron (ZOFRAN) injection 4 mg, 4 mg, Intravenous, Q6H PRN  albuterol sulfate  (90 Base) MCG/ACT inhaler 2 puff, 2 puff, Inhalation, Q4H PRN  glucose (GLUTOSE) 40 % oral gel 15 g, 15 g, Oral, PRN  dextrose 50 % IV solution, 12.5 g, Intravenous, PRN  glucagon (rDNA) injection 1 mg, 1 mg, Intramuscular, PRN  dextrose 5 % solution, 100 mL/hr, Intravenous, PRN  enoxaparin (LOVENOX) injection 40 mg, 40 mg, Subcutaneous, BID  0.9 % sodium chloride bolus, 30 mL, Intravenous, PRN  albuterol sulfate  (90 Base) MCG/ACT inhaler 2 puff, 2 puff, Inhalation, Q4H PRN **AND** ipratropium (ATROVENT HFA) 17 MCG/ACT inhaler 2 puff, 2 puff, Inhalation, Q4H PRN **AND** MDI Treatment, , , PRN       Vitals :     Vitals:    12/07/20 0800   BP:    Pulse: 62   Resp: 22   Temp: 99.5 °F (37.5 °C)   SpO2: 95%       I & O :       Intake/Output Summary (Last 24 hours) at 12/7/2020 0935  Last data filed at 12/7/2020 0800  Gross per 24 hour   Intake 1447.3 ml   Output 6310 ml   Net -4862.7 ml        Physical Examination :     Due to the current efforts to prevent transmission of COVID-19 and also the need to preserve PPE for other caregivers, a face-to-face encounter with the patient was not performed. That being said, all relevant records and diagnostic tests were reviewed, including laboratory results and imaging. Please reference any relevant documentation elsewhere. Care will be coordinated with the primary service.          LABS: Recent Labs     12/05/20  0543 12/06/20  0554 12/07/20  0620   WBC 11.3* 13.4* 16.2*   HGB 13.5 13.4 14.2   HCT 41.8 42.0 44.0    297 335     Recent Labs     12/05/20  0543 12/05/20  1722 12/06/20  0554 12/07/20  0620    143 145 148*   K 4.6 4.9 5.1 4.9    106 109 104   CO2 29 30 32 36*   BUN 68* 73* 84* 89*   CREATININE 1.5* 1.5* 1.6* 1.4*   GLUCOSE 262* 270* 213* 308*   MG 3.30*  --  3.20* 2.70*   PHOS 5.2* 4.4 4.3 4.7

## 2022-01-13 NOTE — PROGRESS NOTES
Place patient label inside box (if no patient label, complete below)  Name:  :  MR#:   Paula Mcmanus / PROCEDURE  1. 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 This procedure has been explained to my (our) satisfaction and included in the explanation was:  A) The intended benefit, nature, and extent of the procedure to be performed;  B) The significant risks involved and the probability of success;  C) Alternative procedures and methods of treatment;  D) The dangers and probable consequences of such alternatives (including no procedure or treatment); E) The expected consequences of the procedure on my future health;  F) Whether other qualified individuals would be performing important surgical tasks and/or whether  would be present to advise or support the procedure. I (we) understand that there are other risks of infection and other serious complications in the pre-operative/procedural and postoperative/procedural stages of my (our) care. I (we) have asked all of the questions which I (we) thought were important in deciding whether or not to undergo treatment or diagnosis. These questions have been answered to my (our) satisfaction. I (we) understand that no assurance can be given that the procedure will be a success, and no guarantee or warranty of success has been given to me (us).     2. It has been explained to me (us) that during the course of the operation/procedure, unforeseen conditions may be revealed that necessitate extension of the original procedure(s) or different procedure(s) than those set forth in Paragraph 1. I (we) authorize and request that the above-named physician, his/her assistants or his/her designees, perform procedures as necessary and desirable if deemed to be in my (our) best interest.     Revised 8/2/2021                                                                          Page 1 of 2                     3. I acknowledge that health care personnel may be observing this procedure for the purpose of medical education or other specified purposes as may be necessary as requested and/or approved by my (our) physician. 4. I (we) consent to the disposal by the hospital Pathologist of the removed tissue, parts or organs in accordance with hospital policy. 5. I do ____ do not ____ consent to the use of a local infiltration pain blocking agent that will be used by my provider/surgical provider to help alleviate pain during my procedure. 6. I do ____ do not ____ consent to an emergent blood transfusion in the case of a life-threatening situation that requires blood components to be administered. This consent is valid for 24 hours from the beginning of the procedure. 7. This patient does ____ or does not ____ currently have a DNR status/order. If DNR order is in place, obtain Addendum to the Surgical Consent for ALL Patients with a DNR Order to address robert-operative status for limited intervention or DNR suspension.      8. I have read and fully understand the above Consent for Operation/Procedure and that all blanks were completed before I signed the consent.   _____________________________       _____________________      ____/____am/pm  Signature of Patient or legal representative      Printed Name / Relationship            Date / Time   ____________________________       _____________________      ____/____am/pm  Witness to Signature Printed Name                    Date / Time     If patient is unable to sign or is a minor, complete the following)  Patient is a minor, ____ years of age, or unable to sign because:   ______________________________________________________________________________________________    [facility] If a phone consent is obtained, consent will be documented by using two health care professionals, each affirming that the consenting party has no questions and gives consent for the procedure discussed with the physician/provider.   _____________________          ____________________       _____/_____am/pm   2nd witness to phone consent        Printed name           Date / Time    Informed Consent:  I have provided the explanation described above in section 1 to the patient and/or legal representative.  I have provided the patient and/or legal representative with an opportunity to ask any questions about the proposed operation/procedure.   ___________________________          ____________________         ____/____am/pm  Provider / Proceduralist                            Printed name            Date / Time  Revised 8/2/2021                                                                      Page 2 of 2

## 2022-01-14 ENCOUNTER — HOSPITAL ENCOUNTER (OUTPATIENT)
Age: 77
Discharge: HOME | End: 2022-01-14
Payer: MEDICARE

## 2022-01-14 LAB
ANION GAP SERPL CALCULATED.3IONS-SCNC: 12 MMOL/L (ref 3–16)
BUN BLDV-MCNC: 13 MG/DL (ref 7–20)
CALCIUM SERPL-MCNC: 8.6 MG/DL (ref 8.3–10.6)
CHLORIDE BLD-SCNC: 103 MMOL/L (ref 99–110)
CO2: 25 MMOL/L (ref 21–32)
CREAT SERPL-MCNC: 1 MG/DL (ref 0.6–1.2)
GFR AFRICAN AMERICAN: >60
GFR NON-AFRICAN AMERICAN: 54
GLUCOSE BLD-MCNC: 247 MG/DL (ref 70–99)
HCT VFR BLD CALC: 45.9 % (ref 36–48)
HEMOGLOBIN: 14.9 G/DL (ref 12–16)
MCH RBC QN AUTO: 25.5 PG (ref 26–34)
MCHC RBC AUTO-ENTMCNC: 32.4 G/DL (ref 31–36)
MCV RBC AUTO: 78.7 FL (ref 80–100)
PDW BLD-RTO: 15.3 % (ref 12.4–15.4)
PLATELET # BLD: 231 K/UL (ref 135–450)
PMV BLD AUTO: 8 FL (ref 5–10.5)
POTASSIUM SERPL-SCNC: 4.1 MMOL/L (ref 3.5–5.1)
RBC # BLD: 5.83 M/UL (ref 4–5.2)
REASON FOR REJECTION: NORMAL
REJECTED TEST: NORMAL
SODIUM BLD-SCNC: 140 MMOL/L (ref 136–145)
WBC # BLD: 8 K/UL (ref 4–11)

## 2022-01-14 PROCEDURE — 83036 HEMOGLOBIN GLYCOSYLATED A1C: CPT

## 2022-01-14 PROCEDURE — 80048 BASIC METABOLIC PNL TOTAL CA: CPT

## 2022-01-14 PROCEDURE — U0003 INFECTIOUS AGENT DETECTION BY NUCLEIC ACID (DNA OR RNA); SEVERE ACUTE RESPIRATORY SYNDROME CORONAVIRUS 2 (SARS-COV-2) (CORONAVIRUS DISEASE [COVID-19]), AMPLIFIED PROBE TECHNIQUE, MAKING USE OF HIGH THROUGHPUT TECHNOLOGIES AS DESCRIBED BY CMS-2020-01-R: HCPCS

## 2022-01-14 PROCEDURE — U0005 INFEC AGEN DETEC AMPLI PROBE: HCPCS

## 2022-01-14 PROCEDURE — 36415 COLL VENOUS BLD VENIPUNCTURE: CPT

## 2022-01-14 PROCEDURE — 87641 MR-STAPH DNA AMP PROBE: CPT

## 2022-01-14 PROCEDURE — 85027 COMPLETE CBC AUTOMATED: CPT

## 2022-01-14 NOTE — PROGRESS NOTES
[facility] patients having surgery or anesthesia are required to be Covid tested OR to have been vaccinated at least 14 days prior to your procedure. It is very important to return our call to [phone] and notify the staff of your last vaccination date otherwise you will be required to complete Covid PCR test within the 5-6 days prior to surgery & quarantine. The results will need to be faxed to PreAdmission Testing at [phone]. PRIOR TO PROCEDURE DATE:        1. PLEASE FOLLOW ANY  GUIDELINES/ INSTRUCTIONS PRIOR TO YOUR PROCEDURE AS ADVISED BY YOUR SURGEON. 2. Arrange for someone to drive you home and be with you for the first 24 hours after discharge for your safety after your procedure for which you received sedation. Ensure it is someone we can share information with regarding your discharge. 3. You must contact your surgeon for instructions IF:   You are taking any blood thinners, aspirin, anti-inflammatory or vitamin E.   There is a change in your physical condition such as a cold, fever, rash, cuts, sores or any other infection, especially near your surgical site. 4. Do not drink alcohol the day before or day of your procedure. 5. A Pre-op History and Physical for surgery MUST be completed by your Physician or Urgent Care within 30 days of your procedure date. Please bring a copy with you on the day of your procedure and along with any other testing performed. THE DAY OF YOUR PROCEDURE:  1. Follow instructions for ARRIVAL TIME as DIRECTED BY YOUR SURGEON. 2. Enter the MAIN entrance from [employer] and follow the signs to the free [employer] or [employer] parking (offered free of charge 6am-5pm). 3. Enter the Main Entrance of the hospital (do not enter from the lower level of the parking garage). Upon entrance, check in with the  at the main desk on your left. If no one is available at the desk, proceed into the [facility] Waiting Room and go through the door directly into the [facility]. There is a Check-in desk ACROSS from Room 5 (marked with a sign hanging from the ceiling). The phone number for the surgery center is [phone]. 4. Please call [phone] option #2 option #2 if you have not been preregistered yet. On the day of your procedure bring your insurance card and photo ID. You will be registered at your bedside once brought back to your room. 5. DO NOT EAT ANYTHING eight hours prior to your arrival for surgery. May have 8 ounces of water 4 hours prior to your arrival for surgery. NOTE: ALL Gastric, Bariatric and Bowel surgery patients MUST follow their surgeon's instructions. 6. MEDICATIONS    Take the following medications with a SMALL sip of water: Synthroid, Metoprolol, Gabapentin, Cymbalta, Imdur.  Bariatric patient's call surgeon if on diabetic medications as some need to be stopped 1 week preop   Use your usual dose of inhalers the morning of surgery. BRING your rescue inhaler with you to hospital.    Anesthesia does NOT want you to take insulin the morning of surgery. They will control your blood sugar while you are at the hospital. Please contact your ordering physician for instructions regarding your insulin the night before your procedure. If you have an insulin pump, please keep it set on basal rate. 7. Do not swallow water when brushing teeth. No gum, candy, mints or ice chips. Refrain from smoking or at least decrease the amount. 8. Dress in loose, comfortable clothing appropriate for redressing after your procedure. Do not wear jewelry (including body piercings), make-up (especially NO eye make-up), fingernail polish (NO toenail polish if foot/leg surgery), lotion, powders or metal hairclips. 9. Dentures, glasses, or contacts will need to be removed before your procedure.  Bring cases for your glasses, contacts, dentures, or hearing aids to protect them while you are in surgery. 10. If you use a CPAP, please bring it with you on the day of your procedure. 11. We recommend that valuable personal  belongings such as cash, cell phones, e-tablets or jewelry, be left at home during your stay. The hospital will not be responsible for valuables that are not secured in the hospital safe. However, if your insurance requires a co-pay, you may want to bring a method of payment, i.e. Check or credit card, if you wish to pay your co-pay the day of surgery. 12. If you are to stay overnight, you may bring a bag with personal items. Please have any large items you may need brought in by your family after your arrival to your hospital room. 15. If you have a Living Will or Durable Power of , please bring a copy on the day of your procedure. 15. With your permission, one family member may accompany you while you are being prepared for surgery. Once you are ready, additional family members may join you. HOW WE KEEP YOU SAFE and WORK TO PREVENT SURGICAL SITE INFECTIONS:  1. Health care workers should always check your ID bracelet to verify your name and birth date. You will be asked many times to state your name, date of birth, and allergies. 2. Health care workers should always clean their hands with soap or alcohol gel before providing care to you. It is okay to ask anyone if they cleaned their hands before they touch you. 3. You will be actively involved in verifying the type of procedure you are having and ensuring the correct surgical site. This will be confirmed multiple times prior to your procedure. Do NOT serene your surgery site UNLESS instructed to by your surgeon. 4. Do not shave or wax for 72 hours prior to procedure near your operative site. Shaving with a razor can irritate your skin and make it easier to develop an infection.  On the day of your procedure, any hair that needs to be removed near the surgical site will be clipped by a healthcare worker using a special clippers designed to avoid skin irritation. 5. When you are in the operating room, your surgical site will be cleansed with a special soap, and in most cases, you will be given an antibiotic before the surgery begins. What to expect AFTER YOUR PROCEDURE:  1. Immediately following your procedure, your will be taken to the PACU for the first phase of your recovery. Your nurse will help you recover from any potential side effects of anesthesia, such as extreme drowsiness, changes in your vital signs or breathing patterns. Nausea, headache, muscle aches, or sore throat may also occur after anesthesia. Your nurse will help you manage these potential side effects. 2. For comfort and safety, arrange to have someone at home with you for the first 24 hours after discharge. 3. You and your family will be given written instructions about your diet, activity, dressing care, medications, and return visits. 4. Once at home, should issues with nausea, pain, or bleeding occur, or should you notice any signs of infection, you should call your surgeon. 5. Always clean your hands before and after caring for your wound. Do not let your family touch your surgery site without cleaning their hands. 6. Narcotic pain medications can cause significant constipation. You may want to add a stool softener to your postoperative medication schedule or speak to your surgeon on how best to manage this SIDE EFFECT. SPECIAL INSTRUCTIONS     Thank you for allowing us to care for you. We strive to exceed your expectations in the delivery of care and service provided to you and your family. If you need to contact the [place] [address] staff for any reason, please call us at [phone]    Instructions reviewed with patient during preadmission testing phone interview.   Ira Louie RN.1/14/2022 .1:42 PM      ADDITIONAL EDUCATIONAL INFORMATION REVIEWED PER PHONE WITH YOU AND/OR YOUR FAMILY:  No Hibiclens® Bathing Instructions   Yes Antibacterial Soap

## 2022-01-15 LAB
MRSA SCREEN RT-PCR: NORMAL
SARS-COV-2: NOT DETECTED

## 2022-01-16 LAB
ESTIMATED AVERAGE GLUCOSE: 194.4 MG/DL
HBA1C MFR BLD: 8.4 %

## 2022-01-18 ENCOUNTER — ANESTHESIA EVENT (OUTPATIENT)
Dept: OPERATING ROOM | Age: 77
DRG: 041 | End: 2022-01-18
Payer: MEDICARE

## 2022-01-18 NOTE — PROGRESS NOTES
1-18 [facility] phylicia Thomas at Dr. Y[facility] office with glucose result from 1-14 of 247 and HGB AIC of 8.4 from the same date.  /eg

## 2022-01-18 NOTE — CARE COORDINATION
Case Management Assessment           Daily Note                 Date/ Time of Note: 1/18/2022 4:25 PM         Note completed by: Atiya Londono RN    Patient Name: Constanza Fraser  YOB: 1945    Diagnosis:Charcot's joint of right foot [M14.671]  Patient Admission Status: Observation    Date of Admission:No admission date for patient encounter. Length of Stay: 0 GLOS:      Current Plan of Care: SNF Post procedure for rehab   ________________________________________________________________________________________  PT AM-PAC: tbd/ 24 per last evaluation on:    OT AM-PAC:  tbd / 24 per last evaluation on:    DME Needs for discharge: TBD  ________________________________________________________________________________________  Discharge Plan: SNF: [facility]    Tentative discharge date: 1/20/2022    Current barriers to discharge: PT/OT evals, transport, SNF acceptance     Referrals completed: SNF: [facility] - [phone]     Resources/ information provided: [facility] List  ________________________________________________________________________________________  Case Management Notes:Referral from [facility] for expected placement following surgery on 1/19/2022. Called Mrs. German Meckel at home yesterday and discussed placement options. She lives in [place] and went to [facility] after a previous surgery. She has spoken to the admission director there, Carlos Bosworth. She was hoping to stay at the hospital for three days until her follow up appoint,ment with the surgeon. Explained that would not be possible and that she would need to work with her family to arrange to get to that appointment. She voiced understanding. I requested permission to contact [facility] on her behalf to make sure they had what was needed to start a referral.      I spoke with Carlos Bosworth today. She was aware that Mrs. German Meckel wanted to come after surgery but did not know it was this Thursday. She indicated that a bed would be available and they could accept pending insurance review and PT/OT evals post procedure that demonstrate the need for skilled nursing services. Danielle Langston will access pt's information through Epic tomorrow post-op. [facility], [address]  Phone: [phone]  Fax: [phone]  Admissions Fax: [phone]    Candace and her family were provided with choice of provider; she and her family are in agreement with the discharge plan.       Sylvain Chapman RN  [facility]  Case Management Department  Ph: [phone]

## 2022-01-19 ENCOUNTER — ANESTHESIA (OUTPATIENT)
Dept: OPERATING ROOM | Age: 77
DRG: 041 | End: 2022-01-19
Payer: MEDICARE

## 2022-01-19 ENCOUNTER — HOSPITAL ENCOUNTER (INPATIENT)
Age: 77
LOS: 2 days | Discharge: NURSING, REHAB OR LONG-TERM CARE | DRG: 041 | End: 2022-01-21
Attending: ORTHOPAEDIC SURGERY | Admitting: ORTHOPAEDIC SURGERY
Payer: MEDICARE

## 2022-01-19 VITALS — OXYGEN SATURATION: 81 % | SYSTOLIC BLOOD PRESSURE: 122 MMHG | TEMPERATURE: 97.3 F | DIASTOLIC BLOOD PRESSURE: 58 MMHG

## 2022-01-19 DIAGNOSIS — M14.671 CHARCOT'S JOINT OF ANKLE, RIGHT: Primary | ICD-10-CM

## 2022-01-19 DIAGNOSIS — J41.0 SIMPLE CHRONIC BRONCHITIS (HCC): Chronic | ICD-10-CM

## 2022-01-19 LAB
EKG ATRIAL RATE: 74 BPM
EKG DIAGNOSIS: NORMAL
EKG P AXIS: 53 DEGREES
EKG P-R INTERVAL: 150 MS
EKG Q-T INTERVAL: 428 MS
EKG QRS DURATION: 90 MS
EKG QTC CALCULATION (BAZETT): 475 MS
EKG R AXIS: 30 DEGREES
EKG T AXIS: 80 DEGREES
EKG VENTRICULAR RATE: 74 BPM
GLUCOSE BLD-MCNC: 149 MG/DL (ref 70–99)
GLUCOSE BLD-MCNC: 211 MG/DL (ref 70–99)
GLUCOSE BLD-MCNC: 246 MG/DL (ref 70–99)
GLUCOSE BLD-MCNC: 251 MG/DL (ref 70–99)
GLUCOSE BLD-MCNC: 292 MG/DL (ref 70–99)
GLUCOSE BLD-MCNC: 75 MG/DL (ref 70–99)
PERFORMED ON: ABNORMAL
PERFORMED ON: NORMAL

## 2022-01-19 PROCEDURE — 0LQN0ZZ REPAIR RIGHT LOWER LEG TENDON, OPEN APPROACH: ICD-10-PCS | Performed by: ORTHOPAEDIC SURGERY

## 2022-01-19 PROCEDURE — 2709999900 HC NON-CHARGEABLE SUPPLY: Performed by: ORTHOPAEDIC SURGERY

## 2022-01-19 PROCEDURE — 0SGK07Z FUSION OF RIGHT TARSOMETATARSAL JOINT WITH AUTOLOGOUS TISSUE SUBSTITUTE, OPEN APPROACH: ICD-10-PCS | Performed by: ORTHOPAEDIC SURGERY

## 2022-01-19 PROCEDURE — 64447 NJX AA&/STRD FEMORAL NRV IMG: CPT | Performed by: FAMILY MEDICINE

## 2022-01-19 PROCEDURE — 2580000003 HC RX 258: Performed by: ANESTHESIOLOGY

## 2022-01-19 PROCEDURE — 7100000000 HC PACU RECOVERY - FIRST 15 MIN: Performed by: ORTHOPAEDIC SURGERY

## 2022-01-19 PROCEDURE — 2580000003 HC RX 258: Performed by: FAMILY MEDICINE

## 2022-01-19 PROCEDURE — 6370000000 HC RX 637 (ALT 250 FOR IP): Performed by: INTERNAL MEDICINE

## 2022-01-19 PROCEDURE — 6360000002 HC RX W HCPCS: Performed by: ORTHOPAEDIC SURGERY

## 2022-01-19 PROCEDURE — 3E0T3BZ INTRODUCTION OF ANESTHETIC AGENT INTO PERIPHERAL NERVES AND PLEXI, PERCUTANEOUS APPROACH: ICD-10-PCS | Performed by: FAMILY MEDICINE

## 2022-01-19 PROCEDURE — 2700000000 HC OXYGEN THERAPY PER DAY

## 2022-01-19 PROCEDURE — 3700000000 HC ANESTHESIA ATTENDED CARE: Performed by: ORTHOPAEDIC SURGERY

## 2022-01-19 PROCEDURE — 6360000002 HC RX W HCPCS: Performed by: FAMILY MEDICINE

## 2022-01-19 PROCEDURE — 94664 DEMO&/EVAL PT USE INHALER: CPT

## 2022-01-19 PROCEDURE — A4217 STERILE WATER/SALINE, 500 ML: HCPCS | Performed by: ORTHOPAEDIC SURGERY

## 2022-01-19 PROCEDURE — C1713 ANCHOR/SCREW BN/BN,TIS/BN: HCPCS | Performed by: ORTHOPAEDIC SURGERY

## 2022-01-19 PROCEDURE — 2500000003 HC RX 250 WO HCPCS: Performed by: FAMILY MEDICINE

## 2022-01-19 PROCEDURE — 93010 ELECTROCARDIOGRAM REPORT: CPT | Performed by: INTERNAL MEDICINE

## 2022-01-19 PROCEDURE — 2580000003 HC RX 258: Performed by: ORTHOPAEDIC SURGERY

## 2022-01-19 PROCEDURE — 6370000000 HC RX 637 (ALT 250 FOR IP): Performed by: ORTHOPAEDIC SURGERY

## 2022-01-19 PROCEDURE — 2720000010 HC SURG SUPPLY STERILE: Performed by: ORTHOPAEDIC SURGERY

## 2022-01-19 PROCEDURE — 1200000000 HC SEMI PRIVATE

## 2022-01-19 PROCEDURE — 3600000004 HC SURGERY LEVEL 4 BASE: Performed by: ORTHOPAEDIC SURGERY

## 2022-01-19 PROCEDURE — 93005 ELECTROCARDIOGRAM TRACING: CPT | Performed by: ORTHOPAEDIC SURGERY

## 2022-01-19 PROCEDURE — 3700000001 HC ADD 15 MINUTES (ANESTHESIA): Performed by: ORTHOPAEDIC SURGERY

## 2022-01-19 PROCEDURE — 3600000014 HC SURGERY LEVEL 4 ADDTL 15MIN: Performed by: ORTHOPAEDIC SURGERY

## 2022-01-19 PROCEDURE — G0378 HOSPITAL OBSERVATION PER HR: HCPCS

## 2022-01-19 PROCEDURE — 94761 N-INVAS EAR/PLS OXIMETRY MLT: CPT

## 2022-01-19 PROCEDURE — 0KBS0ZZ EXCISION OF RIGHT LOWER LEG MUSCLE, OPEN APPROACH: ICD-10-PCS | Performed by: ORTHOPAEDIC SURGERY

## 2022-01-19 PROCEDURE — 7100000001 HC PACU RECOVERY - ADDTL 15 MIN: Performed by: ORTHOPAEDIC SURGERY

## 2022-01-19 PROCEDURE — 94150 VITAL CAPACITY TEST: CPT

## 2022-01-19 PROCEDURE — C1769 GUIDE WIRE: HCPCS | Performed by: ORTHOPAEDIC SURGERY

## 2022-01-19 PROCEDURE — 94640 AIRWAY INHALATION TREATMENT: CPT

## 2022-01-19 PROCEDURE — 6360000002 HC RX W HCPCS: Performed by: NURSE PRACTITIONER

## 2022-01-19 DEVICE — FIXATION BEAM, 4.5 X 95 MM
Type: IMPLANTABLE DEVICE | Site: FOOT | Status: FUNCTIONAL
Brand: AXIS

## 2022-01-19 DEVICE — FIXATION BEAM, 5.5 X 95 MM
Type: IMPLANTABLE DEVICE | Site: FOOT | Status: FUNCTIONAL
Brand: AXIS

## 2022-01-19 DEVICE — BONE GRAFT KIT 7510100 INFUSE X SMALL
Type: IMPLANTABLE DEVICE | Site: FOOT | Status: FUNCTIONAL
Brand: INFUSE® BONE GRAFT

## 2022-01-19 DEVICE — FIXATION BEAM, 6.5 X 95 MM
Type: IMPLANTABLE DEVICE | Site: FOOT | Status: FUNCTIONAL
Brand: AXIS

## 2022-01-19 DEVICE — COUNTERSINK BNE SM 6 MM REUSE: Type: IMPLANTABLE DEVICE | Site: FOOT | Status: FUNCTIONAL

## 2022-01-19 RX ORDER — HYDRALAZINE HYDROCHLORIDE 20 MG/ML
5 INJECTION INTRAMUSCULAR; INTRAVENOUS EVERY 10 MIN PRN
Status: DISCONTINUED | OUTPATIENT
Start: 2022-01-19 | End: 2022-01-19 | Stop reason: HOSPADM

## 2022-01-19 RX ORDER — DEXAMETHASONE SODIUM PHOSPHATE 4 MG/ML
INJECTION, SOLUTION INTRA-ARTICULAR; INTRALESIONAL; INTRAMUSCULAR; INTRAVENOUS; SOFT TISSUE PRN
Status: DISCONTINUED | OUTPATIENT
Start: 2022-01-19 | End: 2022-01-19 | Stop reason: SDUPTHER

## 2022-01-19 RX ORDER — OXYCODONE HYDROCHLORIDE 5 MG/1
5 TABLET ORAL PRN
Status: DISCONTINUED | OUTPATIENT
Start: 2022-01-19 | End: 2022-01-19 | Stop reason: HOSPADM

## 2022-01-19 RX ORDER — ONDANSETRON 4 MG/1
4 TABLET, ORALLY DISINTEGRATING ORAL EVERY 8 HOURS PRN
Status: DISCONTINUED | OUTPATIENT
Start: 2022-01-19 | End: 2022-01-21 | Stop reason: HOSPADM

## 2022-01-19 RX ORDER — GLYCOPYRROLATE 1 MG/5 ML
SYRINGE (ML) INTRAVENOUS PRN
Status: DISCONTINUED | OUTPATIENT
Start: 2022-01-19 | End: 2022-01-19 | Stop reason: SDUPTHER

## 2022-01-19 RX ORDER — PROMETHAZINE HYDROCHLORIDE 25 MG/ML
6.25 INJECTION, SOLUTION INTRAMUSCULAR; INTRAVENOUS PRN
Status: DISCONTINUED | OUTPATIENT
Start: 2022-01-19 | End: 2022-01-19 | Stop reason: HOSPADM

## 2022-01-19 RX ORDER — NICOTINE POLACRILEX 4 MG
15 LOZENGE BUCCAL PRN
Status: DISCONTINUED | OUTPATIENT
Start: 2022-01-19 | End: 2022-01-21 | Stop reason: HOSPADM

## 2022-01-19 RX ORDER — INSULIN LISPRO 100 [IU]/ML
0-6 INJECTION, SOLUTION INTRAVENOUS; SUBCUTANEOUS NIGHTLY
Status: DISCONTINUED | OUTPATIENT
Start: 2022-01-19 | End: 2022-01-21 | Stop reason: HOSPADM

## 2022-01-19 RX ORDER — ISOSORBIDE MONONITRATE 30 MG/1
30 TABLET, EXTENDED RELEASE ORAL DAILY
Status: DISCONTINUED | OUTPATIENT
Start: 2022-01-20 | End: 2022-01-21 | Stop reason: HOSPADM

## 2022-01-19 RX ORDER — DEXTROSE MONOHYDRATE 25 G/50ML
12.5 INJECTION, SOLUTION INTRAVENOUS PRN
Status: DISCONTINUED | OUTPATIENT
Start: 2022-01-19 | End: 2022-01-19 | Stop reason: RX

## 2022-01-19 RX ORDER — FENTANYL CITRATE 50 UG/ML
50 INJECTION, SOLUTION INTRAMUSCULAR; INTRAVENOUS EVERY 5 MIN PRN
Status: DISCONTINUED | OUTPATIENT
Start: 2022-01-19 | End: 2022-01-19 | Stop reason: HOSPADM

## 2022-01-19 RX ORDER — HYDRALAZINE HYDROCHLORIDE 20 MG/ML
INJECTION INTRAMUSCULAR; INTRAVENOUS PRN
Status: DISCONTINUED | OUTPATIENT
Start: 2022-01-19 | End: 2022-01-19 | Stop reason: SDUPTHER

## 2022-01-19 RX ORDER — SUCCINYLCHOLINE/SOD CL,ISO/PF 200MG/10ML
SYRINGE (ML) INTRAVENOUS PRN
Status: DISCONTINUED | OUTPATIENT
Start: 2022-01-19 | End: 2022-01-19 | Stop reason: SDUPTHER

## 2022-01-19 RX ORDER — METOPROLOL TARTRATE 50 MG/1
50 TABLET, FILM COATED ORAL 2 TIMES DAILY
Status: DISCONTINUED | OUTPATIENT
Start: 2022-01-19 | End: 2022-01-21 | Stop reason: HOSPADM

## 2022-01-19 RX ORDER — LEVOTHYROXINE SODIUM 0.05 MG/1
50 TABLET ORAL DAILY
Status: DISCONTINUED | OUTPATIENT
Start: 2022-01-20 | End: 2022-01-21 | Stop reason: HOSPADM

## 2022-01-19 RX ORDER — NEOSTIGMINE METHYLSULFATE 5 MG/5 ML
SYRINGE (ML) INTRAVENOUS PRN
Status: DISCONTINUED | OUTPATIENT
Start: 2022-01-19 | End: 2022-01-19 | Stop reason: SDUPTHER

## 2022-01-19 RX ORDER — DIPHENHYDRAMINE HYDROCHLORIDE 50 MG/ML
12.5 INJECTION INTRAMUSCULAR; INTRAVENOUS
Status: DISCONTINUED | OUTPATIENT
Start: 2022-01-19 | End: 2022-01-19 | Stop reason: HOSPADM

## 2022-01-19 RX ORDER — ONDANSETRON 2 MG/ML
4 INJECTION INTRAMUSCULAR; INTRAVENOUS EVERY 6 HOURS PRN
Status: DISCONTINUED | OUTPATIENT
Start: 2022-01-19 | End: 2022-01-21 | Stop reason: HOSPADM

## 2022-01-19 RX ORDER — MORPHINE SULFATE 2 MG/ML
4 INJECTION, SOLUTION INTRAMUSCULAR; INTRAVENOUS
Status: DISCONTINUED | OUTPATIENT
Start: 2022-01-19 | End: 2022-01-21 | Stop reason: HOSPADM

## 2022-01-19 RX ORDER — SODIUM CHLORIDE 9 MG/ML
INJECTION, SOLUTION INTRAVENOUS CONTINUOUS
Status: DISCONTINUED | OUTPATIENT
Start: 2022-01-19 | End: 2022-01-21 | Stop reason: HOSPADM

## 2022-01-19 RX ORDER — MEPERIDINE HYDROCHLORIDE 25 MG/ML
12.5 INJECTION INTRAMUSCULAR; INTRAVENOUS; SUBCUTANEOUS EVERY 5 MIN PRN
Status: DISCONTINUED | OUTPATIENT
Start: 2022-01-19 | End: 2022-01-19 | Stop reason: HOSPADM

## 2022-01-19 RX ORDER — FENTANYL CITRATE 50 UG/ML
INJECTION, SOLUTION INTRAMUSCULAR; INTRAVENOUS PRN
Status: DISCONTINUED | OUTPATIENT
Start: 2022-01-19 | End: 2022-01-19 | Stop reason: SDUPTHER

## 2022-01-19 RX ORDER — SODIUM CHLORIDE 9 MG/ML
25 INJECTION, SOLUTION INTRAVENOUS PRN
Status: DISCONTINUED | OUTPATIENT
Start: 2022-01-19 | End: 2022-01-21 | Stop reason: HOSPADM

## 2022-01-19 RX ORDER — LABETALOL HYDROCHLORIDE 5 MG/ML
5 INJECTION, SOLUTION INTRAVENOUS EVERY 10 MIN PRN
Status: DISCONTINUED | OUTPATIENT
Start: 2022-01-19 | End: 2022-01-19 | Stop reason: HOSPADM

## 2022-01-19 RX ORDER — GABAPENTIN 600 MG/1
600 TABLET ORAL 3 TIMES DAILY
Status: DISCONTINUED | OUTPATIENT
Start: 2022-01-19 | End: 2022-01-21 | Stop reason: HOSPADM

## 2022-01-19 RX ORDER — SODIUM CHLORIDE 0.9 % (FLUSH) 0.9 %
5-40 SYRINGE (ML) INJECTION EVERY 12 HOURS SCHEDULED
Status: DISCONTINUED | OUTPATIENT
Start: 2022-01-19 | End: 2022-01-21 | Stop reason: HOSPADM

## 2022-01-19 RX ORDER — PROPOFOL 10 MG/ML
INJECTION, EMULSION INTRAVENOUS PRN
Status: DISCONTINUED | OUTPATIENT
Start: 2022-01-19 | End: 2022-01-19 | Stop reason: SDUPTHER

## 2022-01-19 RX ORDER — HYDROCODONE BITARTRATE AND ACETAMINOPHEN 5; 325 MG/1; MG/1
2 TABLET ORAL EVERY 4 HOURS PRN
Status: DISCONTINUED | OUTPATIENT
Start: 2022-01-19 | End: 2022-01-21 | Stop reason: HOSPADM

## 2022-01-19 RX ORDER — ALBUTEROL SULFATE 2.5 MG/3ML
2.5 SOLUTION RESPIRATORY (INHALATION) 4 TIMES DAILY PRN
Status: DISCONTINUED | OUTPATIENT
Start: 2022-01-19 | End: 2022-01-21 | Stop reason: HOSPADM

## 2022-01-19 RX ORDER — SODIUM CHLORIDE, SODIUM LACTATE, POTASSIUM CHLORIDE, CALCIUM CHLORIDE 600; 310; 30; 20 MG/100ML; MG/100ML; MG/100ML; MG/100ML
INJECTION, SOLUTION INTRAVENOUS CONTINUOUS
Status: DISCONTINUED | OUTPATIENT
Start: 2022-01-19 | End: 2022-01-21 | Stop reason: HOSPADM

## 2022-01-19 RX ORDER — OXYCODONE HYDROCHLORIDE 5 MG/1
10 TABLET ORAL PRN
Status: DISCONTINUED | OUTPATIENT
Start: 2022-01-19 | End: 2022-01-19 | Stop reason: HOSPADM

## 2022-01-19 RX ORDER — CLOPIDOGREL BISULFATE 75 MG/1
75 TABLET ORAL DAILY
Status: DISCONTINUED | OUTPATIENT
Start: 2022-01-20 | End: 2022-01-21 | Stop reason: HOSPADM

## 2022-01-19 RX ORDER — ONDANSETRON 2 MG/ML
INJECTION INTRAMUSCULAR; INTRAVENOUS PRN
Status: DISCONTINUED | OUTPATIENT
Start: 2022-01-19 | End: 2022-01-19 | Stop reason: SDUPTHER

## 2022-01-19 RX ORDER — SODIUM CHLORIDE 0.9 % (FLUSH) 0.9 %
5-40 SYRINGE (ML) INJECTION PRN
Status: DISCONTINUED | OUTPATIENT
Start: 2022-01-19 | End: 2022-01-21 | Stop reason: HOSPADM

## 2022-01-19 RX ORDER — HYDROCODONE BITARTRATE AND ACETAMINOPHEN 5; 325 MG/1; MG/1
1 TABLET ORAL EVERY 4 HOURS PRN
Status: DISCONTINUED | OUTPATIENT
Start: 2022-01-19 | End: 2022-01-21 | Stop reason: HOSPADM

## 2022-01-19 RX ORDER — DULOXETIN HYDROCHLORIDE 30 MG/1
30 CAPSULE, DELAYED RELEASE ORAL DAILY
Status: DISCONTINUED | OUTPATIENT
Start: 2022-01-20 | End: 2022-01-21 | Stop reason: HOSPADM

## 2022-01-19 RX ORDER — ROCURONIUM BROMIDE 10 MG/ML
INJECTION, SOLUTION INTRAVENOUS PRN
Status: DISCONTINUED | OUTPATIENT
Start: 2022-01-19 | End: 2022-01-19 | Stop reason: SDUPTHER

## 2022-01-19 RX ORDER — M-VIT,TX,IRON,MINS/CALC/FOLIC 27MG-0.4MG
1 TABLET ORAL DAILY
Status: DISCONTINUED | OUTPATIENT
Start: 2022-01-20 | End: 2022-01-21 | Stop reason: HOSPADM

## 2022-01-19 RX ORDER — BUPIVACAINE HYDROCHLORIDE 2.5 MG/ML
INJECTION, SOLUTION EPIDURAL; INFILTRATION; INTRACAUDAL
Status: DISPENSED
Start: 2022-01-19 | End: 2022-01-19

## 2022-01-19 RX ORDER — FUROSEMIDE 40 MG/1
40 TABLET ORAL DAILY
Status: DISCONTINUED | OUTPATIENT
Start: 2022-01-20 | End: 2022-01-21 | Stop reason: HOSPADM

## 2022-01-19 RX ORDER — LIDOCAINE HYDROCHLORIDE 20 MG/ML
INJECTION, SOLUTION INTRAVENOUS PRN
Status: DISCONTINUED | OUTPATIENT
Start: 2022-01-19 | End: 2022-01-19 | Stop reason: SDUPTHER

## 2022-01-19 RX ORDER — MORPHINE SULFATE 2 MG/ML
2 INJECTION, SOLUTION INTRAMUSCULAR; INTRAVENOUS
Status: DISCONTINUED | OUTPATIENT
Start: 2022-01-19 | End: 2022-01-21 | Stop reason: HOSPADM

## 2022-01-19 RX ORDER — DEXTROSE MONOHYDRATE 50 MG/ML
100 INJECTION, SOLUTION INTRAVENOUS PRN
Status: DISCONTINUED | OUTPATIENT
Start: 2022-01-19 | End: 2022-01-21 | Stop reason: HOSPADM

## 2022-01-19 RX ORDER — INSULIN LISPRO 100 [IU]/ML
9 INJECTION, SOLUTION INTRAVENOUS; SUBCUTANEOUS
Status: DISCONTINUED | OUTPATIENT
Start: 2022-01-19 | End: 2022-01-21 | Stop reason: HOSPADM

## 2022-01-19 RX ORDER — ALBUTEROL SULFATE 2.5 MG/3ML
2.5 SOLUTION RESPIRATORY (INHALATION) ONCE
Status: COMPLETED | OUTPATIENT
Start: 2022-01-19 | End: 2022-01-19

## 2022-01-19 RX ORDER — INSULIN LISPRO 100 [IU]/ML
0-12 INJECTION, SOLUTION INTRAVENOUS; SUBCUTANEOUS
Status: DISCONTINUED | OUTPATIENT
Start: 2022-01-19 | End: 2022-01-21 | Stop reason: HOSPADM

## 2022-01-19 RX ADMIN — ROCURONIUM BROMIDE 30 MG: 10 INJECTION INTRAVENOUS at 08:17

## 2022-01-19 RX ADMIN — DEXAMETHASONE SODIUM PHOSPHATE 4 MG: 4 INJECTION, SOLUTION INTRAMUSCULAR; INTRAVENOUS at 08:13

## 2022-01-19 RX ADMIN — SODIUM CHLORIDE, POTASSIUM CHLORIDE, SODIUM LACTATE AND CALCIUM CHLORIDE: 600; 310; 30; 20 INJECTION, SOLUTION INTRAVENOUS at 06:46

## 2022-01-19 RX ADMIN — LIDOCAINE HYDROCHLORIDE 100 MG: 20 INJECTION INTRAVENOUS at 08:01

## 2022-01-19 RX ADMIN — CEFAZOLIN 2000 MG: 10 INJECTION, POWDER, FOR SOLUTION INTRAVENOUS at 18:36

## 2022-01-19 RX ADMIN — ALBUTEROL SULFATE 2.5 MG: 2.5 SOLUTION RESPIRATORY (INHALATION) at 06:57

## 2022-01-19 RX ADMIN — INSULIN LISPRO 2 UNITS: 100 INJECTION, SOLUTION INTRAVENOUS; SUBCUTANEOUS at 23:11

## 2022-01-19 RX ADMIN — INSULIN GLARGINE 66 UNITS: 100 INJECTION, SOLUTION SUBCUTANEOUS at 23:12

## 2022-01-19 RX ADMIN — SODIUM CHLORIDE: 9 INJECTION, SOLUTION INTRAVENOUS at 12:36

## 2022-01-19 RX ADMIN — ONDANSETRON 4 MG: 2 INJECTION INTRAMUSCULAR; INTRAVENOUS at 08:13

## 2022-01-19 RX ADMIN — PHENYLEPHRINE HYDROCHLORIDE 100 MCG: 10 INJECTION, SOLUTION INTRAMUSCULAR; INTRAVENOUS; SUBCUTANEOUS at 08:06

## 2022-01-19 RX ADMIN — PROPOFOL 100 MG: 10 INJECTION, EMULSION INTRAVENOUS at 08:01

## 2022-01-19 RX ADMIN — GABAPENTIN 600 MG: 600 TABLET, FILM COATED ORAL at 17:25

## 2022-01-19 RX ADMIN — PHENYLEPHRINE HYDROCHLORIDE 50 MCG: 10 INJECTION, SOLUTION INTRAMUSCULAR; INTRAVENOUS; SUBCUTANEOUS at 10:44

## 2022-01-19 RX ADMIN — Medication 100 MG: at 08:00

## 2022-01-19 RX ADMIN — HYDRALAZINE HYDROCHLORIDE 5 MG: 20 INJECTION INTRAMUSCULAR; INTRAVENOUS at 10:31

## 2022-01-19 RX ADMIN — PHENYLEPHRINE HYDROCHLORIDE 50 MCG/MIN: 10 INJECTION, SOLUTION INTRAMUSCULAR; INTRAVENOUS; SUBCUTANEOUS at 08:40

## 2022-01-19 RX ADMIN — FENTANYL CITRATE 50 MCG: 50 INJECTION, SOLUTION INTRAMUSCULAR; INTRAVENOUS at 08:16

## 2022-01-19 RX ADMIN — PHENYLEPHRINE HYDROCHLORIDE 100 MCG: 10 INJECTION, SOLUTION INTRAMUSCULAR; INTRAVENOUS; SUBCUTANEOUS at 08:23

## 2022-01-19 RX ADMIN — CEFAZOLIN 3000 MG: 10 INJECTION, POWDER, FOR SOLUTION INTRAVENOUS at 08:16

## 2022-01-19 RX ADMIN — Medication 5 MG: at 10:51

## 2022-01-19 RX ADMIN — PHENYLEPHRINE HYDROCHLORIDE 100 MCG: 10 INJECTION, SOLUTION INTRAMUSCULAR; INTRAVENOUS; SUBCUTANEOUS at 08:18

## 2022-01-19 RX ADMIN — PHENYLEPHRINE HYDROCHLORIDE 100 MCG: 10 INJECTION, SOLUTION INTRAMUSCULAR; INTRAVENOUS; SUBCUTANEOUS at 08:40

## 2022-01-19 RX ADMIN — FENTANYL CITRATE 50 MCG: 50 INJECTION, SOLUTION INTRAMUSCULAR; INTRAVENOUS at 08:01

## 2022-01-19 RX ADMIN — METOPROLOL TARTRATE 50 MG: 50 TABLET, FILM COATED ORAL at 21:42

## 2022-01-19 RX ADMIN — GABAPENTIN 600 MG: 600 TABLET, FILM COATED ORAL at 21:42

## 2022-01-19 RX ADMIN — Medication 0.8 MG: at 10:51

## 2022-01-19 RX ADMIN — INSULIN LISPRO 4 UNITS: 100 INJECTION, SOLUTION INTRAVENOUS; SUBCUTANEOUS at 18:52

## 2022-01-19 RX ADMIN — FENTANYL CITRATE 50 MCG: 50 INJECTION, SOLUTION INTRAMUSCULAR; INTRAVENOUS at 10:31

## 2022-01-19 RX ADMIN — FENTANYL CITRATE 50 MCG: 50 INJECTION, SOLUTION INTRAMUSCULAR; INTRAVENOUS at 09:41

## 2022-01-19 RX ADMIN — INSULIN LISPRO 9 UNITS: 100 INJECTION, SOLUTION INTRAVENOUS; SUBCUTANEOUS at 18:52

## 2022-01-19 ASSESSMENT — PULMONARY FUNCTION TESTS
PIF_VALUE: 26
PIF_VALUE: 25
PIF_VALUE: 28
PIF_VALUE: 26
PIF_VALUE: 0
PIF_VALUE: 1
PIF_VALUE: 25
PIF_VALUE: 0
PIF_VALUE: 19
PIF_VALUE: 21
PIF_VALUE: 24
PIF_VALUE: 25
PIF_VALUE: 25
PIF_VALUE: 28
PIF_VALUE: 29
PIF_VALUE: 26
PIF_VALUE: 33
PIF_VALUE: 27
PIF_VALUE: 6
PIF_VALUE: 29
PIF_VALUE: 29
PIF_VALUE: 24
PIF_VALUE: 25
PIF_VALUE: 26
PIF_VALUE: 26
PIF_VALUE: 25
PIF_VALUE: 21
PIF_VALUE: 8
PIF_VALUE: 26
PIF_VALUE: 26
PIF_VALUE: 29
PIF_VALUE: 29
PIF_VALUE: 27
PIF_VALUE: 28
PIF_VALUE: 21
PIF_VALUE: 25
PIF_VALUE: 27
PIF_VALUE: 29
PIF_VALUE: 25
PIF_VALUE: 29
PIF_VALUE: 30
PIF_VALUE: 27
PIF_VALUE: 29
PIF_VALUE: 24
PIF_VALUE: 31
PIF_VALUE: 25
PIF_VALUE: 20
PIF_VALUE: 30
PIF_VALUE: 29
PIF_VALUE: 33
PIF_VALUE: 29
PIF_VALUE: 34
PIF_VALUE: 24
PIF_VALUE: 25
PIF_VALUE: 26
PIF_VALUE: 21
PIF_VALUE: 25
PIF_VALUE: 0
PIF_VALUE: 29
PIF_VALUE: 29
PIF_VALUE: 26
PIF_VALUE: 29
PIF_VALUE: 24
PIF_VALUE: 25
PIF_VALUE: 0
PIF_VALUE: 29
PIF_VALUE: 8
PIF_VALUE: 24
PIF_VALUE: 27
PIF_VALUE: 29
PIF_VALUE: 25
PIF_VALUE: 27
PIF_VALUE: 24
PIF_VALUE: 24
PIF_VALUE: 26
PIF_VALUE: 24
PIF_VALUE: 29
PIF_VALUE: 26
PIF_VALUE: 0
PIF_VALUE: 24
PIF_VALUE: 26
PIF_VALUE: 25
PIF_VALUE: 26
PIF_VALUE: 26
PIF_VALUE: 21
PIF_VALUE: 24
PIF_VALUE: 25
PIF_VALUE: 29
PIF_VALUE: 25
PIF_VALUE: 25
PIF_VALUE: 24
PIF_VALUE: 24
PIF_VALUE: 29
PIF_VALUE: 29
PIF_VALUE: 25
PIF_VALUE: 26
PIF_VALUE: 25
PIF_VALUE: 0
PIF_VALUE: 24
PIF_VALUE: 29
PIF_VALUE: 27
PIF_VALUE: 21
PIF_VALUE: 25
PIF_VALUE: 26
PIF_VALUE: 29
PIF_VALUE: 27
PIF_VALUE: 24
PIF_VALUE: 29
PIF_VALUE: 25
PIF_VALUE: 26
PIF_VALUE: 21
PIF_VALUE: 21
PIF_VALUE: 24
PIF_VALUE: 26
PIF_VALUE: 20
PIF_VALUE: 25
PIF_VALUE: 6
PIF_VALUE: 26
PIF_VALUE: 27
PIF_VALUE: 29
PIF_VALUE: 33
PIF_VALUE: 30
PIF_VALUE: 27
PIF_VALUE: 26
PIF_VALUE: 19
PIF_VALUE: 26
PIF_VALUE: 26
PIF_VALUE: 27
PIF_VALUE: 30
PIF_VALUE: 27
PIF_VALUE: 25
PIF_VALUE: 29
PIF_VALUE: 26
PIF_VALUE: 32
PIF_VALUE: 26
PIF_VALUE: 24
PIF_VALUE: 27
PIF_VALUE: 30
PIF_VALUE: 0
PIF_VALUE: 26
PIF_VALUE: 22
PIF_VALUE: 29
PIF_VALUE: 24
PIF_VALUE: 25
PIF_VALUE: 25
PIF_VALUE: 0
PIF_VALUE: 29
PIF_VALUE: 24
PIF_VALUE: 0
PIF_VALUE: 29
PIF_VALUE: 1
PIF_VALUE: 29
PIF_VALUE: 27
PIF_VALUE: 29
PIF_VALUE: 25
PIF_VALUE: 25
PIF_VALUE: 26
PIF_VALUE: 27
PIF_VALUE: 25
PIF_VALUE: 25
PIF_VALUE: 29
PIF_VALUE: 25
PIF_VALUE: 21
PIF_VALUE: 24
PIF_VALUE: 29
PIF_VALUE: 25
PIF_VALUE: 25
PIF_VALUE: 26
PIF_VALUE: 26
PIF_VALUE: 29
PIF_VALUE: 25
PIF_VALUE: 24
PIF_VALUE: 29
PIF_VALUE: 25
PIF_VALUE: 27
PIF_VALUE: 29
PIF_VALUE: 28
PIF_VALUE: 26
PIF_VALUE: 25
PIF_VALUE: 24
PIF_VALUE: 25
PIF_VALUE: 21
PIF_VALUE: 26
PIF_VALUE: 29
PIF_VALUE: 26
PIF_VALUE: 26
PIF_VALUE: 21
PIF_VALUE: 26
PIF_VALUE: 29
PIF_VALUE: 29
PIF_VALUE: 0
PIF_VALUE: 25
PIF_VALUE: 26
PIF_VALUE: 26
PIF_VALUE: 0
PIF_VALUE: 26
PIF_VALUE: 25
PIF_VALUE: 0
PIF_VALUE: 29
PIF_VALUE: 29
PIF_VALUE: 24
PIF_VALUE: 24
PIF_VALUE: 29
PIF_VALUE: 27
PIF_VALUE: 21
PIF_VALUE: 26
PIF_VALUE: 2
PIF_VALUE: 1
PIF_VALUE: 24
PIF_VALUE: 25
PIF_VALUE: 28
PIF_VALUE: 25
PIF_VALUE: 30
PIF_VALUE: 27

## 2022-01-19 ASSESSMENT — PAIN SCALES - GENERAL
PAINLEVEL_OUTOF10: 0

## 2022-01-19 ASSESSMENT — PAIN - FUNCTIONAL ASSESSMENT: PAIN_FUNCTIONAL_ASSESSMENT: 0-10

## 2022-01-19 NOTE — PROGRESS NOTES
Patient is admitted to room 5501, is A&O X4, VSS, in 2L per nasal cannula. Denies any pain at this time. Tolerating oral diet. Yet to self void post surgery. All fall precaution is in place and call light is with in the reach.

## 2022-01-19 NOTE — ANESTHESIA PROCEDURE NOTES
Peripheral Block    Patient location during procedure: pre-op  Start time: 1/19/2022 7:03 AM  End time: 1/19/2022 7:12 AM  Staffing  Performed: anesthesiologist   Anesthesiologist: Yamileth Miller MD  Preanesthetic Checklist  Completed: patient identified, IV checked, site marked, risks and benefits discussed, surgical consent, monitors and equipment checked, pre-op evaluation, timeout performed, anesthesia consent given, oxygen available and patient being monitored  Peripheral Block  Patient position: supine  Prep: ChloraPrep  Patient monitoring: cardiac monitor, continuous pulse ox, frequent blood pressure checks and IV access  Block type: Femoral and Sciatic  Laterality: right  Injection technique: single-shot  Guidance: ultrasound guided  Local infiltration: lidocaine  Infiltration strength: 1 %  Dose: 3 mL  Provider prep: mask and sterile gloves  Local infiltration: lidocaine  Needle  Needle gauge: 21 G  Needle length: 10 cm  Needle localization: ultrasound guidance  Assessment  Injection assessment: negative aspiration for heme, no paresthesia on injection and local visualized surrounding nerve on ultrasound  Paresthesia pain: none  Slow fractionated injection: yes  Hemodynamics: stable  Additional Notes  R adductor canal block 30 ml 0.25% bupi  R pop block 30 ml 0.5% bupi  No sedation. No complications.       Reason for block: post-op pain management and at surgeon's request

## 2022-01-19 NOTE — PROGRESS NOTES
4 Eyes Admission Assessment     I agree as the admission nurse that 2 RN's have performed a thorough Head to Toe Skin Assessment on the patient. ALL assessment sites listed below have been assessed on admission. Areas assessed by both nurses:   [x]   Head, Face, and Ears   [x]   Shoulders, Back, and Chest  [x]   Arms, Elbows, and Hands   [x]   Coccyx, Sacrum, and Ischium  [x]   Legs, Feet, and Heels    Redness/ruddines to LLE  Redness under bilateral breasts        Does the Patient have Skin Breakdown?   No         Loyd Prevention initiated:  NA   Wound Care Orders initiated:  NA      WOC nurse consulted for Pressure Injury (Stage 3,4, Unstageable, DTI, NWPT, and Complex wounds) or Loyd score 18 or lower:  NA      Nurse 1 eSignature: Electronically signed by Chandana Hilliard RN on 1/19/22 at 3:27 PM EST    **SHARE this note so that the co-signing nurse is able to place an eSignature**    Nurse 2 eSignature: Electronically signed by Bob Fraire RN on 1/19/22 at 3:29 PM EST

## 2022-01-19 NOTE — PROGRESS NOTES
Procedure: peripheral block  MD: Dr. Linda Best performed. Pt monitored closely on heart monitor, 2L NC, continuous pulse oximetry and frequent BPs. Pt remained alert and oriented x4. pt tolerated procedure well.

## 2022-01-19 NOTE — PLAN OF CARE
Problem: Falls - Risk of:  Goal: Will remain free from falls  Description: Will remain free from falls  Outcome: Ongoing    Fall risk precaution in place. Bed is locked and in lowest position. 2/4 side rails are up. Call light with in reach. Fall risk bracelet in place, non slip socks on.frequent check on patient. free from falls at this time. will continue to monitor.        Problem: Skin Integrity:  Goal: Will show no infection signs and symptoms  Description: Will show no infection signs and symptoms  Outcome: Ongoing     Problem: OXYGENATION/RESPIRATORY FUNCTION  Goal: Patient will maintain patent airway  Outcome: Ongoing     Problem: HEMODYNAMIC STATUS  Goal: Patient has stable vital signs and fluid balance  Outcome: Ongoing     Problem: ACTIVITY INTOLERANCE/IMPAIRED MOBILITY  Goal: Mobility/activity is maintained at optimum level for patient  Outcome: Ongoing

## 2022-01-19 NOTE — FLOWSHEET NOTE
PACU Transfer Note    Vitals:    01/19/22 1400   BP: 129/60   Pulse: 81   Resp: 12   Temp: 97 °F (36.1 °C)   SpO2: 96%       In: 375 [P.O.:25; I.V.:250]  Out: 100     Pain assessment:  none  Pain Level: 0    Report given to Receiving unit RN.    1/19/2022 2:03 PM

## 2022-01-19 NOTE — H&P
[facility] Update H & P  Department of General Surgery   Surgical Service   Pre-operative History and Physical  Last H & P within the last 30 days. DIAGNOSIS:   Charcot's joint of right foot [M14.671]    Procedure(s):  RIGHT MULTIPLE MIDFOOT ARTHRODESIS FOR CHARCOT DEFORMITY CORRECTION WITH OSTEOTOMY/ OSTECTOMY, GASTROCNEMIUS RECESSION, POSSIBLE TENDO-ACHILLES LENGTHENING  . [facility] History obtained from: Patient interview and EHR     HISTORY OF PRESENT ILLNESS:   Patient is a morbidly obese (Body mass index is 40.34 kg/m².), 78 y.o. female with c/o right ankle/foot pain, swelling, and deformity in the setting of Charcot arthropathy. Their symptoms have been recalcitrant to conservative treatment and the patient presents today for the above procedure. Covid 19:  Patient denies fever, chills, worsening cough, or known exposure to Covid-19. Past Medical History:        Diagnosis Date    Arthritis     CAD (coronary artery disease)     Cancer ([facility]     bladder    CH[facility]congestive heart failure) [facility]     COPD (chronic obstructive pulmonary disease) ([facility]     COVID-19 11/26/2020    Hyperlipidemia     Hypertension     Neuropathy     Thyroid disease     Type II or unspecified type diabetes mellitus without mention of complication, not stated as uncontrolled      Past Surgical History:        Procedure Laterality Date    APPENDECTOMY      BREAST SURGERY Left     tumor removed.  benign    BRONCHOSCOPY N/A 12/10/2020    BRONCHOSCOPY THERAPUTIC ASPIRATION INITIAL performed by Miley Murguia MD at [address]   [address] N/A 7/21/2021    CYSTOSCOPY, TRANSURETHRAL RESECTION BLADDER TUMOR MULTIFOCAL 5 CM TOTAL RESECTION performed by Lillian White MD at [address] [place] [address]      ENDOSCOPY, COLON, DIAGNOSTIC  EYE SURGERY Bilateral     cataract    HUMERUS FRACTURE SURGERY      OTHER SURGICAL HISTORY      CYSTOSCOPY, TRANSURETHRAL RESECTION BLADDER TUMOR MULTIFOCAL 5 CM TOTAL RESECTION     TUBAL LIGATION      TUMOR REMOVAL       Past Social History:  Social History     Socioeconomic History    Marital status:      Spouse name: None    Number of children: None    Years of education: None    Highest education level: None   Occupational History    None   Tobacco Use    Smoking status: Never Smoker    Smokeless tobacco: Never Used   Substance and Sexual Activity    Alcohol use: Never    Drug use: None    Sexual activity: None   Other Topics Concern    None   Social History Narrative    None     Social Determinants of Health     Financial Resource Strain:     Difficulty of Paying Living Expenses: Not on file   Food Insecurity:     Worried About Running Out of Food in the Last Year: Not on file    Lazarus of Food in the Last Year: Not on file   Transportation Needs:     Lack of Transportation (Medical): Not on file    Lack of Transportation (Non-Medical):  Not on file   Physical Activity:     Days of Exercise per Week: Not on file    Minutes of Exercise per Session: Not on file   Stress:     Feeling of Stress : Not on file   Social Connections:     Frequency of Communication with Friends and Family: Not on file    Frequency of Social Gatherings with Friends and Family: Not on file    Attends Zoroastrianism Services: Not on file    Active Member of Clubs or Organizations: Not on file    Attends Club or Organization Meetings: Not on file    Marital Status: Not on file   Intimate Partner Violence:     Fear of Current or Ex-Partner: Not on file    Emotionally Abused: Not on file    Physically Abused: Not on file    Sexually Abused: Not on file   Housing Stability:     Unable to Pay for Housing in the Last Year: Not on file    Number of Places Lived in the Last Year: Not on file    Unstable Housing in the Last Year: Not on file         Medications Prior to Admission:      Prior to Admission medications    Medication Sig Start Date End Date Taking? Authorizing Provider   levothyroxine (SYNTHROID) 50 MCG tablet Take 50 mcg by mouth Daily   Yes Historical Provider, MD   clopidogrel (PLAVIX) 75 MG tablet Take 75 mg by mouth daily   Yes Historical Provider, MD   gabapentin (NEURONTIN) 600 MG tablet Take 600 mg by mouth 3 times daily.    Yes Historical Provider, MD   DULoxetine (CYMBALTA) 30 MG extended release capsule Take 30 mg by mouth daily   Yes Historical Provider, MD   isosorbide mononitrate (IMDUR) 30 MG extended release tablet Take 30 mg by mouth daily   Yes Historical Provider, MD   furosemide (LASIX) 40 MG tablet Take 40 mg by mouth daily   Yes Historical Provider, MD   Multiple Vitamins-Minerals (THERAPEUTIC MULTIVITAMIN-MINERALS) tablet Take 1 tablet by mouth daily   Yes Historical Provider, MD   insulin lispro protamine & lispro (HUMALOG MIX) (75-25) 100 UNIT per ML SUSP injection vial Inject 55 Units into the skin 2 times daily (with meals)    Yes Historical Provider, MD   metoprolol tartrate (LOPRESSOR) 50 MG tablet Take 1 tablet by mouth 2 times daily 12/16/20  Yes Francy Juarez MD   Turmeric 500 MG TABS Take 1,500 mg by mouth 3 times daily    Historical Provider, MD   acetaminophen (TYLENOL) 325 MG tablet Take 2 tablets by mouth every 4 hours as needed for Pain or Fever 12/16/20   Devon Parish MD   albuterol sulfate  (90 Base) MCG/ACT inhaler Inhale 2 puffs into the lungs every 4 hours as needed for Wheezing or Shortness of Breath 12/16/20   Devon Parish MD         Allergies:  Latex, Contrast [iodides], Ibuprofen, Lipitor [atorvastatin calcium], and Statins    PHYSICAL EXAM:      BP (!) 165/54   Pulse 79   Temp 97.7 °F (36.5 °C) (Temporal)   Resp 18   Ht 5' 4\" (1.626 m)   Wt 235 lb (106.6 kg)   SpO2 92%   BMI 40.34 kg/m²      Airway:  Airway patent with no audible

## 2022-01-19 NOTE — CONSULTS
[facility]    1/19/2022 4:36 PM    Patient Information: Sofia Rush   Date of Admit:  1/19/2022  Primary Care Physician:  Corona Lomeli MD  Requesting Physician:  Reginaldo Sutton, *    Reason for consult:   Medical evaluation and recommendations for diabetes mellitus, coronary artery disease    Chief complaint:  No chief complaint on file. Foot pain    History of Present Illness:  Sofia Rush is a 78 y.o. female on Reginaldo Sutton * service who was admitted on 1/19/2022 for   Date of Procedure: 1/19/2022     Pre-Op Diagnosis: Charcot's joint of right foot [M14.671]     Post-Op Diagnosis: Same       Procedure(s):  RIGHT MULTIPLE MIDFOOT ARTHRODESIS FOR CHARCOT DEFORMITY CORRECTION WITH OSTEOTOMY/ OSTECTOMY, GASTROCNEMIUS RECESSION    Medical team is consulted for medical management. Patient does has a history of coronary artery disease, history of 5 stents in the past last stent was in October 2020 which as per daughter was stent placed in the  maker. Patient denies any recent chest pain shortness of breath. Patient is also diabetic she is on 70/30 insulin which she takes twice a day at home. Over here we have switched her to Lantus and Premeal insulin. Mention that she had a history of COVID in November 2020. She denies any use of oxygen at home. She also mentioned that she had a history of COPD. She has quit smoking many years ago. She is from [facility].     History obtained from patient and talking to the daughter who is at bedside      REVIEW OF SYSTEMS:   All other ROS negative except mentioned in [place]    Past Medical History:   has a past medical history of Arthritis, CAD (coronary artery disease), Cancer ([address] CHF (congestive heart failure) ([address] COPD (chronic obstructive pulmonary disease) ([facility] COVID-19, Hyperlipidemia, Hypertension, Neuropathy, Thyroid disease, and Type II or unspecified type diabetes mellitus without mention of complication, not stated as uncontrolled. Past Surgical History:   has a past surgical history that includes Appendectomy; Cholecystectomy; Tubal ligation; tumor removal; Humerus fracture surgery; bronchoscopy (N/A, 12/10/2020); Colonoscopy; Endoscopy, colon, diagnostic; Cardiac surgery; Dilation and curettage of uterus; Cardiac catheterization; Breast surgery (Left); eye surgery (Bilateral); other surgical history; Cystoscopy (N/A, 7/21/2021); arthrodesis (Right, 1/19/2022); Gastrocnemius Recession (Right, 1/19/2022); and Achilles tendon surgery (Right, 1/19/2022). Medications:   bupivacaine (PF)        [START ON 1/20/2022] clopidogrel  75 mg Oral Daily    [START ON 1/20/2022] DULoxetine  30 mg Oral Daily    [START ON 1/20/2022] furosemide  40 mg Oral Daily    gabapentin  600 mg Oral TID    [START ON 1/20/2022] isosorbide mononitrate  30 mg Oral Daily    [START ON 1/20/2022] levothyroxine  50 mcg Oral Daily    metoprolol tartrate  50 mg Oral BID    [START ON 1/20/2022] therapeutic multivitamin-minerals  1 tablet Oral Daily    sodium chloride flush  5-40 mL IntraVENous 2 times per day    ceFAZolin  2,000 mg IntraVENous Q8H    insulin glargine  66 Units SubCUTAneous Nightly    insulin lispro  9 Units SubCUTAneous TID WC       Allergies: Allergies   Allergen Reactions    Latex Rash     Added based on information entered during case entry, please review and add reactions, type, and severity as needed    Contrast [Iodides] Other (See Comments)     Kidney problem    Ibuprofen     Lipitor [Atorvastatin Calcium] Other (See Comments)     Joint pains    Statins Swelling        Social History: LIVE WITH HER     reports that she quit smoking about 32 years ago. She has a 52.50 pack-year smoking history. She has never used smokeless tobacco. She reports that she does not drink alcohol and does not use drugs. Family History:  family history is not on file. , Father had CAD    Physical Exam:  /68   Pulse 74   Temp 97.7 °F (36.5 °C) (Oral)   Resp 15   Ht 5' 4\" (1.626 m)   Wt 235 lb (106.6 kg)   SpO2 93%   BMI 40.34 kg/m²   Obese  Right foot dressing  Lungs are clear to auscultation  S1-S2 no added sounds or murmur  Belly soft nontender nondistended  General appearance: Appears comfortable. Well nourished  Eyes: Sclera clear, pupils equal  ENT: Moist mucus membranes, no thrush  Neck: Trachea midline, symmetrical  Musculoskeletal: No cyanosis in digits, warm extremities  Neurologic: Cranial nerves grossly intact, no motor or speech deficits. Psychiatric: Normal affect. Alert and oriented to time, place and person. Skin: Warm, dry, normal turgor, no rash    Labs:  CBC:   Lab Results   Component Value Date    WBC 8.0 01/14/2022    RBC 5.83 01/14/2022    HGB 14.9 01/14/2022    HCT 45.9 01/14/2022    MCV 78.7 01/14/2022    MCH 25.5 01/14/2022    MCHC 32.4 01/14/2022    RDW 15.3 01/14/2022     01/14/2022    MPV 8.0 01/14/2022     BMP:    Lab Results   Component Value Date     01/14/2022    K 4.1 01/14/2022    K 4.0 07/21/2021     01/14/2022    CO2 25 01/14/2022    BUN 13 01/14/2022    CREATININE 1.0 01/14/2022    CALCIUM 8.6 01/14/2022    GFRAA >60 01/14/2022    LABGLOM 54 01/14/2022    GLUCOSE 247 01/14/2022           Problem List:    Active Problems:    Charcot's joint of ankle, right  Resolved Problems:    * No resolved hospital problems.  *       Assessment & Plan:   Coronary artery disease  With latest PCI in October 2020  Stable, continue on home Plavix Imdur Lopressor, patient not on statin but it seems like she has allergic to Lipitor with muscle pain    Diabetes mellitus  Insulin sliding scale, Premeal insulin, Lantus, monitor blood sugars, she does take 70/30 insulin at home 55 units twice daily    Status post foot surgery  Management as per Ortho    Morbid obesity with a BMI of 40.34    DVT prophylaxis as per primary team recommend Lovenox  Thank you for the opportunity to participate in the care of your patient.     Jazmin Conner MD   1/19/2022 4:36 PM

## 2022-01-19 NOTE — ANESTHESIA PRE PROCEDURE
Department of Anesthesiology  Preprocedure Note       Name:  Jarrod Woo   Age:  78 y.o.  :  1945                                          MRN:  [id number]         Date:  2022      Surgeon: Corinthia Goodpasture):  Diana Frias MD    Procedure: Procedure(s):  RIGHT MULTIPLE MIDFOOT ARTHRODESIS FOR CHARCOT DEFORMITY CORRECTION WITH OSTEOTOMY/ OSTECTOMY, GASTROCNEMIUS RECESSION, POSSIBLE TENDO-ACHILLES LENGTHENING  . Pascual Rich Medications prior to admission:   Prior to Admission medications    Medication Sig Start Date End Date Taking? Authorizing Provider   levothyroxine (SYNTHROID) 50 MCG tablet Take 50 mcg by mouth Daily    Historical Provider, MD   clopidogrel (PLAVIX) 75 MG tablet Take 75 mg by mouth daily    Historical Provider, MD   gabapentin (NEURONTIN) 600 MG tablet Take 600 mg by mouth 3 times daily.     Historical Provider, MD   DULoxetine (CYMBALTA) 30 MG extended release capsule Take 30 mg by mouth daily    Historical Provider, MD   isosorbide mononitrate (IMDUR) 30 MG extended release tablet Take 30 mg by mouth daily    Historical Provider, MD   furosemide (LASIX) 40 MG tablet Take 40 mg by mouth daily    Historical Provider, MD   Turmeric 500 MG TABS Take 1,500 mg by mouth 3 times daily    Historical Provider, MD   Multiple Vitamins-Minerals (THERAPEUTIC MULTIVITAMIN-MINERALS) tablet Take 1 tablet by mouth daily    Historical Provider, MD   insulin lispro protamine & lispro (HUMALOG MIX) (75-25) 100 UNIT per ML SUSP injection vial Inject 55 Units into the skin 2 times daily (with meals)     Historical Provider, MD   acetaminophen (TYLENOL) 325 MG tablet Take 2 tablets by mouth every 4 hours as needed for Pain or Fever 20   Flaco Piedra MD   albuterol sulfate  (90 Base) MCG/ACT inhaler Inhale 2 puffs into the lungs every 4 hours as needed for Wheezing or Shortness of Breath 20   Flaco Piedra MD   metoprolol tartrate (LOPRESSOR) 50 MG tablet Take 1 tablet by mouth 2 times daily 12/16/20   Susan Cortez MD       Current medications:    No current facility-administered medications for this visit. No current outpatient medications on file. Facility-Administered Medications Ordered in Other Visits   Medication Dose Route Frequency Provider Last Rate Last Admin    lactated ringers infusion   IntraVENous Continuous Maria Teresa Ross MD        ceFAZolin (ANCEF) 3,000 mg in dextrose 5 % 100 mL IVPB  3,000 mg IntraVENous Once Maria Teresa Ross MD        lactated ringers infusion   IntraVENous Continuous Taty Chay,  mL/hr at 01/19/22 0646 New Bag at 01/19/22 0646    meperidine (DEMEROL) injection 12.5 mg  12.5 mg IntraVENous Q5 Min PRN Stan Burch MD        HYDROmorphone (DILAUDID) injection 0.25 mg  0.25 mg IntraVENous Q5 Min PRN Stan Burch MD        fentaNYL (SUBLIMAZE) injection 50 mcg  50 mcg IntraVENous Q5 Min PRN Stan Burch MD        HYDROmorphone (DILAUDID) injection 0.25 mg  0.25 mg IntraVENous Q5 Min PRN Stan Burch MD        HYDROmorphone (DILAUDID) injection 0.5 mg  0.5 mg IntraVENous Q5 Min PRN Stan Burch MD        oxyCODONE (ROXICODONE) immediate release tablet 5 mg  5 mg Oral PRN Stan Burch MD        Or    oxyCODONE (ROXICODONE) immediate release tablet 10 mg  10 mg Oral PRN Stan Burch MD        promethazine (PHENERGAN) injection 6.25 mg  6.25 mg IntraVENous PRN Stan Burch MD        diphenhydrAMINE (BENADRYL) injection 12.5 mg  12.5 mg IntraVENous Once PRN Stan Burch MD        labetalol (NORMODYNE;TRANDATE) injection 5 mg  5 mg IntraVENous Q10 Min PRANJEL Burch MD        hydrALAZINE (APRESOLINE) injection 5 mg  5 mg IntraVENous Q10 Min PRN Stan Burch MD        albuterol (PROVENTIL) nebulizer solution 2.5 mg  2.5 mg Nebulization Once HAKEEM Stanford CNP           Allergies:     Allergies   Allergen Reactions    Latex Rash     Added based on information entered during case entry, please review and add reactions, type, and severity as needed    Contrast [Iodides] Other (See Comments)     Kidney problem    Ibuprofen     Lipitor [Atorvastatin Calcium] Other (See Comments)     Joint pains    Statins Swelling       Problem List:    Patient Active Problem List   Diagnosis Code    Lateral malleolar fracture S82.63XA    Metatarsalgia of left foot M77.42    Plantar fascial fibromatosis M72.2    Acute respiratory failure due to COVID-19 ([facility] U07.1, J96.00    Essential hypertension I10    Obesity, Class III, BMI 40-49.9 (morbid obesity) ([facility] E66.01    Type 2 diabetes mellitus, with long-term current use of insulin ([facility]) E11.9, Z79.4    DM (diabetes mellitus), secondary uncontrolled ([facility] E13.65    Morbid obesity with BMI of 40.0-44.9, adult ([facility]) E66.01, Z68.41    COPD (chronic obstructive pulmonary disease) ([facility]) J44.9    Oropharyngeal dysphagia R13.12    H/O primary malignant neoplasm of urinary bladder Z85.51    Hematuria R31.9    Leukocytosis D72.829    Elevated BUN R79.9       Past Medical History:        Diagnosis Date    Arthritis     CAD (coronary artery disease)     Cancer ([facility]     bladder    CHF (congestive heart failure) ([facility])     COPD (chronic obstructive pulmonary disease) ([facility]     COVID-19 11/26/2020    Hyperlipidemia     Hypertension     Neuropathy     Thyroid disease     Type II or unspecified type diabetes mellitus without mention of complication, not stated as uncontrolled        Past Surgical History:        Procedure Laterality Date    APPENDECTOMY      BREAST SURGERY Left     tumor removed.  benign    BRONCHOSCOPY N/A 12/10/2020    BRONCHOSCOPY THERAPUTIC ASPIRATION INITIAL performed by Estelle Goodpasture, MD at [facility]      stents    CHOLECYSTECTOMY      COLONOSCOPY      CYSTOSCOPY N/A 7/21/2021    CYSTOSCOPY, TRANSURETHRAL RESECTION BLADDER TUMOR MULTIFOCAL 5 CM TOTAL RESECTION performed by Chucho Casillas MD at [address] [place] [address]      ENDOSCOPY, COLON, DIAGNOSTIC      EYE SURGERY Bilateral     cataract    HUMERUS FRACTURE SURGERY      OTHER SURGICAL HISTORY      CYSTOSCOPY, TRANSURETHRAL RESECTION BLADDER TUMOR MULTIFOCAL 5 CM TOTAL RESECTION     TUBAL LIGATION      TUMOR REMOVAL         Social History:    Social History     Tobacco Use    Smoking status: Former Smoker     Packs/day: 1.50     Years: 35.00     Pack years: 52.50     Quit date: 1990     Years since quittin.0    Smokeless tobacco: Never Used   Substance Use Topics    Alcohol use: Never                                Counseling given: Not Answered      Vital Signs (Current): There were no vitals filed for this visit.                                            BP Readings from Last 3 Encounters:   22 (!) 165/54   21 (!) 93/52   21 (!) 142/57       NPO Status:                                                                                 BMI:   Wt Readings from Last 3 Encounters:   22 235 lb (106.6 kg)   07/15/21 228 lb (103.4 kg)   12/15/20 206 lb 12.7 oz (93.8 kg)     There is no height or weight on file to calculate BMI.    CBC:   Lab Results   Component Value Date    WBC 8.0 2022    RBC 5.83 2022    HGB 14.9 2022    HCT 45.9 2022    MCV 78.7 2022    RDW 15.3 2022     2022       CMP:   Lab Results   Component Value Date     2022    K 4.1 2022    K 4.0 2021     2022    CO2 25 2022    BUN 13 2022    CREATININE 1.0 2022    GFRAA >60 2022    AGRATIO 1.1 12/15/2020    LABGLOM 54 2022    GLUCOSE 247 2022    PROT 6.4 12/15/2020    CALCIUM 8.6 2022    BILITOT 1.0 12/15/2020    ALKPHOS 70 12/15/2020    AST 36 12/15/2020    ALT 47 12/15/2020       POC Tests:   No results for input(s): POCGLU, POCNA, POCK, POCCL, POCBUN, Josse Schooling in the last 72 hours. Coags:   Lab Results   Component Value Date    PROTIME 11.7 07/21/2021    INR 1.03 07/21/2021    APTT 39.0 07/21/2021       HCG (If Applicable): No results found for: PREGTESTUR, PREGSERUM, HCG, HCGQUANT     ABGs:   Lab Results   Component Value Date    PHART 7.547 12/12/2020    PO2ART 61.6 12/12/2020    NXN6FCT 37.3 12/12/2020    XPN7MTP 31.7 12/12/2020    BEART 8.8 12/12/2020    T4VUCTTG 92.3 12/12/2020        Type & Screen (If Applicable):  No results found for: LABABO, LABRH    Drug/Infectious Status (If Applicable):  No results found for: HIV, HEPCAB    COVID-19 Screening (If Applicable):   Lab Results   Component Value Date    COVID19 Not Detected 01/14/2022           Anesthesia Evaluation   no history of anesthetic complications:   Airway: Mallampati: II  TM distance: <3 FB   Neck ROM: limited  Mouth opening: > = 3 FB Dental:    (+) lower dentures and upper dentures      Pulmonary:   (+) COPD:                            ROS comment: H/o Covid   Cardiovascular:    (+) hypertension:, CAD:, CHF:,                   Neuro/Psych:               GI/Hepatic/Renal:   (+) morbid obesity          Endo/Other:    (+) DiabetesType II DM, poorly controlled, , hypothyroidism: arthritis: OA., malignancy/cancer. Abdominal:             Vascular: Other Findings:               Anesthesia Plan      general and regional     ASA 3       Induction: intravenous. Anesthetic plan and risks discussed with patient.                       Santino Goff MD   1/19/2022

## 2022-01-19 NOTE — CARE COORDINATION
Social Work Note                    Date: 1/19/2022     Patient Name: Ashley Mcfarland    Date of Admission: 1/19/2022  5:52 AM  YOB: 1945    Length of Stay: 0              Diagnosis:Charcot's joint of right foot [M14.671]  Charcot's joint of ankle, right [M14.671]     ________________________________________________________________________________________  Discharge Plan: SNF: [facility]: Payor: Linda Hyatt / Plan: Kenroy Coats ESSENTIAL/PLUS / Product Type: *No Product type* /   Pre-cert needed for placement?: waived? Pre-cert initiated?: n/a    Tentative ischarge date: 1/19    Current barriers: Therapy in AM, Facility Acceptance. Referrals completed: SNF: [facility]     Resources/information provided: [facility] List   ________________________________________________________________________________________  PT AM-PAC:   / 24     OT AM-PAC:   / 24     DME Needs for discharge: n/a   ________________________________________________________________________________________  Notes  CARMEN was notified by RN CM that patient had come to the floor. CARMEN has spoken with  regarding patient. Therapy Manager contacted and patient to be a priority treat tomorrow. CARMEN placed a call to Venkata Arreola at [facility] at 3:10 pm and LVM regarding patient. RN CHLOÉ Garg sent referral.     [place] reached out to  CM admin to work on setting up transport for tomorrow evening. CARMEN and CM to follow.      Patient and/or family were provided with choice of provider?: yes     Patient and/or family are in agreement with the discharge plan at this time?: yes     Care Transition Patient: No    ZOHAIB Chávez  Social Work  [facility] [employer]  Ph: [phone]

## 2022-01-19 NOTE — ANESTHESIA POSTPROCEDURE EVALUATION
Department of Anesthesiology  Postprocedure Note    Patient: Darline Arriola  MRN: [id number]  YOB: 1945  Date of evaluation: 1/19/2022  Time:  12:01 PM     Procedure Summary     Date: 01/19/22 Room / Location: [address]    Anesthesia Start: 6409 Anesthesia Stop: 5207    Procedures:       RIGHT MULTIPLE MIDFOOT ARTHRODESIS FOR CHARCOT DEFORMITY CORRECTION WITH OSTEOTOMY/ OSTECTOMY, GASTROCNEMIUS RECESSION (Right )      . (Right )      . (Right ) Diagnosis:       Charcot's joint of right foot      (Charcot's joint of right foot [M14.671])    Surgeons: Keely Bone MD Responsible Provider: Aidan Braden MD    Anesthesia Type: general, regional ASA Status: 3          Anesthesia Type: general, regional    Ene Phase I:      Ene Phase II:      Last vitals: Reviewed and per EMR flowsheets.        Anesthesia Post Evaluation    Patient location during evaluation: PACU  Level of consciousness: awake  Complications: no  Multimodal analgesia pain management approach

## 2022-01-19 NOTE — PROGRESS NOTES
Pt alert and oriented x4. Consents signed and on chart. IV placed. IVF infusing. WILLIE hose unable to be placed d/T LE dressings bilaterally and d/t dressing unable to use fer wipes. Dr. Becka Bustillos made aware at bedside. EKG and albuterol preop.

## 2022-01-19 NOTE — OP NOTE
[facility] [address], [address]                                OPERATIVE REPORT    PATIENT NAME: Mau Lawson                   :        1945  MED REC NO:   [id number]                          ROOM:       5501  ACCOUNT NO:   [de-identified]                           ADMIT DATE: 2022  PROVIDER:     Riley Batres. Tegan Theodore MD    DATE OF PROCEDURE:  2022    SURGEON:  Riley Batres. Tegan Theodore MD    SECOND SURGEON:  Lorena Anna PA-C    PREOPERATIVE DIAGNOSES:  Right,  1. Charcot midfoot fracture-dislocation. 2.  Equinus contracture. 3.  Chronic tibialis anterior tendon avulsion. POSTOPERATIVE DIAGNOSES:  Right,  1. Charcot midfoot fracture-dislocation. 2.  Equinus contracture. 3.  Chronic tibialis anterior tendon avulsion. OPERATIONS:  Right,  1. Midfoot arthrodesis with osteotomy/ostectomy for Charcot mid foot  reconstruction. 2.  Gastrocnemius recession. 3.  Repair of tibialis anterior tendon. ANESTHESIA:  General with block. INDICATIONS:  This is a 42-year-old woman with a history of significant  dysfunction due to a right Charcot midfoot fracture-dislocation. The  patient has developed impending ulceration medially due to dislocation  of her medial cuneiform as well as plantarly due to plantar dislocation  of the entire lateral column of her foot. The patient has elected for  surgical reconstruction in an effort to salvage the extremity. The  risks and potential benefits of the procedure were discussed at length  with the patient. She understands these, was given the opportunity to  ask questions. Her questions answered to her satisfaction. She has  given consent to proceed with the above-outlined procedure. OPERATIVE PROCEDURE:  The patient was brought to the operating room,  placed in the supine position on the operating table.   After induction  of general anesthetic, a pneumatic tourniquet was placed around the  patient's right proximal thigh and set to 350 mmHg. The right leg was  then prepped and draped free in the usual sterile fashion. A second surgeon was necessary throughout the procedure due to the  patient's increased size and body mass index. This increased the  overall technical complexity procedure, and a second surgeon was  necessary to aid with appropriate positioning of the patient,  positioning of the extremity throughout the procedure. A second surgeon  was necessary to decrease overall operative time and to improve the  patient's safety and outcome. A second surgeon was necessary to aid  with major deformity correction. An assistant with these skills was not  available from the hospital at the time of the procedure, necessitating  Ladonna Borden's presence. Gastrocnemius recession: At this point, an intraoperative Silfverskiold  test was performed. This revealed a significant equinus contracture. An incision was therefore made in line with the axis of the extremity,  centered over the distal aponeurosis of the gastrocnemius muscle. Blunt  dissection was carried out through the subcutaneous and deeper tissue  taking care to identify and protect the sural nerve and lesser saphenous  vein. These structures were gently retracted laterally allowing  visualization of the overlying aponeurosis of the gastrocnemius muscle. The aponeurosis was divided transversely under direct visualization  allowing about 3 cm of lengthening, which nicely corrected the patient's  equinus contracture. Tendo-Achilles lengthening was not necessary. This wound was irrigated and closed with 3-0 nylon suture. Midfoot arthrodesis multiple with osteotomy/ostectomy:  At this point,  an incision was made along the medial column of the foot and dissection  carried out through the subcutaneous and deeper tissue.   A subperiosteal  dissection was performed at the level of the dislocation and through the  midfoot. Significant comminution to the level of Charcot dislocation  was noted and meticulous dissection was taken to keep the dissection  subperiosteal both plantarly and dorsally. The tibialis anterior tendon  was noted to have been chronically avulsed and this was identified and  tagged for later repair. The entire medial cuneiform had been extruded  medially and this was excised at this point as this could not be  reduced. Dissection was then carried out across the mid axis of the  foot. A second incision was made laterally and a subperiosteal  dissection performed dorsally over the calcaneocuboid and fourth and  fifth tarsometatarsal joints. Dissection was then carried out  subperiosteally to meet the medial incision. A high-speed handheld  oscillating saw was then used to resect fibrotic and avascular pieces of  bone from the midfoot. This appeared to be most of the cuneiforms and a  large portion of the cuboid was noted to have become fragmented and  dislocated plantarly. This was removed. All bone that was removed, did  appear clean and viable and this was therefore placed in the back table  and later morselized for bone graft. At this point, attention was  turned to realignment of the foot. Guidewires for the axis midfoot  bolting system from [employer] were utilized to cannulate the  metatarsals one, two, three and four; these were brought to the level of  the dislocation. The dislocation was then reduced and fusion surfaces  had been brought back to a healthy-appearing cancellous bone with the  oscillating saw prior to passing the wires. At this point, the wires  were passed across the forefoot through the midfoot and into the  hindfoot to restore alignment. The talonavicular joint was bridged as  the osseous structures of the navicular and cuboid were not adequate for  proximal fixation of the dislocation.   Each guidewire was then  predrilled, countersunk, measured and then an axis fixation bolt applied  over the guidewire. A 6.5 mm bolt was used in the medial column, 5.5 mm  bolts used in the second and third rays, and a 4.5 mm bolt used in the  fourth ray. The fifth tarsometatarsal joint was noted to be reduced and  stable following reduction. At this point, all wounds were irrigated  with a sterile lavage solution. Attention was turned to repair of the  tibialis anterior tendon. Repair of tibialis anterior tendon: At this point, dissection was  carried out more dorsally. The tibialis anterior tendon stump was  identified. This was grasped with Kochers and pulled distally. It was  then secured with two 2-0 FiberLoop sutures which were run in a Krackow  fashion medially and laterally to allow for a four-strand repair. Drill  holes were made in the first metatarsal base with a 0.062 K-wire and the  sutures were then passed through the drill holes. The ankle held at  neutral dorsiflexion and the tibialis anterior tendon sewn directly down  to the base of the first metatarsal.  This afforded an excellent quality  repair. Final radiographs were obtained with multiplanar fluoroscopy at  this point. The previously harvested fragments of bone at this point  were stripped off soft tissue and cartilage and morselized in the back  table then placed in a bone mill to create a corticocancellous  autograft. This bone milled paste was then packed throughout the fusion  sites in the mid foot. An XS Infuse sponge had been prepared per  's recommendations. This was morselized and placed in each  of the fused joints. At this point, the tourniquet was released and  meticulous hemostasis obtained with the electrocautery. The wounds were  again irrigated and closed in layers. The deep tissues reapproximated  with 2-0 PDS suture. The subcutaneous tissues reapproximated with 3-0  Vicryl suture. The skin edges reapproximated with interrupted 2-0  nylon.   A sterile lightly

## 2022-01-20 LAB
GLUCOSE BLD-MCNC: 171 MG/DL (ref 70–99)
GLUCOSE BLD-MCNC: 184 MG/DL (ref 70–99)
GLUCOSE BLD-MCNC: 195 MG/DL (ref 70–99)
GLUCOSE BLD-MCNC: 223 MG/DL (ref 70–99)
GLUCOSE BLD-MCNC: 266 MG/DL (ref 70–99)
GLUCOSE BLD-MCNC: 291 MG/DL (ref 70–99)
PERFORMED ON: ABNORMAL

## 2022-01-20 PROCEDURE — 97166 OT EVAL MOD COMPLEX 45 MIN: CPT

## 2022-01-20 PROCEDURE — 97530 THERAPEUTIC ACTIVITIES: CPT

## 2022-01-20 PROCEDURE — 97535 SELF CARE MNGMENT TRAINING: CPT

## 2022-01-20 PROCEDURE — 2580000003 HC RX 258: Performed by: ORTHOPAEDIC SURGERY

## 2022-01-20 PROCEDURE — G0378 HOSPITAL OBSERVATION PER HR: HCPCS

## 2022-01-20 PROCEDURE — 6360000002 HC RX W HCPCS: Performed by: ORTHOPAEDIC SURGERY

## 2022-01-20 PROCEDURE — 1200000000 HC SEMI PRIVATE

## 2022-01-20 PROCEDURE — 97162 PT EVAL MOD COMPLEX 30 MIN: CPT

## 2022-01-20 PROCEDURE — 6370000000 HC RX 637 (ALT 250 FOR IP): Performed by: ORTHOPAEDIC SURGERY

## 2022-01-20 RX ADMIN — CEFAZOLIN 2000 MG: 10 INJECTION, POWDER, FOR SOLUTION INTRAVENOUS at 00:18

## 2022-01-20 RX ADMIN — GABAPENTIN 600 MG: 600 TABLET, FILM COATED ORAL at 14:48

## 2022-01-20 RX ADMIN — INSULIN GLARGINE 66 UNITS: 100 INJECTION, SOLUTION SUBCUTANEOUS at 21:10

## 2022-01-20 RX ADMIN — HYDROCODONE BITARTRATE AND ACETAMINOPHEN 1 TABLET: 5; 325 TABLET ORAL at 14:49

## 2022-01-20 RX ADMIN — LEVOTHYROXINE SODIUM 50 MCG: 0.05 TABLET ORAL at 06:54

## 2022-01-20 RX ADMIN — INSULIN LISPRO 6 UNITS: 100 INJECTION, SOLUTION INTRAVENOUS; SUBCUTANEOUS at 09:37

## 2022-01-20 RX ADMIN — SODIUM CHLORIDE, PRESERVATIVE FREE 10 ML: 5 INJECTION INTRAVENOUS at 21:20

## 2022-01-20 RX ADMIN — HYDROCODONE BITARTRATE AND ACETAMINOPHEN 1 TABLET: 5; 325 TABLET ORAL at 09:31

## 2022-01-20 RX ADMIN — ISOSORBIDE MONONITRATE 30 MG: 30 TABLET, EXTENDED RELEASE ORAL at 09:32

## 2022-01-20 RX ADMIN — GABAPENTIN 600 MG: 600 TABLET, FILM COATED ORAL at 09:32

## 2022-01-20 RX ADMIN — CLOPIDOGREL BISULFATE 75 MG: 75 TABLET ORAL at 09:32

## 2022-01-20 RX ADMIN — GABAPENTIN 600 MG: 600 TABLET, FILM COATED ORAL at 21:20

## 2022-01-20 RX ADMIN — INSULIN LISPRO 2 UNITS: 100 INJECTION, SOLUTION INTRAVENOUS; SUBCUTANEOUS at 12:00

## 2022-01-20 RX ADMIN — SODIUM CHLORIDE, PRESERVATIVE FREE 10 ML: 5 INJECTION INTRAVENOUS at 09:34

## 2022-01-20 RX ADMIN — METOPROLOL TARTRATE 50 MG: 50 TABLET, FILM COATED ORAL at 21:20

## 2022-01-20 RX ADMIN — INSULIN LISPRO 2 UNITS: 100 INJECTION, SOLUTION INTRAVENOUS; SUBCUTANEOUS at 18:22

## 2022-01-20 RX ADMIN — INSULIN LISPRO 9 UNITS: 100 INJECTION, SOLUTION INTRAVENOUS; SUBCUTANEOUS at 12:01

## 2022-01-20 RX ADMIN — INSULIN LISPRO 1 UNITS: 100 INJECTION, SOLUTION INTRAVENOUS; SUBCUTANEOUS at 21:10

## 2022-01-20 RX ADMIN — FUROSEMIDE 40 MG: 40 TABLET ORAL at 09:32

## 2022-01-20 RX ADMIN — INSULIN LISPRO 9 UNITS: 100 INJECTION, SOLUTION INTRAVENOUS; SUBCUTANEOUS at 18:23

## 2022-01-20 RX ADMIN — METOPROLOL TARTRATE 50 MG: 50 TABLET, FILM COATED ORAL at 09:32

## 2022-01-20 RX ADMIN — INSULIN LISPRO 9 UNITS: 100 INJECTION, SOLUTION INTRAVENOUS; SUBCUTANEOUS at 09:36

## 2022-01-20 RX ADMIN — DULOXETINE HYDROCHLORIDE 30 MG: 30 CAPSULE, DELAYED RELEASE ORAL at 09:32

## 2022-01-20 RX ADMIN — MULTIPLE VITAMINS W/ MINERALS TAB 1 TABLET: TAB at 09:32

## 2022-01-20 ASSESSMENT — PAIN SCALES - GENERAL
PAINLEVEL_OUTOF10: 5
PAINLEVEL_OUTOF10: 0
PAINLEVEL_OUTOF10: 4

## 2022-01-20 NOTE — PROGRESS NOTES
Physical Therapy    Facility/Department: [facility] 5T ORTHO/NEURO  Initial Assessment    NAME: An Christian  :   MRN: [id number]    Date of Service: 2022    Discharge Recommendations: An Christian scored a 13/24 on the AM-PAC short mobility form. Current research shows that an AM-PAC score of 17 or less is typically not associated with a discharge to the patient's home setting. Based on the patient's AM-PAC score and their current functional mobility deficits, it is recommended that the patient have 3-5 sessions per week of Physical Therapy at d/c to increase the patient's independence. Please see assessment section for further patient specific details. If patient discharges prior to next session this note will serve as a discharge summary. Please see below for the latest assessment towards goals. PT Equipment Recommendations  Equipment Needed: No    Assessment   Body structures, Functions, Activity limitations: Decreased functional mobility ; Decreased endurance;Decreased strength;Decreased balance;Decreased safe awareness  Assessment: pt is a 77 yo female s/p R charcot correction surgery typically mod I for mobility with 4WW now limited by NWB on RLE requiring min A for sit to stand and stand pivot transfers at [facility] requiring verbal cues for correct hand placement and in order to maintain NWB on RLE. pt required inc assist for dynamic standing balance with dressing/pericare at  needing mod A. pt will be at wheelchair level due to NWB status and is limited by decreased endurance. pt has very supportive discharge environment and support at home but will benefit from continued IP PT in order to maximize independence with transfers and mobility. will follow throughout stay  Treatment Diagnosis: dec functional mobility  Prognosis: Good  Decision Making: Medium Complexity  PT Education: Goals;Plan of Care;PT Role;General Safety; Functional Mobility Training;Transfer Training;Weight-bearing Education  Patient Education: pt v.u. REQUIRES PT FOLLOW UP: Yes  Activity Tolerance  Activity Tolerance: Patient Tolerated treatment well;Patient limited by endurance; Patient limited by fatigue  Activity Tolerance: rest breaks with standing activities       Patient Diagnosis(es): There were no encounter diagnoses. has a past medical history of Arthritis, CAD (coronary artery disease), Cancer ([facility] [address] CHF (congestive heart failure) ([facility] [address] COPD (chronic obstructive pulmonary disease) ([facility] [address] COVID-19, Hyperlipidemia, Hypertension, Neuropathy, Thyroid disease, and Type II or unspecified type diabetes mellitus without mention of complication, not stated as uncontrolled. has a past surgical history that includes Appendectomy; Cholecystectomy; Tubal ligation; tumor removal; Humerus fracture surgery; bronchoscopy (N/A, 12/10/2020); Colonoscopy; Endoscopy, colon, diagnostic; Cardiac surgery; Dilation and curettage of uterus; Cardiac catheterization; Breast surgery (Left); eye surgery (Bilateral); other surgical history; Cystoscopy (N/A, 7/21/2021); arthrodesis (Right, 1/19/2022); Gastrocnemius Recession (Right, 1/19/2022); and Achilles tendon surgery (Right, 1/19/2022). Restrictions  Position Activity Restriction  Other position/activity restrictions: NWB R LE  Vision/Hearing  Vision: Impaired  Vision Exceptions: Wears glasses for reading  Hearing: Within functional limits     Subjective  General  Chart Reviewed:  Yes  Additional Pertinent Hx: pt is a 68yo female s/p RIGHT MULTIPLE MIDFOOT ARTHRODESIS FOR CHARCOT DEFORMITY CORRECTION WITH OSTEOTOMY/ OSTECTOMY, GASTROCNEMIUS RECESSION  Referring Practitioner: Tegan Theodore MD  Diagnosis: charcot's joint of R foot  Follows Commands: Within Functional Limits  Subjective  Subjective: pt supine in bed and agreeable to PT  Pain Screening  Patient Currently in Pain:  (no c/o)  Vital Signs  Patient Currently in Pain: Denies       Orientation  Orientation  Overall Orientation Status: Within Normal Limits  Social/Functional History  Social/Functional History  Lives With: Spouse  Type of Home: House  Home Layout: One level,Laundry in basement ( does laundry)  Home Access: Ramped entrance  Bathroom Shower/Tub: Walk-in shower  Bathroom Toilet: Standard  Bathroom Equipment: Grab bars in [address] raiser,Shower chair,Hand-held shower  Bathroom Accessibility: Son Kirkpatrick: 4 wheeled walker,Hospital bed,Reacher (transport chair)  Receives Help From: Family (son and daughter)  ADL Assistance: Needs assistance (required assist preparing leg dues to requiring to get boot off and wrap it in plastic, was able to perform showering tasks once foot was prepared)  Homemaking Assistance: Needs assistance (shares responsibility with daughter,  does laundry and some cleaning)  Homemaking Responsibilities: No  Ambulation Assistance: Independent (with 4WW for short distances, transport chair for long distances)  Transfer Assistance: Independent  Active : No  Patient's  Info: son & daughter  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 pt performed 5 chair push ups with CGA with ability to completely clear buttocks from chair.   Ambulation  Ambulation?: No (unsafe to attempt due to NWB status)     Balance  Posture: Good  Sitting - Static: Good  Sitting - Dynamic: Good  Standing - Static: -;Fair  Standing - Dynamic: Poor;+  Comments: static standing balance CGA, dynamic standing balance required mod A while simulating dressing/ pericare in standing        Plan   Plan  Times per week: 5-7  Current Treatment Recommendations: Harika Gresham Re-education,Patient/Caregiver Education & Training,Balance Training,Equipment Evaluation, Education, & procurement,Pain Management,Wheelchair Mobility Training,Gait Training,Functional Mobility Training,Stair training,Safety Education & Training,Home Exercise Program  Safety Devices  Type of devices: Call light within reach,Left in chair,Gait belt,Nurse notified,Chair alarm in place    G-Code       OutComes Score                                                  AM-PAC Score  AM-PAC Inpatient Mobility Raw Score : 13 (01/20/22 0922)  AM-PAC Inpatient T-Scale Score : 36.74 (01/20/22 0922)  Mobility Inpatient CMS 0-100% Score: 64.91 (01/20/22 7406)  Mobility Inpatient CMS G-Code Modifier : CL (01/20/22 9884)          Goals  Short term goals  Time Frame for Short term goals: by dc  Short term goal 1: pt will perform bed mobility with sup  Short term goal 2: pt will perform sit to stand transfer at [address] [place] [address] with CGA  Short term goal 3: pt will perform stand pivot transfer with RW with CGA  Short term goal 4: pt will demonstrate dynamic standing balance at RW with CGA  Patient Goals   Patient goals : to get stronger       Therapy Time   Individual Concurrent Group Co-treatment   Time In 0810         Time Out 0848         Minutes 38              Timed Code Treatment Minutes:  23 min     Total Treatment Minutes:  38 min       Viktoria Corona, PT

## 2022-01-20 NOTE — PROGRESS NOTES
Pt A&O x4. VSS. Pt worked with pt/ot today. Pt expressed progress with this. No significant events this shift. Pt RLE dressing intact. Pt elevating RLE extremity as directed with ice packs. Weaned pt to 1 L NC O2. Pain controlled with norco prn and scheduled gabapentin. Pt denies needs at this time. Pt resting comfortably in bed. Fall precautions in place. Call light in hand. Will handoff to night shift RN at shift change.

## 2022-01-20 NOTE — PROGRESS NOTES
Ortho Progress Note    POD 1 s/p multiple midfoot arthrodesis for Charcot deformity correction and gastrocnemius recession. She has no complaints at this time. She is sitting in the chair attended by her daughter. Dressings clean, dry and intact. Good capillary refill in all digits. Continue NWB on operative extremity. PT/OT. D/c planning per social work for SNF -- looks like facility will be able to accept her tomorrow.

## 2022-01-20 NOTE — CARE COORDINATION
CARMEN received Voice mail from [place] in admissions at [facility] this AM. They have their first case of Dru in the building since 2020. As a result they are now holding new admissions. They are unable to accept patient. She offered that we might want to try [facility]. SW reviewed [facility] includes the [facility], referrals also sent to [facility], [facility], [place], and [facility]. SW to follow up with patient at bedside and provide update.      Jazmine Hamm, MSW, LSW  [facility] Work   Ph: [phone]

## 2022-01-20 NOTE — DISCHARGE INSTR - COC
Continuity of Care Form    Patient Name: Odette Goldberg   :    MRN:  [id number]    Admit date:  2022  Discharge date:  2022     Code Status Order: Full Code   Advance Directives:   [facility] Documentation       Date/Time Healthcare Directive Type of Healthcare Directive Copy in [facility] [address] Agent's Name Healthcare Agent's Phone Number    22 [phone] No, patient does not have an advance directive for healthcare treatment -- -- -- -- --    22 1337 No, patient does not have an advance directive for healthcare treatment -- -- -- -- --            Admitting Physician:  Bryant Trent MD  PCP: James Hall MD    Discharging Nurse: [facility] Unit/Room#: 0070/1397-18  Discharging Unit Phone Number: ***    Emergency Contact:   Extended Emergency Contact Information  Primary Emergency Contact: Jai [address] Phone: [phone]  Relation: Spouse  Secondary Emergency Contact: Amanda [address] Phone: [phone]  Relation: Child    Past Surgical History:  Past Surgical History:   Procedure Laterality Date    ACHILLES TENDON SURGERY Right 2022    . performed by Bryant Trent MD at [address] [place] [address] Right 2022    RIGHT MULTIPLE MIDFOOT ARTHRODESIS FOR CHARCOT DEFORMITY CORRECTION WITH OSTEOTOMY/ OSTECTOMY, GASTROCNEMIUS RECESSION performed by Bryant Trent MD at [address] Left     tumor removed.  benign    BRONCHOSCOPY N/A 12/10/2020    BRONCHOSCOPY THERAPUTIC ASPIRATION INITIAL performed by Gilmer Dinero MD at [facility]      stents    CHOLECYSTECTOMY      COLONOSCOPY      CYSTOSCOPY N/A 2021    CYSTOSCOPY, TRANSURETHRAL RESECTION BLADDER TUMOR MULTIFOCAL 5 CM TOTAL RESECTION performed by Bianka Davies MD at [facility] DILATION AND CURETTAGE OF UTERUS      ENDOSCOPY, COLON, DIAGNOSTIC      EYE SURGERY Bilateral     cataract    GASTROCNEMIUS RECESSION Right 2022    . performed by Leeroy So MD at [address], TRANSURETHRAL RESECTION BLADDER TUMOR MULTIFOCAL 5 CM TOTAL RESECTION     TUBAL LIGATION      TUMOR REMOVAL         Immunization History: There is no immunization history on file for this patient.     Active Problems:  Patient Active Problem List   Diagnosis Code    Lateral malleolar fracture S82.63XA    Metatarsalgia of left foot M77.42    Plantar fascial fibromatosis M72.2    Acute respiratory failure due to COVID-19 ([facility]) U07.1, J96.00    Essential hypertension I10    Obesity, Class III, BMI 40-49.9 (morbid obesity) ([facility] E66.01    Type 2 diabetes mellitus, with long-term current use of insulin ([facility]) E11.9, Z79.4    DM (diabetes mellitus), secondary uncontrolled ([facility] [address] E13.65    Morbid obesity with BMI of 40.0-44.9, adult ([facility]) E66.01, Z68.41    COPD (chronic obstructive pulmonary disease) ([facility]) J44.9    Oropharyngeal dysphagia R13.12    H/O primary malignant neoplasm of urinary bladder Z85.51    Hematuria R31.9    Leukocytosis D72.829    Elevated BUN R79.9    Charcot's joint of ankle, right M14.671       Isolation/Infection:   Isolation            No Isolation          Patient Infection Status       Infection Onset Added Last Indicated Last Indicated By Review Planned Expiration Resolved Resolved By    None active    Resolved    COVID-19 (Rule Out) 22 COVID-19 (Ordered)   01/15/22 Rule-Out Test Resulted    COVID-19 (Rule Out) 21 COVID-19 (Ordered)   21 Rule-Out Test Resulted    COVID-19 20 COVID-19   20             Nurse Assessment:  Last Vital Signs: /73   Pulse 63   Temp 97.5 °F (36.4 °C) (Oral)   Resp 16   Ht 5' 4\" (1.626 m)   Wt 235 lb (106.6 kg)   SpO2 95%   BMI 40.34 kg/m²     Last documented pain score (0-10 scale): Pain Level: 5  Last Weight:   Wt Readings from Last 1 Encounters:   01/19/22 235 lb (106.6 kg)     Mental Status:  oriented and alert    IV Access:  - None    Nursing Mobility/ADLs:  Walking   Assisted  Transfer  Assisted  Bathing  Assisted  Dressing  Assisted  Toileting  Assisted  Feeding  [facility] Delivery   whole    Wound Care Documentation and Therapy:        Elimination:  Continence: Bowel: Yes  Bladder: Yes  Urinary Catheter: None   Colostomy/Ileostomy/Ileal Conduit: No       Date of Last BM: ***    Intake/Output Summary (Last 24 hours) at 1/20/2022 1336  Last data filed at 1/20/2022 1210  Gross per 24 hour   Intake 814.16 ml   Output 850 ml   Net -35.84 ml     I/O last 3 completed shifts: In: 674.2 [P.O.:225; I.V.:250; IV Piggyback:199.2]  Out: 350 [Urine:250; Blood:100]    Safety Concerns:     None    Impairments/Disabilities:      Vision    Nutrition Therapy:  Current Nutrition Therapy:   - Oral Diet:  General, Carb Control 4 carbs/meal (1800kcals/day), and Low Sodium (2gm)    Routes of Feeding: Oral  Liquids: Thin Liquids  Daily Fluid Restriction: No  Last Modified Barium Swallow with Video (Video Swallowing Test): not done    Treatments at the Time of Hospital Discharge:   Respiratory Treatments:     Oxygen Therapy:  is on oxygen at 1 L/min per nasal cannula.   Ventilator:    - No ventilator support    Rehab Therapies: Physical Therapy and Occupational Therapy  Weight Bearing Status/Restrictions: Non-weight bearing on right leg  Other Medical Equipment (for information only, NOT a DME order):  walker and bedside commode  Other Treatments: ***    Patient's personal belongings (please select all that are sent with patient):  Glasses, dentures top and bottom    RN SIGNATURE:  Electronically signed by Cheko Mallory RN on 1/21/22 at 11:24 AM EST    CASE MANAGEMENT/SOCIAL WORK

## 2022-01-20 NOTE — PROGRESS NOTES
Occupational Therapy   Occupational Therapy Initial Assessment and Treatment    Date: 2022   Patient Name: Michelle Newsome  MRN: [id number]     : 1945    Date of Service: 2022    Discharge Recommendations:  Michelle Newsome scored a 17/24 on the AM-PAC ADL Inpatient form. Current research shows that an AM-PAC score of 17 or less is typically not associated with a discharge to the patient's home setting. Based on the patient's AM-PAC score and their current ADL deficits, it is recommended that the patient have 3-5 sessions per week of Occupational Therapy at d/c to increase the patient's independence. Please see assessment section for further patient specific details. If patient discharges prior to next session this note will serve as a discharge summary. Please see below for the latest assessment towards goals. OT Equipment Recommendations  Equipment Needed: No (defer recommendations to discharge facility)    Assessment   Performance deficits / Impairments: Decreased functional mobility ; Decreased ADL status; Decreased balance;Decreased endurance  Assessment: Seen POD#1 s/p RIGHT MULTIPLE MIDFOOT ARTHRODESIS FOR CHARCOT DEFORMITY CORRECTION WITH OSTEOTOMY/ OSTECTOMY, GASTROCNEMIUS RECESSION. Pt is NWB RLE. Currently, functioning at [facility] A for transfers and pivot transfers w/ walker; requiring Mod/Max A for LB ADLs. Demo good awareness of NWB precaution. Plans are for SNF, which is appropriate. Does not have adequate support at home to safely manage pt's care (2 children recovering from surgery,  unable to physically assist). Continue per POC and progress as tolerated.   Treatment Diagnosis: impaired ADLs/transfers s/p surgery, NWB RLE  Decision Making: Medium Complexity  OT Education: OT Role;Plan of Care;ADL Adaptive Strategies;Precautions;Transfer Training  REQUIRES OT FOLLOW UP: Yes  Activity Tolerance  Activity Tolerance: Patient Tolerated treatment well  Safety Devices  Safety Devices in place: Yes  Type of devices: Nurse notified; Left in chair;Chair alarm in place;Call light within reach (RLE elevated on pillows and ice applied)           Patient Diagnosis(es): There were no encounter diagnoses. has a past medical history of Arthritis, CAD (coronary artery disease), Cancer ([facility] [address] CHF (congestive heart failure) ([facility] [address] COPD (chronic obstructive pulmonary disease) ([facility] [address] COVID-19, Hyperlipidemia, Hypertension, Neuropathy, Thyroid disease, and Type II or unspecified type diabetes mellitus without mention of complication, not stated as uncontrolled. has a past surgical history that includes Appendectomy; Cholecystectomy; Tubal ligation; tumor removal; Humerus fracture surgery; bronchoscopy (N/A, 12/10/2020); Colonoscopy; Endoscopy, colon, diagnostic; Cardiac surgery; Dilation and curettage of uterus; Cardiac catheterization; Breast surgery (Left); eye surgery (Bilateral); other surgical history; Cystoscopy (N/A, 7/21/2021); arthrodesis (Right, 1/19/2022); Gastrocnemius Recession (Right, 1/19/2022); and Achilles tendon surgery (Right, 1/19/2022). Treatment Diagnosis: impaired ADLs/transfers s/p surgery, NWB RLE      Restrictions  Position Activity Restriction  Other position/activity restrictions: NWB R LE    Subjective   General  Chart Reviewed: Yes  Additional Pertinent Hx: 78 y.o. F to OR 1/19 for RIGHT MULTIPLE MIDFOOT ARTHRODESIS FOR CHARCOT DEFORMITY CORRECTION WITH OSTEOTOMY/ OSTECTOMY, GASTROCNEMIUS RECESSION. PMH: CAD, Bladder CA, CHF, COPD, HTN, Hyperlipidemia, DM, Elizabeth. Family / Caregiver Present: No  Referring Practitioner: Marcelina Dye MD  Diagnosis: Charcot's Joint of R Foot    Subjective  Subjective: In bed on entry. Reports plans are for SNF at discharge. \"It will be hard for me (to be NWB). \" Omar Smith said I could stay there for 3 months. \"    Patient Currently in Pain:  (no c/o)      Social/Functional History  Social/Functional History  Lives With: Spouse  Type of Home: House  Home Layout: One level,Laundry in basement ( does laundry)  Home Access: Ramped entrance  Bathroom Shower/Tub: Walk-in shower  Bathroom Toilet: Standard  Bathroom Equipment: Grab bars in [address] raiser,Shower chair,Hand-held shower  Bathroom Accessibility: Son Kirkpatrick: 4 wheeled walker,Hospital bed,Reacher (transport chair)  Receives Help From: Family (son and daughter)  ADL Assistance: Needs assistance (required assist preparing leg dues to requiring to get boot off and wrap it in plastic, was able to perform showering tasks once foot was prepared)  Homemaking Assistance: Needs assistance (shares responsibility with daughter,  does laundry and some cleaning)  Homemaking Responsibilities: No  Ambulation Assistance: Independent (with 4WW for short distances, transport chair for long distances)  Transfer Assistance: Independent  Active : No  Patient's  Info: son & daughter  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  Additional Comments: no falls reported       Objective   Vision: Impaired  Vision Exceptions: Wears glasses for reading  Hearing: Within functional limits      Orientation  Overall Orientation Status: Within Functional Limits        Balance  Standing Balance:  (Min A - static, NWB RLE; Mod A - dynamic (simulated pullup of pants by alternating hand at center of walker))    Toilet Transfers  Toilet - Technique: Stand pivot  Equipment Used: Standard bedside commode (simulated w/ bedside chair)  Toilet Transfer: Minimal assistance    ADL  LE Dressing: Maximum assistance (assist to thread feet, Mod A for standing balance while maintaining NWB status)     Tone RUE  RUE Tone: Normotonic  Tone LUE  LUE Tone: Normotonic  Coordination  Movements Are Fluid And Coordinated: Yes        Bed mobility  Supine to Sit: Stand by assistance (HOB elevated, bedrail)     Transfers  Stand Pivot Transfers: Minimal assistance (bed > chair/simulated BSC; pivoting on L foot, assist for walker negotiation, cues for NWB status)  Sit to stand: Minimal assistance (from bed, chair)  Stand to sit: Minimal assistance (cues to reach both hands back to chair)  Transfer Comments: Note: demo understanding of NWB status        Cognition  Overall Cognitive Status: [facility]              Exercises  Chair Push-ups: x 5, NWB RLE       LUE AROM (degrees)  LUE AROM : WFL  Left Hand AROM (degrees)  Left Hand AROM: WFL  RUE AROM (degrees)  RUE AROM : WFL  Right Hand AROM (degrees)  Right Hand AROM: WFL  LUE Strength  Gross LUE Strength: WFL  RUE Strength  Gross RUE Strength: WFL               Pt seen by OT for eval and treat.  Treatment included: bed mobility, functional transfer, ADL, pt education         Plan   Plan  Times per week: 5-7  Times per day: Daily  Current Treatment Recommendations: Sushant Garcia Mobility Training,Endurance Training,Safety Education & Training,Equipment Evaluation, Education, & procurement,Self-Care / ADL                                                      AM-PAC Score        AM-[facility] Inpatient Daily Activity Raw Score: 17 (01/20/22 0904)  AM-PAC Inpatient ADL T-Scale Score : 37.26 (01/20/22 0904)  ADL Inpatient CMS 0-100% Score: 50.11 (01/20/22 0904)  ADL Inpatient CMS G-Code Modifier : CK (01/20/22 0904)    Goals  Short term goals  Time Frame for Short term goals: Discharge  Short term goal 1: BSC transfer w/ CGA, NWB RLE  Short term goal 2: LB dressing using AE prn, Min A while maintaining NWB RLE  Short term goal 3: chair pushups x 20, NWB RLE  Short term goal 4: increase functional standing tolerance to 5 minutes, NWB RLE  Patient Goals   Patient goals : to be independent       Therapy Time   Individual Concurrent Group Co-treatment   Time In 0808         Time Out 0846         Minutes 38           Timed Code Treatment Minutes:   23    Total Treatment Minutes:  [facility] OTR/L #[id number]

## 2022-01-20 NOTE — PROGRESS NOTES
Hospitalist Progress Note      PCP: Olya Horvath MD    Date of Admission: 1/19/2022    Reason for consult:   Medical evaluation and recommendations for diabetes mellitus, coronary artery disease    Subjective:   Feels pretty good. Reports she has been on a little bit of oxygen since surgery but not feeling sob, has some intermittent cough. No fever or chest pain. Has never been evaluated for MATIAS. PO intake is good. Medications:  Reviewed    Infusion Medications    lactated ringers      lactated ringers 125 mL/hr at 01/19/22 0646    sodium chloride 125 mL/hr at 01/19/22 1236    sodium chloride      dextrose       Scheduled Medications    clopidogrel  75 mg Oral Daily    DULoxetine  30 mg Oral Daily    furosemide  40 mg Oral Daily    gabapentin  600 mg Oral TID    isosorbide mononitrate  30 mg Oral Daily    levothyroxine  50 mcg Oral Daily    metoprolol tartrate  50 mg Oral BID    therapeutic multivitamin-minerals  1 tablet Oral Daily    sodium chloride flush  5-40 mL IntraVENous 2 times per day    insulin glargine  66 Units SubCUTAneous Nightly    insulin lispro  9 Units SubCUTAneous TID WC    insulin lispro  0-12 Units SubCUTAneous TID WC    insulin lispro  0-6 Units SubCUTAneous Nightly     PRN Meds: albuterol, sodium chloride flush, sodium chloride, ondansetron **OR** ondansetron, HYDROcodone 5 mg - acetaminophen **OR** HYDROcodone 5 mg - acetaminophen, morphine **OR** morphine, glucose, glucagon (rDNA), dextrose, dextrose bolus (hypoglycemia)      Intake/Output Summary (Last 24 hours) at 1/20/2022 1837  Last data filed at 1/20/2022 1619  Gross per 24 hour   Intake 459.16 ml   Output 1100 ml   Net -640.84 ml       Exam:    /66   Pulse 62   Temp 97.8 °F (36.6 °C) (Oral)   Resp 16   Ht 5' 4\" (1.626 m)   Wt 235 lb (106.6 kg)   SpO2 94%   BMI 40.34 kg/m²     General appearance: No apparent distress, appears stated age and cooperative. Obese.   HEENT: Pupils equal, round, and reactive to light. Conjunctivae/corneas clear. Neck: Supple, with full range of motion. No jugular venous distention. Trachea midline. Respiratory:  Normal respiratory effort. Clear to auscultation, bilaterally without Rales/Wheezes/Rhonchi. Cardiovascular: Regular rate and rhythm with normal S1/S2 without murmurs, rubs or gallops. Abdomen: Soft, non-tender, non-distended with normal bowel sounds. Musculoskelatal: No clubbing, cyanosis or edema bilaterally. Skin: Skin color, texture, turgor normal.  No rashes or lesions. Neurologic:  Cranial nerves: II-XII intact, grossly non-focal.  Psychiatric: Alert and oriented, thought content appropriate, normal insight    Labs:   No results for input(s): WBC, HGB, HCT, PLT in the last 72 hours. No results for input(s): NA, K, CL, CO2, BUN, CREATININE, CALCIUM, PHOS in the last 72 hours. Invalid input(s): MAGNES  No results for input(s): AST, ALT, BILIDIR, BILITOT, ALKPHOS in the last 72 hours. No results for input(s): INR in the last 72 hours. No results for input(s): Ellene Lazier in the last 72 hours. Studies:  No orders to display       Assessment/Plan:    Active Hospital Problems    Diagnosis Date Noted    Charcot's joint of ankle, right [M14.671] 01/19/2022       Coronary artery disease  With latest PCI in October 2020  Stable, continue on home Plavix Imdur Lopressor, patient not on statin due to allergy or intolerance     Diabetes mellitus  Insulin sliding scale, Premeal insulin, Lantus, monitor blood sugars, she does take 70/30 insulin at home 55 units twice daily  -continue current regiment     Status post foot surgery  Management as per Ortho     Morbid obesity with a BMI of 40.34    DVT Prophylaxis: Per primary  Diet: ADULT DIET; Regular; 4 carb choices (60 gm/meal);  Low Sodium (2 gm)  Code Status: Full Code    PT/OT Eval Status:     Dispo - Inpatient pending placement    Frida Martino DO

## 2022-01-20 NOTE — CARE COORDINATION
Social Work Note                    Date: 1/20/2022     Patient Name: Susy Carvalho    Date of Admission: 1/19/2022  5:52 AM  YOB: 1945    Length of Stay: 1  GMLOS: 4.6           Diagnosis:Charcot's joint of right foot [M14.671]  Charcot's joint of ankle, right [M14.671]     ________________________________________________________________________________________  Discharge Plan: SNF: referrals pending. Insurance: Payor: Narcisa Crain / Plan: Malorie Mann ESSENTIAL/PLUS / Product Type: *No Product type* /   Pre-cert needed for placement?: waived? Pre-cert initiated?: n/a    Tentative ischarge date: 1/19    Current barriers: Therapy in AM, Facility Acceptance. Referrals completed: SNF: [facility]     Resources/information provided: [facility] List   ________________________________________________________________________________________  PT AM-PAC: 15 / 24     OT AM-PAC: 16 / 24     DME Needs for discharge: n/a   ________________________________________________________________________________________  Notes  SW met with patient at bedside. SW provided update regarding [facility]. Patient requested a referral to [facility] as first choice. SW placed call to Connie Claytonin at [facility], [phone] at 12:11 pm.Sky reported that are looking at bed needs right now. Asked for SW to fax referral for review and he could follow up. CARMEN faxed referral [phone]. Fax sent at 12:17 pm    CARMEN placed call [facility] at [phone] at 12:18 pm CARMEN LVM with [facility] regarding placement. CARMEN placed call to [facility] at ([phone]). At 12:20 pm. CARMEN LVM with Apple Cabrera in admissions requesting a return call. UPDATE: [facility] called and reported new COVID cases and putting hold on admissions, they are unable to accept. UPDATE: 2:00 pm   CARMEN spoke with patient and daughter at bedside and provided updated.  They asked for referrals to [facility] and [facility]. SW also placed referral to Ariel Coughlin. CARMEN LVM with DENZEL at [facility] at 3:13 pm requesting status update. LVM and number. SW placed call to [facility] ([phone]) at 3:17 pm.  They reported having beds and had SW hard fax/secure e-mail referral to [email or web address]     UPDATE: 5:01 PM   SW spoke with [place] at [facility]. They can admit tomorrow. SW to update patient at bedside.      Patient and/or family were provided with choice of provider?: yes     Patient and/or family are in agreement with the discharge plan at this time?: yes     Care Transition Patient: No    ZOHAIB oMore  Social Work  [facility] [employer]  Ph: [phone]

## 2022-01-20 NOTE — PLAN OF CARE
Problem: Falls - Risk of:  Goal: Will remain free from falls  Description: Will remain free from falls  1/20/2022 0215 by Monique Saavedra RN  Outcome: Ongoing  1/19/2022 1605 by Bacilio Roman RN  Outcome: Ongoing     Problem: Skin Integrity:  Goal: Will show no infection signs and symptoms  Description: Will show no infection signs and symptoms  1/20/2022 0215 by Monique Saavedra RN  Outcome: Ongoing  1/19/2022 1605 by Bacilio Roman RN  Outcome: Ongoing     Problem: OXYGENATION/RESPIRATORY FUNCTION  Goal: Patient will maintain patent airway  1/19/2022 1605 by Bacilio Roman RN  Outcome: Ongoing     Problem: HEMODYNAMIC STATUS  Goal: Patient has stable vital signs and fluid balance  1/19/2022 1605 by Bacilio Roman RN  Outcome: Ongoing     Problem: ACTIVITY INTOLERANCE/IMPAIRED MOBILITY  Goal: Mobility/activity is maintained at optimum level for patient  1/19/2022 1605 by Bacilio Roman RN  Outcome: Ongoing

## 2022-01-20 NOTE — PLAN OF CARE
Problem: Falls - Risk of:  Goal: Will remain free from falls  Description: Will remain free from falls  1/20/2022 8569 by Fidencio Cain RN  Outcome: Ongoing  1/20/2022 0215 by Migel Shepard RN  Outcome: Ongoing     Pt remaining free from falls    Problem: Skin Integrity:  Goal: Will show no infection signs and symptoms  Description: Will show no infection signs and symptoms  1/20/2022 3839 by Fidencio Cain RN  Outcome: Ongoing  1/20/2022 0215 by Migel Shepard RN  Outcome: Ongoing     R foot incision site showing no s/s of infection, skin otherwise shows no s/s of infection as well

## 2022-01-21 VITALS
SYSTOLIC BLOOD PRESSURE: 128 MMHG | BODY MASS INDEX: 40.12 KG/M2 | DIASTOLIC BLOOD PRESSURE: 68 MMHG | RESPIRATION RATE: 16 BRPM | WEIGHT: 235 LBS | TEMPERATURE: 98 F | HEART RATE: 71 BPM | OXYGEN SATURATION: 95 % | HEIGHT: 64 IN

## 2022-01-21 LAB
GLUCOSE BLD-MCNC: 140 MG/DL (ref 70–99)
GLUCOSE BLD-MCNC: 157 MG/DL (ref 70–99)
GLUCOSE BLD-MCNC: 195 MG/DL (ref 70–99)
PERFORMED ON: ABNORMAL
SARS-COV-2, NAAT: NOT DETECTED

## 2022-01-21 PROCEDURE — 87635 SARS-COV-2 COVID-19 AMP PRB: CPT

## 2022-01-21 PROCEDURE — 2580000003 HC RX 258: Performed by: ORTHOPAEDIC SURGERY

## 2022-01-21 PROCEDURE — G0378 HOSPITAL OBSERVATION PER HR: HCPCS

## 2022-01-21 PROCEDURE — 6370000000 HC RX 637 (ALT 250 FOR IP): Performed by: ORTHOPAEDIC SURGERY

## 2022-01-21 RX ORDER — IPRATROPIUM BROMIDE AND ALBUTEROL SULFATE 2.5; .5 MG/3ML; MG/3ML
1 SOLUTION RESPIRATORY (INHALATION) EVERY 6 HOURS PRN
Qty: 360 ML | Refills: 0
Start: 2022-01-21 | End: 2022-07-21

## 2022-01-21 RX ORDER — AZITHROMYCIN 250 MG/1
TABLET, FILM COATED ORAL
Qty: 1 PACKET | Refills: 0
Start: 2022-01-21 | End: 2022-01-31

## 2022-01-21 RX ORDER — HYDROCODONE BITARTRATE AND ACETAMINOPHEN 5; 325 MG/1; MG/1
1-2 TABLET ORAL
Qty: 40 TABLET | Refills: 0 | Status: SHIPPED | OUTPATIENT
Start: 2022-01-21 | End: 2022-01-28

## 2022-01-21 RX ADMIN — CLOPIDOGREL BISULFATE 75 MG: 75 TABLET ORAL at 09:32

## 2022-01-21 RX ADMIN — ISOSORBIDE MONONITRATE 30 MG: 30 TABLET, EXTENDED RELEASE ORAL at 09:31

## 2022-01-21 RX ADMIN — SODIUM CHLORIDE, PRESERVATIVE FREE 10 ML: 5 INJECTION INTRAVENOUS at 09:32

## 2022-01-21 RX ADMIN — HYDROCODONE BITARTRATE AND ACETAMINOPHEN 1 TABLET: 5; 325 TABLET ORAL at 09:31

## 2022-01-21 RX ADMIN — INSULIN LISPRO 2 UNITS: 100 INJECTION, SOLUTION INTRAVENOUS; SUBCUTANEOUS at 09:29

## 2022-01-21 RX ADMIN — MULTIPLE VITAMINS W/ MINERALS TAB 1 TABLET: TAB at 09:31

## 2022-01-21 RX ADMIN — FUROSEMIDE 40 MG: 40 TABLET ORAL at 09:32

## 2022-01-21 RX ADMIN — INSULIN LISPRO 9 UNITS: 100 INJECTION, SOLUTION INTRAVENOUS; SUBCUTANEOUS at 09:29

## 2022-01-21 RX ADMIN — DULOXETINE HYDROCHLORIDE 30 MG: 30 CAPSULE, DELAYED RELEASE ORAL at 09:31

## 2022-01-21 RX ADMIN — GABAPENTIN 600 MG: 600 TABLET, FILM COATED ORAL at 09:31

## 2022-01-21 RX ADMIN — INSULIN LISPRO 2 UNITS: 100 INJECTION, SOLUTION INTRAVENOUS; SUBCUTANEOUS at 12:12

## 2022-01-21 RX ADMIN — LEVOTHYROXINE SODIUM 50 MCG: 0.05 TABLET ORAL at 06:57

## 2022-01-21 RX ADMIN — INSULIN LISPRO 9 UNITS: 100 INJECTION, SOLUTION INTRAVENOUS; SUBCUTANEOUS at 12:12

## 2022-01-21 RX ADMIN — METOPROLOL TARTRATE 50 MG: 50 TABLET, FILM COATED ORAL at 09:31

## 2022-01-21 ASSESSMENT — PAIN SCALES - GENERAL: PAINLEVEL_OUTOF10: 4

## 2022-01-21 NOTE — PROGRESS NOTES
Ortho Progress Note     POD 2 s/p multiple midfoot arthrodesis for Charcot deformity correction and gastrocnemius recession. She has no complaints at this time.       Dressings clean, dry and intact. Good capillary refill in all digits.     Continue NWB on operative extremity. PT/OT. D/c to SNF today. She will need to follow up in the office early next week for cast application.

## 2022-01-21 NOTE — PLAN OF CARE
Problem: Falls - Risk of:  Goal: Will remain free from falls  Description: Will remain free from falls  1/21/2022 0756 by Arminda Boeck, RN  Outcome: Ongoing  1/20/2022 2219 by Laura Kunz RN  Outcome: Ongoing   Pt remains free from falls    Problem: Skin Integrity:  Goal: Will show no infection signs and symptoms  Description: Will show no infection signs and symptoms  1/21/2022 0756 by Arminda Boeck, RN  Outcome: Ongoing  1/20/2022 2219 by Laura Kunz RN  Outcome: Ongoing   Pt skin shows no s/s of infection

## 2022-01-21 NOTE — PROGRESS NOTES
Pt ready for DC. Pt A&O x4. VSS. No significant events this shift. Pt IV taken out. Pt verbalized no other needs at this time. AVS and MAR report given to EMS. Pt belongings with pt. Pt taken by stretcher via EMS. Pt discharged.

## 2022-01-21 NOTE — CARE COORDINATION
Patient accepted by [facility] yesterday at 5:01 pm. SW notified patient at bedside. SW worked to set up transport for patient today.      SW called and notified [facility]:     ZOHAIB [place], [place]  [employer]   Ph: [phone]

## 2022-01-21 NOTE — PROGRESS NOTES
Hospitalist Progress Note      PCP: Aureliano Thayer MD    Date of Admission: 1/19/2022    Reason for consult:   Medical evaluation and recommendations for diabetes mellitus, coronary artery disease    Subjective:   Feels pretty good. Reports she has been on a little bit of oxygen since surgery but not feeling sob, has some intermittent cough. No fever or chest pain. Has never been evaluated for MATIAS. PO intake is good. Medications:  Reviewed    Infusion Medications    lactated ringers      lactated ringers 125 mL/hr at 01/19/22 0646    sodium chloride 125 mL/hr at 01/19/22 1236    sodium chloride      dextrose       Scheduled Medications    clopidogrel  75 mg Oral Daily    DULoxetine  30 mg Oral Daily    furosemide  40 mg Oral Daily    gabapentin  600 mg Oral TID    isosorbide mononitrate  30 mg Oral Daily    levothyroxine  50 mcg Oral Daily    metoprolol tartrate  50 mg Oral BID    therapeutic multivitamin-minerals  1 tablet Oral Daily    sodium chloride flush  5-40 mL IntraVENous 2 times per day    insulin glargine  66 Units SubCUTAneous Nightly    insulin lispro  9 Units SubCUTAneous TID WC    insulin lispro  0-12 Units SubCUTAneous TID WC    insulin lispro  0-6 Units SubCUTAneous Nightly     PRN Meds: albuterol, sodium chloride flush, sodium chloride, ondansetron **OR** ondansetron, HYDROcodone 5 mg - acetaminophen **OR** HYDROcodone 5 mg - acetaminophen, morphine **OR** morphine, glucose, glucagon (rDNA), dextrose, dextrose bolus (hypoglycemia)      Intake/Output Summary (Last 24 hours) at 1/21/2022 1134  Last data filed at 1/21/2022 0826  Gross per 24 hour   Intake 360 ml   Output 850 ml   Net -490 ml       Exam:    /68   Pulse 71   Temp 98 °F (36.7 °C) (Oral)   Resp 16   Ht 5' 4\" (1.626 m)   Wt 235 lb (106.6 kg)   SpO2 95%   BMI 40.34 kg/m²     General appearance: No apparent distress, appears stated age and cooperative. Obese.   HEENT: Pupils equal, round, and reactive to light. Conjunctivae/corneas clear. Neck: Supple, with full range of motion. No jugular venous distention. Trachea midline. Respiratory:  Normal respiratory effort. Clear to auscultation, bilaterally without Rales/Wheezes/Rhonchi. Cardiovascular: Regular rate and rhythm with normal S1/S2 without murmurs, rubs or gallops. Abdomen: Soft, non-tender, non-distended with normal bowel sounds. Musculoskelatal: No clubbing, cyanosis or edema bilaterally. Skin: Skin color, texture, turgor normal.  No rashes or lesions. Neurologic:  Cranial nerves: II-XII intact, grossly non-focal.  Psychiatric: Alert and oriented, thought content appropriate, normal insight    Labs:   No results for input(s): WBC, HGB, HCT, PLT in the last 72 hours. No results for input(s): NA, K, CL, CO2, BUN, CREATININE, CALCIUM, PHOS in the last 72 hours. Invalid input(s): MAGNES  No results for input(s): AST, ALT, BILIDIR, BILITOT, ALKPHOS in the last 72 hours. No results for input(s): INR in the last 72 hours. No results for input(s): Stephanie Na in the last 72 hours. Studies:  No orders to display       Assessment/Plan:    Active Hospital Problems    Diagnosis Date Noted    Charcot's joint of ankle, right [M14.671] 01/19/2022       Coronary artery disease  With latest PCI in October 2020  Stable, continue on home Plavix Imdur Lopressor, patient not on statin due to allergy or intolerance     Diabetes mellitus  Insulin sliding scale, Premeal insulin, Lantus, monitor blood sugars, she does take 70/30 insulin at home 55 units twice daily  -continue current regiment     Status post foot surgery  Management as per Ortho     Morbid obesity with a BMI of 40.34    DVT Prophylaxis: Per primary  Diet: ADULT DIET; Regular; 4 carb choices (60 gm/meal);  Low Sodium (2 gm)  Code Status: Full Code    PT/OT Eval Status:     Dispo - Inpatient pending placement    Frida Martino DO

## 2022-01-21 NOTE — CARE COORDINATION
Case Management            Discharge Note                    Date / Time of Note: 1/21/2022 8:45 AM                  Discharge Note Completed by: ZOHAIB Ferrara    Patient Name: Odette Goldberg   YOB: 1945  Diagnosis: Charcot's joint of right foot [M14.671]  Charcot's joint of ankle, right [M14.671]   Date / Time: 1/19/2022  5:52 AM    Current PCP: James Hall MD  Clinic patient: No    Hospitalization in the last 30 days: Yes    Advance Directives:  Code Status: Full Code  [place] DNR form completed and on chart: Not Indicated    Financial:  Payor: Verónica Crews / Plan: Umang Dupree ESSENTIAL/PLUS / Product Type: *No Product type* /      Pharmacy:    [address]  [address] [facility]  Phone: [phone] Fax: [phone]      Assistance purchasing medications?: Potential Assistance Purchasing Medications: Yes  Assistance provided by Case Management: None at this time    Does patient want to participate in local refill/ meds to beds program?:      Meds To Beds General Rules:  1. Can ONLY be done Monday- Friday between 8:30am-5pm  2. Prescription(s) must be in pharmacy by 3pm to be filled same day  3. Copy of patient's insurance/ prescription drug card and patient face sheet must be sent along with the prescription(s)  4. Cost of Rx cannot be added to hospital bill. If financial assistance is needed, please contact unit  or ;  or  CANNOT provide pharmacy voucher for patients co-pays  5.  Patients can then  the prescription on their way out of the hospital at discharge, or pharmacy can deliver to the bedside if staff is available. (payment due at time of pick-up or delivery - cash, check, or card accepted)     Able to afford home medications/ co-pay costs: Yes    ADLS:  Current PT AM-PAC Score: 13 /24  Current OT AM-PAC Score: 17 /24      Discharge Disposition: [facility] (SNF):   [address], [place], [facility]  Phone: [phone]   Fax: [phone]    LOC at discharge: Skilled  LIBAN Completed: Yes    Notification completed in HENS/PAS?:  Yes : CM has completed HENS online through secure website for SNF admission at [facility] . Document ID #: [id number]    IMM Completed:   Not Indicated    Transportation:  Transportation Plan for discharge: EMS transportation   Mode of Transport: Ambulance stretcher - BLS    Reason for medical transport: Other: [facility] RLE post surgery 2 person assist   Name of [address]: [address]  Phone: [phone]  Transport Time: 12:00 pm     Transportation form completed: Yes    Referrals made at [facility] for outpatient continued care:  Not Applicable    Additional CM Notes:   Patient medically ready for discharge today. Patient to [facility] today at 12:00 pm. CARMEN called and LVM with Manuel Lucio at [facility] regarding time. HENS completed. CARMEN faxed clinicals. RAPID COVID ordered. Transport form completed. Patient aware and in agreement with plan.      The Plan for Transition of Care is related to the following treatment goals Charcot's joint of right foot [M14.671]  Charcot's joint of ankle, right [M14.671]      The Patient and/or patient representative  was provided with a choice of provider and agrees with the discharge plan Yes    Care Transitions patient: Yes    ZOHAIB Dean  [employer] [address] [facility]  Ph: [phone]

## 2022-01-21 NOTE — PLAN OF CARE
Problem: Falls - Risk of:  Goal: Will remain free from falls  Description: Will remain free from falls  1/20/2022 2219 by Brandi Darby RN  Outcome: Ongoing  1/20/2022 0822 by Gabriela So RN  Outcome: Ongoing     Problem: Skin Integrity:  Goal: Will show no infection signs and symptoms  Description: Will show no infection signs and symptoms  1/20/2022 2219 by Brandi Darby RN  Outcome: Ongoing  1/20/2022 0822 by Gabriela So RN  Outcome: Ongoing     Problem: Musculor/Skeletal Functional Status  Intervention: PT Evaluation/treatment  1/20/2022 0936 by Daniel Hines PT  Note: To increase independence with functional mobility in order to facilitate return to PLOF.

## 2022-01-28 NOTE — DISCHARGE SUMMARY
[place] DISCHARGE SUMMARY         The patient was taken to the operating room on 1/19/2022 where the aforementioned procedure was preformed. The patient was taken to the post operative anesthesia recovery unit in stable condition. The patient was then transferred to the orthopaedic floor for post operative pain management and convalesce       The patient was followed medically in the hospital for the above surgical procedures performed and below medical issues during their hospital stay    Active Problems:    Charcot's joint of ankle, right  Resolved Problems:    * No resolved hospital problems. *         (x )The patient was placed on anticoagulation therapy for DVT prophylaxis       The patient was discharged in stable condition on 1/21/22. Please see medical reconciliation for discharge medications. The discharge instructions were explained to the patient and the family. The patient will follow up in the office at her scheduled post-op appointments.

## 2022-03-29 NOTE — PROGRESS NOTES
1.  Do not eat or drink anything after 12 midnight prior to surgery. This includes no water, chewing gum or mints. 2.  Take the following pills with a small sip of water on the morning of surgery 4-63. Aspirin, Ibuprofen, Advil, Naproxen, Vitamin E and other Anti-inflammatory products should be stopped for 5 days before surgery or as directed by your physician. 4.  Check with your doctor regarding stopping Plavix, Coumadin, Lovenox, Fragmin or other blood thinners. 5.  Do not smoke and do not drink alcoholic beverages 24 hours prior to surgery. This includes NA Beer. 6.  You may brush your teeth and gargle the morning of surgery. DO NOT SWALLOW WATER.  7.  You MUST make arrangements for a responsible adult to take you home after your surgery. You will not be allowed to leave alone or drive yourself home. It is strongly suggested someone stay with you the first 24 hours. Your surgery will be cancelled if you do not have a ride home. 8.  A parent/legal guardian must accompany a child scheduled for surgery and plan to stay at the hospital until the child is discharged. Please do not bring other children with you. 9.  Please wear simple, loose fitting clothing to the hospital.  Jose Ramon Solid not bring valuables ( money, credit cards, checkbooks, etc.)  Do not wear any makeup (including no eye makeup) or nail polish on your fingers or toes. 10.  Do not wear any jewelry or piercing on the day of surgery. All body piercing jewelry must be removed. 11.  If you have dentures, they will be removed before going to the OR; we will provide you a container. If you wear contact lenses or glasses, they will be removed; please bring a case for them. 12.  Please see your family doctor/pediatrician for a history & physical and/or concerning medications. Bring any test results/reports from your physician's office the day of surgery. 15.  Remember to bring Blood Bank Bracelet to the hospital on the day of surgery.   14.  If you have a Living Will and Durable Power of  for Healthcare, please bring in a copy. 13.  Notify your Surgeon if you develop any illness between now and surgery time; cough, cold, fever, sore throat, nausea, vomiting, etc.  Please notify your surgeon if you experience dizziness, shortness of breath or blurred vision between now and the time of your surgery. 16.  DO NOT shave your operative site 96 hours (4 days) prior to surgery. For face and neck surgery, men may use an electric razor 48 hours (2 days) prior to surgery. 17. Shower the night before surgery and the morning of surgery with  an antibacterial soap   or  Chlorhexidine gluconate (for total joint replacement). To provide excellent care, visitors will be limited to two in a room at any given time. Please no children under the age of 15 in the surgical department.

## 2022-04-05 ENCOUNTER — ANESTHESIA EVENT (OUTPATIENT)
Dept: OPERATING ROOM | Age: 77
End: 2022-04-05
Payer: MEDICARE

## 2022-04-06 ENCOUNTER — HOSPITAL ENCOUNTER (OUTPATIENT)
Age: 77
Setting detail: OUTPATIENT SURGERY
Discharge: HOME | End: 2022-04-06
Attending: UROLOGY | Admitting: UROLOGY
Payer: MEDICARE

## 2022-04-06 ENCOUNTER — ANESTHESIA (OUTPATIENT)
Dept: OPERATING ROOM | Age: 77
End: 2022-04-06
Payer: MEDICARE

## 2022-04-06 VITALS
HEIGHT: 64 IN | DIASTOLIC BLOOD PRESSURE: 68 MMHG | RESPIRATION RATE: 18 BRPM | TEMPERATURE: 97.3 F | SYSTOLIC BLOOD PRESSURE: 168 MMHG | OXYGEN SATURATION: 94 % | BODY MASS INDEX: 39.09 KG/M2 | WEIGHT: 229 LBS | HEART RATE: 68 BPM

## 2022-04-06 VITALS — SYSTOLIC BLOOD PRESSURE: 122 MMHG | DIASTOLIC BLOOD PRESSURE: 56 MMHG | OXYGEN SATURATION: 100 %

## 2022-04-06 DIAGNOSIS — C67.8 MALIGNANT NEOPLASM OF OVERLAPPING SITES OF BLADDER (HCC): Primary | ICD-10-CM

## 2022-04-06 LAB
GLUCOSE BLD-MCNC: 108 MG/DL (ref 70–99)
GLUCOSE BLD-MCNC: 83 MG/DL (ref 70–99)
PERFORMED ON: ABNORMAL
PERFORMED ON: NORMAL

## 2022-04-06 PROCEDURE — 3700000001 HC ADD 15 MINUTES (ANESTHESIA): Performed by: UROLOGY

## 2022-04-06 PROCEDURE — 88307 TISSUE EXAM BY PATHOLOGIST: CPT

## 2022-04-06 PROCEDURE — 7100000001 HC PACU RECOVERY - ADDTL 15 MIN: Performed by: UROLOGY

## 2022-04-06 PROCEDURE — 6360000002 HC RX W HCPCS: Performed by: NURSE ANESTHETIST, CERTIFIED REGISTERED

## 2022-04-06 PROCEDURE — 3700000000 HC ANESTHESIA ATTENDED CARE: Performed by: UROLOGY

## 2022-04-06 PROCEDURE — 2580000003 HC RX 258: Performed by: ANESTHESIOLOGY

## 2022-04-06 PROCEDURE — 3600000004 HC SURGERY LEVEL 4 BASE: Performed by: UROLOGY

## 2022-04-06 PROCEDURE — 3600000014 HC SURGERY LEVEL 4 ADDTL 15MIN: Performed by: UROLOGY

## 2022-04-06 PROCEDURE — 2500000003 HC RX 250 WO HCPCS: Performed by: NURSE ANESTHETIST, CERTIFIED REGISTERED

## 2022-04-06 PROCEDURE — 7100000000 HC PACU RECOVERY - FIRST 15 MIN: Performed by: UROLOGY

## 2022-04-06 PROCEDURE — 7100000011 HC PHASE II RECOVERY - ADDTL 15 MIN: Performed by: UROLOGY

## 2022-04-06 PROCEDURE — 2580000003 HC RX 258: Performed by: NURSE ANESTHETIST, CERTIFIED REGISTERED

## 2022-04-06 PROCEDURE — 2709999900 HC NON-CHARGEABLE SUPPLY: Performed by: UROLOGY

## 2022-04-06 PROCEDURE — 7100000010 HC PHASE II RECOVERY - FIRST 15 MIN: Performed by: UROLOGY

## 2022-04-06 RX ORDER — ONDANSETRON 2 MG/ML
INJECTION INTRAMUSCULAR; INTRAVENOUS PRN
Status: DISCONTINUED | OUTPATIENT
Start: 2022-04-06 | End: 2022-04-06 | Stop reason: SDUPTHER

## 2022-04-06 RX ORDER — SODIUM CHLORIDE 0.9 % (FLUSH) 0.9 %
5-40 SYRINGE (ML) INJECTION EVERY 12 HOURS SCHEDULED
Status: DISCONTINUED | OUTPATIENT
Start: 2022-04-06 | End: 2022-04-06 | Stop reason: HOSPADM

## 2022-04-06 RX ORDER — LABETALOL HYDROCHLORIDE 5 MG/ML
10 INJECTION, SOLUTION INTRAVENOUS
Status: DISCONTINUED | OUTPATIENT
Start: 2022-04-06 | End: 2022-04-06 | Stop reason: HOSPADM

## 2022-04-06 RX ORDER — MEPERIDINE HYDROCHLORIDE 25 MG/ML
12.5 INJECTION INTRAMUSCULAR; INTRAVENOUS; SUBCUTANEOUS EVERY 5 MIN PRN
Status: DISCONTINUED | OUTPATIENT
Start: 2022-04-06 | End: 2022-04-06 | Stop reason: HOSPADM

## 2022-04-06 RX ORDER — GLYCOPYRROLATE 0.2 MG/ML
INJECTION INTRAMUSCULAR; INTRAVENOUS PRN
Status: DISCONTINUED | OUTPATIENT
Start: 2022-04-06 | End: 2022-04-06 | Stop reason: SDUPTHER

## 2022-04-06 RX ORDER — SODIUM CHLORIDE 0.9 % (FLUSH) 0.9 %
5-40 SYRINGE (ML) INJECTION PRN
Status: DISCONTINUED | OUTPATIENT
Start: 2022-04-06 | End: 2022-04-06 | Stop reason: HOSPADM

## 2022-04-06 RX ORDER — PHENAZOPYRIDINE HYDROCHLORIDE 200 MG/1
200 TABLET, FILM COATED ORAL 3 TIMES DAILY PRN
Qty: 15 TABLET | Refills: 0 | Status: SHIPPED | OUTPATIENT
Start: 2022-04-06 | End: 2022-04-11

## 2022-04-06 RX ORDER — OXYCODONE HYDROCHLORIDE 5 MG/1
10 TABLET ORAL PRN
Status: DISCONTINUED | OUTPATIENT
Start: 2022-04-06 | End: 2022-04-06 | Stop reason: HOSPADM

## 2022-04-06 RX ORDER — SODIUM CHLORIDE, SODIUM LACTATE, POTASSIUM CHLORIDE, CALCIUM CHLORIDE 600; 310; 30; 20 MG/100ML; MG/100ML; MG/100ML; MG/100ML
INJECTION, SOLUTION INTRAVENOUS CONTINUOUS PRN
Status: DISCONTINUED | OUTPATIENT
Start: 2022-04-06 | End: 2022-04-06 | Stop reason: SDUPTHER

## 2022-04-06 RX ORDER — GENTAMICIN SULFATE 80 MG/100ML
80 INJECTION, SOLUTION INTRAVENOUS ONCE
Status: DISCONTINUED | OUTPATIENT
Start: 2022-04-06 | End: 2022-04-06 | Stop reason: HOSPADM

## 2022-04-06 RX ORDER — GENTAMICIN SULFATE 80 MG/100ML
INJECTION, SOLUTION INTRAVENOUS
Status: DISCONTINUED
Start: 2022-04-06 | End: 2022-04-06 | Stop reason: HOSPADM

## 2022-04-06 RX ORDER — ROCURONIUM BROMIDE 10 MG/ML
INJECTION, SOLUTION INTRAVENOUS PRN
Status: DISCONTINUED | OUTPATIENT
Start: 2022-04-06 | End: 2022-04-06 | Stop reason: SDUPTHER

## 2022-04-06 RX ORDER — SODIUM CHLORIDE 9 MG/ML
25 INJECTION, SOLUTION INTRAVENOUS PRN
Status: DISCONTINUED | OUTPATIENT
Start: 2022-04-06 | End: 2022-04-06 | Stop reason: HOSPADM

## 2022-04-06 RX ORDER — NITROFURANTOIN MACROCRYSTALS 50 MG/1
50 CAPSULE ORAL 2 TIMES DAILY
Qty: 20 CAPSULE | Refills: 0 | Status: SHIPPED | OUTPATIENT
Start: 2022-04-06 | End: 2022-04-16

## 2022-04-06 RX ORDER — SODIUM CHLORIDE, SODIUM LACTATE, POTASSIUM CHLORIDE, CALCIUM CHLORIDE 600; 310; 30; 20 MG/100ML; MG/100ML; MG/100ML; MG/100ML
INJECTION, SOLUTION INTRAVENOUS CONTINUOUS
Status: DISCONTINUED | OUTPATIENT
Start: 2022-04-06 | End: 2022-04-06 | Stop reason: HOSPADM

## 2022-04-06 RX ORDER — PROPOFOL 10 MG/ML
INJECTION, EMULSION INTRAVENOUS PRN
Status: DISCONTINUED | OUTPATIENT
Start: 2022-04-06 | End: 2022-04-06 | Stop reason: SDUPTHER

## 2022-04-06 RX ORDER — ONDANSETRON 2 MG/ML
4 INJECTION INTRAMUSCULAR; INTRAVENOUS
Status: DISCONTINUED | OUTPATIENT
Start: 2022-04-06 | End: 2022-04-06 | Stop reason: HOSPADM

## 2022-04-06 RX ORDER — DIPHENHYDRAMINE HYDROCHLORIDE 50 MG/ML
12.5 INJECTION INTRAMUSCULAR; INTRAVENOUS
Status: DISCONTINUED | OUTPATIENT
Start: 2022-04-06 | End: 2022-04-06 | Stop reason: HOSPADM

## 2022-04-06 RX ORDER — OXYCODONE HYDROCHLORIDE 5 MG/1
5 TABLET ORAL PRN
Status: DISCONTINUED | OUTPATIENT
Start: 2022-04-06 | End: 2022-04-06 | Stop reason: HOSPADM

## 2022-04-06 RX ORDER — LIDOCAINE HYDROCHLORIDE 10 MG/ML
0.3 INJECTION, SOLUTION EPIDURAL; INFILTRATION; INTRACAUDAL; PERINEURAL
Status: DISCONTINUED | OUTPATIENT
Start: 2022-04-06 | End: 2022-04-06 | Stop reason: HOSPADM

## 2022-04-06 RX ORDER — LIDOCAINE HYDROCHLORIDE 20 MG/ML
INJECTION, SOLUTION INFILTRATION; PERINEURAL PRN
Status: DISCONTINUED | OUTPATIENT
Start: 2022-04-06 | End: 2022-04-06 | Stop reason: SDUPTHER

## 2022-04-06 RX ORDER — OXYCODONE HYDROCHLORIDE AND ACETAMINOPHEN 5; 325 MG/1; MG/1
0.5 TABLET ORAL EVERY 4 HOURS PRN
Qty: 10 TABLET | Refills: 0 | Status: SHIPPED | OUTPATIENT
Start: 2022-04-06 | End: 2022-04-11

## 2022-04-06 RX ORDER — GENTAMICIN SULFATE 40 MG/ML
INJECTION, SOLUTION INTRAMUSCULAR; INTRAVENOUS PRN
Status: DISCONTINUED | OUTPATIENT
Start: 2022-04-06 | End: 2022-04-06 | Stop reason: SDUPTHER

## 2022-04-06 RX ADMIN — SODIUM CHLORIDE, POTASSIUM CHLORIDE, SODIUM LACTATE AND CALCIUM CHLORIDE: 600; 310; 30; 20 INJECTION, SOLUTION INTRAVENOUS at 14:58

## 2022-04-06 RX ADMIN — SUGAMMADEX 100 MG: 100 INJECTION, SOLUTION INTRAVENOUS at 15:39

## 2022-04-06 RX ADMIN — ROCURONIUM BROMIDE 5 MG: 10 INJECTION, SOLUTION INTRAVENOUS at 15:18

## 2022-04-06 RX ADMIN — PROPOFOL 100 MG: 10 INJECTION, EMULSION INTRAVENOUS at 15:02

## 2022-04-06 RX ADMIN — GLYCOPYRROLATE 0.2 MG: 0.2 INJECTION, SOLUTION INTRAMUSCULAR; INTRAVENOUS at 15:13

## 2022-04-06 RX ADMIN — GENTAMICIN SULFATE 80 MG: 40 INJECTION, SOLUTION INTRAMUSCULAR; INTRAVENOUS at 14:58

## 2022-04-06 RX ADMIN — LIDOCAINE HYDROCHLORIDE 100 MG: 20 INJECTION, SOLUTION INFILTRATION; PERINEURAL at 15:09

## 2022-04-06 RX ADMIN — ONDANSETRON HYDROCHLORIDE 4 MG: 2 INJECTION, SOLUTION INTRAMUSCULAR; INTRAVENOUS at 15:07

## 2022-04-06 RX ADMIN — SODIUM CHLORIDE, POTASSIUM CHLORIDE, SODIUM LACTATE AND CALCIUM CHLORIDE: 600; 310; 30; 20 INJECTION, SOLUTION INTRAVENOUS at 13:39

## 2022-04-06 RX ADMIN — LIDOCAINE HYDROCHLORIDE 20 MG: 20 INJECTION, SOLUTION INFILTRATION; PERINEURAL at 15:02

## 2022-04-06 RX ADMIN — ROCURONIUM BROMIDE 10 MG: 10 INJECTION, SOLUTION INTRAVENOUS at 15:02

## 2022-04-06 ASSESSMENT — PULMONARY FUNCTION TESTS
PIF_VALUE: 2
PIF_VALUE: 12
PIF_VALUE: 1
PIF_VALUE: 12
PIF_VALUE: 13
PIF_VALUE: 12
PIF_VALUE: 0
PIF_VALUE: 13
PIF_VALUE: 12
PIF_VALUE: 0
PIF_VALUE: 0
PIF_VALUE: 13
PIF_VALUE: 0
PIF_VALUE: 4
PIF_VALUE: 12
PIF_VALUE: 13
PIF_VALUE: 13
PIF_VALUE: 0
PIF_VALUE: 12
PIF_VALUE: 1
PIF_VALUE: 25
PIF_VALUE: 4
PIF_VALUE: 13
PIF_VALUE: 4
PIF_VALUE: 0
PIF_VALUE: 11
PIF_VALUE: 12
PIF_VALUE: 13
PIF_VALUE: 12
PIF_VALUE: 1
PIF_VALUE: 12
PIF_VALUE: 0
PIF_VALUE: 12
PIF_VALUE: 12
PIF_VALUE: 0
PIF_VALUE: 12
PIF_VALUE: 12
PIF_VALUE: 13
PIF_VALUE: 4
PIF_VALUE: 12
PIF_VALUE: 5
PIF_VALUE: 12
PIF_VALUE: 0
PIF_VALUE: 12
PIF_VALUE: 3
PIF_VALUE: 11
PIF_VALUE: 1
PIF_VALUE: 12
PIF_VALUE: 15
PIF_VALUE: 4
PIF_VALUE: 1

## 2022-04-06 ASSESSMENT — PAIN SCALES - GENERAL
PAINLEVEL_OUTOF10: 0
PAINLEVEL_OUTOF10: 0

## 2022-04-06 ASSESSMENT — PAIN - FUNCTIONAL ASSESSMENT: PAIN_FUNCTIONAL_ASSESSMENT: 0-10

## 2022-04-06 NOTE — PROGRESS NOTES
Patient discharged via wheelchair in stable condition with all belongings to private car. Luis Castillo RN

## 2022-04-06 NOTE — PROGRESS NOTES
Instructed patient and patients daughter how to discontinue catheter in the am.  Both understand how to discontinue riggins catheter. Perla Killian RN

## 2022-04-06 NOTE — PROGRESS NOTES
Discharge instructions given to patient and patients daughter. Both deny any questions at this time. Amber Sow RN

## 2022-04-06 NOTE — PROGRESS NOTES
Patient admitted from OR to PACU. Bedside report received. Patient immediately hooked up to heart monitor. Po Mondragon RN

## 2022-04-06 NOTE — ANESTHESIA PRE PROCEDURE
Department of Anesthesiology  Preprocedure Note       Name:  Sinai Barrier   Age:  78 y.o.  :  1945                                          MRN:  [id number]         Date:  2022      Surgeon: Georgiana Norris):  Mario Henriquez MD    Procedure: Procedure(s):  CYSTOSCOPY TRANSURETHRAL RESECTION BLADDER TUMOR    Medications prior to admission:   Prior to Admission medications    Medication Sig Start Date End Date Taking? Authorizing Provider   ipratropium-albuterol (DUONEB) 0.5-2.5 (3) MG/3ML SOLN nebulizer solution Inhale 3 mLs into the lungs every 6 hours as needed for Shortness of Breath or Other (wheezing) 22  Frida Martino DO   levothyroxine (SYNTHROID) 50 MCG tablet Take 50 mcg by mouth Daily    Historical Provider, MD   clopidogrel (PLAVIX) 75 MG tablet Take 75 mg by mouth daily    Historical Provider, MD   gabapentin (NEURONTIN) 600 MG tablet Take 600 mg by mouth 3 times daily.     Historical Provider, MD   DULoxetine (CYMBALTA) 30 MG extended release capsule Take 30 mg by mouth daily    Historical Provider, MD   isosorbide mononitrate (IMDUR) 30 MG extended release tablet Take 30 mg by mouth daily    Historical Provider, MD   furosemide (LASIX) 40 MG tablet Take 40 mg by mouth daily    Historical Provider, MD   Turmeric 500 MG TABS Take 1,500 mg by mouth 3 times daily  Patient not taking: Reported on 3/29/2022    Historical Provider, MD   Multiple Vitamins-Minerals (THERAPEUTIC MULTIVITAMIN-MINERALS) tablet Take 1 tablet by mouth daily    Historical Provider, MD   insulin lispro protamine & lispro (HUMALOG MIX) (75-25) 100 UNIT per ML SUSP injection vial Inject 55 Units into the skin 2 times daily (with meals)     Historical Provider, MD   albuterol sulfate  (90 Base) MCG/ACT inhaler Inhale 2 puffs into the lungs every 4 hours as needed for Wheezing or Shortness of Breath 20   Jonas Leach MD   metoprolol tartrate (LOPRESSOR) 50 MG tablet Take 1 tablet by mouth 2 times daily 12/16/20   Aby Logan MD       Current medications:    Current Facility-Administered Medications   Medication Dose Route Frequency Provider Last Rate Last Admin    lidocaine PF 1 % injection 0.3 mL  0.3 mL IntraDERmal Once PRN Niraj Reynolds MD        lactated ringers infusion   IntraVENous Continuous Niraj Reynolds  mL/hr at 04/06/22 1339 New Bag at 04/06/22 1339    sodium chloride flush 0.9 % injection 5-40 mL  5-40 mL IntraVENous 2 times per day Niraj Reynolds MD        sodium chloride flush 0.9 % injection 5-40 mL  5-40 mL IntraVENous PRN Niraj Reynolds MD        0.9 % sodium chloride infusion  25 mL IntraVENous PRN Niraj Reynolds MD        gentamicin (GARAMYCIN) IVPB 80 mg  80 mg IntraVENous Once Lyric Griggs MD        gentamicin (GARAMYCIN) 0.8-0.9 MG/ML-% IVPB                Allergies:     Allergies   Allergen Reactions    Latex Rash     Added based on information entered during case entry, please review and add reactions, type, and severity as needed    Contrast [Iodides] Other (See Comments)     Kidney problem    Ibuprofen     Lipitor [Atorvastatin Calcium] Other (See Comments)     Joint pains    Cipro [Ciprofloxacin Hcl] Swelling    Keflex [Cephalexin] Swelling    Statins Swelling       Problem List:    Patient Active Problem List   Diagnosis Code    Lateral malleolar fracture S82.63XA    Metatarsalgia of left foot M77.42    Plantar fascial fibromatosis M72.2    Acute respiratory failure due to COVID-19 ([facility] U07.1, J96.00    Essential hypertension I10    Obesity, Class III, BMI 40-49.9 (morbid obesity) ([facility] E66.01    Type 2 diabetes mellitus, with long-term current use of insulin ([facility]) E11.9, Z79.4    DM (diabetes mellitus), secondary uncontrolled ([facility] E13.65    Morbid obesity with BMI of 40.0-44.9, adult ([facility]) E66.01, Z68.41    COPD (chronic obstructive pulmonary disease) ([facility] J44.9    Oropharyngeal dysphagia R13.12    H/O primary malignant neoplasm of urinary bladder Z85.51    Hematuria R31.9    Leukocytosis D72.829    Elevated BUN R79.9    Charcot's joint of ankle, right M14.671       Past Medical History:        Diagnosis Date    Arthritis     CAD (coronary artery disease)     Cancer ([facility]     bladder    CHF (congestive heart failure) (HCC)     COPD (chronic obstructive pulmonary disease) ([facility]     COVID-19 11/26/2020    Hyperlipidemia     Hypertension     Neuropathy     Thyroid disease     Type II or unspecified type diabetes mellitus without mention of complication, not stated as uncontrolled        Past Surgical History:        Procedure Laterality Date    ACHILLES TENDON SURGERY Right 1/19/2022    . performed by Tirso Franco MD at [address] [place] [address] Dr Right 1/19/2022    RIGHT MULTIPLE MIDFOOT ARTHRODESIS FOR CHARCOT DEFORMITY CORRECTION WITH OSTEOTOMY/ OSTECTOMY, GASTROCNEMIUS RECESSION performed by Tirso Franco MD at [address] Left     tumor removed. benign    BRONCHOSCOPY N/A 12/10/2020    BRONCHOSCOPY THERAPUTIC ASPIRATION INITIAL performed by Amy Miller MD at [facility]      stents    CHOLECYSTECTOMY      COLONOSCOPY      CYSTOSCOPY N/A 7/21/2021    CYSTOSCOPY, TRANSURETHRAL RESECTION BLADDER TUMOR MULTIFOCAL 5 CM TOTAL RESECTION performed by Ole Kruger MD at [address]      ENDOSCOPY, COLON, DIAGNOSTIC      EYE SURGERY Bilateral     cataract    GASTROCNEMIUS RECESSION Right 1/19/2022    .  performed by [facility] MD at [facility] [address]      OTHER SURGICAL HISTORY      CYSTOSCOPY, TRANSURETHRAL RESECTION BLADDER TUMOR MULTIFOCAL 5 CM TOTAL RESECTION     TUBAL LIGATION      TUMOR REMOVAL         Social History:    Social History     Tobacco Use    Smoking status: Former Smoker     Packs/day: 1.50     Years: 35.00 Pack years: 52.50     Quit date: 1990     Years since quittin.2    Smokeless tobacco: Never Used   Substance Use Topics    Alcohol use: Never                                Counseling given: Not Answered      Vital Signs (Current):   Vitals:    22 1333 22 1335 22 1346   BP:   129/76   Pulse:   61   Resp:   20   Temp:   97.3 °F (36.3 °C)   TempSrc:   Infrared   SpO2:   96%   Weight:  229 lb (103.9 kg)    Height: 5' 4\" (1.626 m)                                                BP Readings from Last 3 Encounters:   22 129/76   22 128/68   22 (!) 122/58       NPO Status: Time of last liquid consumption:                         Time of last solid consumption:                         Date of last liquid consumption: 22                        Date of last solid food consumption: 22    BMI:   Wt Readings from Last 3 Encounters:   22 229 lb (103.9 kg)   22 235 lb (106.6 kg)   07/15/21 228 lb (103.4 kg)     Body mass index is 39.31 kg/m².     CBC:   Lab Results   Component Value Date    WBC 8.0 2022    RBC 5.83 2022    HGB 14.9 2022    HCT 45.9 2022    MCV 78.7 2022    RDW 15.3 2022     2022       CMP:   Lab Results   Component Value Date     2022    K 4.1 2022    K 4.0 2021     2022    CO2 25 2022    BUN 13 2022    CREATININE 1.0 2022    GFRAA >60 2022    AGRATIO 1.1 12/15/2020    LABGLOM 54 2022    GLUCOSE 247 2022    PROT 6.4 12/15/2020    CALCIUM 8.6 2022    BILITOT 1.0 12/15/2020    ALKPHOS 70 12/15/2020    AST 36 12/15/2020    ALT 47 12/15/2020       POC Tests:   Recent Labs     22  1338   POCGLU 108*       Coags:   Lab Results   Component Value Date    PROTIME 11.7 2021    INR 1.03 2021    APTT 39.0 2021       HCG (If Applicable): No results found for: PREGTESTUR, PREGSERUM, HCG, HCGQUANT ABGs:   Lab Results   Component Value Date    PHART 7.547 12/12/2020    PO2ART 61.6 12/12/2020    EXT4AGR 37.3 12/12/2020    DNM1STE 31.7 12/12/2020    BEART 8.8 12/12/2020    O9RXOOTV 92.3 12/12/2020        Type & Screen (If Applicable):  No results found for: LABABO, LABRH    Drug/Infectious Status (If Applicable):  No results found for: HIV, HEPCAB    COVID-19 Screening (If Applicable):   Lab Results   Component Value Date    COVID19 Not Detected 01/21/2022    COVID19 Not Detected 01/14/2022           Anesthesia Evaluation   no history of anesthetic complications:   Airway: Mallampati: III  TM distance: <3 FB   Neck ROM: limited  Mouth opening: > = 3 FB Dental:    (+) lower dentures and upper dentures      Pulmonary:   (+) COPD:                            ROS comment: H/o tob   Cardiovascular:    (+) hypertension:, CAD:, CHF:, hyperlipidemia                  Neuro/Psych:   (+) neuromuscular disease:,             GI/Hepatic/Renal:   (+) morbid obesity          Endo/Other:    (+) DiabetesType II DM, using insulin, hypothyroidism::., malignancy/cancer (bladder CA). Abdominal:             Vascular: negative vascular ROS. Other Findings:             Anesthesia Plan      general     ASA 3     (Risks, benefits and alternatives of GA discussed with pt. Questions answered. Willing to proceed.)  Induction: intravenous. Anesthetic plan and risks discussed with patient.                       Kari Bonilla MD   4/6/2022

## 2022-04-06 NOTE — ANESTHESIA POSTPROCEDURE EVALUATION
Department of Anesthesiology  Postprocedure Note    Patient: Rene Sumner  MRN: [id number]  YOB: 1945  Date of evaluation: 4/6/2022  Time:  6:05 PM     Procedure Summary     Date: 04/06/22 Room / Location: \[facility]\"" / [facility]    Anesthesia Start: 9342 Anesthesia Stop: 1552    Procedure: CYSTOSCOPY TRANSURETHRAL RESECTION BLADDER TUMOR (N/A Bladder) Diagnosis: (BLADDER CANCER LATERAL WALL)    Surgeons: Cat Nova MD Responsible Provider: Arnaud Collazo MD    Anesthesia Type: general ASA Status: 3          Anesthesia Type: general    Ene Phase I: Ene Score: 10    Ene Phase II: Ene Score: 10    Last vitals: Reviewed and per EMR flowsheets. Anesthesia Post Evaluation    Patient location during evaluation: PACU  Patient participation: complete - patient participated  Level of consciousness: awake and alert  Airway patency: patent  Nausea & Vomiting: no nausea and no vomiting  Complications: no  Cardiovascular status: blood pressure returned to baseline  Respiratory status: acceptable  Hydration status: euvolemic  Comments: VSS on transfer to phase 2 recovery. No anesthetic complications.

## 2022-04-06 NOTE — BRIEF OP NOTE
Brief Postoperative Note      Patient: Thalia Rai  YOB: 1945  MRN: [id number]    Date of Procedure: 4/6/2022    Pre-Op Diagnosis: BLADDER CANCER LATERAL WALL    Post-Op Diagnosis: Same       Procedure(s):  CYSTOSCOPY TRANSURETHRAL RESECTION BLADDER TUMOR, LARGE 8 cm. Surgeon(s):  Marko Santos MD    Assistant:  * No surgical staff found *    Anesthesia: General    Estimated Blood Loss (mL): Minimal    Complications: None    Specimens:   ID Type Source Tests Collected by Time Destination   A : BLADDER TUMOR Tissue Tissue SURGICAL PATHOLOGY Marko Santos MD 4/6/2022 1535        Implants:  * No implants in log *      Drains:   External Urinary Catheter (Active)   Urine Color Colorless 04/06/22 1545       Findings: Large tumor right lateral wall, multifocal tumors, unresected tumor on the anterior dome.     Electronically signed by Alta Wallace MD on 4/6/2022 at 4:05 PM

## 2022-04-08 NOTE — OP NOTE
[address]                 [address] [address]                                OPERATIVE REPORT    PATIENT NAME: Nessa Chaidez                   :        1945  MED REC NO:   [id number]                          ROOM:  ACCOUNT NO:   [de-identified]                           ADMIT DATE: 2022  PROVIDER:     Juanito Sifuentes MD    DATE OF PROCEDURE:  2022    PREOPERATIVE DIAGNOSIS:  Bladder cancer. POSTOPERATIVE DIAGNOSIS:  Bladder cancer. OPERATION PERFORMED:  1. Cystoscopy with transurethral resection of bladder tumor, 8 cm,  multifocal.  2.  Cystoscopy with transurethral ablation of multiple satellite lesions  using loop cauterization. SURGEON:  Juanito Sifuentes MD    ESTIMATED BLOOD LOSS FOR PROCEDURE:  10 mL. HISTORY AND INDICATIONS:  A 42-year-old white female with a longstanding  history of bladder cancer. She presented recently with worsening  symptoms and then diagnosed with extensive cancer tissue. She is now  presenting for further followup and evaluation. Risks, benefits, and  expected outcomes of the procedure have been discussed. DETAILS OF THE PROCEDURE:  After obtaining informed consent, the patient  was taken to the operative suite. She was given a general anesthetic  and placed on table in a modified dorsal lithotomy position. All  pressure points were well padded and pneumatic boots have been applied. Prepping and draping was done in sterile fashion. Cystourethroscopy was  then performed with both 30 and 70-degree lenses. Multifocal bladder  cancer was noted with a large lesion on the right lateral wall. Resectoscope was then brought in and multiple lesions were resected  approximately 8 cm of tumor being removed. These pieces of tissue were  then irrigated from the patient's bladder and sent for pathologic  analysis.   Other satellite lesions were simply fulgurated with the loop  cauterization. The majority of the tumors were treated in this fashion. At the dome and on the anterior part were some additional tumors which  were difficult to get to with the scope and secondary to the patient's  body habitus. These lesions I attempted to fulgurate as best I could  but there are still residual cancer cells being left. At the end of the  resection, both ureteral orifices were found to be free of iatrogenic  trauma with good efflux of urine. No significant bleeding was noted. Because of the extensive resection that I performed, I placed a Urena  catheter to help with bladder decompression. The patient was then taken  back to the postop recovery room in stable condition.         Adriana Pierce MD    D: 04/07/2022 21:37:08       T: 04/07/2022 21:39:40     [email or web address]  Job#: [id number]     Doc#: [id number]    CC:

## 2022-07-18 NOTE — PROGRESS NOTES
Place patient label inside box (if no patient label, complete below)  Name:  :  MR#:   Puneet Esparza / PROCEDURE  I (we), Emmy Prater (Patient Name) authorize Analilia Fareed (Provider / [facility]) and/or such assistants as may be selected by him/her, to perform the following operation/procedure(s): TREATMENT OF NONUNION RIGHT MIDFOOT ARTHRODESIS, REMOVAL OF BROKEN HARDWARE        Note: If unable to obtain consent prior to an emergent procedure, document the emergent reason in the medical record. This procedure has been explained to my (our) satisfaction and included in the explanation was: The intended benefit, nature, and extent of the procedure to be performed; The significant risks involved and the probability of success; Alternative procedures and methods of treatment; The dangers and probable consequences of such alternatives (including no procedure or treatment); The expected consequences of the procedure on my future health; Whether other qualified individuals would be performing important surgical tasks and/or whether  would be present to advise or support the procedure. I (we) understand that there are other risks of infection and other serious complications in the pre-operative/procedural and postoperative/procedural stages of my (our) care. I (we) have asked all of the questions which I (we) thought were important in deciding whether or not to undergo treatment or diagnosis. These questions have been answered to my (our) satisfaction. I (we) understand that no assurance can be given that the procedure will be a success, and no guarantee or warranty of success has been given to me (us).     It has been explained to me (us) that during the course of the operation/procedure, unforeseen conditions may be revealed that necessitate extension of the original procedure(s) or different procedure(s) than those set forth in Paragraph 1. I (we) authorize and request that the above-named physician, his/her assistants or his/her designees, perform procedures as necessary and desirable if deemed to be in my (our) best interest.     Revised 8/2/2021                                                                          Page 1 of 2         I acknowledge that health care personnel may be observing this procedure for the purpose of medical education or other specified purposes as may be necessary as requested and/or approved by my (our) physician. I (we) consent to the disposal by the hospital Pathologist of the removed tissue, parts or organs in accordance with hospital policy. I do ____ do not ____ consent to the use of a local infiltration pain blocking agent that will be used by my provider/surgical provider to help alleviate pain during my procedure. I do ____ do not ____ consent to an emergent blood transfusion in the case of a life-threatening situation that requires blood components to be administered. This consent is valid for 24 hours from the beginning of the procedure. This patient does ____ or does not ____ currently have a DNR status/order. If DNR order is in place, obtain Addendum to the Surgical Consent for ALL Patients with a DNR Order to address robert-operative status for limited intervention or DNR suspension.      I have read and fully understand the above Consent for Operation/Procedure and that all blanks were completed before I signed the consent.   _____________________________       _____________________      ____/____am/pm  Signature of Patient or legal representative      Printed Name / Relationship            Date / Time   ____________________________       _____________________      ____/____am/pm  Witness to Signature                                    Printed Name                    Date / Time    If patient is unable to sign or is a minor, complete the following)  Patient is a minor, ____ years of age, or unable to sign because:   ______________________________________________________________________________________________    If a phone consent is obtained, consent will be documented by using two health care professionals, each affirming that the consenting party has no questions and gives consent for the procedure discussed with the physician/provider.   _____________________          ____________________       _____/_____am/pm   2nd witness to phone consent        Printed name           Date / Time    Informed Consent:  I have provided the explanation described above in section 1 to the patient and/or legal representative.  I have provided the patient and/or legal representative with an opportunity to ask any questions about the proposed operation/procedure.   ___________________________          ____________________         ____/____am/pm  Provider / Proceduralist                            Printed name            Date / Time  Revised 8/2/2021                                                                      Page 2 of 2

## 2022-07-19 RX ORDER — DOCUSATE CALCIUM 240 MG
CAPSULE ORAL
COMMUNITY

## 2022-07-19 NOTE — PROGRESS NOTES
[facility] PRE-SURGICAL TESTING INSTRUCTIONS                                  PRIOR TO PROCEDURE DATE:        1. PLEASE FOLLOW ANY  GUIDELINES/ INSTRUCTIONS PRIOR TO YOUR PROCEDURE AS ADVISED BY YOUR SURGEON. 2. Arrange for someone to drive you home and be with you for the first 24 hours after discharge for your safety after your procedure for which you received sedation. Ensure it is someone we can share information with regarding your discharge. 3. You must contact your surgeon for instructions IF:  You are taking any blood thinners, aspirin, anti-inflammatory or vitamin E. There is a change in your physical condition such as a cold, fever, rash, cuts, sores or any other infection, especially near your surgical site. 4. Do not drink alcohol the day before or day of your procedure. 5. A Pre-op History and Physical for surgery MUST be completed by your Physician or Urgent Care within 30 days of your procedure date. Please bring a copy with you on the day of your procedure and along with any other testing performed. THE DAY OF YOUR PROCEDURE:  1. Follow instructions for ARRIVAL TIME as DIRECTED BY YOUR SURGEON. 2. Enter the MAIN entrance from [address] and follow the signs to the free [employer] or [employer] parking (offered free of charge 6am-5pm). 3. Enter the Main Entrance of the hospital (do not enter from the lower level of the parking garage). Upon entrance, check in with the  at the main desk on your left. If no one is available at the desk, proceed into the [facility] Waiting Room and go through the door directly into the [facility]. There is a Check-in desk ACROSS from Room 5 (marked with a sign hanging from the ceiling). The phone number for the surgery center is [phone]. 4. Please call [phone] option #2 option #2 if you have not been preregistered yet.   On the day of your procedure bring your insurance card and photo ID. You will be registered at your bedside once brought back to your room. 5. DO NOT EAT ANYTHING eight hours prior to your arrival for surgery. May have 8 ounces of water 4 hours prior to your arrival for surgery. NOTE: ALL Gastric, Bariatric and Bowel surgery patients MUST follow their surgeon's instructions. 6. MEDICATIONS   Take the following medications with a SMALL sip of water: metoprolol, levothyroxine, Imdur, bring inhalers  Bariatric patient's call surgeon if on diabetic medications as some need to be stopped 1 week preop  Use your usual dose of inhalers the morning of surgery. BRING your rescue inhaler with you to hospital.   Anesthesia does NOT want you to take insulin the morning of surgery. They will control your blood sugar while you are at the hospital. Please contact your ordering physician for instructions regarding your insulin the night before your procedure. If you have an insulin pump, please keep it set on basal rate. 7. Do not swallow water when brushing teeth. No gum, candy, mints or ice chips. Refrain from smoking or at least decrease the amount. 8. Dress in loose, comfortable clothing appropriate for redressing after your procedure. Do not wear jewelry (including body piercings), make-up (especially NO eye make-up), fingernail polish (NO toenail polish if foot/leg surgery), lotion, powders or metal hairclips. 9. Dentures, glasses, or contacts will need to be removed before your procedure. Bring cases for your glasses, contacts, dentures, or hearing aids to protect them while you are in surgery. 10. If you use a CPAP, please bring it with you on the day of your procedure. 11. We recommend that valuable personal  belongings such as cash, cell phones, e-tablets or jewelry, be left at home during your stay. The hospital will not be responsible for valuables that are not secured in the hospital safe.  However, if your insurance requires a co-pay, you may want to bring a method of payment, i.e. Check or credit card, if you wish to pay your co-pay the day of surgery. 12. If you are to stay overnight, you may bring a bag with personal items. Please have any large items you may need brought in by your family after your arrival to your hospital room. 15. If you have a Living Will or Durable Power of , please bring a copy on the day of your procedure. 15. With your permission, one family member may accompany you while you are being prepared for surgery. Once you are ready, additional family members may join you. HOW WE KEEP YOU SAFE and WORK TO PREVENT SURGICAL SITE INFECTIONS:  1. Health care workers should always check your ID bracelet to verify your name and birth date. You will be asked many times to state your name, date of birth, and allergies. 2. Health care workers should always clean their hands with soap or alcohol gel before providing care to you. It is okay to ask anyone if they cleaned their hands before they touch you. 3. You will be actively involved in verifying the type of procedure you are having and ensuring the correct surgical site. This will be confirmed multiple times prior to your procedure. Do NOT serene your surgery site UNLESS instructed to by your surgeon. 4. Do not shave or wax for 72 hours prior to procedure near your operative site. Shaving with a razor can irritate your skin and make it easier to develop an infection. On the day of your procedure, any hair that needs to be removed near the surgical site will be clipped by a healthcare worker using a special clippers designed to avoid skin irritation. 5. When you are in the operating room, your surgical site will be cleansed with a special soap, and in most cases, you will be given an antibiotic before the surgery begins. What to expect AFTER YOUR PROCEDURE:  1.  Immediately following your procedure, your will be taken to the PACU for the first phase of your recovery. Your nurse will help you recover from any potential side effects of anesthesia, such as extreme drowsiness, changes in your vital signs or breathing patterns. Nausea, headache, muscle aches, or sore throat may also occur after anesthesia. Your nurse will help you manage these potential side effects. 2. For comfort and safety, arrange to have someone at home with you for the first 24 hours after discharge. 3. You and your family will be given written instructions about your diet, activity, dressing care, medications, and return visits. 4. Once at home, should issues with nausea, pain, or bleeding occur, or should you notice any signs of infection, you should call your surgeon. 5. Always clean your hands before and after caring for your wound. Do not let your family touch your surgery site without cleaning their hands. 6. Narcotic pain medications can cause significant constipation. You may want to add a stool softener to your postoperative medication schedule or speak to your surgeon on how best to manage this SIDE EFFECT. Thank you for allowing us to care for you. We strive to exceed your expectations in the delivery of care and service provided to you and your family. If you need to contact the [place] [address] staff for any reason, please call us at [phone]    Instructions reviewed with patient during preadmission testing phone interview.   Mathieu Darby RN.7/19/2022 .10:26 AM      ADDITIONAL EDUCATIONAL INFORMATION REVIEWED PER PHONE WITH YOU AND/OR YOUR FAMILY:  No Hibiclens® Bathing Instructions   Yes Antibacterial Soap

## 2022-07-19 NOTE — PROGRESS NOTES
Called and left message for Trista Petty, informed unable to reach pt and no H&P/labs/EKG have been received. /MA    7-19-22 @ [phone] - Received call back from Trista Petty with additional phone number for pt and daughters phone number. /MA    7-19-22 @ 1038 - Called and left message for Trista Petty regarding pt's allergy to PCN and needing new abx order. Spoke to pt, states she has pre op appt with PCP 7-20 @ 0825. Asked pt if she was told to see Cardiologist prior to surgery and she states no. /MA    7-20-22 @ 1124 - H&P received but no labs or EKG. Called PCP office, states pt was sent out for labs but states EKG was not done, EKG ordered for DOS. Labs to be faxed by lab or PCP when received.  Elham Flowers

## 2022-07-20 ENCOUNTER — ANESTHESIA EVENT (OUTPATIENT)
Dept: OPERATING ROOM | Age: 77
End: 2022-07-20
Payer: MEDICARE

## 2022-07-20 RX ORDER — CLINDAMYCIN PHOSPHATE 900 MG/50ML
900 INJECTION INTRAVENOUS ONCE
Status: DISCONTINUED | OUTPATIENT
Start: 2022-07-20 | End: 2022-07-20 | Stop reason: ALTCHOICE

## 2022-07-21 ENCOUNTER — HOSPITAL ENCOUNTER (OUTPATIENT)
Age: 77
Setting detail: OUTPATIENT SURGERY
Discharge: HOME | End: 2022-07-21
Attending: ORTHOPAEDIC SURGERY | Admitting: ORTHOPAEDIC SURGERY
Payer: MEDICARE

## 2022-07-21 ENCOUNTER — ANESTHESIA (OUTPATIENT)
Dept: OPERATING ROOM | Age: 77
End: 2022-07-21
Payer: MEDICARE

## 2022-07-21 VITALS
SYSTOLIC BLOOD PRESSURE: 156 MMHG | TEMPERATURE: 97.1 F | WEIGHT: 200.12 LBS | HEART RATE: 72 BPM | RESPIRATION RATE: 12 BRPM | HEIGHT: 64 IN | DIASTOLIC BLOOD PRESSURE: 63 MMHG | BODY MASS INDEX: 34.16 KG/M2 | OXYGEN SATURATION: 93 %

## 2022-07-21 DIAGNOSIS — M14.671 CHARCOT'S JOINT OF FOOT, RIGHT: Primary | ICD-10-CM

## 2022-07-21 LAB
APTT: 25.5 SEC (ref 23–34.3)
EKG ATRIAL RATE: 70 BPM
EKG DIAGNOSIS: NORMAL
EKG P AXIS: 53 DEGREES
EKG P-R INTERVAL: 146 MS
EKG Q-T INTERVAL: 412 MS
EKG QRS DURATION: 96 MS
EKG QTC CALCULATION (BAZETT): 444 MS
EKG R AXIS: 29 DEGREES
EKG T AXIS: 73 DEGREES
EKG VENTRICULAR RATE: 70 BPM
GLUCOSE BLD-MCNC: 215 MG/DL (ref 70–99)
GLUCOSE BLD-MCNC: 303 MG/DL (ref 70–99)
INR BLD: 1.02 (ref 0.87–1.14)
PERFORMED ON: ABNORMAL
PERFORMED ON: ABNORMAL
PROTHROMBIN TIME: 13.3 SEC (ref 11.7–14.5)

## 2022-07-21 PROCEDURE — A4217 STERILE WATER/SALINE, 500 ML: HCPCS | Performed by: ORTHOPAEDIC SURGERY

## 2022-07-21 PROCEDURE — 6360000002 HC RX W HCPCS: Performed by: ORTHOPAEDIC SURGERY

## 2022-07-21 PROCEDURE — 3700000001 HC ADD 15 MINUTES (ANESTHESIA): Performed by: ORTHOPAEDIC SURGERY

## 2022-07-21 PROCEDURE — 6360000002 HC RX W HCPCS: Performed by: NURSE ANESTHETIST, CERTIFIED REGISTERED

## 2022-07-21 PROCEDURE — 2580000003 HC RX 258: Performed by: ORTHOPAEDIC SURGERY

## 2022-07-21 PROCEDURE — 93005 ELECTROCARDIOGRAM TRACING: CPT | Performed by: ORTHOPAEDIC SURGERY

## 2022-07-21 PROCEDURE — 7100000011 HC PHASE II RECOVERY - ADDTL 15 MIN: Performed by: ORTHOPAEDIC SURGERY

## 2022-07-21 PROCEDURE — 3600000014 HC SURGERY LEVEL 4 ADDTL 15MIN: Performed by: ORTHOPAEDIC SURGERY

## 2022-07-21 PROCEDURE — 7100000001 HC PACU RECOVERY - ADDTL 15 MIN: Performed by: ORTHOPAEDIC SURGERY

## 2022-07-21 PROCEDURE — 93010 ELECTROCARDIOGRAM REPORT: CPT | Performed by: INTERNAL MEDICINE

## 2022-07-21 PROCEDURE — 2580000003 HC RX 258: Performed by: NURSE ANESTHETIST, CERTIFIED REGISTERED

## 2022-07-21 PROCEDURE — 3600000004 HC SURGERY LEVEL 4 BASE: Performed by: ORTHOPAEDIC SURGERY

## 2022-07-21 PROCEDURE — 2709999900 HC NON-CHARGEABLE SUPPLY: Performed by: ORTHOPAEDIC SURGERY

## 2022-07-21 PROCEDURE — 7100000000 HC PACU RECOVERY - FIRST 15 MIN: Performed by: ORTHOPAEDIC SURGERY

## 2022-07-21 PROCEDURE — 6370000000 HC RX 637 (ALT 250 FOR IP)

## 2022-07-21 PROCEDURE — 2580000003 HC RX 258: Performed by: ANESTHESIOLOGY

## 2022-07-21 PROCEDURE — C1713 ANCHOR/SCREW BN/BN,TIS/BN: HCPCS | Performed by: ORTHOPAEDIC SURGERY

## 2022-07-21 PROCEDURE — 85610 PROTHROMBIN TIME: CPT

## 2022-07-21 PROCEDURE — 2720000010 HC SURG SUPPLY STERILE: Performed by: ORTHOPAEDIC SURGERY

## 2022-07-21 PROCEDURE — 7100000010 HC PHASE II RECOVERY - FIRST 15 MIN: Performed by: ORTHOPAEDIC SURGERY

## 2022-07-21 PROCEDURE — C1769 GUIDE WIRE: HCPCS | Performed by: ORTHOPAEDIC SURGERY

## 2022-07-21 PROCEDURE — 85730 THROMBOPLASTIN TIME PARTIAL: CPT

## 2022-07-21 PROCEDURE — C1762 CONN TISS, HUMAN(INC FASCIA): HCPCS | Performed by: ORTHOPAEDIC SURGERY

## 2022-07-21 PROCEDURE — 3700000000 HC ANESTHESIA ATTENDED CARE: Performed by: ORTHOPAEDIC SURGERY

## 2022-07-21 PROCEDURE — 2500000003 HC RX 250 WO HCPCS: Performed by: NURSE ANESTHETIST, CERTIFIED REGISTERED

## 2022-07-21 DEVICE — FIXATION BEAM, 7.5 X 95 MM
Type: IMPLANTABLE DEVICE | Status: FUNCTIONAL
Brand: AXIS

## 2022-07-21 DEVICE — COUNTERSINK BNE SM 6 MM REUSE: Type: IMPLANTABLE DEVICE | Status: FUNCTIONAL

## 2022-07-21 DEVICE — FIXATION BEAM, 5.5 X 95 MM
Type: IMPLANTABLE DEVICE | Status: FUNCTIONAL
Brand: AXIS

## 2022-07-21 DEVICE — BONE GRAFT KIT 7510050 INFUSE XX SMALL
Type: IMPLANTABLE DEVICE | Site: FOOT | Status: FUNCTIONAL
Brand: INFUSE® BONE GRAFT

## 2022-07-21 DEVICE — FIXATION BEAM, 5.5 X 90 MM
Type: IMPLANTABLE DEVICE | Status: FUNCTIONAL
Brand: AXIS

## 2022-07-21 RX ORDER — SODIUM CHLORIDE 0.9 % (FLUSH) 0.9 %
5-40 SYRINGE (ML) INJECTION PRN
Status: DISCONTINUED | OUTPATIENT
Start: 2022-07-21 | End: 2022-07-21 | Stop reason: HOSPADM

## 2022-07-21 RX ORDER — MEPERIDINE HYDROCHLORIDE 25 MG/ML
12.5 INJECTION INTRAMUSCULAR; INTRAVENOUS; SUBCUTANEOUS EVERY 5 MIN PRN
Status: DISCONTINUED | OUTPATIENT
Start: 2022-07-21 | End: 2022-07-21 | Stop reason: HOSPADM

## 2022-07-21 RX ORDER — METOCLOPRAMIDE HYDROCHLORIDE 5 MG/ML
10 INJECTION INTRAMUSCULAR; INTRAVENOUS
Status: DISCONTINUED | OUTPATIENT
Start: 2022-07-21 | End: 2022-07-21 | Stop reason: HOSPADM

## 2022-07-21 RX ORDER — HYDROCODONE BITARTRATE AND ACETAMINOPHEN 5; 325 MG/1; MG/1
1-2 TABLET ORAL
Qty: 40 TABLET | Refills: 0 | Status: SHIPPED | OUTPATIENT
Start: 2022-07-21 | End: 2022-07-28

## 2022-07-21 RX ORDER — SODIUM CHLORIDE 0.9 % (FLUSH) 0.9 %
5-40 SYRINGE (ML) INJECTION EVERY 12 HOURS SCHEDULED
Status: DISCONTINUED | OUTPATIENT
Start: 2022-07-21 | End: 2022-07-21 | Stop reason: HOSPADM

## 2022-07-21 RX ORDER — DIPHENHYDRAMINE HYDROCHLORIDE 50 MG/ML
12.5 INJECTION INTRAMUSCULAR; INTRAVENOUS
Status: DISCONTINUED | OUTPATIENT
Start: 2022-07-21 | End: 2022-07-21 | Stop reason: HOSPADM

## 2022-07-21 RX ORDER — SODIUM CHLORIDE, SODIUM LACTATE, POTASSIUM CHLORIDE, CALCIUM CHLORIDE 600; 310; 30; 20 MG/100ML; MG/100ML; MG/100ML; MG/100ML
INJECTION, SOLUTION INTRAVENOUS CONTINUOUS PRN
Status: DISCONTINUED | OUTPATIENT
Start: 2022-07-21 | End: 2022-07-21 | Stop reason: SDUPTHER

## 2022-07-21 RX ORDER — FENTANYL CITRATE 50 UG/ML
INJECTION, SOLUTION INTRAMUSCULAR; INTRAVENOUS PRN
Status: DISCONTINUED | OUTPATIENT
Start: 2022-07-21 | End: 2022-07-21 | Stop reason: SDUPTHER

## 2022-07-21 RX ORDER — SODIUM CHLORIDE 9 MG/ML
INJECTION, SOLUTION INTRAVENOUS PRN
Status: DISCONTINUED | OUTPATIENT
Start: 2022-07-21 | End: 2022-07-21 | Stop reason: HOSPADM

## 2022-07-21 RX ORDER — HYDRALAZINE HYDROCHLORIDE 20 MG/ML
10 INJECTION INTRAMUSCULAR; INTRAVENOUS
Status: DISCONTINUED | OUTPATIENT
Start: 2022-07-21 | End: 2022-07-21 | Stop reason: HOSPADM

## 2022-07-21 RX ORDER — SODIUM CHLORIDE, SODIUM LACTATE, POTASSIUM CHLORIDE, CALCIUM CHLORIDE 600; 310; 30; 20 MG/100ML; MG/100ML; MG/100ML; MG/100ML
INJECTION, SOLUTION INTRAVENOUS CONTINUOUS
Status: DISCONTINUED | OUTPATIENT
Start: 2022-07-21 | End: 2022-07-21 | Stop reason: HOSPADM

## 2022-07-21 RX ORDER — ONDANSETRON 2 MG/ML
INJECTION INTRAMUSCULAR; INTRAVENOUS PRN
Status: DISCONTINUED | OUTPATIENT
Start: 2022-07-21 | End: 2022-07-21 | Stop reason: SDUPTHER

## 2022-07-21 RX ORDER — LABETALOL HYDROCHLORIDE 5 MG/ML
10 INJECTION, SOLUTION INTRAVENOUS
Status: DISCONTINUED | OUTPATIENT
Start: 2022-07-21 | End: 2022-07-21 | Stop reason: HOSPADM

## 2022-07-21 RX ORDER — OXYCODONE HYDROCHLORIDE 5 MG/1
10 TABLET ORAL PRN
Status: DISCONTINUED | OUTPATIENT
Start: 2022-07-21 | End: 2022-07-21 | Stop reason: HOSPADM

## 2022-07-21 RX ORDER — LIDOCAINE HYDROCHLORIDE 20 MG/ML
INJECTION, SOLUTION INFILTRATION; PERINEURAL PRN
Status: DISCONTINUED | OUTPATIENT
Start: 2022-07-21 | End: 2022-07-21 | Stop reason: SDUPTHER

## 2022-07-21 RX ORDER — OXYCODONE HYDROCHLORIDE 5 MG/1
5 TABLET ORAL PRN
Status: DISCONTINUED | OUTPATIENT
Start: 2022-07-21 | End: 2022-07-21 | Stop reason: HOSPADM

## 2022-07-21 RX ORDER — PROPOFOL 10 MG/ML
INJECTION, EMULSION INTRAVENOUS PRN
Status: DISCONTINUED | OUTPATIENT
Start: 2022-07-21 | End: 2022-07-21 | Stop reason: SDUPTHER

## 2022-07-21 RX ORDER — SODIUM CHLORIDE 9 MG/ML
25 INJECTION, SOLUTION INTRAVENOUS PRN
Status: DISCONTINUED | OUTPATIENT
Start: 2022-07-21 | End: 2022-07-21 | Stop reason: HOSPADM

## 2022-07-21 RX ADMIN — SODIUM CHLORIDE, SODIUM LACTATE, POTASSIUM CHLORIDE, AND CALCIUM CHLORIDE: .6; .31; .03; .02 INJECTION, SOLUTION INTRAVENOUS at 08:57

## 2022-07-21 RX ADMIN — LIDOCAINE HYDROCHLORIDE 100 MG: 20 INJECTION, SOLUTION INFILTRATION; PERINEURAL at 08:59

## 2022-07-21 RX ADMIN — SODIUM CHLORIDE, SODIUM LACTATE, POTASSIUM CHLORIDE, AND CALCIUM CHLORIDE: .6; .31; .03; .02 INJECTION, SOLUTION INTRAVENOUS at 10:02

## 2022-07-21 RX ADMIN — FENTANYL CITRATE 100 MCG: 50 INJECTION, SOLUTION INTRAMUSCULAR; INTRAVENOUS at 08:57

## 2022-07-21 RX ADMIN — ONDANSETRON 4 MG: 2 INJECTION INTRAMUSCULAR; INTRAVENOUS at 09:12

## 2022-07-21 RX ADMIN — PROPOFOL 50 MG: 10 INJECTION, EMULSION INTRAVENOUS at 09:01

## 2022-07-21 RX ADMIN — INSULIN HUMAN 10 UNITS: 100 INJECTION, SOLUTION PARENTERAL at 08:32

## 2022-07-21 RX ADMIN — VANCOMYCIN HYDROCHLORIDE 1497 MG: 10 INJECTION, POWDER, LYOPHILIZED, FOR SOLUTION INTRAVENOUS at 08:57

## 2022-07-21 RX ADMIN — SODIUM CHLORIDE, SODIUM LACTATE, POTASSIUM CHLORIDE, AND CALCIUM CHLORIDE: .6; .31; .03; .02 INJECTION, SOLUTION INTRAVENOUS at 08:11

## 2022-07-21 RX ADMIN — SODIUM CHLORIDE, POTASSIUM CHLORIDE, SODIUM LACTATE AND CALCIUM CHLORIDE: 600; 310; 30; 20 INJECTION, SOLUTION INTRAVENOUS at 08:38

## 2022-07-21 ASSESSMENT — PAIN SCALES - GENERAL
PAINLEVEL_OUTOF10: 0
PAINLEVEL_OUTOF10: 0

## 2022-07-21 ASSESSMENT — PAIN - FUNCTIONAL ASSESSMENT: PAIN_FUNCTIONAL_ASSESSMENT: 0-10

## 2022-07-21 NOTE — BRIEF OP NOTE
Brief Postoperative Note      Patient: Hood Simms  YOB: 1945  MRN: [id number]    Date of Procedure: 7/21/2022    Pre-Op Diagnosis: Charcot's joint of right foot [M14.671]    Post-Op Diagnosis: Same       Procedure(s):  TREATMENT OF NONUNION MIDFOOT ARTHRODESIS, REMOVAL OF BROKEN HARDWARE  . Surgeon(s):  Kb Beauchamp MD    Assistant:  Surgical Assistant: Lexi Oliveira Assistant: JOHNATHAN Powell    Anesthesia: General    Estimated Blood Loss (mL): less than 50     Complications: None    Specimens:   * No specimens in log *    Implants:  Implant Name Type Inv. Item Serial No.  Lot No. LRB No. Used Action   BioFuse Pro Osteo Viable Matrix 5 cc [id number] - [id number]   [id number]  [id number] Right 1 Implanted   BioFuse Pro Osteo Viable Matrix 5cc [id number] - [id number]   [id number]  [id number] Right 1 Implanted   KIT GRFT SUB 2XSM 0.7ML 1.05MG RHBMP-2 FRZ DRY ST H2O CLLGN - [id number]  KIT GRFT SUB 2XSM 0.7ML 1.05MG RHBMP-2 FRZ DRY ST H2O CLLGN  [employer] SPINALGRAFT TECH- [id number] Right 1 Implanted   BEAM FIX L90MM DIA5. 5MM ARTH FOR CHARCOT DEFORMITY AXIS - [id number]  BEAM FIX L90MM DIA5. 5MM ARTH FOR CHARCOT DEFORMITY AXIS  [employer]-  Right 1 Implanted   FIXATION BEAM 5.5 X 95 MM - [id number]  FIXATION BEAM 5.5 X 95 MM  [employer]-  Right 2 Implanted   BEAM FIX L95MM DIA7. 5MM ARTH FOR CHARCOT DEFORMITY AXIS - [id number]  BEAM FIX L95MM DIA7. 5MM ARTH FOR CHARCOT DEFORMITY AXIS  [facility]  Right 1 Implanted   COUNTERSINK BNE SM 6 MM REUSE - [id number]  COUNTERSINK BNE SM 6 MM REUSE  [employer]-  Right 1 Implanted           Findings: See Above.      Electronically signed by Kb Beauchamp MD on 7/21/2022 at 2:56 PM

## 2022-07-21 NOTE — DISCHARGE INSTRUCTIONS
[address] [employer]  [phone]    POSTOPERATIVE INSTRUCTIONS    - For the first 48 hours after your surgery, rest and elevate the surgical area as much as possible. - Application of an ice pack to the area is helpful. A small amount of drainage or swelling may be expected. - Side effects to anesthesia may include nausea, lightheadedness, coughing, sore throat, and/or muscle aches. Rest, fluids, and light foods can help. Please call our office if:      - Your hand or foot becomes numb, blue, or cold. It is normal to experience numbness 12 to 24 hrs after surgery if you did receive a nerve block    - You have severe pain or burning which is not relieved with your pain medication.     - Your incision has become reddened or swollen, painful or has excessive bleeding or foul smelling drainage. You should have your first post-op appointment scheduled for tomorrow (Friday). Please check the surgery packet for date and time of this appointment. You have been given prescriptions for: Norco.  This was e-prescribed to your pharmacy. ([facility]}    Resume home dosages of Plavix today. If your lower extremity was operated on:    Crutches/Roll-about/Wheelchair as needed to aid in ambulation. You are to remain non-weight bearing on your operative extremity. You may loosen your Ace wrap: as necessary for pain control    Your dressings will be changed at your first post-op visit. It is ok to shower 2 days after surgery, but you must keep your dressings clean and dry. [address] [facility]    There are potential side effects of anesthesia or sedation you may experience for the first 24 hours. These side effects include:    Confusion or Memory loss, Dizziness, or Delayed Reaction Times   [x]A responsible person should be with you for the next 24 hours.   Do not operate any vehicles (automobiles, bicycles, motorcycles) or power tools or machinery for 24 hours. Do not sign any legal documents or make any legal decisions for 24 hours. Do not drink alcohol for 24 hours or while taking narcotic pain medication. Nausea    [x]Start with light diet and progress to your normal diet as you feel like eating. However, if you experience nausea or repeated episodes of vomiting which persist beyond 12-24 hours, notify your physician. Once nausea has passed, remember to keep drinking fluids. Difficulty Passing Urine  [x]Drink extra amounts of fluid today. Notify your physician if you have not urinated within 8 hours after your procedure or you feel uncomfortable. Irritated Throat from a Breathing Tube  [x]Drink extra amounts of fluid today. Lozenges may help. Muscle Aches  [x]You may experience some generalized body aches as your muscles recover from medications used to relax them during surgery. These will gradually subside. MEDICATION INSTRUCTIONS:  []Prescription(S) x     sent with you. Use as directed. When taking pain medications, you may experience the side effect of dizziness or drowsiness. Do not drink alcohol or drive when taking these medications. [x]Prescription(S) x      1    Called to Pharmacy [de-identified]    [x]Give the list of your medications to your primary care physician on your next visit. Keep your med list updated and carry it with in case of emergencies. [x] Narcotic pain medications can cause the side effect of significant constipation. You may want to add a stool softener to your postoperative medication schedule or speak to your surgeon on how best to manage this side effect. NARCOTIC SAFETY:  Your pain medicine is only for you to take. Safely store your medicines. Store pills up high and out of reach of children and pets.   Ensure safety caps are snapped tightly  Keep track of how many pills you have left    Unused medication can be disposed of by taking them to a drop-off box or take-back program that is authorized by the [facility]. Access to a site near you can be found on the [facility] website ([address] [facility] [place]. [facility] [place] If you have a CPAP machine, it is very important that you use it daily during all periods of sleep and daytime rest during your recovery at home. Surgery and Anesthesia place a significant amount of stress on your body. Using your CPAP will help keep you safe and lessen the negative effects of that stress. FOLLOW-UP RECOVERY CARE:  [x]Call the office at [phone] for follow-up appointment as scheduled Friday and problems    Watch for these possible complications, symptoms, or side effects of anesthesia. Call physician if they or any other problems occur:  Signs of INFECTION   > Fever over 101°     > Redness, swelling, hardness or warmth at the operative site   >Foul smelling or cloudy drainage at the operative site   Unrelieved PAIN  Unrelieved NAUSEA  Blood soaked dressing. (Some oozing may be normal)  Inability to urinate      Numb, pale, blue, cold or tingling extremity      Physician:  dr. Layla Rod    The above instructions were reviewed with patient/significant other. The following additional patient specific information was reviewed with the patient/significant other:  [x]Procedure/physician specific instructions  []Medication information sheet(S) including potential side effects  []Anns egress test  []Pain Ball management  []FAQ Catheter associated blood stream infections  []FAQ Surgical Site Infections  []Other-    I have read and understand the instructions given to me: ____________________________________________   (Patient/S.O. Signature)            Date/time 7/21/2022 11:41 AM         PACU:  [phone]   M-F 700 AM - 7 PM      SAME DAY SERVICES:  [phone] M-F 7AM-6PM        If you smoke STOP. We care about your health!

## 2022-07-21 NOTE — PROGRESS NOTES
Pt. Signed consents x 2.  12 lead EKG done per order. Pt. Was given 10 Units Regular insulin SQ for an elevated BS of 303. Daughter at bedside and procedures for the day reviewed and verbalized understanding. Sammie Clay

## 2022-07-21 NOTE — ANESTHESIA PRE PROCEDURE
Department of Anesthesiology  Preprocedure Note       Name:  Hood Simms   Age:  78 y.o.  :  1945                                          MRN:  [id number]         Date:  2022      Surgeon: Jose Luis Solano):  Kb Beauchamp MD    Procedure: Procedure(s):  TREATMENT OF NONUNION MIDFOOT ARTHRODESIS, REMOVAL OF BROKEN HARDWARE  . Medications prior to admission:   Prior to Admission medications    Medication Sig Start Date End Date Taking? Authorizing Provider   docusate calcium (SURFAK) 240 MG capsule Take by mouth    Historical Provider, MD   ipratropium-albuterol (DUONEB) 0.5-2.5 (3) MG/3ML SOLN nebulizer solution Inhale 3 mLs into the lungs every 6 hours as needed for Shortness of Breath or Other (wheezing) 22  Frida Martino DO   levothyroxine (SYNTHROID) 50 MCG tablet Take 50 mcg by mouth Daily    Historical Provider, MD   clopidogrel (PLAVIX) 75 MG tablet Take 75 mg by mouth daily    Historical Provider, MD   gabapentin (NEURONTIN) 600 MG tablet Take 600 mg by mouth 3 times daily.     Historical Provider, MD   DULoxetine (CYMBALTA) 30 MG extended release capsule Take 30 mg by mouth daily    Historical Provider, MD   isosorbide mononitrate (IMDUR) 30 MG extended release tablet Take 30 mg by mouth daily    Historical Provider, MD   furosemide (LASIX) 40 MG tablet Take 40 mg by mouth daily    Historical Provider, MD   Multiple Vitamins-Minerals (THERAPEUTIC MULTIVITAMIN-MINERALS) tablet Take 1 tablet by mouth daily    Historical Provider, MD   insulin lispro protamine & lispro (HUMALOG MIX) (75-25) 100 UNIT per ML SUSP injection vial Inject 55 Units into the skin 2 times daily (with meals)     Historical Provider, MD   albuterol sulfate  (90 Base) MCG/ACT inhaler Inhale 2 puffs into the lungs every 4 hours as needed for Wheezing or Shortness of Breath 20   Angelique Weldon MD   metoprolol tartrate (LOPRESSOR) 50 MG tablet Take 1 tablet by mouth 2 times daily 20 Betsy Vergara MD       Current medications:    Current Facility-Administered Medications   Medication Dose Route Frequency Provider Last Rate Last Admin    lactated ringers infusion   IntraVENous Continuous Phani Shaw MD        lactated ringers infusion   IntraVENous Continuous Veria Emelina, DO        sodium chloride flush 0.9 % injection 5-40 mL  5-40 mL IntraVENous 2 times per day Veria Emelina, DO        sodium chloride flush 0.9 % injection 5-40 mL  5-40 mL IntraVENous PRN Veria Emelina, DO        0.9 % sodium chloride infusion   IntraVENous PRN Veria Emelina, DO        vancomycin (VANCOCIN) 1,500 mg in dextrose 5 % 250 mL IVPB  15 mg/kg IntraVENous Once Phani Shaw MD           Allergies:     Allergies   Allergen Reactions    Latex Rash     Added based on information entered during case entry, please review and add reactions, type, and severity as needed    Contrast [Iodides] Other (See Comments)     Kidney problem    Ibuprofen Swelling and Rash     face    Cipro [Ciprofloxacin Hcl] Swelling     Feet/legs    Keflex [Cephalexin] Swelling     Feet/legs    Lipitor [Atorvastatin Calcium] Other (See Comments)     Joint pains    Statins Swelling     Feet/legs       Problem List:    Patient Active Problem List   Diagnosis Code    Lateral malleolar fracture S82.63XA    Metatarsalgia of left foot M77.42    Plantar fascial fibromatosis 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.2    Acute respiratory failure due to COVID-19 ([facility] U07.1, J96.00    Essential hypertension I10    Obesity, Class III, BMI 40-49.9 (morbid obesity) ([facility]) E66.01    Type 2 diabetes mellitus, with long-term current use of insulin ([facility]) E11.9, Z79.4    DM (diabetes mellitus), secondary uncontrolled ([facility] E13.65    Morbid obesity with BMI of 40.0-44.9, adult ([facility]) E66.01, Z68.41    COPD (chronic obstructive pulmonary disease) ([facility]) J44.9    Oropharyngeal dysphagia R13.12    H/O primary malignant neoplasm of urinary bladder Z85.51    Hematuria R31.9    Leukocytosis D72.829    Elevated BUN R79.9    Charcot's joint of ankle, right M14.671       Past Medical History:        Diagnosis Date    Arthritis     CAD (coronary artery disease)     Cancer ([facility]     bladder    CHF (congestive heart failure) (HCC)     COPD (chronic obstructive pulmonary disease) ([facility]     COVID-19 11/26/2020    Hyperlipidemia     Hypertension     Neuropathy     Thyroid disease     Type II or unspecified type diabetes mellitus without mention of complication, not stated as uncontrolled     Uses walker        Past Surgical History:        Procedure Laterality Date    ACHILLES TENDON SURGERY Right 1/19/2022    . performed by Derrick Garcia MD at [address] [place] [address] Right 1/19/2022    RIGHT MULTIPLE MIDFOOT ARTHRODESIS FOR CHARCOT DEFORMITY CORRECTION WITH OSTEOTOMY/ OSTECTOMY, GASTROCNEMIUS RECESSION performed by Derrick Garcia MD at [address] Left     tumor removed. benign    BRONCHOSCOPY N/A 12/10/2020    BRONCHOSCOPY THERAPUTIC ASPIRATION INITIAL performed by Tommie Lopez MD at [facility]      stents    CHOLECYSTECTOMY      COLONOSCOPY      CYSTOSCOPY N/A 7/21/2021    CYSTOSCOPY, TRANSURETHRAL RESECTION BLADDER TUMOR MULTIFOCAL 5 CM TOTAL RESECTION performed by Jeyson Munroe MD at [place] [address] 4/6/2022    CYSTOSCOPY TRANSURETHRAL RESECTION BLADDER TUMOR performed by Jeyson Munroe MD at [address] [place] [address]      ENDOSCOPY, COLON, DIAGNOSTIC      EYE SURGERY Bilateral     cataract    GASTROCNEMIUS RECESSION Right 1/19/2022    .  performed by Derrick Garcia MD at [place] [address] HISTORY      CYSTOSCOPY, TRANSURETHRAL RESECTION BLADDER TUMOR MULTIFOCAL 5 CM TOTAL RESECTION     TUBAL LIGATION      TUMOR REMOVAL Social History:    Social History     Tobacco Use    Smoking status: Former     Packs/day: 1.50     Years: 35.00     Pack years: 52.50     Types: Cigarettes     Quit date: 1990     Years since quittin.5    Smokeless tobacco: Never   Substance Use Topics    Alcohol use: Never                                Counseling given: Not Answered      Vital Signs (Current):   Vitals:    22 1014 22 0711   BP:  (!) 178/66   Pulse:  70   Resp:  16   Temp:  (!) 96.7 °F (35.9 °C)   TempSrc:  Temporal   SpO2:  95%   Weight: 220 lb (99.8 kg) 200 lb 1.9 oz (90.8 kg)   Height: 5' 4\" (1.626 m) 5' 4\" (1.626 m)                                              BP Readings from Last 3 Encounters:   22 (!) 178/66   22 (!) 122/56   22 (!) 168/68       NPO Status:                                                                                 BMI:   Wt Readings from Last 3 Encounters:   22 200 lb 1.9 oz (90.8 kg)   22 229 lb (103.9 kg)   22 235 lb (106.6 kg)     Body mass index is 34.35 kg/m².     CBC:   Lab Results   Component Value Date/Time    WBC 8.0 2022 04:25 PM    RBC 5.83 2022 04:25 PM    HGB 14.9 2022 04:25 PM    HCT 45.9 2022 04:25 PM    MCV 78.7 2022 04:25 PM    RDW 15.3 2022 04:25 PM     2022 04:25 PM       CMP:   Lab Results   Component Value Date/Time     2022 01:09 PM    K 4.1 2022 01:09 PM    K 4.0 2021 12:53 PM     2022 01:09 PM    CO2 25 2022 01:09 PM    BUN 13 2022 01:09 PM    CREATININE 1.0 2022 01:09 PM    GFRAA >60 2022 01:09 PM    AGRATIO 1.1 12/15/2020 05:00 AM    LABGLOM 54 2022 01:09 PM    GLUCOSE 247 2022 01:09 PM    PROT 6.4 12/15/2020 05:00 AM    CALCIUM 8.6 2022 01:09 PM    BILITOT 1.0 12/15/2020 05:00 AM    ALKPHOS 70 12/15/2020 05:00 AM    AST 36 12/15/2020 05:00 AM    ALT 47 12/15/2020 05:00 AM       POC Tests: No results for input(s): POCGLU, POCNA, POCK, POCCL, POCBUN, POCHEMO, POCHCT in the last 72 hours. Coags:   Lab Results   Component Value Date/Time    PROTIME 11.7 07/21/2021 12:53 PM    INR 1.03 07/21/2021 12:53 PM    APTT 39.0 07/21/2021 12:53 PM       HCG (If Applicable): No results found for: PREGTESTUR, PREGSERUM, HCG, HCGQUANT     ABGs:   Lab Results   Component Value Date/Time    PHART 7.547 12/12/2020 10:15 AM    PO2ART 61.6 12/12/2020 10:15 AM    JPM6XJK 37.3 12/12/2020 10:15 AM    TCE7PQD 31.7 12/12/2020 10:15 AM    BEART 8.8 12/12/2020 10:15 AM    R4JNYFPU 92.3 12/12/2020 10:15 AM        Type & Screen (If Applicable):  No results found for: LABABO, LABRH    Drug/Infectious Status (If Applicable):  No results found for: HIV, HEPCAB    COVID-19 Screening (If Applicable):   Lab Results   Component Value Date/Time    COVID19 Not Detected 01/21/2022 09:20 AM    COVID19 Not Detected 01/14/2022 01:09 PM           Anesthesia Evaluation  Patient summary reviewed and Nursing notes reviewed no history of anesthetic complications:   Airway: Mallampati: II  TM distance: >3 FB   Neck ROM: full  Mouth opening: > = 3 FB   Dental:          Pulmonary:   (+) COPD:                             Cardiovascular:    (+) hypertension:, CAD:, CHF:, hyperlipidemia                  Neuro/Psych:               GI/Hepatic/Renal:             Endo/Other:    (+) DiabetesType II DM, , : arthritis:., .                 Abdominal:             Vascular: Other Findings:           Anesthesia Plan      general     ASA 1    (44-year-old female presents for TREATMENT OF NONUNION MIDFOOT ARTHRODESIS, REMOVAL OF BROKEN HARDWARE. Plan general anesthesia with ASA standard monitors. Questions answered. Patient agreeable with anesthetic plan.  )  Induction: intravenous. Anesthetic plan and risks discussed with patient. Plan discussed with CRNA.     Attending anesthesiologist reviewed and agrees with Preprocedure content          Delmer Galan MD   7/21/2022

## 2022-07-21 NOTE — PROGRESS NOTES
Ambulatory Surgery/Procedure Discharge Note    Vitals:    07/21/22 1225   BP: (!) 156/63   Pulse: 72   Resp: 12   Temp: 97.1 °F (36.2 °C)   SpO2: 93%   BP meets willy discharge criteria     In: 0364 [P.O.:100; I.V.:1550]  Out: 10     Restroom use offered before discharge. Yes, pt void per bathroom, assist x2. Pain assessment:  level of pain (1-10, 10 severe)  Pain Level: 0  Pt to SDS post treatment of nonunion midfoot arthrodesis, removal of broken hardware-Right. Bandage inside of cast noted to have moderate amount of bright red blood under toes, no breakthrough noted to the rest of bandages. Ice to site. Capillary refill brisk to operative extremity. Pt denies pain at this time. Pt denies nausea at this time, pt tolerating ice chips well. Discharge instructions given to pt's daughter and she states understanding of these instructions, prescriptions E- scribed to pt's pharmacy of choice. 1301: Pt's daughter states that she and pt are spending the night her in [place] and prescriptions were E-scribed to [facility]. Pt's daughter concerned that pt will have no pain medication for the night. [place] in [place] 8 notified at this time and states that she is \" positioning a patient and will have to deal with this after positioning the patient. \"   [phone]: Pt daughter notified this writer that written prescription was given to them by [facility]. Pt taken to restroom and states\" ready to go. \"         Patient discharged to home/self care.  Patient discharged via wheel chair by transporter to waiting family/S.O.       7/21/2022 12:53 PM

## 2022-07-21 NOTE — PROGRESS NOTES
Dr. [facility] called, aware of , pt previously received insulin, also /64 ( with in 20 Percent},, no new orders, ok to monitor

## 2022-07-21 NOTE — PROGRESS NOTES
Pt arrived form OR, s/p TREATMENT OF NONUNION MIDFOOT ARTHRODESIS, REMOVAL OF BROKEN HARDWARE , report received form CRNA, pt sedated on arrival.  Oral airway in place

## 2022-07-21 NOTE — ANESTHESIA POSTPROCEDURE EVALUATION
Department of Anesthesiology  Postprocedure Note    Patient: Kaya Carpio  MRN: [id number]  YOB: 1945  Date of evaluation: 7/21/2022      Procedure Summary     Date: 07/21/22 Room / Location: [address]    Anesthesia Start: 8084 Anesthesia Stop: 1059    Procedures:       TREATMENT OF NONUNION MIDFOOT ARTHRODESIS, REMOVAL OF BROKEN HARDWARE (Right)      . (Right) Diagnosis:       Charcot's joint of right foot      (Charcot's joint of right foot [Z09.656])    Surgeons: Fany Salinas MD Responsible Provider: Danica Haider MD    Anesthesia Type: general ASA Status: 3          Anesthesia Type: No value filed.     Ene Phase I: Ene Score: 10    Ene Phase II: Ene Score: 10      Anesthesia Post Evaluation    Patient location during evaluation: PACU  Patient participation: complete - patient participated  Level of consciousness: awake and alert  Pain score: 0  Airway patency: patent  Nausea & Vomiting: no nausea and no vomiting  Complications: no  Cardiovascular status: hemodynamically stable  Respiratory status: acceptable  Hydration status: euvolemic

## 2022-07-22 NOTE — OP NOTE
[facility] [address]                                OPERATIVE REPORT    PATIENT NAME: Nuha Murdock                   :        1945  MED REC NO:   [id number]                          ROOM:  ACCOUNT NO:   [de-identified]                           ADMIT DATE: 2022  PROVIDER:     Chito Shaw. Enedina Dobbins MD    DATE OF PROCEDURE:  2022    SURGEON:  Chito Shaw. Enedina Dobbins MD    SECOND SURGEON:  Juni Gómez PA-C    PREOPERATIVE DIAGNOSIS:  Right midfoot nonunion status post arthrodesis  for Charcot midfoot fracture-dislocation. POSTOPERATIVE DIAGNOSIS:  Right midfoot nonunion status post arthrodesis  for Charcot midfoot fracture-dislocation. OPERATION:  1. Repair of nonunion midfoot with revision fixation and bone grafting. 2.  Removal of hardware. ANESTHESIA:  General.    INDICATIONS:  This is a 44-year-old woman with a history of previous  midfoot arthrodesis. The patient has gone on to fail some of the  hardware and lose some for correction. She had developed a nonunion to  her midfoot. The decision was made to bone graft this to revise  fixation. The risks and potential benefits of the procedures were  discussed in length with the patient. She understands these, was given  the opportunity to ask questions. Her questions were answered to her  satisfaction. She has given consent to proceed with the above-outlined  procedures. OPERATIVE PROCEDURE:  The patient was brought to the operating room,  placed in the supine position on the operating table. After induction  of general anesthetic, a pneumatic tourniquet was placed around the  patient's right proximal thigh and set to 300 mmHg, although this was  not inflated during the procedure. The patient's right leg was then  prepped and draped free in the usual sterile fashion.     A second surgeon was necessary throughout the procedure due to the  revision nature of the procedure and the complex nature of the  procedure. A second surgeon was necessary to aid with identification  and protection of neurologic and vascular structures in the scarred soft  tissue envelope. A second surgeon was necessary to aid with removal of  the broken hardware which was in the intramedullary canals and  intraosseously in the talus. A second surgeon was necessary to decrease  overall operative time and to improve the patient's safety and outcome. An assistant with these skills was not available from the hospital at  the time of the procedure, necessitating Ladonna Borden's presence. At this point, the three screws were cannulated with a small 0.062  K-wire. Removal of hardware: At this point, the previously existing screws were  each cannulated with a small K-wire. An incision was made directly over  each of the screws and the K-wire was exchanged for the appropriate  sized guidewire. This was used to feed the screwdriver directly into  the appropriate screw head in the metatarsal head. This was all done  under fluoroscopic control. The first and second ray screws were broken  and the distal fragments were removed without incident by backing them  out with the appropriate captured screwdriver. The fourth and fifth  screws were also removed. Broken fragments of screw were present within  the talus from the first and second columns and these were cannulated  under fluoroscopic control with a \"easy out\" screw extraction device  from [employer] and this was used to capture each screw and the screw was  then backed out without incident. Once all hardware was removed, the  area was copiously irrigated with a sterile lavage solution. Treatment nonunion midfoot with revision fixation and bone grafting: At  this point, the nonunion site was identified with the use of multiplanar  fluoroscopy. This encompassed mostly the first and second  tarsometatarsal joints.   A small incision was made directly adjacent to  these under fluoroscopic control, and these were entered with curettes  and a drill to reprep the area for arthrodesis. The entire area was  bone grafted with a \"graft gun\" which was used to inject [employer]  demineralized bone matrix and morselized portions of an XXS Infuse  sponge which had been prepared per 's recommendations. Complete filling of the nonunion site and previous defects from the  hardware was appreciated. Each ray was then again cannulated with the  appropriate guidewire. A new 7.5 mm midfoot fusion bolt was applied in  the medial column and new 5.5 mm cannulated fusion bolts were applied in  the second, third and fourth rays. Final positioning was verified with  the use of multiplanar fluoroscopy. The wounds were again irrigated and  closed with 3-0 nylon suture. A sterile lightly compressive dressing  and well-padded posterior splint was applied with the ankle in neutral  dorsiflexion. There were no drains and no complications. The sponge and needle counts  were noted to be correct at the end of the procedure by the nursing  staff. Following closure and application of dressings, the patient was  awoken from her anesthetic and transferred to the post anesthesia care  unit in stable condition. Estimated blood loss approximately 100 mL.         Carmen Camp MD    D: 07/21/2022 16:54:35       T: 07/21/2022 16:56:52     [email or web address]  Job#: [id number]     Doc#: [id number]    CC:

## 2022-08-17 NOTE — PROGRESS NOTES
PRE OP INSTRUCTION SHEET   1. Do not eat or drink anything after 12 midnight  prior to surgery. This includes no water, chewing gum or mints. 2. Take the following pills will a small sip of water (see MAR)                                        3. Aspirin, Ibuprofen, Advil, Naproxen, Vitamin E, fish oil and other Anti-inflammatory products should be stopped for 5 days before surgery or as directed by your physician. 4. Check with your Doctor regarding stopping Plavix, Coumadin, Lovenox, Fragmin or other blood thinners   5. Do not smoke, and do not drink any alcoholic beverages 24 hours prior to surgery. This includes NA Beer. 6. You may brush your teeth and gargle the morning of surgery. DO NOT SWALLOW WATER   7. You MUST make arrangements for a responsible adult to take you home after your surgery. You will not be allowed to leave alone or drive yourself home. It is strongly suggested someone stay with you the first 24 hrs. Your surgery will be cancelled if you do not have a ride home. 8. A parent/legal guardian must accompany a child scheduled for surgery and plan to stay at the hospital until the child is discharged. Please do not bring other children with you. 9. Please wear simple, loose fitting clothing to the hospital.  [place] not bring valuables (money, credit cards, checkbooks, etc.) Do not wear any makeup (including no eye makeup) or nail polish on your fingers or toes. 10. DO NOT wear any jewelry or piercings on day of surgery. All body piercing jewelry must be removed. 11. If you have dentures,glasses, or contacts they will be removed before going to the OR; we will provide you a container. 12. Please see your family doctor/and cardiologist for a history & physical and/or concerning medications. Bring any test results/reports from your physician's office. Have history and labs faxed to [phone].  Remember to bring Blood Bank bracelet on the day of surgery. 14. If you have a Living Will and Durable Power of  for Healthcare, please bring in a copy. 13. Notify your Surgeon if you develop any illness between now and surgery  time, cough, cold, fever, sore throat, nausea, vomiting, etc.  Please notify your surgeon if you experience dizziness, shortness of breath or blurred vision between now & the time of your surgery   16. DO NOT shave your operative site 96 hours prior to surgery. For face & neck surgery, men may use an electric razor 48 hours prior to surgery. 17. Shower with _x__Antibacterial soap (x_chlorhexidine for total joint  Pt's) shower two times before surgery.(the morning of and the night before. 18. To provide excellent care visitors will be limited to one in the room at any given time.   Please call pre admission testing if you any further questions [phone] or [phone]

## 2022-08-23 ENCOUNTER — ANESTHESIA EVENT (OUTPATIENT)
Dept: OPERATING ROOM | Age: 77
End: 2022-08-23
Payer: MEDICARE

## 2022-08-23 NOTE — ANESTHESIA PRE PROCEDURE
Department of Anesthesiology  Preprocedure Note       Name:  Zachery Pichardo   Age:  78 y.o.  :  1945                                          MRN:  [id number]         Date:  2022      Surgeon: Zuleima Dunn):  Bushra Garcia MD    Procedure: Procedure(s):  CYSTOSCOPY, POSSIBLE BLADDER BIOPSY    Medications prior to admission:   Prior to Admission medications    Medication Sig Start Date End Date Taking? Authorizing Provider   docusate calcium (SURFAK) 240 MG capsule Take by mouth    Historical Provider, MD   ipratropium-albuterol (DUONEB) 0.5-2.5 (3) MG/3ML SOLN nebulizer solution Inhale 3 mLs into the lungs every 6 hours as needed for Shortness of Breath or Other (wheezing) 22  Frida Martino DO   levothyroxine (SYNTHROID) 50 MCG tablet Take 50 mcg by mouth Daily    Historical Provider, MD   clopidogrel (PLAVIX) 75 MG tablet Take 75 mg by mouth daily    Historical Provider, MD   gabapentin (NEURONTIN) 600 MG tablet Take 600 mg by mouth 3 times daily.     Historical Provider, MD   DULoxetine (CYMBALTA) 30 MG extended release capsule Take 30 mg by mouth daily    Historical Provider, MD   isosorbide mononitrate (IMDUR) 30 MG extended release tablet Take 30 mg by mouth daily    Historical Provider, MD   furosemide (LASIX) 40 MG tablet Take 40 mg by mouth daily    Historical Provider, MD   Multiple Vitamins-Minerals (THERAPEUTIC MULTIVITAMIN-MINERALS) tablet Take 1 tablet by mouth daily    Historical Provider, MD   insulin lispro protamine & lispro (HUMALOG MIX) (75-25) 100 UNIT per ML SUSP injection vial Inject 55 Units into the skin 2 times daily (with meals)     Historical Provider, MD   albuterol sulfate  (90 Base) MCG/ACT inhaler Inhale 2 puffs into the lungs every 4 hours as needed for Wheezing or Shortness of Breath 20   Nathaniel Christian MD   metoprolol tartrate (LOPRESSOR) 50 MG tablet Take 1 tablet by mouth 2 times daily 20   Nathaniel Christian MD       Current medications:    No current facility-administered medications for this encounter. Current Outpatient Medications   Medication Sig Dispense Refill    docusate calcium (SURFAK) 240 MG capsule Take by mouth      ipratropium-albuterol (DUONEB) 0.5-2.5 (3) MG/3ML SOLN nebulizer solution Inhale 3 mLs into the lungs every 6 hours as needed for Shortness of Breath or Other (wheezing) 360 mL 0    levothyroxine (SYNTHROID) 50 MCG tablet Take 50 mcg by mouth Daily      clopidogrel (PLAVIX) 75 MG tablet Take 75 mg by mouth daily      gabapentin (NEURONTIN) 600 MG tablet Take 600 mg by mouth 3 times daily.  DULoxetine (CYMBALTA) 30 MG extended release capsule Take 30 mg by mouth daily      isosorbide mononitrate (IMDUR) 30 MG extended release tablet Take 30 mg by mouth daily      furosemide (LASIX) 40 MG tablet Take 40 mg by mouth daily      Multiple Vitamins-Minerals (THERAPEUTIC MULTIVITAMIN-MINERALS) tablet Take 1 tablet by mouth daily      insulin lispro protamine & lispro (HUMALOG MIX) (75-25) 100 UNIT per ML SUSP injection vial Inject 55 Units into the skin 2 times daily (with meals)       albuterol sulfate  (90 Base) MCG/ACT inhaler Inhale 2 puffs into the lungs every 4 hours as needed for Wheezing or Shortness of Breath 1 Inhaler 3    metoprolol tartrate (LOPRESSOR) 50 MG tablet Take 1 tablet by mouth 2 times daily 60 tablet 3       Allergies:     Allergies   Allergen Reactions    Latex Rash     Added based on information entered during case entry, please review and add reactions, type, and severity as needed    Contrast [Iodides] Other (See Comments)     Kidney problem    Ibuprofen Swelling and Rash     face    Cipro [Ciprofloxacin Hcl] Swelling     Feet/legs    Keflex [Cephalexin] Swelling     Feet/legs    Lipitor [Atorvastatin Calcium] Other (See Comments)     Joint pains    Statins Swelling     Feet/legs       Problem List:    Patient Active Problem List   Diagnosis Code    Lateral BLADDER TUMOR MULTIFOCAL 5 CM TOTAL RESECTION performed by Vic Acuna MD at [address] N/A 2022    CYSTOSCOPY TRANSURETHRAL RESECTION BLADDER TUMOR performed by Vic Acuna MD at [facility]      ENDOSCOPY, COLON, DIAGNOSTIC      EYE SURGERY Bilateral     cataract    FOOT SURGERY Right 2022    TREATMENT OF NONUNION MIDFOOT ARTHRODESIS, REMOVAL OF BROKEN HARDWARE performed by Larry Santiago MD at [address] [facility] Right 2022    . performed by Larry Santiago MD at [address] Right 2022    . performed by Larry Santiago MD at [place] [address] HISTORY      CYSTOSCOPY, TRANSURETHRAL RESECTION BLADDER TUMOR MULTIFOCAL 5 CM TOTAL RESECTION     TUBAL LIGATION      TUMOR REMOVAL         Social History:    Social History     Tobacco Use    Smoking status: Former     Packs/day: 1.50     Years: 35.00     Pack years: 52.50     Types: Cigarettes     Quit date: 1990     Years since quittin.6    Smokeless tobacco: Never   Substance Use Topics    Alcohol use: Never                                Counseling given: Not Answered      Vital Signs (Current):   Vitals:    22 1500   Weight: 215 lb (97.5 kg)   Height: 5' 4\" (1.626 m)                                              BP Readings from Last 3 Encounters:   22 (!) 156/63   22 (!) 122/56   22 (!) 168/68       NPO Status:                                                                                 BMI:   Wt Readings from Last 3 Encounters:   22 200 lb 1.9 oz (90.8 kg)   22 229 lb (103.9 kg)   22 235 lb (106.6 kg)     Body mass index is 36.9 kg/m².     CBC:   Lab Results   Component Value Date/Time    WBC 8.0 2022 04:25 PM    RBC 5.83 2022 04:25 PM    HGB 14.9 2022 04:25 PM    HCT 45.9 2022 04:25 PM    MCV 78.7 01/14/2022 04:25 PM    RDW 15.3 01/14/2022 04:25 PM     01/14/2022 04:25 PM       CMP:   Lab Results   Component Value Date/Time     01/14/2022 01:09 PM    K 4.1 01/14/2022 01:09 PM    K 4.0 07/21/2021 12:53 PM     01/14/2022 01:09 PM    CO2 25 01/14/2022 01:09 PM    BUN 13 01/14/2022 01:09 PM    CREATININE 1.0 01/14/2022 01:09 PM    GFRAA >60 01/14/2022 01:09 PM    AGRATIO 1.1 12/15/2020 05:00 AM    LABGLOM 54 01/14/2022 01:09 PM    GLUCOSE 247 01/14/2022 01:09 PM    PROT 6.4 12/15/2020 05:00 AM    CALCIUM 8.6 01/14/2022 01:09 PM    BILITOT 1.0 12/15/2020 05:00 AM    ALKPHOS 70 12/15/2020 05:00 AM    AST 36 12/15/2020 05:00 AM    ALT 47 12/15/2020 05:00 AM       POC Tests: No results for input(s): POCGLU, POCNA, POCK, POCCL, POCBUN, POCHEMO, POCHCT in the last 72 hours.     Coags:   Lab Results   Component Value Date/Time    PROTIME 13.3 07/21/2022 09:17 AM    INR 1.02 07/21/2022 09:17 AM    APTT 25.5 07/21/2022 09:17 AM       HCG (If Applicable): No results found for: PREGTESTUR, PREGSERUM, HCG, HCGQUANT     ABGs:   Lab Results   Component Value Date/Time    PHART 7.547 12/12/2020 10:15 AM    PO2ART 61.6 12/12/2020 10:15 AM    RYK1FXU 37.3 12/12/2020 10:15 AM    VEF3UTR 31.7 12/12/2020 10:15 AM    BEART 8.8 12/12/2020 10:15 AM    G5HJIRAU 92.3 12/12/2020 10:15 AM        Type & Screen (If Applicable):  No results found for: LABABO, LABRH    Drug/Infectious Status (If Applicable):  No results found for: HIV, HEPCAB    COVID-19 Screening (If Applicable):   Lab Results   Component Value Date/Time    COVID19 Not Detected 01/21/2022 09:20 AM    COVID19 Not Detected 01/14/2022 01:09 PM           Anesthesia Evaluation  Patient summary reviewed and Nursing notes reviewed no history of anesthetic complications:   Airway: Mallampati: III     Neck ROM: full     Dental:    (+) upper dentures and lower dentures      Pulmonary:Negative Pulmonary ROS and normal exam    (+) COPD: Cardiovascular:Negative CV ROS    (+) hypertension:, CAD:, CABG/stent (STENT 2 YEARS AGO):, CHF:, hyperlipidemia    (-)  angina                Neuro/Psych:   Negative Neuro/Psych ROS              GI/Hepatic/Renal: Neg GI/Hepatic/Renal ROS       (-) hiatal hernia and GERD       Endo/Other: Negative Endo/Other ROS   (+) Diabetes, hypothyroidism: arthritis:., .                 Abdominal:             Vascular: Other Findings:           Anesthesia Plan      general     ASA 3     (I discussed with the patient the risks and benefits of PIV, general anesthesia, IV Narcotics, PACU. All questions were answered the patient agrees with the plan and wishes to proceed.  )  Induction: intravenous.                             Jose Luo MD   8/23/2022

## 2022-08-24 ENCOUNTER — ANESTHESIA (OUTPATIENT)
Dept: OPERATING ROOM | Age: 77
End: 2022-08-24
Payer: MEDICARE

## 2022-08-24 ENCOUNTER — HOSPITAL ENCOUNTER (OUTPATIENT)
Age: 77
Setting detail: OUTPATIENT SURGERY
Discharge: HOME | End: 2022-08-24
Attending: UROLOGY | Admitting: UROLOGY
Payer: MEDICARE

## 2022-08-24 VITALS
OXYGEN SATURATION: 100 % | BODY MASS INDEX: 36.7 KG/M2 | HEART RATE: 68 BPM | RESPIRATION RATE: 16 BRPM | SYSTOLIC BLOOD PRESSURE: 149 MMHG | DIASTOLIC BLOOD PRESSURE: 61 MMHG | HEIGHT: 64 IN | WEIGHT: 215 LBS | TEMPERATURE: 97.4 F

## 2022-08-24 LAB
GLUCOSE BLD-MCNC: 168 MG/DL (ref 70–99)
GLUCOSE BLD-MCNC: 193 MG/DL (ref 70–99)
PERFORMED ON: ABNORMAL
PERFORMED ON: ABNORMAL

## 2022-08-24 PROCEDURE — 3600000014 HC SURGERY LEVEL 4 ADDTL 15MIN: Performed by: UROLOGY

## 2022-08-24 PROCEDURE — 7100000010 HC PHASE II RECOVERY - FIRST 15 MIN: Performed by: UROLOGY

## 2022-08-24 PROCEDURE — 6370000000 HC RX 637 (ALT 250 FOR IP): Performed by: UROLOGY

## 2022-08-24 PROCEDURE — 2580000003 HC RX 258: Performed by: UROLOGY

## 2022-08-24 PROCEDURE — 2709999900 HC NON-CHARGEABLE SUPPLY: Performed by: UROLOGY

## 2022-08-24 PROCEDURE — 3700000001 HC ADD 15 MINUTES (ANESTHESIA): Performed by: UROLOGY

## 2022-08-24 PROCEDURE — 2580000003 HC RX 258: Performed by: NURSE ANESTHETIST, CERTIFIED REGISTERED

## 2022-08-24 PROCEDURE — 7100000011 HC PHASE II RECOVERY - ADDTL 15 MIN: Performed by: UROLOGY

## 2022-08-24 PROCEDURE — 6360000002 HC RX W HCPCS: Performed by: UROLOGY

## 2022-08-24 PROCEDURE — 3700000000 HC ANESTHESIA ATTENDED CARE: Performed by: UROLOGY

## 2022-08-24 PROCEDURE — 2500000003 HC RX 250 WO HCPCS: Performed by: NURSE ANESTHETIST, CERTIFIED REGISTERED

## 2022-08-24 PROCEDURE — 6360000002 HC RX W HCPCS: Performed by: NURSE ANESTHETIST, CERTIFIED REGISTERED

## 2022-08-24 PROCEDURE — 3600000004 HC SURGERY LEVEL 4 BASE: Performed by: UROLOGY

## 2022-08-24 RX ORDER — OXYCODONE HYDROCHLORIDE 5 MG/1
10 TABLET ORAL PRN
Status: DISCONTINUED | OUTPATIENT
Start: 2022-08-24 | End: 2022-08-24 | Stop reason: HOSPADM

## 2022-08-24 RX ORDER — SODIUM CHLORIDE, SODIUM LACTATE, POTASSIUM CHLORIDE, CALCIUM CHLORIDE 600; 310; 30; 20 MG/100ML; MG/100ML; MG/100ML; MG/100ML
INJECTION, SOLUTION INTRAVENOUS CONTINUOUS PRN
Status: DISCONTINUED | OUTPATIENT
Start: 2022-08-24 | End: 2022-08-24 | Stop reason: SDUPTHER

## 2022-08-24 RX ORDER — LABETALOL HYDROCHLORIDE 5 MG/ML
5 INJECTION, SOLUTION INTRAVENOUS EVERY 10 MIN PRN
Status: DISCONTINUED | OUTPATIENT
Start: 2022-08-24 | End: 2022-08-24 | Stop reason: HOSPADM

## 2022-08-24 RX ORDER — SODIUM CHLORIDE 9 MG/ML
INJECTION, SOLUTION INTRAVENOUS PRN
Status: DISCONTINUED | OUTPATIENT
Start: 2022-08-24 | End: 2022-08-24 | Stop reason: HOSPADM

## 2022-08-24 RX ORDER — OXYCODONE HYDROCHLORIDE 5 MG/1
5 TABLET ORAL PRN
Status: DISCONTINUED | OUTPATIENT
Start: 2022-08-24 | End: 2022-08-24 | Stop reason: HOSPADM

## 2022-08-24 RX ORDER — NITROFURANTOIN MACROCRYSTALS 50 MG/1
50 CAPSULE ORAL 2 TIMES DAILY
Qty: 14 CAPSULE | Refills: 0 | Status: SHIPPED | OUTPATIENT
Start: 2022-08-24 | End: 2022-08-31

## 2022-08-24 RX ORDER — SODIUM CHLORIDE 0.9 % (FLUSH) 0.9 %
5-40 SYRINGE (ML) INJECTION EVERY 12 HOURS SCHEDULED
Status: DISCONTINUED | OUTPATIENT
Start: 2022-08-24 | End: 2022-08-24 | Stop reason: HOSPADM

## 2022-08-24 RX ORDER — PHENAZOPYRIDINE HYDROCHLORIDE 200 MG/1
200 TABLET, FILM COATED ORAL 3 TIMES DAILY PRN
Qty: 15 TABLET | Refills: 0 | Status: SHIPPED | OUTPATIENT
Start: 2022-08-24 | End: 2022-08-29

## 2022-08-24 RX ORDER — PROPOFOL 10 MG/ML
INJECTION, EMULSION INTRAVENOUS PRN
Status: DISCONTINUED | OUTPATIENT
Start: 2022-08-24 | End: 2022-08-24 | Stop reason: SDUPTHER

## 2022-08-24 RX ORDER — SODIUM CHLORIDE 0.9 % (FLUSH) 0.9 %
5-40 SYRINGE (ML) INJECTION PRN
Status: DISCONTINUED | OUTPATIENT
Start: 2022-08-24 | End: 2022-08-24 | Stop reason: HOSPADM

## 2022-08-24 RX ORDER — MEPERIDINE HYDROCHLORIDE 25 MG/ML
12.5 INJECTION INTRAMUSCULAR; INTRAVENOUS; SUBCUTANEOUS EVERY 5 MIN PRN
Status: DISCONTINUED | OUTPATIENT
Start: 2022-08-24 | End: 2022-08-24 | Stop reason: HOSPADM

## 2022-08-24 RX ORDER — LIDOCAINE HYDROCHLORIDE 20 MG/ML
JELLY TOPICAL PRN
Status: DISCONTINUED | OUTPATIENT
Start: 2022-08-24 | End: 2022-08-24 | Stop reason: ALTCHOICE

## 2022-08-24 RX ORDER — SODIUM CHLORIDE, SODIUM LACTATE, POTASSIUM CHLORIDE, CALCIUM CHLORIDE 600; 310; 30; 20 MG/100ML; MG/100ML; MG/100ML; MG/100ML
INJECTION, SOLUTION INTRAVENOUS CONTINUOUS
Status: DISCONTINUED | OUTPATIENT
Start: 2022-08-24 | End: 2022-08-24 | Stop reason: HOSPADM

## 2022-08-24 RX ORDER — LIDOCAINE HYDROCHLORIDE 10 MG/ML
0.3 INJECTION, SOLUTION EPIDURAL; INFILTRATION; INTRACAUDAL; PERINEURAL
Status: DISCONTINUED | OUTPATIENT
Start: 2022-08-24 | End: 2022-08-24 | Stop reason: HOSPADM

## 2022-08-24 RX ORDER — MAGNESIUM HYDROXIDE 1200 MG/15ML
LIQUID ORAL PRN
Status: DISCONTINUED | OUTPATIENT
Start: 2022-08-24 | End: 2022-08-24 | Stop reason: ALTCHOICE

## 2022-08-24 RX ORDER — DIPHENHYDRAMINE HYDROCHLORIDE 50 MG/ML
12.5 INJECTION INTRAMUSCULAR; INTRAVENOUS
Status: DISCONTINUED | OUTPATIENT
Start: 2022-08-24 | End: 2022-08-24 | Stop reason: HOSPADM

## 2022-08-24 RX ORDER — LIDOCAINE HYDROCHLORIDE 20 MG/ML
INJECTION, SOLUTION INFILTRATION; PERINEURAL PRN
Status: DISCONTINUED | OUTPATIENT
Start: 2022-08-24 | End: 2022-08-24 | Stop reason: SDUPTHER

## 2022-08-24 RX ORDER — ONDANSETRON 2 MG/ML
4 INJECTION INTRAMUSCULAR; INTRAVENOUS
Status: DISCONTINUED | OUTPATIENT
Start: 2022-08-24 | End: 2022-08-24 | Stop reason: HOSPADM

## 2022-08-24 RX ADMIN — GENTAMICIN SULFATE 240 MG: 40 INJECTION, SOLUTION INTRAMUSCULAR; INTRAVENOUS at 12:41

## 2022-08-24 RX ADMIN — PROPOFOL 50 MG: 10 INJECTION, EMULSION INTRAVENOUS at 12:53

## 2022-08-24 RX ADMIN — LIDOCAINE HYDROCHLORIDE 50 MG: 20 INJECTION, SOLUTION INFILTRATION; PERINEURAL at 12:47

## 2022-08-24 RX ADMIN — PROPOFOL 200 MG: 10 INJECTION, EMULSION INTRAVENOUS at 12:47

## 2022-08-24 RX ADMIN — SODIUM CHLORIDE, POTASSIUM CHLORIDE, SODIUM LACTATE AND CALCIUM CHLORIDE: 600; 310; 30; 20 INJECTION, SOLUTION INTRAVENOUS at 12:44

## 2022-08-24 ASSESSMENT — PAIN SCALES - GENERAL: PAINLEVEL_OUTOF10: 0

## 2022-08-24 ASSESSMENT — PAIN DESCRIPTION - DESCRIPTORS: DESCRIPTORS: OTHER (COMMENT)

## 2022-08-24 ASSESSMENT — PAIN - FUNCTIONAL ASSESSMENT: PAIN_FUNCTIONAL_ASSESSMENT: 0-10

## 2022-08-24 NOTE — ANESTHESIA POSTPROCEDURE EVALUATION
Department of Anesthesiology  Postprocedure Note    Patient: Rubi Byers  MRN: [id number]  YOB: 1945  Date of evaluation: 8/24/2022      Procedure Summary     Date: 08/24/22 Room / [facility]: [facility]    Anesthesia Start: 1244 Anesthesia Stop: 2769    Procedure: CYSTOSCOPY, POSSIBLE BLADDER BIOPSY (Bladder) Diagnosis:       Malignant neoplasm of lateral wall of urinary bladder ([address]      (Malignant neoplasm of lateral wall of urinary bladder ([address] [C67.2])    Surgeons: Louise Hall MD Responsible Provider: Whitney Winter MD    Anesthesia Type: general ASA Status: 3          Anesthesia Type: No value filed.     Ene Phase I: Ene Score: 10    Ene Phase II: Ene Score: 10      Anesthesia Post Evaluation    Comments: Postoperative Anesthesia Note    Name:    Rubi Byers  MRN:      [id number]    Patient Vitals in the past 12 hrs:  08/24/22 1341, BP:(!) 149/61, Pulse:68, Resp:16, SpO2:100 %  08/24/22 1316, Temp:97.4 °F (36.3 °C)  08/24/22 1315, BP:(!) 143/56, Pulse:72, Resp:16, SpO2:96 %  08/24/22 1302, BP:(!) 115/51, Temp:97.3 °F (36.3 °C), Pulse:77, Resp:18  08/24/22 1140, BP:(!) 168/65, Temp:97.7 °F (36.5 °C), Temp src:Temporal, Pulse:67, Resp:10, SpO2:95 %, Height:5' 4\" (1.626 m), Weight:215 lb (97.5 kg)     LABS:    CBC  Lab Results       Component                Value               Date/Time                  WBC                      8.0                 01/14/2022 04:25 PM        HGB                      14.9                01/14/2022 04:25 PM        HCT                      45.9                01/14/2022 04:25 PM        PLT                      231                 01/14/2022 04:25 PM   RENAL  Lab Results       Component                Value               Date/Time                  NA                       140                 01/14/2022 01:09 PM        K                        4.1                 01/14/2022 01:09 PM        K 4.0                 07/21/2021 12:53 PM        CL                       103                 01/14/2022 01:09 PM        CO2                      25                  01/14/2022 01:09 PM        BUN                      13                  01/14/2022 01:09 PM        CREATININE               1.0                 01/14/2022 01:09 PM        GLUCOSE                  247 (H)             01/14/2022 01:09 PM   COAGS  Lab Results       Component                Value               Date/Time                  PROTIME                  13.3                07/21/2022 09:17 AM        INR                      1.02                07/21/2022 09:17 AM        APTT                     25.5                07/21/2022 09:17 AM     Intake & Output:  @69YLAP@    Nausea & Vomiting:  No    Level of Consciousness:  Awake    Pain Assessment:  Adequate analgesia    Anesthesia Complications:  No apparent anesthetic complications    SUMMARY      Vital signs stable  OK to discharge from Stage I post anesthesia care.   Care transferred from Anesthesiology department on discharge from perioperative area

## 2022-08-24 NOTE — PROGRESS NOTES
Reviewed discharge instructions with patient and sister Jewel Schumacher, they verbalized understanding, patient states she is ready to go home.

## 2022-08-24 NOTE — DISCHARGE INSTRUCTIONS
Patient to call Dr. Lilli Bobo office for a follow up appointment in 3 weeks. Office number to call is [phone]. ANESTHESIA DISCHARGE INSTRUCTIONS    You are under the influence of drugs- do not drink alcohol, drive a car, operate machinery(such as power tools, kitchen appliances, etc), sign legal documents, or make any important decisions for 24 hours (or while on pain medications). Children should not ride bikes or [place] or play on gym sets  for 24 hours after surgery. A responsible adult should be with you for 24 hours. Rest at home today- increase activity as tolerated. Progress slowly to a regular diet unless your physician has instructed you otherwise. Drink plenty of water. CALL YOUR DOCTOR IF YOU:  Have moderate to severe nausea or vomiting AND are unable to hold down fluids or prescribed medications. Have bright red bloody drainage from your dressing that won't stop oozing. Do not get relief with your pain medication    NORMAL (POSSIBLE) SIDE EFFECTS FROM ANESTHESIA:     Confusion, temporary memory loss, delayed reaction times in the first 24 hours  Lightheadedness, dizziness, difficulty focusing, blurred vision  Nausea/vomiting can happen  Shivering, feeling cold, sore throat, cough and muscle aches should stop within 24-48 hours  Trouble urinating - call your surgeon if it has been more than 8 hrs  Bruising or soreness at the IV site - call if it remains red, firm or there is drainage             FEMALES OF CHILDBEARING AGE WHO ARE TAKING BIRTH CONTROL PILLS:  You may have received a medication during your procedure that interferes with the   actions of birth control pills (Bridion or Emend). Use some other kind of birth control in addition to your pills, like a condom, for 1 month after your procedure to prevent unwanted pregnancy. The following instructions are to be followed if you have a known history or diagnosis of sleep apnea:   For all sleep apnea patients:  ? Sleep on your side or sitting up in a chair whenever possible, especially the first 24 hours after surgery. ? Use only medicines prescribed by your doctor. ? Do not drink alcohol. ? If you have a dental device to assist you while at rest, use it at all times for the first 24 hours. For patients using CPAP machines:  ? Use your CPAP machine during all periods of sleep as usual.  ? Use your CPAP machine during all periods of daytime rest while on pain medicines. ** Follow up with your primary care doctor for continued care. IF YOU DO NOT TAKE ALL OF YOUR NARCOTIC PAIN MEDICATION, please dispose of them responsibly. There are drop off boxes in the Emergency Departments 24/7 at both [facility] and [place]. If these locations are not convenient, other options for discarding them can be found at:  [email or web address]    Hospital or office staff may NOT accept any medications to drop off in the cabinet for you.

## 2022-08-24 NOTE — BRIEF OP NOTE
Brief Postoperative Note      Patient: Nancy Tsai  YOB: 1945  MRN: [id number]    Date of Procedure: 8/24/2022    Pre-Op Diagnosis: Malignant neoplasm of lateral wall of urinary bladder ([address] [C67.2]    Post-Op Diagnosis: Same       Procedure(s):  CYSTOSCOPY, POSSIBLE BLADDER BIOPSY    Surgeon(s):  Macy Bartlett MD    Assistant:  * No surgical staff found *    Anesthesia: General    Estimated Blood Loss (mL): Minimal    Complications: None    Specimens:   * No specimens in log *    Implants:  * No implants in log *      Drains:   [REMOVED] External Urinary Catheter (Removed)       Findings: Stenosis, diffuse cystitis    Electronically signed by Carlos Alberto Dinero MD on 8/24/2022 at 1:31 PM

## 2022-08-25 NOTE — OP NOTE
[address]                 [address] [place] [address]                                OPERATIVE REPORT    PATIENT NAME: Cici Browne                   :        1945  MED REC NO:   [id number]                          ROOM:  ACCOUNT NO:   [de-identified]                           ADMIT DATE: 2022  PROVIDER:     James Abraham MD    DATE OF PROCEDURE:  2022  PREOPERATIVE DIAGNOSES:  1. Chronic cystitis. 2.  History of urethral stenosis. 3.  Voiding difficulty. POSTOPERATIVE DIAGNOSIS:  Recurrent urethral stenosis. OPERATION:  Cystoscopy with urethral dilation. SURGEON:  James Abraham MD    ANESTHESIA:  General.    OPERATIVE FINDINGS:  1. Urethral stenosis. 2.  Diffuse cystitis. 3.  No evidence for bladder cancer, tumors or stones. ESTIMATED BLOOD LOSS:  5 mL    HISTORY AND INDICATIONS:  A 72-year-old white female with a history of  chronic cystitis and voiding difficulty. She was failing options of  conservative and medical management. Options have been discussed with  the patient and she elected to proceed forth with today's procedure. Risks, benefits and expected outcomes of the procedure have been  discussed. PROCEDURE IN DETAIL:  After obtaining informed consent, the patient was  taken to the operative suite. She was given a general anesthetic and  placed on the operating table in modified dorsal lithotomy position. Prepping and draping was done in sterile fashion. Cystourethroscopy was  then performed with both 30- and 70-degree lenses. This showed diffuse  cystitis throughout the patient's bladder. There was debris in the base  of the bladder which was irrigated. No distinct tumors or other  abnormalities were noted. There was diffuse trabeculation. Stenosis of  the patient's urethra and bladder neck which had been encountered was  subsequently dilated with Karlos sounds to 34-Tamazight. Reevaluation of  the bladder showed both ureteral orifices to be orthotopic with clear  efflux of urine. Bladder was now clear of all debris. Her bladder was  drained and lidocaine jelly was applied. She was taken back to the  postop recovery room in stable condition.         Racquel Dover MD    D: 08/24/2022 22:41:09       T: 08/24/2022 22:43:22     [email or web address]  Job#: [id number]     Doc#: [id number]    CC:

## 2023-04-27 ENCOUNTER — ANESTHESIA EVENT (OUTPATIENT)
Dept: OPERATING ROOM | Age: 78
End: 2023-04-27
Payer: MEDICARE

## 2023-05-03 ENCOUNTER — ANESTHESIA (OUTPATIENT)
Dept: OPERATING ROOM | Age: 78
End: 2023-05-03
Payer: MEDICARE

## 2023-05-03 ENCOUNTER — HOSPITAL ENCOUNTER (OUTPATIENT)
Age: 78
Setting detail: OUTPATIENT SURGERY
Discharge: HOME | End: 2023-05-03
Attending: UROLOGY | Admitting: UROLOGY
Payer: MEDICARE

## 2023-05-03 VITALS
OXYGEN SATURATION: 96 % | RESPIRATION RATE: 15 BRPM | DIASTOLIC BLOOD PRESSURE: 84 MMHG | BODY MASS INDEX: 36.19 KG/M2 | HEIGHT: 64 IN | HEART RATE: 67 BPM | WEIGHT: 212 LBS | TEMPERATURE: 96.9 F | SYSTOLIC BLOOD PRESSURE: 187 MMHG

## 2023-05-03 DIAGNOSIS — Z85.51 H/O PRIMARY MALIGNANT NEOPLASM OF URINARY BLADDER: Primary | ICD-10-CM

## 2023-05-03 DIAGNOSIS — R31.0 GROSS HEMATURIA: ICD-10-CM

## 2023-05-03 LAB
ANION GAP SERPL CALCULATED.3IONS-SCNC: 7 MMOL/L (ref 3–16)
BUN SERPL-MCNC: 15 MG/DL (ref 7–20)
CALCIUM SERPL-MCNC: 9.1 MG/DL (ref 8.3–10.6)
CHLORIDE SERPL-SCNC: 102 MMOL/L (ref 99–110)
CO2 SERPL-SCNC: 31 MMOL/L (ref 21–32)
CREAT SERPL-MCNC: 1 MG/DL (ref 0.6–1.2)
DEPRECATED RDW RBC AUTO: 15.4 % (ref 12.4–15.4)
GFR SERPLBLD CREATININE-BSD FMLA CKD-EPI: 58 ML/MIN/{1.73_M2}
GLUCOSE BLD-MCNC: 171 MG/DL (ref 70–99)
GLUCOSE BLD-MCNC: 194 MG/DL (ref 70–99)
GLUCOSE SERPL-MCNC: 231 MG/DL (ref 70–99)
HCT VFR BLD AUTO: 45 % (ref 36–48)
HGB BLD-MCNC: 14.7 G/DL (ref 12–16)
MCH RBC QN AUTO: 25.6 PG (ref 26–34)
MCHC RBC AUTO-ENTMCNC: 32.8 G/DL (ref 31–36)
MCV RBC AUTO: 78.2 FL (ref 80–100)
PERFORMED ON: ABNORMAL
PERFORMED ON: ABNORMAL
PLATELET # BLD AUTO: 217 K/UL (ref 135–450)
PMV BLD AUTO: 9.1 FL (ref 5–10.5)
POTASSIUM SERPL-SCNC: 3.9 MMOL/L (ref 3.5–5.1)
RBC # BLD AUTO: 5.75 M/UL (ref 4–5.2)
SODIUM SERPL-SCNC: 140 MMOL/L (ref 136–145)
WBC # BLD AUTO: 9.9 K/UL (ref 4–11)

## 2023-05-03 PROCEDURE — 2580000003 HC RX 258: Performed by: UROLOGY

## 2023-05-03 PROCEDURE — 88307 TISSUE EXAM BY PATHOLOGIST: CPT

## 2023-05-03 PROCEDURE — 2580000003 HC RX 258: Performed by: NURSE ANESTHETIST, CERTIFIED REGISTERED

## 2023-05-03 PROCEDURE — 36415 COLL VENOUS BLD VENIPUNCTURE: CPT

## 2023-05-03 PROCEDURE — 6370000000 HC RX 637 (ALT 250 FOR IP): Performed by: ANESTHESIOLOGY

## 2023-05-03 PROCEDURE — 3600000004 HC SURGERY LEVEL 4 BASE: Performed by: UROLOGY

## 2023-05-03 PROCEDURE — 7100000010 HC PHASE II RECOVERY - FIRST 15 MIN: Performed by: UROLOGY

## 2023-05-03 PROCEDURE — 6360000002 HC RX W HCPCS: Performed by: UROLOGY

## 2023-05-03 PROCEDURE — 85027 COMPLETE CBC AUTOMATED: CPT

## 2023-05-03 PROCEDURE — 3600000014 HC SURGERY LEVEL 4 ADDTL 15MIN: Performed by: UROLOGY

## 2023-05-03 PROCEDURE — 7100000011 HC PHASE II RECOVERY - ADDTL 15 MIN: Performed by: UROLOGY

## 2023-05-03 PROCEDURE — 2500000003 HC RX 250 WO HCPCS: Performed by: NURSE ANESTHETIST, CERTIFIED REGISTERED

## 2023-05-03 PROCEDURE — 80048 BASIC METABOLIC PNL TOTAL CA: CPT

## 2023-05-03 PROCEDURE — 6360000002 HC RX W HCPCS: Performed by: NURSE ANESTHETIST, CERTIFIED REGISTERED

## 2023-05-03 PROCEDURE — 3700000001 HC ADD 15 MINUTES (ANESTHESIA): Performed by: UROLOGY

## 2023-05-03 PROCEDURE — 7100000001 HC PACU RECOVERY - ADDTL 15 MIN: Performed by: UROLOGY

## 2023-05-03 PROCEDURE — 2709999900 HC NON-CHARGEABLE SUPPLY: Performed by: UROLOGY

## 2023-05-03 PROCEDURE — 3700000000 HC ANESTHESIA ATTENDED CARE: Performed by: UROLOGY

## 2023-05-03 PROCEDURE — 7100000000 HC PACU RECOVERY - FIRST 15 MIN: Performed by: UROLOGY

## 2023-05-03 RX ORDER — SODIUM CHLORIDE, SODIUM LACTATE, POTASSIUM CHLORIDE, CALCIUM CHLORIDE 600; 310; 30; 20 MG/100ML; MG/100ML; MG/100ML; MG/100ML
INJECTION, SOLUTION INTRAVENOUS CONTINUOUS
Status: DISCONTINUED | OUTPATIENT
Start: 2023-05-03 | End: 2023-05-03 | Stop reason: HOSPADM

## 2023-05-03 RX ORDER — GLYCINE 1.5 G/100ML
SOLUTION IRRIGATION PRN
Status: DISCONTINUED | OUTPATIENT
Start: 2023-05-03 | End: 2023-05-03 | Stop reason: ALTCHOICE

## 2023-05-03 RX ORDER — OXYCODONE HYDROCHLORIDE 5 MG/1
5 TABLET ORAL PRN
Status: COMPLETED | OUTPATIENT
Start: 2023-05-03 | End: 2023-05-03

## 2023-05-03 RX ORDER — SODIUM CHLORIDE 0.9 % (FLUSH) 0.9 %
5-40 SYRINGE (ML) INJECTION PRN
Status: DISCONTINUED | OUTPATIENT
Start: 2023-05-03 | End: 2023-05-03 | Stop reason: HOSPADM

## 2023-05-03 RX ORDER — DIPHENHYDRAMINE HYDROCHLORIDE 50 MG/ML
12.5 INJECTION INTRAMUSCULAR; INTRAVENOUS
Status: DISCONTINUED | OUTPATIENT
Start: 2023-05-03 | End: 2023-05-03 | Stop reason: HOSPADM

## 2023-05-03 RX ORDER — SODIUM CHLORIDE 9 MG/ML
INJECTION, SOLUTION INTRAVENOUS PRN
Status: DISCONTINUED | OUTPATIENT
Start: 2023-05-03 | End: 2023-05-03 | Stop reason: HOSPADM

## 2023-05-03 RX ORDER — PHENAZOPYRIDINE HYDROCHLORIDE 200 MG/1
200 TABLET, FILM COATED ORAL 3 TIMES DAILY PRN
Qty: 15 TABLET | Refills: 0 | Status: SHIPPED | OUTPATIENT
Start: 2023-05-03 | End: 2023-05-08

## 2023-05-03 RX ORDER — DEXMEDETOMIDINE HYDROCHLORIDE 100 UG/ML
INJECTION, SOLUTION INTRAVENOUS PRN
Status: DISCONTINUED | OUTPATIENT
Start: 2023-05-03 | End: 2023-05-03 | Stop reason: SDUPTHER

## 2023-05-03 RX ORDER — MEPERIDINE HYDROCHLORIDE 25 MG/ML
12.5 INJECTION INTRAMUSCULAR; INTRAVENOUS; SUBCUTANEOUS EVERY 5 MIN PRN
Status: CANCELLED | OUTPATIENT
Start: 2023-05-03

## 2023-05-03 RX ORDER — DOCUSATE SODIUM 100 MG/1
100 CAPSULE, LIQUID FILLED ORAL 2 TIMES DAILY
Qty: 60 CAPSULE | Refills: 0 | Status: SHIPPED | OUTPATIENT
Start: 2023-05-03 | End: 2023-06-02

## 2023-05-03 RX ORDER — SODIUM CHLORIDE 0.9 % (FLUSH) 0.9 %
5-40 SYRINGE (ML) INJECTION EVERY 12 HOURS SCHEDULED
Status: DISCONTINUED | OUTPATIENT
Start: 2023-05-03 | End: 2023-05-03 | Stop reason: HOSPADM

## 2023-05-03 RX ORDER — ONDANSETRON 2 MG/ML
4 INJECTION INTRAMUSCULAR; INTRAVENOUS
Status: DISCONTINUED | OUTPATIENT
Start: 2023-05-03 | End: 2023-05-03 | Stop reason: HOSPADM

## 2023-05-03 RX ORDER — SODIUM CHLORIDE, SODIUM LACTATE, POTASSIUM CHLORIDE, CALCIUM CHLORIDE 600; 310; 30; 20 MG/100ML; MG/100ML; MG/100ML; MG/100ML
INJECTION, SOLUTION INTRAVENOUS CONTINUOUS PRN
Status: DISCONTINUED | OUTPATIENT
Start: 2023-05-03 | End: 2023-05-03 | Stop reason: SDUPTHER

## 2023-05-03 RX ORDER — PROPOFOL 10 MG/ML
INJECTION, EMULSION INTRAVENOUS PRN
Status: DISCONTINUED | OUTPATIENT
Start: 2023-05-03 | End: 2023-05-03 | Stop reason: SDUPTHER

## 2023-05-03 RX ORDER — SULFAMETHOXAZOLE AND TRIMETHOPRIM 400; 80 MG/1; MG/1
1 TABLET ORAL 2 TIMES DAILY
Qty: 8 TABLET | Refills: 0 | Status: SHIPPED | OUTPATIENT
Start: 2023-05-03 | End: 2023-05-07

## 2023-05-03 RX ORDER — SODIUM CHLORIDE 9 MG/ML
25 INJECTION, SOLUTION INTRAVENOUS PRN
Status: DISCONTINUED | OUTPATIENT
Start: 2023-05-03 | End: 2023-05-03 | Stop reason: HOSPADM

## 2023-05-03 RX ORDER — LABETALOL HYDROCHLORIDE 5 MG/ML
10 INJECTION, SOLUTION INTRAVENOUS
Status: DISCONTINUED | OUTPATIENT
Start: 2023-05-03 | End: 2023-05-03 | Stop reason: HOSPADM

## 2023-05-03 RX ORDER — OXYCODONE HYDROCHLORIDE AND ACETAMINOPHEN 5; 325 MG/1; MG/1
0.5 TABLET ORAL EVERY 4 HOURS PRN
Qty: 10 TABLET | Refills: 0 | Status: SHIPPED | OUTPATIENT
Start: 2023-05-03 | End: 2023-05-08

## 2023-05-03 RX ORDER — MAGNESIUM HYDROXIDE 1200 MG/15ML
LIQUID ORAL PRN
Status: DISCONTINUED | OUTPATIENT
Start: 2023-05-03 | End: 2023-05-03 | Stop reason: ALTCHOICE

## 2023-05-03 RX ORDER — OXYCODONE HYDROCHLORIDE 5 MG/1
10 TABLET ORAL PRN
Status: COMPLETED | OUTPATIENT
Start: 2023-05-03 | End: 2023-05-03

## 2023-05-03 RX ORDER — FAMOTIDINE 10 MG/ML
20 INJECTION, SOLUTION INTRAVENOUS ONCE
Status: DISCONTINUED | OUTPATIENT
Start: 2023-05-03 | End: 2023-05-03 | Stop reason: HOSPADM

## 2023-05-03 RX ORDER — LIDOCAINE HYDROCHLORIDE 20 MG/ML
INJECTION, SOLUTION INFILTRATION; PERINEURAL PRN
Status: DISCONTINUED | OUTPATIENT
Start: 2023-05-03 | End: 2023-05-03 | Stop reason: SDUPTHER

## 2023-05-03 RX ADMIN — LIDOCAINE HYDROCHLORIDE 60 MG: 20 INJECTION, SOLUTION INFILTRATION; PERINEURAL at 13:20

## 2023-05-03 RX ADMIN — OXYCODONE 10 MG: 5 TABLET ORAL at 14:31

## 2023-05-03 RX ADMIN — DEXMEDETOMIDINE HYDROCHLORIDE 10 MCG: 100 INJECTION, SOLUTION INTRAVENOUS at 13:20

## 2023-05-03 RX ADMIN — SODIUM CHLORIDE, POTASSIUM CHLORIDE, SODIUM LACTATE AND CALCIUM CHLORIDE: 600; 310; 30; 20 INJECTION, SOLUTION INTRAVENOUS at 13:20

## 2023-05-03 RX ADMIN — GENTAMICIN SULFATE 240 MG: 40 INJECTION, SOLUTION INTRAMUSCULAR; INTRAVENOUS at 13:15

## 2023-05-03 RX ADMIN — PROPOFOL 200 MG: 10 INJECTION, EMULSION INTRAVENOUS at 13:20

## 2023-05-03 ASSESSMENT — PAIN - FUNCTIONAL ASSESSMENT
PAIN_FUNCTIONAL_ASSESSMENT: 0-10
PAIN_FUNCTIONAL_ASSESSMENT: ACTIVITIES ARE NOT PREVENTED

## 2023-05-03 ASSESSMENT — PAIN DESCRIPTION - DESCRIPTORS: DESCRIPTORS: ACHING;DISCOMFORT

## 2023-05-03 ASSESSMENT — PAIN DESCRIPTION - ORIENTATION: ORIENTATION: ANTERIOR

## 2023-05-03 ASSESSMENT — PAIN SCALES - GENERAL: PAINLEVEL_OUTOF10: 6

## 2023-05-03 ASSESSMENT — PAIN DESCRIPTION - LOCATION: LOCATION: PELVIS;VAGINA

## 2023-05-03 NOTE — DISCHARGE INSTRUCTIONS
Patient to call Dr. Yamila Guerrero office for a follow up appointment in 1-2 weeks. Patient is to remove her riggins on Friday morning. Office number to call is [phone].

## 2023-05-03 NOTE — ANESTHESIA POSTPROCEDURE EVALUATION
Department of Anesthesiology  Postprocedure Note    Patient: Iggy Corea  MRN: [id number]  YOB: 1945  Date of evaluation: 5/3/2023      Procedure Summary     Date: 05/03/23 Room / Location: [place] [address] / [facility]    Anesthesia Start: 3839 Anesthesia Stop: 2142    Procedure: CYSTOSCOPY,, Transureteral resection of  BLADDER TUMOR (Bladder) Diagnosis:       Gross hematuria      (Gross hematuria [R31.0])    Surgeons: Melvina Crystal MD Responsible Provider: Giana Valdivia MD    Anesthesia Type: general ASA Status: 3          Anesthesia Type: No value filed. Ene Phase I: Ene Score: 9    Ene Phase II: Ene Score: 10      Anesthesia Post Evaluation    Patient location during evaluation: PACU  Patient participation: complete - patient participated  Level of consciousness: awake and alert  Airway patency: patent  Nausea & Vomiting: no nausea and no vomiting  Complications: no  Cardiovascular status: blood pressure returned to baseline  Respiratory status: acceptable  Hydration status: euvolemic  Comments: VSS on transfer to phase 2 recovery. No anesthetic complications.

## 2023-05-03 NOTE — ANESTHESIA PRE PROCEDURE
Department of Anesthesiology  Preprocedure Note       Name:  Damaso Clawson   Age:  70 y.o.  :  1945                                          MRN:  [id number]         Date:  5/3/2023      Surgeon: Meredith Stout):  Daniel Mir MD    Procedure: Procedure(s):  CYSTOSCOPY, POSSIBLE URETHRAL DILATATION, POSSIBLE BLADDER BIOPSY  X  X    Medications prior to admission:   Prior to Admission medications    Medication Sig Start Date End Date Taking? Authorizing Provider   docusate calcium (SURFAK) 240 MG capsule Take by mouth    Historical Provider, MD   ipratropium-albuterol (DUONEB) 0.5-2.5 (3) MG/3ML SOLN nebulizer solution Inhale 3 mLs into the lungs every 6 hours as needed for Shortness of Breath or Other (wheezing) 22  Shoshana Damon DO   levothyroxine (SYNTHROID) 50 MCG tablet Take 1 tablet by mouth Daily    Historical Provider, MD   clopidogrel (PLAVIX) 75 MG tablet Take 1 tablet by mouth daily    Historical Provider, MD   gabapentin (NEURONTIN) 600 MG tablet Take 1 tablet by mouth 3 times daily.     Historical Provider, MD   DULoxetine (CYMBALTA) 30 MG extended release capsule Take 1 capsule by mouth daily    Historical Provider, MD   isosorbide mononitrate (IMDUR) 30 MG extended release tablet Take 1 tablet by mouth daily    Historical Provider, MD   furosemide (LASIX) 40 MG tablet Take 1 tablet by mouth daily    Historical Provider, MD   Multiple Vitamins-Minerals (THERAPEUTIC MULTIVITAMIN-MINERALS) tablet Take 1 tablet by mouth daily    Historical Provider, MD   insulin lispro protamine & lispro (HUMALOG MIX) (75-25) 100 UNIT per ML SUSP injection vial Inject 55 Units into the skin daily (with breakfast)    Historical Provider, MD   albuterol sulfate  (90 Base) MCG/ACT inhaler Inhale 2 puffs into the lungs every 4 hours as needed for Wheezing or Shortness of Breath 20   Loco Otero MD   metoprolol tartrate (LOPRESSOR) 50 MG tablet Take 1 tablet by mouth 2 times daily

## 2023-05-03 NOTE — PROGRESS NOTES
IV x 1 removed per discharge hemostasis achieved, dressing applied, no complications noted. Patient denies further needs. Pt transported to private car via wheel chair. Vitals per doc flow, pt daughter Irma Ugarte assumes responsibility.

## 2023-05-03 NOTE — PROGRESS NOTES
Called and spoke to pt daughter [facility]. Discharge instructions explained in detail over the phone to pt daughter [facility] while at bedside allowing pt to hear as well. Pt and daughter received copy of discharge instructions and prescriptions x 4. Pt  and daughter [facility] deny having any questions about discharge.

## 2023-05-03 NOTE — BRIEF OP NOTE
Brief Postoperative Note      Patient: Ginette Reyna  YOB: 1945  MRN: [id number]    Date of Procedure: 5/3/2023    Pre-Op Diagnosis Codes:     * Gross hematuria [R31.0]    Post-Op Diagnosis: Same       Procedure(s):  CYSTOSCOPY,, Transureteral resection of  BLADDER TUMOR, large, multifocal    Surgeon(s):  Tori Deng MD    Assistant:  * No surgical staff found *    Anesthesia: General    Estimated Blood Loss (mL): Minimal    Complications: None    Specimens:   ID Type Source Tests Collected by Time Destination   A : bladder tumor Tissue Tissue SURGICAL PATHOLOGY Tori Deng MD 5/3/2023 1342        Implants:  * No implants in log *      Drains:   Urinary Catheter 05/03/23 2 Way (Active)       Findings: Multiple tumors involving the lateral walls, bladder neck and dome, invasive, R UO involvement.       Electronically signed by Neli Bautista MD on 5/3/2023 at 2:21 PM

## 2023-05-03 NOTE — PROGRESS NOTES
Pt arrived to PACU from OR in stable condition. Report received from [facility] and [address] [place] [address] Phase I. Care of pt transferred at this time. Pt immediately placed on bedside monitor. NSR, strip printed. Pt arrived to unit without any oxygen requirements. SPO2 94% on RA.

## 2023-05-08 NOTE — OP NOTE
[address]                 [address] [id number]                                OPERATIVE REPORT    PATIENT NAME: Abe Hess                   :        1945  MED REC NO:   [id number]                          ROOM:  ACCOUNT NO:   [de-identified]                           ADMIT DATE: 2023  PROVIDER:     Cristiana Brewer MD    DATE OF PROCEDURE:  2023    PREOPERATIVE DIAGNOSES:  1. History of bladder cancer. 2.  Gross hematuria. POSTOPERATIVE DIAGNOSIS:  Recurrent bladder cancer. OPERATION PERFORMED:  Cystoscopy with transurethral resection of bladder  tumor, multifocality, 8 cm of tissue removed. SURGEON:  Cristiana Brewer MD    ESTIMATED BLOOD LOSS:  For this procedure, 15 mL. HISTORY AND INDICATIONS:  A 59-year-old white female with a known  history of superficial bladder cancer. She has not been seen in over a  year and has recently had gross hematuria. To rule out recurrent  disease, she has been scheduled for today's procedure to include a  cystoscopy. The risks, benefits and expected outcomes of the procedure  were discussed. PROCEDURE IN DETAIL:  After obtaining informed consent, the patient was  taken to the operative suite. She was given a general anesthetic and  placed on the table in a modified dorsal lithotomy position. Prepping  and draping were done in sterile fashion. Cystourethroscopy was then  performed with both 30 and 70-degree lenses. Immediately upon entering  the bladder, multiple tumors were noted. There was evidence of covering  the right ureteral orifice with cancer. The left ureteral orifice was  able to be identified. Resectoscope was then brought in. Resection of  the tumors was then started with some of the smaller ones being removed  in its entirety.   I attempted to resect some of the tumor over the right  ureteral orifice, but at this point I decided not to proceed forth

## 2023-12-11 NOTE — PROGRESS NOTES
Pt states breathing improved after neb but still with sob when sitting up and activity. Ambulatory to bathroom. Gait steady. States right ear pain, headache, non productive cough. Denies n/v/d, fever. In no distress. Skin warm and dry.   This note also relates to the following rows which could not be included:  Resp - Cannot attach notes to unvalidated device data  SpO2 - Cannot attach notes to unvalidated device data       11/30/20 1603   Oxygen Therapy/Pulse Ox   O2 Therapy Oxygen humidified   O2 Device Heated high flow cannula   O2 Flow Rate (L/min) 35 L/min   FiO2  80 %

## 2024-01-15 ENCOUNTER — HOSPITAL ENCOUNTER (OUTPATIENT)
Age: 79
Setting detail: SPECIMEN
Discharge: HOME | End: 2024-01-15
Payer: MEDICARE

## 2024-01-15 LAB
ALBUMIN SERPL-MCNC: 3.3 G/DL (ref 3.4–5)
ALBUMIN/GLOB SERPL: 1 {RATIO} (ref 1.1–2.2)
ALP SERPL-CCNC: 164 U/L (ref 40–129)
ALT SERPL-CCNC: 14 U/L (ref 10–40)
ANION GAP SERPL CALCULATED.3IONS-SCNC: 11 MMOL/L (ref 3–16)
AST SERPL-CCNC: 46 U/L (ref 15–37)
BASOPHILS # BLD: 0.1 K/UL (ref 0–0.2)
BASOPHILS NFR BLD: 1.8 %
BILIRUB SERPL-MCNC: <0.2 MG/DL (ref 0–1)
BUN SERPL-MCNC: 20 MG/DL (ref 7–20)
CALCIUM SERPL-MCNC: 9.5 MG/DL (ref 8.3–10.6)
CHLORIDE SERPL-SCNC: 102 MMOL/L (ref 99–110)
CO2 SERPL-SCNC: 30 MMOL/L (ref 21–32)
CREAT SERPL-MCNC: 1.1 MG/DL (ref 0.6–1.2)
DEPRECATED RDW RBC AUTO: 16.7 % (ref 12.4–15.4)
EOSINOPHIL # BLD: 0.3 K/UL (ref 0–0.6)
EOSINOPHIL NFR BLD: 4.7 %
GFR SERPLBLD CREATININE-BSD FMLA CKD-EPI: 52 ML/MIN/{1.73_M2}
GLUCOSE SERPL-MCNC: 209 MG/DL (ref 70–99)
HCT VFR BLD AUTO: 42.8 % (ref 36–48)
HGB BLD-MCNC: 13.8 G/DL (ref 12–16)
LYMPHOCYTES # BLD: 2 K/UL (ref 1–5.1)
LYMPHOCYTES NFR BLD: 28.1 %
MCH RBC QN AUTO: 24.4 PG (ref 26–34)
MCHC RBC AUTO-ENTMCNC: 32.2 G/DL (ref 31–36)
MCV RBC AUTO: 75.9 FL (ref 80–100)
MONOCYTES # BLD: 0.5 K/UL (ref 0–1.3)
MONOCYTES NFR BLD: 7.1 %
NEUTROPHILS # BLD: 4.2 K/UL (ref 1.7–7.7)
NEUTROPHILS NFR BLD: 58.3 %
PLATELET # BLD AUTO: 268 K/UL (ref 135–450)
PMV BLD AUTO: 9.6 FL (ref 5–10.5)
POTASSIUM SERPL-SCNC: 3.2 MMOL/L (ref 3.5–5.1)
PROT SERPL-MCNC: 6.6 G/DL (ref 6.4–8.2)
RBC # BLD AUTO: 5.64 M/UL (ref 4–5.2)
SODIUM SERPL-SCNC: 143 MMOL/L (ref 136–145)
WBC # BLD AUTO: 7.3 K/UL (ref 4–11)

## 2024-01-15 PROCEDURE — 85652 RBC SED RATE AUTOMATED: CPT

## 2024-01-15 PROCEDURE — 86140 C-REACTIVE PROTEIN: CPT

## 2024-01-15 PROCEDURE — 80053 COMPREHEN METABOLIC PANEL: CPT

## 2024-01-15 PROCEDURE — 85025 COMPLETE CBC W/AUTO DIFF WBC: CPT

## 2024-01-16 LAB
CRP SERPL-MCNC: 6.2 MG/L (ref 0–5.1)
ERYTHROCYTE [SEDIMENTATION RATE] IN BLOOD BY WESTERGREN METHOD: 27 MM/HR (ref 0–30)

## 2024-02-01 NOTE — PROGRESS NOTES
Hospitalist Progress Note      PCP: No primary care provider on file. Date of Admission: 11/29/2020    Chief [address] [facility] Hospital Course:       Subjective:  remains intubated , sedated, on fio2 of 55, was having some intermitent tremors per pt's nurse(noted yesterday also)*       Medications:  Reviewed    Infusion Medications    norepinephrine 2 mcg/min (12/06/20 1439)    midazolam 6 mg/hr (12/06/20 2349)    propofol Stopped (12/04/20 2300)    fentaNYL (SUBLIMAZE) infusion 150 mcg/hr (12/07/20 0730)    dextrose       Scheduled Medications    insulin glargine  55 Units Subcutaneous Nightly    methylPREDNISolone  40 mg Intravenous Q6H    pantoprazole  40 mg Intravenous Daily    insulin lispro  0-18 Units Subcutaneous 6 times per day    carboxymethylcellulose PF  1 drop Both Eyes 6 times per day    chlorhexidine  15 mL Mouth/Throat BID    [Held by provider] metoprolol  50 mg Oral BID    enoxaparin  40 mg Subcutaneous BID     PRN Meds: hydrALAZINE, potassium chloride, sodium chloride flush, magnesium sulfate, acetaminophen **OR** acetaminophen, ondansetron, albuterol sulfate HFA, glucose, dextrose, glucagon (rDNA), dextrose, sodium chloride, albuterol sulfate HFA **AND** ipratropium **AND** MDI Treatment      Intake/Output Summary (Last 24 hours) at 12/7/2020 1005  Last data filed at 12/7/2020 0800  Gross per 24 hour   Intake 1339.2 ml   Output 5210 ml   Net -3870.8 ml       Physical Exam Performed:    BP (!) 145/45   Pulse 62   Temp 99.5 °F (37.5 °C) (Bladder)   Resp 22   Ht 5' 4\" (1.626 m)   Wt 245 lb 6 oz (111.3 kg)   SpO2 95%   BMI 42.12 kg/m²     General appearance: , appears stated age and cooperative. obese, on vent  HEENT: Pupils equal, round, and reactive to light. Conjunctivae/corneas clear. Neck: Supple, with full range of motion. No jugular venous distention. Trachea midline. Respiratory:  Normal respiratory effort.  Clear to auscultation, bilaterally without Occupational Therapy   Treatment    Name: Windy Swann  MRN: [id number]  Admitting Diagnosis:  Generalized weakness       Recommendations:     Discharge Recommendations: Moderate Intensity Therapy  Discharge Equipment Recommendations:  3-in-1 commode  Barriers to discharge:  None    Assessment:     Windy Swann is a 89 y.o. female with a medical diagnosis of Generalized weakness.  She presents with weakness. Performance deficits affecting function are weakness, impaired endurance, impaired self care skills, impaired functional mobility, gait instability, impaired balance, decreased safety awareness, decreased lower extremity function, decreased upper extremity function.     Rehab Prognosis:  Good; patient would benefit from acute skilled OT services to address these deficits and reach maximum level of function.       Plan:     Patient to be seen 5 x/week to address the above listed problems via self-care/home management, therapeutic activities, therapeutic exercises  Plan of Care Expires: 02/07/24  Plan of Care Reviewed with: patient    Subjective     Chief Complaint: Pain and weakness  Patient/Family Comments/goals:  to return to prior level of function  Pain/Comfort:       Objective:     Communicated with: Nurse prior to session.  Patient found supine with bed alarm, oxygen upon OT entry to room.    General Precautions: Standard, fall    Orthopedic Precautions:N/A  Braces: N/A  Respiratory Status: Nasal cannula, flow 2 L/min     Occupational Performance:     Bed Mobility:    Patient completed Rolling/Turning to Left with  independence  Patient completed Rolling/Turning to Right with independence  Patient completed Scooting/Bridging with independence  Patient completed Supine to Sit with independence     Functional Mobility/Transfers:  Patient completed Sit <> Stand Transfer with minimum assistance  with  rolling walker   Patient completed Bed <> Chair Transfer using Step Transfer technique with minimum  Wheezes/Rhonchi. Mild bibas rales  Cardiovascular: Regular rate and rhythm with normal S1/S2 without murmurs, rubs or gallops. Abdomen: Soft, non-tender, non-distended with normal bowel sounds. Musculoskeletal: No clubbing, cyanosis or edema bilaterally.  Full range of motion without deformity. Skin: Skin color, texture, turgor normal.  No rashes or lesions. Neurologic:  did not test as sedated  Psychiatric: sedated  Capillary Refill: Brisk,< 3 seconds   Peripheral Pulses: +2 palpable, equal bilaterally          Labs:   Recent Labs     12/05/20  0543 12/06/20  0554 12/07/20  0620   WBC 11.3* 13.4* 16.2*   HGB 13.5 13.4 14.2   HCT 41.8 42.0 44.0    297 335     Recent Labs     12/05/20  1722 12/06/20  0554 12/07/20  0620    145 148*   K 4.9 5.1 4.9    109 104   CO2 30 32 36*   BUN 73* 84* 89*   CREATININE 1.5* 1.6* 1.4*   CALCIUM 8.2* 8.6 8.7   PHOS 4.4 4.3 4.7     Recent Labs     12/05/20  0543 12/06/20  0554 12/07/20  0620   AST 16 17 22   ALT 22 21 28   BILITOT 0.3 0.3 0.6   ALKPHOS 66 67 64     No results for input(s): INR in the last 72 hours. No results for input(s): Jadene Moh in the last 72 hours. Urinalysis:      Lab Results   Component Value Date    NITRU Negative 12/01/2020    WBCUA see below 12/01/2020    RBCUA >100 12/01/2020    BLOODU LARGE 12/01/2020    SPECGRAV 1.020 12/01/2020    GLUCOSEU 100 12/01/2020       Radiology:  XR CHEST PORTABLE   Final Result   Support apparatus appears stable. Mild congestive failure, pulmonary edema which is similar to slightly   improved when accounting for differences in technique. Superimposed   pneumonia cannot be excluded in a patient with known COVID-19 infection. XR ABDOMEN FOR NG/OG/NE TUBE PLACEMENT   Final Result   Support lines and tubes as above. Worsening diffuse pulmonary opacities relative to 2 days prior in the setting   of known COVID-19 infection.          XR CHEST PORTABLE   Final Result   Support assistance with rolling walker  Patient completed Toilet Transfer Step Transfer technique with minimum assistance with  rolling walker  Functional Mobility: Patient transferred within room with Min A for safety due to patient swaying backward during transfers and nearly losing balance each time. When asked, patient states she does not feel dizzy and doesn't know why she does that.     Activities of Daily Living:  Upper Body Dressing: supervision to ame robe  Lower Body Dressing: minimum assistance to ame socks      [facility] 6 Click ADL:      Treatment & Education:  Pt performed B UE strengthening exercises to include:   Shoulder flexion   Chest press    Elbow flexion   Elbow extension   Pectoral stretches   Bilateral rowing  All exercises performed 3x10 reps using 2# weight and red theraband.     Patient left up in chair with all lines intact and call button in reach    GOALS:   Multidisciplinary Problems       Occupational Therapy Goals          Problem: Occupational Therapy    Goal Priority Disciplines Outcome Interventions   Occupational Therapy Goal     OT, PT/OT Ongoing, Progressing    Description: Grooming Status:   Short Term Goal: Pt will perform grooming with SBA sitting EOB.   Long Term Goal: Pt will perform grooming/oral hygiene standing at sink with SBA      LE dressing Status:   Short Term Goal: Pt will perform LE dressing with SBA.   Long Term Goal: Pt will perform LE dressing with Mod I.    Toileting Status:   Short Term Goal: Pt will perform toilet hygiene on BSC with Mod I.  Long Term Goal: Pt will perform toilet hygiene on toilet with no AE with Mod I.    Commode Transfer:   Short Term Goal: Pt will perform BSC t/f with Mod I.  Long Term Goal:  Pt will perform toilet t/f in bathroom with Mod I.     Bathing Status:   Long Term Goal: Pt will perform sponge bath with Mod I with no unsafe fatigue.     Strength Status: 5/5 BUEs  Long Term Goal: Pt to perform BUE strengthening with weights and/or body  weight to increase ADL independence and safety    Endurance Status:   Short Term Goal:pt to perform 15 min OT treatment with 5 or greater rest breaks  Long Term Goal: pt to perform 30 min OT treat with 3 or less rest breaks                          Time Tracking:     OT Date of Treatment: 02/01/24  OT Start Time: 1009  OT Stop Time: 1100  OT Total Time (min): 51 min    Billable Minutes:Self Care/Home Management 20 min  Therapeutic Activity 11 min   Therapeutic Exercise 20 min      GILBERT Tatum/L, CSRS  OT/SOFYA: OT          2/1/2024   recs  -continued riggins     DVT Prophylaxis: lovenox bid  Diet: DIET TUBE FEED CONTINUOUS/CYCLIC NPO; Diabetic 1.5; Orogastric; Continuous; 10; 55; Exceptions are: Sips with Meds  Code Status: Full Code       PT/OT Eval Status: not possible     Dispo -unclear, icu care, Da Kelley MD

## (undated) DEVICE — BAG DRNGE 2000ML UROLOGY ANTI REFLX CHMBR SAMP PRT

## (undated) DEVICE — GUIDEWIRE ARTHSCP L 3.2MM DISP FOR 4.5/8.5MM BEAMING SYS

## (undated) DEVICE — SUTURE ETHLN SZ 3-0 L18IN NONABSORBABLE BLK FS-1 L24MM 3/8 663H

## (undated) DEVICE — SPLINT QUICK STEP SCOTCHCAST 5 X 30

## (undated) DEVICE — Device

## (undated) DEVICE — SYRINGE MED 10ML SLIP TIP BLNT FILL AND LUERLOCK DISP

## (undated) DEVICE — SINGLE USE SUCTION VALVE MAJ-209: Brand: SINGLE USE SUCTION VALVE (STERILE)

## (undated) DEVICE — CABLE ENDOSCP L10FT ACT DISP

## (undated) DEVICE — CANNULA NSL 13FT TUBE AD ETCO2 DIV SAMP M

## (undated) DEVICE — CONNECTOR TBNG WHT PLAS SUCT STR 5IN1 LTWT W/ M CONN

## (undated) DEVICE — DRESSING,GAUZE,XEROFORM,CURAD,1"X8",ST: Brand: CURAD

## (undated) DEVICE — 3M™ STERI-DRAPE™ U-DRAPE 1015: Brand: STERI-DRAPE™

## (undated) DEVICE — Z CONVERTED USE 2271043 CONTAINER SPEC COLL 4OZ SCR ON LID PEEL PCH

## (undated) DEVICE — PADDING UNDERCAST W4INXL4YD 100% COT CRIMPED FINISH WBRL II

## (undated) DEVICE — SOLUTION IV IRRIG WATER 1000ML POUR BRL 2F7114

## (undated) DEVICE — BASIC CYSTO I-LF: Brand: MEDLINE INDUSTRIES, INC.

## (undated) DEVICE — GLOVE SURG SZ 65 L12IN FNGR THK79MIL GRN LTX FREE

## (undated) DEVICE — 3M™ IOBAN™ 2 ANTIMICROBIAL INCISE DRAPE 6640EZ: Brand: IOBAN™ 2

## (undated) DEVICE — PADDING CAST W4INXL4YD HIGHLY ABSRB THAN COT EZ APPL

## (undated) DEVICE — BANDAGE COBAN 4 IN COMPR W4INXL5YD FOAM COHESIVE QUIK STK SELF ADH SFT

## (undated) DEVICE — GUIDEWIRE ARTHSCP SM 2MM DISP FOR 4.5/8.5MM BEAMING SYS

## (undated) DEVICE — DBD-PACK,CYSTOSCOPY,PK VI,AURORA: Brand: MEDLINE

## (undated) DEVICE — GLOVE ORANGE PI 7 1/2   MSG9075

## (undated) DEVICE — POSITIONER HD REST FOAM CMFRT TCH

## (undated) DEVICE — SYRINGE MED 10ML LUERLOCK TIP W/O SFTY DISP

## (undated) DEVICE — CIRCUIT ANES L72IN 3L BACT AND VIR FLTR EL CONN SGL LIMB

## (undated) DEVICE — SOLUTION IV IRRIG 500ML 0.9% SODIUM CHL 2F7123

## (undated) DEVICE — COUNTER NDL 40 COUNT HLD 70 NUM FOAM BLK SGL MAG W BLDE REMV

## (undated) DEVICE — TOWEL,OR,DSP,ST,BLUE,DLX,8/PK,10PK/CS: Brand: MEDLINE

## (undated) DEVICE — LENS EYEGLASS LTWT REPL DISP SAFEVIEW

## (undated) DEVICE — BOWL MED M 16OZ PLAS CAP GRAD

## (undated) DEVICE — ELECTRODE LOOP 24FR R ANG MPLR CUT

## (undated) DEVICE — GARMENT,MEDLINE,DVT,INT,CALF,MED, GEN2: Brand: MEDLINE

## (undated) DEVICE — PREP SOL PVP IODINE 4%  4 OZ/BTL

## (undated) DEVICE — BANDAGE,ELASTIC,ESMARK,STERILE,6"X9',LF: Brand: MEDLINE

## (undated) DEVICE — Z DISCONTINUED USE 2276105 GOWN PROTCT UNIV CHST W28IN L49IN SL 24IN BLU SPUNBOND FLM

## (undated) DEVICE — SOLUTION IV 1000ML 0.9% SOD CHL

## (undated) DEVICE — VELCLOSE LATEX FREE ELASTIC BANDAGE D/L 6INX10YD

## (undated) DEVICE — SUTURE VCRL SZ 3-0 L27IN ABSRB UD L26MM SH 1/2 CIR J416H

## (undated) DEVICE — SYRINGE MED 50ML LUERSLIP TIP

## (undated) DEVICE — PRECISION (9.0 X 0.51 X 25.0MM)

## (undated) DEVICE — ADAPTER,CATHETER/SYRINGE/LUER,STERILE: Brand: MEDLINE

## (undated) DEVICE — GAUZE,SPONGE,4"X4",8PLY,STRL,LF,10/TRAY: Brand: MEDLINE

## (undated) DEVICE — LINER,SOFT,SUCTION CANISTER,1500CC: Brand: MEDLINE

## (undated) DEVICE — GLOVE SURG SZ 85 L1185IN FNGR THK75MIL STRW LTX POLYMER

## (undated) DEVICE — BOWL MED L 32OZ PLAS W/ MOLD GRAD EZ OPN PEEL PCH

## (undated) DEVICE — SUTURE VCRL SZ 0 L27IN ABSRB UD L36MM CT-1 1/2 CIR J260H

## (undated) DEVICE — FRAME EYEWR PROTCT ASST CLR DISP SAFEVIEW

## (undated) DEVICE — TRAP,MUCUS SPECIMEN, 80CC: Brand: MEDLINE

## (undated) DEVICE — ELECTRODE PT RET AD L9FT HI MOIST COND ADH HYDRGEL CORDED

## (undated) DEVICE — SET IRRIG L94IN DIA0.281IN L BOR W/ 2 N VENT SPIK 2 ON/OFF

## (undated) DEVICE — ADAPTER TBNG DIA15MM SWVL FBROPT BRONCHSCP TERM 2 AXIS PEEP

## (undated) DEVICE — GOWN SIRUS NONREIN LG W/TWL: Brand: MEDLINE INDUSTRIES, INC.

## (undated) DEVICE — FOOT SWITCH DRAPE: Brand: UNBRANDED

## (undated) DEVICE — GLOVE ORANGE PI 8   MSG9080

## (undated) DEVICE — PODIATRY: Brand: MEDLINE INDUSTRIES, INC.

## (undated) DEVICE — SOLUTION IRRIGATION STRL H2O 1000 ML UROMATIC CONTAINER

## (undated) DEVICE — BAG URIN STRL FOR URO CTCH SYS

## (undated) DEVICE — SOLUTION IRRIG 3000ML 1.5% GL USP UROMATIC CONT

## (undated) DEVICE — CATHETER URETH 22FR BLLN 30CC 3 W F SPEC INF CTRL BARDX

## (undated) DEVICE — SYRINGE MED 30ML STD CLR PLAS LUERLOCK TIP N CTRL DISP

## (undated) DEVICE — THIN OFFSET (13.3 X 0.38 X 42.0MM)

## (undated) DEVICE — INTENDED FOR TISSUE SEPARATION, AND OTHER PROCEDURES THAT REQUIRE A SHARP SURGICAL BLADE TO PUNCTURE OR CUT.: Brand: BARD-PARKER ® CARBON RIB-BACK BLADES

## (undated) DEVICE — C-ARM PACK: Brand: C-ARM COVER

## (undated) DEVICE — STRAP SFTY W5XL72IN 1 PC

## (undated) DEVICE — COVER,TABLE,HEAVY DUTY,77"X90",STRL: Brand: MEDLINE

## (undated) DEVICE — T-DRAPE,EXTREMITY,STERILE: Brand: MEDLINE

## (undated) DEVICE — SYRINGE 20ML LL S/C 50

## (undated) DEVICE — BOWL ASSY BM210 DUAL BLADE DISPOSABLE: Brand: MIDAS REX™

## (undated) DEVICE — YANKAUER,BULB TIP,W/O VENT,RIGID,STERILE: Brand: MEDLINE

## (undated) DEVICE — PAD,NON-ADHERENT,3X8,STERILE,LF,1/PK: Brand: MEDLINE

## (undated) DEVICE — CATHETER,FOLEY,100%SILICONE,20FR,10ML,LF: Brand: MEDLINE

## (undated) DEVICE — SINGLE USE BIOPSY VALVE MAJ-210: Brand: SINGLE USE BIOPSY VALVE (STERILE)

## (undated) DEVICE — ROLL COT W11.5INXL11FT 1LB HIGHLY ABSRB SURG GRD

## (undated) DEVICE — TUBING, SUCTION, 3/16" X 10', STRAIGHT: Brand: MEDLINE

## (undated) DEVICE — PROTECTOR ULN NRV PUR FOAM HK LOOP STRP ANATOMICALLY

## (undated) DEVICE — BIT DRL DIA3MM CANN DISP FOR IO FIX IOFIX 2.0

## (undated) DEVICE — PADDING,UNDERCAST,COTTON, 4"X4YD STERILE: Brand: MEDLINE

## (undated) DEVICE — SYRINGE 60ML IRRIG PISTON TOMEY

## (undated) DEVICE — ENDO CARRY-ON PROCEDURE KIT INCLUDES SUCTION TUBING, LUBRICANT, GAUZE, BIOHAZARD STICKER, TRANSPORT PAD AND INTERCEPT BEDSIDE KIT.: Brand: ENDO CARRY-ON PROCEDURE KIT

## (undated) DEVICE — COUNTERSINK SURG SM

## (undated) DEVICE — GLOVE SURG SZ 65 L12IN THK75MIL DK GRN LTX FREE

## (undated) DEVICE — SUTURE PDS II SZ 2-0 L27IN ABSRB VLT L26MM CT-2 1/2 CIR Z333H

## (undated) DEVICE — CONMED SCOPE SAVER BITE BLOCK, 20X27 MM: Brand: SCOPE SAVER

## (undated) DEVICE — TOWEL,STOP FLAG GOLD N-W: Brand: MEDLINE

## (undated) DEVICE — MASK RESP REG WHT POLYPR PCH STYL FLAT FLD N95

## (undated) DEVICE — TUBING, SUCTION, 3/16" X 6', STRAIGHT: Brand: MEDLINE

## (undated) DEVICE — C-ARM: Brand: UNBRANDED

## (undated) DEVICE — TUBING, SUCTION, 3/16" X 12', STRAIGHT: Brand: MEDLINE

## (undated) DEVICE — GLOVE SURG SZ 65 THK91MIL LTX FREE SYN POLYISOPRENE

## (undated) DEVICE — MEDI-VAC NON-CONDUCTIVE SUCTION TUBING: Brand: CARDINAL HEALTH

## (undated) DEVICE — GOWN,SIRUS,POLYRNF,BRTHSLV,LG,30/CS: Brand: MEDLINE